# Patient Record
Sex: MALE | Race: WHITE | Employment: OTHER | URBAN - METROPOLITAN AREA
[De-identification: names, ages, dates, MRNs, and addresses within clinical notes are randomized per-mention and may not be internally consistent; named-entity substitution may affect disease eponyms.]

---

## 2017-01-01 ENCOUNTER — HOSPITAL ENCOUNTER (OUTPATIENT)
Dept: OTHER | Age: 37
Discharge: HOME | End: 2017-01-31
Attending: PSYCHIATRY & NEUROLOGY | Admitting: PSYCHIATRY & NEUROLOGY

## 2017-01-11 LAB
BASOPHILS ABSOLUTE: 0.1 K/CU MM
BASOPHILS RELATIVE PERCENT: 1 % (ref 0–1)
DIFFERENTIAL TYPE: ABNORMAL
EOSINOPHILS ABSOLUTE: 0.5 K/CU MM
EOSINOPHILS RELATIVE PERCENT: 10.3 % (ref 0–3)
HCT VFR BLD CALC: 41.7 % (ref 42–52)
HEMOGLOBIN: 13.1 GM/DL (ref 13.5–18)
IMMATURE NEUTROPHIL %: 0.4 % (ref 0–0.43)
LYMPHOCYTES ABSOLUTE: 2.1 K/CU MM
LYMPHOCYTES RELATIVE PERCENT: 41.9 % (ref 24–44)
MCH RBC QN AUTO: 28 PG (ref 27–31)
MCHC RBC AUTO-ENTMCNC: 31.4 % (ref 32–36)
MCV RBC AUTO: 89.1 FL (ref 78–100)
MONOCYTES ABSOLUTE: 0.4 K/CU MM
MONOCYTES RELATIVE PERCENT: 6.9 % (ref 0–4)
NUCLEATED RBC %: 0 %
PDW BLD-RTO: 15.1 % (ref 11.7–14.9)
PLATELET # BLD: 217 K/CU MM (ref 140–440)
PMV BLD AUTO: 11.2 FL (ref 7.5–11.1)
RBC # BLD: 4.68 M/CU MM (ref 4.6–6.2)
SEGMENTED NEUTROPHILS ABSOLUTE COUNT: 2 K/CU MM
SEGMENTED NEUTROPHILS RELATIVE PERCENT: 39.5 % (ref 36–66)
TOTAL IMMATURE NEUTOROPHIL: 0.02 K/CU MM
TOTAL NUCLEATED RBC: 0 K/CU MM
WBC # BLD: 5.1 K/CU MM (ref 4–10.5)

## 2017-02-01 ENCOUNTER — HOSPITAL ENCOUNTER (OUTPATIENT)
Dept: OTHER | Age: 37
Discharge: HOME | End: 2017-02-28
Attending: PSYCHIATRY & NEUROLOGY | Admitting: PSYCHIATRY & NEUROLOGY

## 2017-02-02 ENCOUNTER — HOSPITAL ENCOUNTER (OUTPATIENT)
Dept: PHYSICAL THERAPY | Age: 37
Discharge: HOME | End: 2017-02-28
Attending: INTERNAL MEDICINE | Admitting: INTERNAL MEDICINE

## 2017-02-02 ASSESSMENT — PAIN DESCRIPTION - DESCRIPTORS: DESCRIPTORS: ACHING

## 2017-02-02 ASSESSMENT — PAIN DESCRIPTION - FREQUENCY: FREQUENCY: CONTINUOUS

## 2017-02-02 ASSESSMENT — PAIN SCALES - GENERAL: PAINLEVEL_OUTOF10: 5

## 2017-02-02 ASSESSMENT — PAIN DESCRIPTION - LOCATION: LOCATION: NECK;BACK

## 2017-02-02 ASSESSMENT — PAIN DESCRIPTION - PAIN TYPE: TYPE: CHRONIC PAIN

## 2017-02-07 ENCOUNTER — HOSPITAL ENCOUNTER (OUTPATIENT)
Dept: PHYSICAL THERAPY | Age: 37
Discharge: HOME | End: 2017-02-07
Admitting: INTERNAL MEDICINE

## 2017-02-08 LAB
BASOPHILS ABSOLUTE: 0 K/CU MM
BASOPHILS RELATIVE PERCENT: 0.6 % (ref 0–1)
DIFFERENTIAL TYPE: ABNORMAL
EOSINOPHILS ABSOLUTE: 0.5 K/CU MM
EOSINOPHILS RELATIVE PERCENT: 7.2 % (ref 0–3)
HCT VFR BLD CALC: 40.8 % (ref 42–52)
HEMOGLOBIN: 13 GM/DL (ref 13.5–18)
IMMATURE NEUTROPHIL %: 1 % (ref 0–0.43)
LYMPHOCYTES ABSOLUTE: 1.7 K/CU MM
LYMPHOCYTES RELATIVE PERCENT: 25.2 % (ref 24–44)
MCH RBC QN AUTO: 28.8 PG (ref 27–31)
MCHC RBC AUTO-ENTMCNC: 31.9 % (ref 32–36)
MCV RBC AUTO: 90.5 FL (ref 78–100)
MONOCYTES ABSOLUTE: 0.4 K/CU MM
MONOCYTES RELATIVE PERCENT: 5.6 % (ref 0–4)
NUCLEATED RBC %: 0 %
PDW BLD-RTO: 13.6 % (ref 11.7–14.9)
PLATELET # BLD: 262 K/CU MM (ref 140–440)
PMV BLD AUTO: 10.8 FL (ref 7.5–11.1)
RBC # BLD: 4.51 M/CU MM (ref 4.6–6.2)
SEGMENTED NEUTROPHILS ABSOLUTE COUNT: 4.2 K/CU MM
SEGMENTED NEUTROPHILS RELATIVE PERCENT: 60.4 % (ref 36–66)
TOTAL IMMATURE NEUTOROPHIL: 0.07 K/CU MM
TOTAL NUCLEATED RBC: 0 K/CU MM
WBC # BLD: 6.9 K/CU MM (ref 4–10.5)

## 2017-02-16 ENCOUNTER — HOSPITAL ENCOUNTER (OUTPATIENT)
Dept: PHYSICAL THERAPY | Age: 37
Discharge: HOME | End: 2017-02-16
Admitting: INTERNAL MEDICINE

## 2017-02-28 ENCOUNTER — HOSPITAL ENCOUNTER (OUTPATIENT)
Dept: PHYSICAL THERAPY | Age: 37
Discharge: HOME | End: 2017-02-28
Admitting: INTERNAL MEDICINE

## 2017-03-01 ENCOUNTER — HOSPITAL ENCOUNTER (OUTPATIENT)
Dept: OTHER | Age: 37
Discharge: HOME | End: 2017-03-13
Attending: INTERNAL MEDICINE | Admitting: INTERNAL MEDICINE

## 2017-03-01 ENCOUNTER — HOSPITAL ENCOUNTER (OUTPATIENT)
Dept: OTHER | Age: 37
Discharge: HOME | End: 2017-03-31
Attending: PSYCHIATRY & NEUROLOGY | Admitting: PSYCHIATRY & NEUROLOGY

## 2017-03-08 ENCOUNTER — HOSPITAL ENCOUNTER (OUTPATIENT)
Dept: PHYSICAL THERAPY | Age: 37
Discharge: HOME | End: 2017-03-08
Admitting: INTERNAL MEDICINE

## 2017-03-08 LAB
BASOPHILS ABSOLUTE: 0 K/CU MM
BASOPHILS RELATIVE PERCENT: 0.9 % (ref 0–1)
DIFFERENTIAL TYPE: ABNORMAL
EOSINOPHILS ABSOLUTE: 0.5 K/CU MM
EOSINOPHILS RELATIVE PERCENT: 10.5 % (ref 0–3)
HCT VFR BLD CALC: 39.9 % (ref 42–52)
HEMOGLOBIN: 12.6 GM/DL (ref 13.5–18)
IMMATURE NEUTROPHIL %: 0.9 % (ref 0–0.43)
LYMPHOCYTES ABSOLUTE: 1.8 K/CU MM
LYMPHOCYTES RELATIVE PERCENT: 39.7 % (ref 24–44)
MCH RBC QN AUTO: 28.4 PG (ref 27–31)
MCHC RBC AUTO-ENTMCNC: 31.6 % (ref 32–36)
MCV RBC AUTO: 90.1 FL (ref 78–100)
MONOCYTES ABSOLUTE: 0.3 K/CU MM
MONOCYTES RELATIVE PERCENT: 7.4 % (ref 0–4)
NUCLEATED RBC %: 0 %
PDW BLD-RTO: 13.1 % (ref 11.7–14.9)
PLATELET # BLD: 232 K/CU MM (ref 140–440)
PMV BLD AUTO: 11.3 FL (ref 7.5–11.1)
RBC # BLD: 4.43 M/CU MM (ref 4.6–6.2)
SEGMENTED NEUTROPHILS ABSOLUTE COUNT: 1.8 K/CU MM
SEGMENTED NEUTROPHILS RELATIVE PERCENT: 40.6 % (ref 36–66)
TOTAL IMMATURE NEUTOROPHIL: 0.04 K/CU MM
TOTAL NUCLEATED RBC: 0 K/CU MM
WBC # BLD: 4.5 K/CU MM (ref 4–10.5)

## 2017-04-01 ENCOUNTER — HOSPITAL ENCOUNTER (OUTPATIENT)
Dept: OTHER | Age: 37
Discharge: HOME | End: 2017-04-30
Attending: PSYCHIATRY & NEUROLOGY | Admitting: PSYCHIATRY & NEUROLOGY

## 2017-04-05 LAB
BASOPHILS ABSOLUTE: 0.1 K/CU MM
BASOPHILS RELATIVE PERCENT: 0.9 % (ref 0–1)
DIFFERENTIAL TYPE: ABNORMAL
EOSINOPHILS ABSOLUTE: 0.6 K/CU MM
EOSINOPHILS RELATIVE PERCENT: 9.1 % (ref 0–3)
HCT VFR BLD CALC: 39.3 % (ref 42–52)
HEMOGLOBIN: 12.4 GM/DL (ref 13.5–18)
IMMATURE NEUTROPHIL %: 0.9 % (ref 0–0.43)
LYMPHOCYTES ABSOLUTE: 2.2 K/CU MM
LYMPHOCYTES RELATIVE PERCENT: 32.6 % (ref 24–44)
MCH RBC QN AUTO: 28.4 PG (ref 27–31)
MCHC RBC AUTO-ENTMCNC: 31.6 % (ref 32–36)
MCV RBC AUTO: 89.9 FL (ref 78–100)
MONOCYTES ABSOLUTE: 0.5 K/CU MM
MONOCYTES RELATIVE PERCENT: 6.9 % (ref 0–4)
NUCLEATED RBC %: 0 %
PDW BLD-RTO: 13 % (ref 11.7–14.9)
PLATELET # BLD: 256 K/CU MM (ref 140–440)
PMV BLD AUTO: 11.4 FL (ref 7.5–11.1)
RBC # BLD: 4.37 M/CU MM (ref 4.6–6.2)
SEGMENTED NEUTROPHILS ABSOLUTE COUNT: 3.3 K/CU MM
SEGMENTED NEUTROPHILS RELATIVE PERCENT: 49.6 % (ref 36–66)
TOTAL IMMATURE NEUTOROPHIL: 0.06 K/CU MM
TOTAL NUCLEATED RBC: 0 K/CU MM
WBC # BLD: 6.6 K/CU MM (ref 4–10.5)

## 2017-05-01 ENCOUNTER — HOSPITAL ENCOUNTER (OUTPATIENT)
Dept: OTHER | Age: 37
Discharge: HOME | End: 2017-05-31
Attending: PSYCHIATRY & NEUROLOGY | Admitting: PSYCHIATRY & NEUROLOGY

## 2017-05-03 LAB
BASOPHILS ABSOLUTE: 0.1 K/CU MM
BASOPHILS RELATIVE PERCENT: 1.3 % (ref 0–1)
DIFFERENTIAL TYPE: ABNORMAL
EOSINOPHILS ABSOLUTE: 1 K/CU MM
EOSINOPHILS RELATIVE PERCENT: 16.6 % (ref 0–3)
HCT VFR BLD CALC: 41 % (ref 42–52)
HEMOGLOBIN: 11.8 GM/DL (ref 13.5–18)
IMMATURE NEUTROPHIL %: 1.3 % (ref 0–0.43)
LYMPHOCYTES ABSOLUTE: 2 K/CU MM
LYMPHOCYTES RELATIVE PERCENT: 32.8 % (ref 24–44)
MCH RBC QN AUTO: 28.8 PG (ref 27–31)
MCHC RBC AUTO-ENTMCNC: 28.8 % (ref 32–36)
MCV RBC AUTO: 100 FL (ref 78–100)
MONOCYTES ABSOLUTE: 0.5 K/CU MM
MONOCYTES RELATIVE PERCENT: 8.5 % (ref 0–4)
NUCLEATED RBC %: 0.3 %
PDW BLD-RTO: 13.3 % (ref 11.7–14.9)
PLATELET # BLD: 240 K/CU MM (ref 140–440)
PMV BLD AUTO: 10.6 FL (ref 7.5–11.1)
RBC # BLD: 4.1 M/CU MM (ref 4.6–6.2)
SEGMENTED NEUTROPHILS ABSOLUTE COUNT: 2.4 K/CU MM
SEGMENTED NEUTROPHILS RELATIVE PERCENT: 39.5 % (ref 36–66)
TOTAL IMMATURE NEUTOROPHIL: 0.08 K/CU MM
TOTAL NUCLEATED RBC: 0 K/CU MM
WBC # BLD: 6 K/CU MM (ref 4–10.5)

## 2017-06-01 ENCOUNTER — HOSPITAL ENCOUNTER (OUTPATIENT)
Dept: OTHER | Age: 37
Discharge: HOME | End: 2017-06-30
Attending: PSYCHIATRY & NEUROLOGY | Admitting: PSYCHIATRY & NEUROLOGY

## 2017-06-07 LAB
BASOPHILS ABSOLUTE: 0.1 K/CU MM
BASOPHILS RELATIVE PERCENT: 1 % (ref 0–1)
DIFFERENTIAL TYPE: ABNORMAL
EOSINOPHILS ABSOLUTE: 0.7 K/CU MM
EOSINOPHILS RELATIVE PERCENT: 12.1 % (ref 0–3)
HCT VFR BLD CALC: 39.1 % (ref 42–52)
HEMOGLOBIN: 12.3 GM/DL (ref 13.5–18)
IMMATURE NEUTROPHIL %: 1.4 % (ref 0–0.43)
LYMPHOCYTES ABSOLUTE: 1.6 K/CU MM
LYMPHOCYTES RELATIVE PERCENT: 27.1 % (ref 24–44)
MCH RBC QN AUTO: 28.9 PG (ref 27–31)
MCHC RBC AUTO-ENTMCNC: 31.5 % (ref 32–36)
MCV RBC AUTO: 91.8 FL (ref 78–100)
MONOCYTES ABSOLUTE: 0.3 K/CU MM
MONOCYTES RELATIVE PERCENT: 5 % (ref 0–4)
NUCLEATED RBC %: 0 %
PDW BLD-RTO: 13 % (ref 11.7–14.9)
PLATELET # BLD: 248 K/CU MM (ref 140–440)
PMV BLD AUTO: 11.3 FL (ref 7.5–11.1)
RBC # BLD: 4.26 M/CU MM (ref 4.6–6.2)
SEGMENTED NEUTROPHILS ABSOLUTE COUNT: 3.1 K/CU MM
SEGMENTED NEUTROPHILS RELATIVE PERCENT: 53.4 % (ref 36–66)
TOTAL IMMATURE NEUTOROPHIL: 0.08 K/CU MM
TOTAL NUCLEATED RBC: 0 K/CU MM
WBC # BLD: 5.8 K/CU MM (ref 4–10.5)

## 2017-07-01 ENCOUNTER — HOSPITAL ENCOUNTER (OUTPATIENT)
Dept: OTHER | Age: 37
Discharge: HOME | End: 2017-07-31
Attending: PSYCHIATRY & NEUROLOGY | Admitting: PSYCHIATRY & NEUROLOGY

## 2017-07-12 LAB
BASOPHILS ABSOLUTE: 0.1 K/CU MM
BASOPHILS RELATIVE PERCENT: 1.3 % (ref 0–1)
DIFFERENTIAL TYPE: ABNORMAL
EOSINOPHILS ABSOLUTE: 0.6 K/CU MM
EOSINOPHILS RELATIVE PERCENT: 9 % (ref 0–3)
HCT VFR BLD CALC: 39.8 % (ref 42–52)
HEMOGLOBIN: 12.8 GM/DL (ref 13.5–18)
IMMATURE NEUTROPHIL %: 0.6 % (ref 0–0.43)
LYMPHOCYTES ABSOLUTE: 2.3 K/CU MM
LYMPHOCYTES RELATIVE PERCENT: 36.9 % (ref 24–44)
MCH RBC QN AUTO: 29 PG (ref 27–31)
MCHC RBC AUTO-ENTMCNC: 32.2 % (ref 32–36)
MCV RBC AUTO: 90.2 FL (ref 78–100)
MONOCYTES ABSOLUTE: 0.5 K/CU MM
MONOCYTES RELATIVE PERCENT: 8.2 % (ref 0–4)
NUCLEATED RBC %: 0 %
PDW BLD-RTO: 12.8 % (ref 11.7–14.9)
PLATELET # BLD: 264 K/CU MM (ref 140–440)
PMV BLD AUTO: 11 FL (ref 7.5–11.1)
RBC # BLD: 4.41 M/CU MM (ref 4.6–6.2)
SEGMENTED NEUTROPHILS ABSOLUTE COUNT: 2.7 K/CU MM
SEGMENTED NEUTROPHILS RELATIVE PERCENT: 44 % (ref 36–66)
TOTAL IMMATURE NEUTOROPHIL: 0.04 K/CU MM
TOTAL NUCLEATED RBC: 0 K/CU MM
WBC # BLD: 6.2 K/CU MM (ref 4–10.5)

## 2017-08-01 ENCOUNTER — HOSPITAL ENCOUNTER (OUTPATIENT)
Dept: OTHER | Age: 37
Discharge: HOME | End: 2017-08-31
Attending: PSYCHIATRY & NEUROLOGY | Admitting: PSYCHIATRY & NEUROLOGY

## 2017-09-01 ENCOUNTER — HOSPITAL ENCOUNTER (OUTPATIENT)
Dept: OTHER | Age: 37
Discharge: HOME | End: 2017-09-30
Attending: PSYCHIATRY & NEUROLOGY | Admitting: PSYCHIATRY & NEUROLOGY

## 2017-10-01 ENCOUNTER — HOSPITAL ENCOUNTER (OUTPATIENT)
Dept: OTHER | Age: 37
Discharge: HOME | End: 2017-10-31
Attending: PSYCHIATRY & NEUROLOGY | Admitting: PSYCHIATRY & NEUROLOGY

## 2017-10-04 LAB
BASOPHILS ABSOLUTE: 0.1 K/CU MM
BASOPHILS RELATIVE PERCENT: 0.9 % (ref 0–1)
DIFFERENTIAL TYPE: ABNORMAL
EOSINOPHILS ABSOLUTE: 0.5 K/CU MM
EOSINOPHILS RELATIVE PERCENT: 6.9 % (ref 0–3)
HCT VFR BLD CALC: 37.5 % (ref 42–52)
HEMOGLOBIN: 12.2 GM/DL (ref 13.5–18)
IMMATURE NEUTROPHIL %: 1.4 % (ref 0–0.43)
LYMPHOCYTES ABSOLUTE: 2.7 K/CU MM
LYMPHOCYTES RELATIVE PERCENT: 34.1 % (ref 24–44)
MCH RBC QN AUTO: 28.8 PG (ref 27–31)
MCHC RBC AUTO-ENTMCNC: 32.5 % (ref 32–36)
MCV RBC AUTO: 88.4 FL (ref 78–100)
MONOCYTES ABSOLUTE: 0.5 K/CU MM
MONOCYTES RELATIVE PERCENT: 6.2 % (ref 0–4)
NUCLEATED RBC %: 0 %
PDW BLD-RTO: 13.5 % (ref 11.7–14.9)
PLATELET # BLD: 252 K/CU MM (ref 140–440)
PMV BLD AUTO: 11.1 FL (ref 7.5–11.1)
RBC # BLD: 4.24 M/CU MM (ref 4.6–6.2)
SEGMENTED NEUTROPHILS ABSOLUTE COUNT: 3.9 K/CU MM
SEGMENTED NEUTROPHILS RELATIVE PERCENT: 50.5 % (ref 36–66)
TOTAL IMMATURE NEUTOROPHIL: 0.11 K/CU MM
TOTAL NUCLEATED RBC: 0 K/CU MM
WBC # BLD: 7.8 K/CU MM (ref 4–10.5)

## 2017-11-01 ENCOUNTER — HOSPITAL ENCOUNTER (OUTPATIENT)
Dept: OTHER | Age: 37
Discharge: HOME | End: 2017-11-30
Attending: PSYCHIATRY & NEUROLOGY | Admitting: PSYCHIATRY & NEUROLOGY

## 2017-12-01 ENCOUNTER — HOSPITAL ENCOUNTER (OUTPATIENT)
Dept: OTHER | Age: 37
Discharge: HOME | End: 2017-12-31
Attending: PSYCHIATRY & NEUROLOGY | Admitting: PSYCHIATRY & NEUROLOGY

## 2017-12-06 LAB
BASOPHILS ABSOLUTE: 0.1 K/CU MM
BASOPHILS RELATIVE PERCENT: 1.3 % (ref 0–1)
DIFFERENTIAL TYPE: ABNORMAL
EOSINOPHILS ABSOLUTE: 0.7 K/CU MM
EOSINOPHILS RELATIVE PERCENT: 12.9 % (ref 0–3)
HCT VFR BLD CALC: 39.3 % (ref 42–52)
HEMOGLOBIN: 12.4 GM/DL (ref 13.5–18)
IMMATURE NEUTROPHIL %: 0.9 % (ref 0–0.43)
LYMPHOCYTES ABSOLUTE: 1.9 K/CU MM
LYMPHOCYTES RELATIVE PERCENT: 34.7 % (ref 24–44)
MCH RBC QN AUTO: 28.5 PG (ref 27–31)
MCHC RBC AUTO-ENTMCNC: 31.6 % (ref 32–36)
MCV RBC AUTO: 90.3 FL (ref 78–100)
MONOCYTES ABSOLUTE: 0.5 K/CU MM
MONOCYTES RELATIVE PERCENT: 8.2 % (ref 0–4)
NUCLEATED RBC %: 0 %
PDW BLD-RTO: 14.6 % (ref 11.7–14.9)
PLATELET # BLD: 251 K/CU MM (ref 140–440)
PMV BLD AUTO: 11.5 FL (ref 7.5–11.1)
RBC # BLD: 4.35 M/CU MM (ref 4.6–6.2)
SEGMENTED NEUTROPHILS ABSOLUTE COUNT: 2.4 K/CU MM
SEGMENTED NEUTROPHILS RELATIVE PERCENT: 42 % (ref 36–66)
TOTAL IMMATURE NEUTOROPHIL: 0.05 K/CU MM
TOTAL NUCLEATED RBC: 0 K/CU MM
WBC # BLD: 5.6 K/CU MM (ref 4–10.5)

## 2018-01-01 ENCOUNTER — HOSPITAL ENCOUNTER (OUTPATIENT)
Dept: OTHER | Age: 38
Discharge: HOME | End: 2018-01-31
Attending: PSYCHIATRY & NEUROLOGY | Admitting: PSYCHIATRY & NEUROLOGY

## 2018-01-03 LAB
BASOPHILS ABSOLUTE: 0 K/CU MM
BASOPHILS RELATIVE PERCENT: 0.3 % (ref 0–1)
DIFFERENTIAL TYPE: ABNORMAL
EOSINOPHILS ABSOLUTE: 0 K/CU MM
EOSINOPHILS RELATIVE PERCENT: 0.1 % (ref 0–3)
HCT VFR BLD CALC: 36.9 % (ref 42–52)
HEMOGLOBIN: 12 GM/DL (ref 13.5–18)
IMMATURE NEUTROPHIL %: 0.3 % (ref 0–0.43)
LYMPHOCYTES ABSOLUTE: 1.4 K/CU MM
LYMPHOCYTES RELATIVE PERCENT: 15.7 % (ref 24–44)
MCH RBC QN AUTO: 28.6 PG (ref 27–31)
MCHC RBC AUTO-ENTMCNC: 32.5 % (ref 32–36)
MCV RBC AUTO: 88.1 FL (ref 78–100)
MONOCYTES ABSOLUTE: 0.5 K/CU MM
MONOCYTES RELATIVE PERCENT: 5.6 % (ref 0–4)
NUCLEATED RBC %: 0 %
PDW BLD-RTO: 13.9 % (ref 11.7–14.9)
PLATELET # BLD: 275 K/CU MM (ref 140–440)
PMV BLD AUTO: 11.5 FL (ref 7.5–11.1)
RBC # BLD: 4.19 M/CU MM (ref 4.6–6.2)
SEGMENTED NEUTROPHILS ABSOLUTE COUNT: 7.2 K/CU MM
SEGMENTED NEUTROPHILS RELATIVE PERCENT: 78 % (ref 36–66)
TOTAL IMMATURE NEUTOROPHIL: 0.03 K/CU MM
TOTAL NUCLEATED RBC: 0 K/CU MM
WBC # BLD: 9.2 K/CU MM (ref 4–10.5)

## 2018-02-01 ENCOUNTER — HOSPITAL ENCOUNTER (OUTPATIENT)
Dept: OTHER | Age: 38
Discharge: HOME | End: 2018-02-28
Attending: PSYCHIATRY & NEUROLOGY | Admitting: PSYCHIATRY & NEUROLOGY

## 2018-02-07 LAB
BASOPHILS ABSOLUTE: 0.1 K/CU MM
BASOPHILS RELATIVE PERCENT: 0.7 % (ref 0–1)
DIFFERENTIAL TYPE: ABNORMAL
EOSINOPHILS ABSOLUTE: 0.9 K/CU MM
EOSINOPHILS RELATIVE PERCENT: 13.1 % (ref 0–3)
HCT VFR BLD CALC: 36.1 % (ref 42–52)
HEMOGLOBIN: 11.5 GM/DL (ref 13.5–18)
IMMATURE NEUTROPHIL %: 0.7 % (ref 0–0.43)
LYMPHOCYTES ABSOLUTE: 2.3 K/CU MM
LYMPHOCYTES RELATIVE PERCENT: 33.6 % (ref 24–44)
MCH RBC QN AUTO: 28.8 PG (ref 27–31)
MCHC RBC AUTO-ENTMCNC: 31.9 % (ref 32–36)
MCV RBC AUTO: 90.3 FL (ref 78–100)
MONOCYTES ABSOLUTE: 0.5 K/CU MM
MONOCYTES RELATIVE PERCENT: 7.8 % (ref 0–4)
NUCLEATED RBC %: 0 %
PDW BLD-RTO: 13.6 % (ref 11.7–14.9)
PLATELET # BLD: 237 K/CU MM (ref 140–440)
PMV BLD AUTO: 11.2 FL (ref 7.5–11.1)
RBC # BLD: 4 M/CU MM (ref 4.6–6.2)
SEGMENTED NEUTROPHILS ABSOLUTE COUNT: 3 K/CU MM
SEGMENTED NEUTROPHILS RELATIVE PERCENT: 44.1 % (ref 36–66)
TOTAL IMMATURE NEUTOROPHIL: 0.05 K/CU MM
TOTAL NUCLEATED RBC: 0 K/CU MM
WBC # BLD: 6.7 K/CU MM (ref 4–10.5)

## 2018-03-01 ENCOUNTER — HOSPITAL ENCOUNTER (OUTPATIENT)
Dept: OTHER | Age: 38
Discharge: HOME | End: 2018-03-31
Attending: PSYCHIATRY & NEUROLOGY | Admitting: PSYCHIATRY & NEUROLOGY

## 2018-03-07 LAB
BASOPHILS ABSOLUTE: 0.1 K/CU MM
BASOPHILS RELATIVE PERCENT: 1.4 % (ref 0–1)
DIFFERENTIAL TYPE: ABNORMAL
EOSINOPHILS ABSOLUTE: 0.7 K/CU MM
EOSINOPHILS RELATIVE PERCENT: 11.6 % (ref 0–3)
HCT VFR BLD CALC: 38.4 % (ref 42–52)
HEMOGLOBIN: 11.8 GM/DL (ref 13.5–18)
IMMATURE NEUTROPHIL %: 0.9 % (ref 0–0.43)
LYMPHOCYTES ABSOLUTE: 1.8 K/CU MM
LYMPHOCYTES RELATIVE PERCENT: 31.1 % (ref 24–44)
MCH RBC QN AUTO: 28.2 PG (ref 27–31)
MCHC RBC AUTO-ENTMCNC: 30.7 % (ref 32–36)
MCV RBC AUTO: 91.9 FL (ref 78–100)
MONOCYTES ABSOLUTE: 0.3 K/CU MM
MONOCYTES RELATIVE PERCENT: 5.8 % (ref 0–4)
NUCLEATED RBC %: 0 %
PDW BLD-RTO: 14.2 % (ref 11.7–14.9)
PLATELET # BLD: 263 K/CU MM (ref 140–440)
PMV BLD AUTO: 11 FL (ref 7.5–11.1)
RBC # BLD: 4.18 M/CU MM (ref 4.6–6.2)
SEGMENTED NEUTROPHILS ABSOLUTE COUNT: 2.8 K/CU MM
SEGMENTED NEUTROPHILS RELATIVE PERCENT: 49.2 % (ref 36–66)
TOTAL IMMATURE NEUTOROPHIL: 0.05 K/CU MM
TOTAL NUCLEATED RBC: 0 K/CU MM
WBC # BLD: 5.7 K/CU MM (ref 4–10.5)

## 2018-04-01 ENCOUNTER — HOSPITAL ENCOUNTER (OUTPATIENT)
Dept: OTHER | Age: 38
Discharge: HOME | End: 2018-04-30
Attending: PSYCHIATRY & NEUROLOGY | Admitting: PSYCHIATRY & NEUROLOGY

## 2018-04-04 LAB
BASOPHILS ABSOLUTE: 0.1 K/CU MM
BASOPHILS RELATIVE PERCENT: 0.8 % (ref 0–1)
DIFFERENTIAL TYPE: ABNORMAL
EOSINOPHILS ABSOLUTE: 1.1 K/CU MM
EOSINOPHILS RELATIVE PERCENT: 12.9 % (ref 0–3)
HCT VFR BLD CALC: 37.3 % (ref 42–52)
HEMOGLOBIN: 11.7 GM/DL (ref 13.5–18)
IMMATURE NEUTROPHIL %: 1.4 % (ref 0–0.43)
LYMPHOCYTES ABSOLUTE: 2.2 K/CU MM
LYMPHOCYTES RELATIVE PERCENT: 26.4 % (ref 24–44)
MCH RBC QN AUTO: 28.9 PG (ref 27–31)
MCHC RBC AUTO-ENTMCNC: 31.4 % (ref 32–36)
MCV RBC AUTO: 92.1 FL (ref 78–100)
MONOCYTES ABSOLUTE: 0.5 K/CU MM
MONOCYTES RELATIVE PERCENT: 6.1 % (ref 0–4)
NUCLEATED RBC %: 0 %
PDW BLD-RTO: 13.6 % (ref 11.7–14.9)
PLATELET # BLD: 267 K/CU MM (ref 140–440)
PMV BLD AUTO: 10.7 FL (ref 7.5–11.1)
RBC # BLD: 4.05 M/CU MM (ref 4.6–6.2)
SEGMENTED NEUTROPHILS ABSOLUTE COUNT: 4.4 K/CU MM
SEGMENTED NEUTROPHILS RELATIVE PERCENT: 52.4 % (ref 36–66)
TOTAL IMMATURE NEUTOROPHIL: 0.12 K/CU MM
TOTAL NUCLEATED RBC: 0 K/CU MM
WBC # BLD: 8.5 K/CU MM (ref 4–10.5)

## 2018-05-01 ENCOUNTER — HOSPITAL ENCOUNTER (OUTPATIENT)
Dept: OTHER | Age: 38
Discharge: HOME | End: 2018-05-31
Attending: PSYCHIATRY & NEUROLOGY | Admitting: PSYCHIATRY & NEUROLOGY

## 2018-05-02 LAB
BASOPHILS ABSOLUTE: 0.1 K/CU MM
BASOPHILS RELATIVE PERCENT: 1.4 % (ref 0–1)
DIFFERENTIAL TYPE: ABNORMAL
EOSINOPHILS ABSOLUTE: 0.8 K/CU MM
EOSINOPHILS RELATIVE PERCENT: 14.9 % (ref 0–3)
HCT VFR BLD CALC: 38.3 % (ref 42–52)
HEMOGLOBIN: 12 GM/DL (ref 13.5–18)
IMMATURE NEUTROPHIL %: 0.2 % (ref 0–0.43)
LYMPHOCYTES ABSOLUTE: 2.3 K/CU MM
LYMPHOCYTES RELATIVE PERCENT: 45 % (ref 24–44)
MCH RBC QN AUTO: 28.5 PG (ref 27–31)
MCHC RBC AUTO-ENTMCNC: 31.3 % (ref 32–36)
MCV RBC AUTO: 91 FL (ref 78–100)
MONOCYTES ABSOLUTE: 0.4 K/CU MM
MONOCYTES RELATIVE PERCENT: 8.5 % (ref 0–4)
NUCLEATED RBC %: 0 %
PDW BLD-RTO: 13.2 % (ref 11.7–14.9)
PLATELET # BLD: 242 K/CU MM (ref 140–440)
PMV BLD AUTO: 11 FL (ref 7.5–11.1)
RBC # BLD: 4.21 M/CU MM (ref 4.6–6.2)
SEGMENTED NEUTROPHILS ABSOLUTE COUNT: 1.5 K/CU MM
SEGMENTED NEUTROPHILS RELATIVE PERCENT: 30 % (ref 36–66)
TOTAL IMMATURE NEUTOROPHIL: 0.01 K/CU MM
TOTAL NUCLEATED RBC: 0 K/CU MM
WBC # BLD: 5.1 K/CU MM (ref 4–10.5)

## 2018-06-01 ENCOUNTER — HOSPITAL ENCOUNTER (OUTPATIENT)
Dept: OTHER | Age: 38
Discharge: HOME | End: 2018-06-30
Attending: PSYCHIATRY & NEUROLOGY | Admitting: PSYCHIATRY & NEUROLOGY

## 2018-06-06 LAB
BASOPHILS ABSOLUTE: 0.1 K/CU MM
BASOPHILS RELATIVE PERCENT: 0.6 % (ref 0–1)
DIFFERENTIAL TYPE: ABNORMAL
EOSINOPHILS ABSOLUTE: 0.9 K/CU MM
EOSINOPHILS RELATIVE PERCENT: 11.8 % (ref 0–3)
HCT VFR BLD CALC: 38.7 % (ref 42–52)
HEMOGLOBIN: 12.3 GM/DL (ref 13.5–18)
IMMATURE NEUTROPHIL %: 0.3 % (ref 0–0.43)
LYMPHOCYTES ABSOLUTE: 2.9 K/CU MM
LYMPHOCYTES RELATIVE PERCENT: 37.1 % (ref 24–44)
MCH RBC QN AUTO: 28.9 PG (ref 27–31)
MCHC RBC AUTO-ENTMCNC: 31.8 % (ref 32–36)
MCV RBC AUTO: 90.8 FL (ref 78–100)
MONOCYTES ABSOLUTE: 0.6 K/CU MM
MONOCYTES RELATIVE PERCENT: 7.3 % (ref 0–4)
NUCLEATED RBC %: 0 %
PDW BLD-RTO: 13.4 % (ref 11.7–14.9)
PLATELET # BLD: 237 K/CU MM (ref 140–440)
PMV BLD AUTO: 11.4 FL (ref 7.5–11.1)
RBC # BLD: 4.26 M/CU MM (ref 4.6–6.2)
SEGMENTED NEUTROPHILS ABSOLUTE COUNT: 3.3 K/CU MM
SEGMENTED NEUTROPHILS RELATIVE PERCENT: 42.9 % (ref 36–66)
TOTAL IMMATURE NEUTOROPHIL: 0.02 K/CU MM
TOTAL NUCLEATED RBC: 0 K/CU MM
WBC # BLD: 7.8 K/CU MM (ref 4–10.5)

## 2018-07-01 ENCOUNTER — HOSPITAL ENCOUNTER (OUTPATIENT)
Dept: OTHER | Age: 38
Discharge: HOME | End: 2018-07-31
Attending: PSYCHIATRY & NEUROLOGY | Admitting: PSYCHIATRY & NEUROLOGY

## 2018-07-11 LAB
BASOPHILS ABSOLUTE: 0.1 K/CU MM
BASOPHILS RELATIVE PERCENT: 1.3 % (ref 0–1)
DIFFERENTIAL TYPE: ABNORMAL
EOSINOPHILS ABSOLUTE: 0.3 K/CU MM
EOSINOPHILS RELATIVE PERCENT: 6.2 % (ref 0–3)
HCT VFR BLD CALC: 40.9 % (ref 42–52)
HEMOGLOBIN: 12.5 GM/DL (ref 13.5–18)
IMMATURE NEUTROPHIL %: 1.1 % (ref 0–0.43)
LYMPHOCYTES ABSOLUTE: 2 K/CU MM
LYMPHOCYTES RELATIVE PERCENT: 37.7 % (ref 24–44)
MCH RBC QN AUTO: 28.3 PG (ref 27–31)
MCHC RBC AUTO-ENTMCNC: 30.6 % (ref 32–36)
MCV RBC AUTO: 92.5 FL (ref 78–100)
MONOCYTES ABSOLUTE: 0.5 K/CU MM
MONOCYTES RELATIVE PERCENT: 8.5 % (ref 0–4)
NUCLEATED RBC %: 0 %
PDW BLD-RTO: 14.3 % (ref 11.7–14.9)
PLATELET # BLD: 239 K/CU MM (ref 140–440)
PMV BLD AUTO: 11.3 FL (ref 7.5–11.1)
RBC # BLD: 4.42 M/CU MM (ref 4.6–6.2)
SEGMENTED NEUTROPHILS ABSOLUTE COUNT: 2.4 K/CU MM
SEGMENTED NEUTROPHILS RELATIVE PERCENT: 45.2 % (ref 36–66)
TOTAL IMMATURE NEUTOROPHIL: 0.06 K/CU MM
TOTAL NUCLEATED RBC: 0 K/CU MM
WBC # BLD: 5.3 K/CU MM (ref 4–10.5)

## 2018-08-01 ENCOUNTER — HOSPITAL ENCOUNTER (OUTPATIENT)
Dept: OTHER | Age: 38
Discharge: HOME | End: 2018-08-31
Attending: PSYCHIATRY & NEUROLOGY | Admitting: PSYCHIATRY & NEUROLOGY

## 2018-08-10 LAB
BASOPHILS ABSOLUTE: 0.1 K/CU MM
BASOPHILS RELATIVE PERCENT: 1.1 % (ref 0–1)
DIFFERENTIAL TYPE: ABNORMAL
EOSINOPHILS ABSOLUTE: 0.6 K/CU MM
EOSINOPHILS RELATIVE PERCENT: 12.2 % (ref 0–3)
HCT VFR BLD CALC: 35.6 % (ref 42–52)
HEMOGLOBIN: 10.6 GM/DL (ref 13.5–18)
IMMATURE NEUTROPHIL %: 0.6 % (ref 0–0.43)
LYMPHOCYTES ABSOLUTE: 1.6 K/CU MM
LYMPHOCYTES RELATIVE PERCENT: 33.3 % (ref 24–44)
MCH RBC QN AUTO: 28.6 PG (ref 27–31)
MCHC RBC AUTO-ENTMCNC: 29.8 % (ref 32–36)
MCV RBC AUTO: 96 FL (ref 78–100)
MONOCYTES ABSOLUTE: 0.4 K/CU MM
MONOCYTES RELATIVE PERCENT: 9.1 % (ref 0–4)
NUCLEATED RBC %: 0 %
PDW BLD-RTO: 14.2 % (ref 11.7–14.9)
PLATELET # BLD: 255 K/CU MM (ref 140–440)
PMV BLD AUTO: 11.3 FL (ref 7.5–11.1)
RBC # BLD: 3.71 M/CU MM (ref 4.6–6.2)
SEGMENTED NEUTROPHILS ABSOLUTE COUNT: 2.1 K/CU MM
SEGMENTED NEUTROPHILS RELATIVE PERCENT: 43.7 % (ref 36–66)
TOTAL IMMATURE NEUTOROPHIL: 0.03 K/CU MM
TOTAL NUCLEATED RBC: 0 K/CU MM
WBC # BLD: 4.8 K/CU MM (ref 4–10.5)

## 2018-09-01 ENCOUNTER — HOSPITAL ENCOUNTER (OUTPATIENT)
Dept: OTHER | Age: 38
Discharge: HOME | End: 2018-09-01
Attending: PSYCHIATRY & NEUROLOGY | Admitting: PSYCHIATRY & NEUROLOGY

## 2018-09-05 LAB
BASOPHILS ABSOLUTE: 0.1 K/CU MM
BASOPHILS RELATIVE PERCENT: 0.8 % (ref 0–1)
DIFFERENTIAL TYPE: ABNORMAL
EOSINOPHILS ABSOLUTE: 0.8 K/CU MM
EOSINOPHILS RELATIVE PERCENT: 11.7 % (ref 0–3)
HCT VFR BLD CALC: 32.5 % (ref 42–52)
HEMOGLOBIN: 9.9 GM/DL (ref 13.5–18)
IMMATURE NEUTROPHIL %: 0.8 % (ref 0–0.43)
LYMPHOCYTES ABSOLUTE: 2.7 K/CU MM
LYMPHOCYTES RELATIVE PERCENT: 38.5 % (ref 24–44)
MCH RBC QN AUTO: 28.6 PG (ref 27–31)
MCHC RBC AUTO-ENTMCNC: 30.5 % (ref 32–36)
MCV RBC AUTO: 93.9 FL (ref 78–100)
MONOCYTES ABSOLUTE: 0.5 K/CU MM
MONOCYTES RELATIVE PERCENT: 7.3 % (ref 0–4)
NUCLEATED RBC %: 0 %
PDW BLD-RTO: 14.1 % (ref 11.7–14.9)
PLATELET # BLD: 236 K/CU MM (ref 140–440)
PMV BLD AUTO: 11.1 FL (ref 7.5–11.1)
RBC # BLD: 3.46 M/CU MM (ref 4.6–6.2)
SEGMENTED NEUTROPHILS ABSOLUTE COUNT: 2.9 K/CU MM
SEGMENTED NEUTROPHILS RELATIVE PERCENT: 40.9 % (ref 36–66)
TOTAL IMMATURE NEUTOROPHIL: 0.06 K/CU MM
TOTAL NUCLEATED RBC: 0 K/CU MM
WBC # BLD: 7.1 K/CU MM (ref 4–10.5)

## 2018-10-03 ENCOUNTER — HOSPITAL ENCOUNTER (OUTPATIENT)
Age: 38
Setting detail: SPECIMEN
Discharge: HOME | End: 2018-10-03
Payer: COMMERCIAL

## 2018-11-07 ENCOUNTER — HOSPITAL ENCOUNTER (OUTPATIENT)
Age: 38
Setting detail: SPECIMEN
Discharge: HOME | End: 2018-11-07
Payer: COMMERCIAL

## 2018-11-07 LAB
BASOPHILS ABSOLUTE: 0.1 K/CU MM
BASOPHILS RELATIVE PERCENT: 1 % (ref 0–1)
DIFFERENTIAL TYPE: ABNORMAL
EOSINOPHILS ABSOLUTE: 0.6 K/CU MM
EOSINOPHILS RELATIVE PERCENT: 11.3 % (ref 0–3)
HCT VFR BLD CALC: 37.2 % (ref 42–52)
HEMOGLOBIN: 11.6 GM/DL (ref 13.5–18)
IMMATURE NEUTROPHIL %: 0.4 % (ref 0–0.43)
LYMPHOCYTES ABSOLUTE: 2 K/CU MM
LYMPHOCYTES RELATIVE PERCENT: 41.6 % (ref 24–44)
MCH RBC QN AUTO: 28.9 PG (ref 27–31)
MCHC RBC AUTO-ENTMCNC: 31.2 % (ref 32–36)
MCV RBC AUTO: 92.5 FL (ref 78–100)
MONOCYTES ABSOLUTE: 0.4 K/CU MM
MONOCYTES RELATIVE PERCENT: 7.4 % (ref 0–4)
NUCLEATED RBC %: 0 %
PDW BLD-RTO: 14.3 % (ref 11.7–14.9)
PLATELET # BLD: 249 K/CU MM (ref 140–440)
PMV BLD AUTO: 10.6 FL (ref 7.5–11.1)
RBC # BLD: 4.02 M/CU MM (ref 4.6–6.2)
SEGMENTED NEUTROPHILS ABSOLUTE COUNT: 1.9 K/CU MM
SEGMENTED NEUTROPHILS RELATIVE PERCENT: 38.3 % (ref 36–66)
TOTAL IMMATURE NEUTOROPHIL: 0.02 K/CU MM
TOTAL NUCLEATED RBC: 0 K/CU MM
WBC # BLD: 4.9 K/CU MM (ref 4–10.5)

## 2018-11-07 PROCEDURE — 36415 COLL VENOUS BLD VENIPUNCTURE: CPT

## 2018-11-07 PROCEDURE — 85025 COMPLETE CBC W/AUTO DIFF WBC: CPT

## 2018-12-05 ENCOUNTER — HOSPITAL ENCOUNTER (OUTPATIENT)
Age: 38
Setting detail: SPECIMEN
Discharge: HOME | End: 2018-12-05
Payer: COMMERCIAL

## 2018-12-05 LAB
BASOPHILS ABSOLUTE: 0.1 K/CU MM
BASOPHILS RELATIVE PERCENT: 1.1 % (ref 0–1)
DIFFERENTIAL TYPE: ABNORMAL
EOSINOPHILS ABSOLUTE: 1.2 K/CU MM
EOSINOPHILS RELATIVE PERCENT: 14.4 % (ref 0–3)
HCT VFR BLD CALC: 40.4 % (ref 42–52)
HEMOGLOBIN: 12.1 GM/DL (ref 13.5–18)
IMMATURE NEUTROPHIL %: 0.6 % (ref 0–0.43)
LYMPHOCYTES ABSOLUTE: 2.6 K/CU MM
LYMPHOCYTES RELATIVE PERCENT: 31.6 % (ref 24–44)
MCH RBC QN AUTO: 28.1 PG (ref 27–31)
MCHC RBC AUTO-ENTMCNC: 30 % (ref 32–36)
MCV RBC AUTO: 94 FL (ref 78–100)
MONOCYTES ABSOLUTE: 0.7 K/CU MM
MONOCYTES RELATIVE PERCENT: 8 % (ref 0–4)
NUCLEATED RBC %: 0 %
PDW BLD-RTO: 14.3 % (ref 11.7–14.9)
PLATELET # BLD: 308 K/CU MM (ref 140–440)
PMV BLD AUTO: 11 FL (ref 7.5–11.1)
RBC # BLD: 4.3 M/CU MM (ref 4.6–6.2)
SEGMENTED NEUTROPHILS ABSOLUTE COUNT: 3.7 K/CU MM
SEGMENTED NEUTROPHILS RELATIVE PERCENT: 44.3 % (ref 36–66)
TOTAL IMMATURE NEUTOROPHIL: 0.05 K/CU MM
TOTAL NUCLEATED RBC: 0 K/CU MM
WBC # BLD: 8.3 K/CU MM (ref 4–10.5)

## 2018-12-05 PROCEDURE — 36415 COLL VENOUS BLD VENIPUNCTURE: CPT

## 2018-12-05 PROCEDURE — 85025 COMPLETE CBC W/AUTO DIFF WBC: CPT

## 2018-12-10 ENCOUNTER — HOSPITAL ENCOUNTER (EMERGENCY)
Age: 38
Discharge: PSYCHIATRIC OR SUBSTANCE USE FACILITY | End: 2018-12-10
Attending: EMERGENCY MEDICINE
Payer: COMMERCIAL

## 2018-12-10 VITALS
OXYGEN SATURATION: 97 % | TEMPERATURE: 98.3 F | WEIGHT: 215 LBS | SYSTOLIC BLOOD PRESSURE: 106 MMHG | HEIGHT: 69 IN | DIASTOLIC BLOOD PRESSURE: 70 MMHG | HEART RATE: 85 BPM | RESPIRATION RATE: 18 BRPM | BODY MASS INDEX: 31.84 KG/M2

## 2018-12-10 DIAGNOSIS — F20.0 PARANOID SCHIZOPHRENIA (HCC): Primary | ICD-10-CM

## 2018-12-10 LAB
ACETAMINOPHEN LEVEL: <5 UG/ML (ref 15–30)
ALBUMIN SERPL-MCNC: 4.7 GM/DL (ref 3.4–5)
ALCOHOL SCREEN SERUM: <0.01 %WT/VOL
ALP BLD-CCNC: 38 IU/L (ref 40–129)
ALT SERPL-CCNC: 17 U/L (ref 10–40)
AMPHETAMINES: NEGATIVE
ANION GAP SERPL CALCULATED.3IONS-SCNC: 16 MMOL/L (ref 4–16)
AST SERPL-CCNC: 22 IU/L (ref 15–37)
BARBITURATE SCREEN URINE: NEGATIVE
BASOPHILS ABSOLUTE: 0.1 K/CU MM
BASOPHILS RELATIVE PERCENT: 0.9 % (ref 0–1)
BENZODIAZEPINE SCREEN, URINE: NEGATIVE
BILIRUB SERPL-MCNC: 0.3 MG/DL (ref 0–1)
BUN BLDV-MCNC: 9 MG/DL (ref 6–23)
CALCIUM SERPL-MCNC: 9.8 MG/DL (ref 8.3–10.6)
CANNABINOID SCREEN URINE: ABNORMAL
CHLORIDE BLD-SCNC: 103 MMOL/L (ref 99–110)
CO2: 20 MMOL/L (ref 21–32)
COCAINE METABOLITE: NEGATIVE
CREAT SERPL-MCNC: 0.9 MG/DL (ref 0.9–1.3)
DIFFERENTIAL TYPE: ABNORMAL
EOSINOPHILS ABSOLUTE: 0.6 K/CU MM
EOSINOPHILS RELATIVE PERCENT: 9.2 % (ref 0–3)
GFR AFRICAN AMERICAN: >60 ML/MIN/1.73M2
GFR NON-AFRICAN AMERICAN: >60 ML/MIN/1.73M2
GLUCOSE BLD-MCNC: 96 MG/DL (ref 70–99)
HCT VFR BLD CALC: 36.6 % (ref 42–52)
HEMOGLOBIN: 11.7 GM/DL (ref 13.5–18)
IMMATURE NEUTROPHIL %: 0.7 % (ref 0–0.43)
LYMPHOCYTES ABSOLUTE: 1.9 K/CU MM
LYMPHOCYTES RELATIVE PERCENT: 28.1 % (ref 24–44)
MCH RBC QN AUTO: 28.7 PG (ref 27–31)
MCHC RBC AUTO-ENTMCNC: 32 % (ref 32–36)
MCV RBC AUTO: 89.7 FL (ref 78–100)
MONOCYTES ABSOLUTE: 0.4 K/CU MM
MONOCYTES RELATIVE PERCENT: 5.7 % (ref 0–4)
NUCLEATED RBC %: 0 %
OPIATES, URINE: NEGATIVE
OXYCODONE: NEGATIVE
PDW BLD-RTO: 13.8 % (ref 11.7–14.9)
PHENCYCLIDINE, URINE: NEGATIVE
PLATELET # BLD: 266 K/CU MM (ref 140–440)
PMV BLD AUTO: 10.9 FL (ref 7.5–11.1)
POTASSIUM SERPL-SCNC: 4 MMOL/L (ref 3.5–5.1)
RBC # BLD: 4.08 M/CU MM (ref 4.6–6.2)
SALICYLATE LEVEL: <0.3 MG/DL (ref 15–30)
SEGMENTED NEUTROPHILS ABSOLUTE COUNT: 3.7 K/CU MM
SEGMENTED NEUTROPHILS RELATIVE PERCENT: 55.4 % (ref 36–66)
SODIUM BLD-SCNC: 139 MMOL/L (ref 135–145)
TOTAL IMMATURE NEUTOROPHIL: 0.05 K/CU MM
TOTAL NUCLEATED RBC: 0 K/CU MM
TOTAL PROTEIN: 7.6 GM/DL (ref 6.4–8.2)
WBC # BLD: 6.7 K/CU MM (ref 4–10.5)

## 2018-12-10 PROCEDURE — 80053 COMPREHEN METABOLIC PANEL: CPT

## 2018-12-10 PROCEDURE — 99285 EMERGENCY DEPT VISIT HI MDM: CPT

## 2018-12-10 PROCEDURE — 94640 AIRWAY INHALATION TREATMENT: CPT

## 2018-12-10 PROCEDURE — 80307 DRUG TEST PRSMV CHEM ANLYZR: CPT

## 2018-12-10 PROCEDURE — G0480 DRUG TEST DEF 1-7 CLASSES: HCPCS

## 2018-12-10 PROCEDURE — 6370000000 HC RX 637 (ALT 250 FOR IP): Performed by: EMERGENCY MEDICINE

## 2018-12-10 PROCEDURE — 36415 COLL VENOUS BLD VENIPUNCTURE: CPT

## 2018-12-10 PROCEDURE — 85025 COMPLETE CBC W/AUTO DIFF WBC: CPT

## 2018-12-10 RX ORDER — ALBUTEROL SULFATE 90 UG/1
2 AEROSOL, METERED RESPIRATORY (INHALATION) ONCE
Status: COMPLETED | OUTPATIENT
Start: 2018-12-10 | End: 2018-12-10

## 2018-12-10 RX ADMIN — ALBUTEROL SULFATE 2 PUFF: 90 AEROSOL, METERED RESPIRATORY (INHALATION) at 18:48

## 2018-12-10 ASSESSMENT — SLEEP AND FATIGUE QUESTIONNAIRES
RESTFUL SLEEP: NO
DIFFICULTY ARISING: NO
DO YOU USE A SLEEP AID: YES
DIFFICULTY FALLING ASLEEP: YES
SLEEP PATTERN: DIFFICULTY FALLING ASLEEP
DIFFICULTY STAYING ASLEEP: NO
DO YOU HAVE DIFFICULTY SLEEPING: YES
AVERAGE NUMBER OF SLEEP HOURS: 4

## 2018-12-10 ASSESSMENT — LIFESTYLE VARIABLES: HISTORY_ALCOHOL_USE: NO

## 2018-12-10 ASSESSMENT — PATIENT HEALTH QUESTIONNAIRE - PHQ9: SUM OF ALL RESPONSES TO PHQ QUESTIONS 1-9: 8

## 2018-12-10 NOTE — ED NOTES
Patient now being assessed by [facility]. Anabelle on Ipad. Alpa Guillen.  BHAVANI Villegas  12/10/18 3126

## 2018-12-10 NOTE — ED NOTES
Boxed lunch given per patient request. Sitter at bedside. Patient updated on admission to [employer]. Bev Garrett.  BHAVANI Villegas  12/10/18 6067

## 2018-12-10 NOTE — ED PROVIDER NOTES
Patient is endorsed to me by Dr. Kamron Barahona at [facility]. In short, patient presented with acute psychosis. The patient was placed in suicide precautions, patient's clothing and belongings were removed, documented and stored in the emergency department.  Patient was reported to me to be medically cleared. I have examined the patient and noted a normal exam and stable vitals. Mental health have evaluated the patient and have recommended that the patient be transferred to a inpatient psychiatric facility. We are currently awaiting placement for the patient. Patient transferred to inpatient facility.      Mihir Da Silva MD  12/10/18 0537

## 2018-12-10 NOTE — ED NOTES
Patient has signed consent for assessment via telehealth. Mishel Arce.  BHAVANI Villegas  12/10/18 0210

## 2018-12-11 ENCOUNTER — HOSPITAL ENCOUNTER (OUTPATIENT)
Age: 38
Setting detail: SPECIMEN
Discharge: HOME | End: 2018-12-11
Payer: COMMERCIAL

## 2018-12-11 LAB
BASOPHILS ABSOLUTE: 0 K/CU MM
BASOPHILS RELATIVE PERCENT: 0.5 % (ref 0–1)
CHOLESTEROL: 140 MG/DL
DIFFERENTIAL TYPE: ABNORMAL
EOSINOPHILS ABSOLUTE: 0.5 K/CU MM
EOSINOPHILS RELATIVE PERCENT: 6.9 % (ref 0–3)
HCT VFR BLD CALC: 41.4 % (ref 42–52)
HDLC SERPL-MCNC: 41 MG/DL
HEMOGLOBIN: 12.4 GM/DL (ref 13.5–18)
IMMATURE NEUTROPHIL %: 0.5 % (ref 0–0.43)
LDL CHOLESTEROL DIRECT: 79 MG/DL
LYMPHOCYTES ABSOLUTE: 1.6 K/CU MM
LYMPHOCYTES RELATIVE PERCENT: 20.8 % (ref 24–44)
MCH RBC QN AUTO: 28 PG (ref 27–31)
MCHC RBC AUTO-ENTMCNC: 30 % (ref 32–36)
MCV RBC AUTO: 93.5 FL (ref 78–100)
MONOCYTES ABSOLUTE: 0.4 K/CU MM
MONOCYTES RELATIVE PERCENT: 5.9 % (ref 0–4)
NUCLEATED RBC %: 0 %
PDW BLD-RTO: 14.2 % (ref 11.7–14.9)
PLATELET # BLD: 299 K/CU MM (ref 140–440)
PMV BLD AUTO: 11.2 FL (ref 7.5–11.1)
RBC # BLD: 4.43 M/CU MM (ref 4.6–6.2)
SEGMENTED NEUTROPHILS ABSOLUTE COUNT: 4.9 K/CU MM
SEGMENTED NEUTROPHILS RELATIVE PERCENT: 65.4 % (ref 36–66)
TOTAL IMMATURE NEUTOROPHIL: 0.04 K/CU MM
TOTAL NUCLEATED RBC: 0 K/CU MM
TRIGL SERPL-MCNC: 86 MG/DL
WBC # BLD: 7.5 K/CU MM (ref 4–10.5)

## 2018-12-11 PROCEDURE — 80061 LIPID PANEL: CPT

## 2018-12-11 PROCEDURE — 36415 COLL VENOUS BLD VENIPUNCTURE: CPT

## 2018-12-11 PROCEDURE — 85025 COMPLETE CBC W/AUTO DIFF WBC: CPT

## 2018-12-11 PROCEDURE — 83721 ASSAY OF BLOOD LIPOPROTEIN: CPT

## 2018-12-11 NOTE — ED NOTES
Vital signs obtained and charted. Pt resting quietly in bed with eyes open and no distress noted. Sitter at bedside.      Candelaria Laird RN  12/10/18 1383

## 2018-12-28 ENCOUNTER — HOSPITAL ENCOUNTER (EMERGENCY)
Age: 38
Discharge: HOME | End: 2018-12-28
Attending: EMERGENCY MEDICINE
Payer: COMMERCIAL

## 2018-12-28 VITALS
TEMPERATURE: 98.2 F | SYSTOLIC BLOOD PRESSURE: 155 MMHG | OXYGEN SATURATION: 98 % | HEIGHT: 68 IN | RESPIRATION RATE: 16 BRPM | DIASTOLIC BLOOD PRESSURE: 83 MMHG | HEART RATE: 83 BPM | WEIGHT: 225 LBS | BODY MASS INDEX: 34.1 KG/M2

## 2018-12-28 DIAGNOSIS — Z71.1 NO PROBLEM, FEARED COMPLAINT UNFOUNDED: Primary | ICD-10-CM

## 2018-12-28 LAB
ALBUMIN SERPL-MCNC: 4.8 GM/DL (ref 3.4–5)
ALP BLD-CCNC: 48 IU/L (ref 40–129)
ALT SERPL-CCNC: 30 U/L (ref 10–40)
AMMONIA: 35 UMOL/L (ref 16–60)
ANION GAP SERPL CALCULATED.3IONS-SCNC: 13 MMOL/L (ref 4–16)
AST SERPL-CCNC: 27 IU/L (ref 15–37)
BACTERIA: NEGATIVE /HPF
BASOPHILS ABSOLUTE: 0 K/CU MM
BASOPHILS RELATIVE PERCENT: 0.5 % (ref 0–1)
BILIRUB SERPL-MCNC: 0.3 MG/DL (ref 0–1)
BILIRUBIN URINE: NEGATIVE MG/DL
BLOOD, URINE: NEGATIVE
BUN BLDV-MCNC: 12 MG/DL (ref 6–23)
CALCIUM SERPL-MCNC: 10 MG/DL (ref 8.3–10.6)
CHLORIDE BLD-SCNC: 102 MMOL/L (ref 99–110)
CLARITY: CLEAR
CO2: 23 MMOL/L (ref 21–32)
COLOR: ABNORMAL
CREAT SERPL-MCNC: 0.8 MG/DL (ref 0.9–1.3)
DIFFERENTIAL TYPE: ABNORMAL
EOSINOPHILS ABSOLUTE: 0 K/CU MM
EOSINOPHILS RELATIVE PERCENT: 0.4 % (ref 0–3)
GFR AFRICAN AMERICAN: >60 ML/MIN/1.73M2
GFR NON-AFRICAN AMERICAN: >60 ML/MIN/1.73M2
GLUCOSE BLD-MCNC: 89 MG/DL (ref 70–99)
GLUCOSE, URINE: NEGATIVE MG/DL
HCT VFR BLD CALC: 43 % (ref 42–52)
HEMOGLOBIN: 13.2 GM/DL (ref 13.5–18)
IMMATURE NEUTROPHIL %: 0.2 % (ref 0–0.43)
KETONES, URINE: NEGATIVE MG/DL
LEUKOCYTE ESTERASE, URINE: NEGATIVE
LIPASE: 75 IU/L (ref 13–60)
LYMPHOCYTES ABSOLUTE: 1.9 K/CU MM
LYMPHOCYTES RELATIVE PERCENT: 23 % (ref 24–44)
MCH RBC QN AUTO: 28.5 PG (ref 27–31)
MCHC RBC AUTO-ENTMCNC: 30.7 % (ref 32–36)
MCV RBC AUTO: 92.9 FL (ref 78–100)
MONOCYTES ABSOLUTE: 0.5 K/CU MM
MONOCYTES RELATIVE PERCENT: 5.8 % (ref 0–4)
MUCUS: ABNORMAL HPF
NITRITE URINE, QUANTITATIVE: NEGATIVE
NUCLEATED RBC %: 0 %
PDW BLD-RTO: 13.5 % (ref 11.7–14.9)
PH, URINE: 6 (ref 5–8)
PLATELET # BLD: 269 K/CU MM (ref 140–440)
PMV BLD AUTO: 10.5 FL (ref 7.5–11.1)
POTASSIUM SERPL-SCNC: 4.9 MMOL/L (ref 3.5–5.1)
PROTEIN UA: NEGATIVE MG/DL
RBC # BLD: 4.63 M/CU MM (ref 4.6–6.2)
RBC URINE: ABNORMAL /HPF (ref 0–3)
SEGMENTED NEUTROPHILS ABSOLUTE COUNT: 5.8 K/CU MM
SEGMENTED NEUTROPHILS RELATIVE PERCENT: 70.1 % (ref 36–66)
SODIUM BLD-SCNC: 138 MMOL/L (ref 135–145)
SPECIFIC GRAVITY UA: 1 (ref 1–1.03)
TOTAL IMMATURE NEUTOROPHIL: 0.02 K/CU MM
TOTAL NUCLEATED RBC: 0 K/CU MM
TOTAL PROTEIN: 7.6 GM/DL (ref 6.4–8.2)
TRICHOMONAS: ABNORMAL /HPF
UROBILINOGEN, URINE: NORMAL MG/DL (ref 0.2–1)
WBC # BLD: 8.3 K/CU MM (ref 4–10.5)
WBC UA: 1 /HPF (ref 0–2)

## 2018-12-28 PROCEDURE — 80053 COMPREHEN METABOLIC PANEL: CPT

## 2018-12-28 PROCEDURE — 85025 COMPLETE CBC W/AUTO DIFF WBC: CPT

## 2018-12-28 PROCEDURE — 36415 COLL VENOUS BLD VENIPUNCTURE: CPT

## 2018-12-28 PROCEDURE — 99283 EMERGENCY DEPT VISIT LOW MDM: CPT

## 2018-12-28 PROCEDURE — 82140 ASSAY OF AMMONIA: CPT

## 2018-12-28 PROCEDURE — 83690 ASSAY OF LIPASE: CPT

## 2018-12-28 PROCEDURE — 81001 URINALYSIS AUTO W/SCOPE: CPT

## 2018-12-29 ASSESSMENT — ENCOUNTER SYMPTOMS
DIARRHEA: 0
COUGH: 0
COLOR CHANGE: 0
VOMITING: 0
BLOOD IN STOOL: 0
ABDOMINAL PAIN: 0
SORE THROAT: 0
NAUSEA: 0
SHORTNESS OF BREATH: 0

## 2019-01-09 ENCOUNTER — HOSPITAL ENCOUNTER (OUTPATIENT)
Age: 39
Setting detail: SPECIMEN
Discharge: HOME | End: 2019-01-09
Payer: COMMERCIAL

## 2019-01-09 LAB
BASOPHILS ABSOLUTE: 0.1 K/CU MM
BASOPHILS RELATIVE PERCENT: 1.1 % (ref 0–1)
DIFFERENTIAL TYPE: ABNORMAL
EOSINOPHILS ABSOLUTE: 0.6 K/CU MM
EOSINOPHILS RELATIVE PERCENT: 10.3 % (ref 0–3)
HCT VFR BLD CALC: 41.2 % (ref 42–52)
HEMOGLOBIN: 12.5 GM/DL (ref 13.5–18)
IMMATURE NEUTROPHIL %: 0.7 % (ref 0–0.43)
LYMPHOCYTES ABSOLUTE: 2.1 K/CU MM
LYMPHOCYTES RELATIVE PERCENT: 34.3 % (ref 24–44)
MCH RBC QN AUTO: 28.9 PG (ref 27–31)
MCHC RBC AUTO-ENTMCNC: 30.3 % (ref 32–36)
MCV RBC AUTO: 95.2 FL (ref 78–100)
MONOCYTES ABSOLUTE: 0.4 K/CU MM
MONOCYTES RELATIVE PERCENT: 7.2 % (ref 0–4)
NUCLEATED RBC %: 0 %
PDW BLD-RTO: 13.1 % (ref 11.7–14.9)
PLATELET # BLD: 255 K/CU MM (ref 140–440)
PMV BLD AUTO: 10.6 FL (ref 7.5–11.1)
RBC # BLD: 4.33 M/CU MM (ref 4.6–6.2)
SEGMENTED NEUTROPHILS ABSOLUTE COUNT: 2.9 K/CU MM
SEGMENTED NEUTROPHILS RELATIVE PERCENT: 46.4 % (ref 36–66)
TOTAL IMMATURE NEUTOROPHIL: 0.04 K/CU MM
TOTAL NUCLEATED RBC: 0 K/CU MM
WBC # BLD: 6.1 K/CU MM (ref 4–10.5)

## 2019-01-09 PROCEDURE — 85025 COMPLETE CBC W/AUTO DIFF WBC: CPT

## 2019-01-09 PROCEDURE — 36415 COLL VENOUS BLD VENIPUNCTURE: CPT

## 2019-02-06 ENCOUNTER — HOSPITAL ENCOUNTER (OUTPATIENT)
Age: 39
Setting detail: SPECIMEN
Discharge: HOME | End: 2019-02-06
Payer: COMMERCIAL

## 2019-02-06 LAB
BASOPHILS ABSOLUTE: 0.1 K/CU MM
BASOPHILS RELATIVE PERCENT: 0.8 % (ref 0–1)
DIFFERENTIAL TYPE: ABNORMAL
EOSINOPHILS ABSOLUTE: 1.2 K/CU MM
EOSINOPHILS RELATIVE PERCENT: 11.4 % (ref 0–3)
HCT VFR BLD CALC: 40 % (ref 42–52)
HEMOGLOBIN: 12.2 GM/DL (ref 13.5–18)
IMMATURE NEUTROPHIL %: 0.6 % (ref 0–0.43)
LYMPHOCYTES ABSOLUTE: 2.3 K/CU MM
LYMPHOCYTES RELATIVE PERCENT: 22.7 % (ref 24–44)
MCH RBC QN AUTO: 28.7 PG (ref 27–31)
MCHC RBC AUTO-ENTMCNC: 30.5 % (ref 32–36)
MCV RBC AUTO: 94.1 FL (ref 78–100)
MONOCYTES ABSOLUTE: 0.7 K/CU MM
MONOCYTES RELATIVE PERCENT: 6.6 % (ref 0–4)
NUCLEATED RBC %: 0 %
PDW BLD-RTO: 13.1 % (ref 11.7–14.9)
PLATELET # BLD: 229 K/CU MM (ref 140–440)
PMV BLD AUTO: 10.8 FL (ref 7.5–11.1)
RBC # BLD: 4.25 M/CU MM (ref 4.6–6.2)
SEGMENTED NEUTROPHILS ABSOLUTE COUNT: 5.9 K/CU MM
SEGMENTED NEUTROPHILS RELATIVE PERCENT: 57.9 % (ref 36–66)
TOTAL IMMATURE NEUTOROPHIL: 0.06 K/CU MM
TOTAL NUCLEATED RBC: 0 K/CU MM
WBC # BLD: 10.1 K/CU MM (ref 4–10.5)

## 2019-02-06 PROCEDURE — 85025 COMPLETE CBC W/AUTO DIFF WBC: CPT

## 2019-02-06 PROCEDURE — 36415 COLL VENOUS BLD VENIPUNCTURE: CPT

## 2019-03-06 ENCOUNTER — HOSPITAL ENCOUNTER (OUTPATIENT)
Age: 39
Setting detail: SPECIMEN
Discharge: HOME | End: 2019-03-06
Payer: COMMERCIAL

## 2019-03-15 ENCOUNTER — HOSPITAL ENCOUNTER (OUTPATIENT)
Age: 39
Discharge: HOME | End: 2019-03-15
Payer: COMMERCIAL

## 2019-03-15 LAB
BASOPHILS ABSOLUTE: 0.1 K/CU MM
BASOPHILS RELATIVE PERCENT: 0.8 % (ref 0–1)
DIFFERENTIAL TYPE: ABNORMAL
EOSINOPHILS ABSOLUTE: 1.2 K/CU MM
EOSINOPHILS RELATIVE PERCENT: 10.6 % (ref 0–3)
HCT VFR BLD CALC: 44.1 % (ref 42–52)
HEMOGLOBIN: 13.8 GM/DL (ref 13.5–18)
IMMATURE NEUTROPHIL %: 0.6 % (ref 0–0.43)
LYMPHOCYTES ABSOLUTE: 2.3 K/CU MM
LYMPHOCYTES RELATIVE PERCENT: 21.3 % (ref 24–44)
MCH RBC QN AUTO: 28.4 PG (ref 27–31)
MCHC RBC AUTO-ENTMCNC: 31.3 % (ref 32–36)
MCV RBC AUTO: 90.7 FL (ref 78–100)
MONOCYTES ABSOLUTE: 0.6 K/CU MM
MONOCYTES RELATIVE PERCENT: 5.2 % (ref 0–4)
NUCLEATED RBC %: 0 %
PDW BLD-RTO: 12.8 % (ref 11.7–14.9)
PLATELET # BLD: 306 K/CU MM (ref 140–440)
PMV BLD AUTO: 10.9 FL (ref 7.5–11.1)
RBC # BLD: 4.86 M/CU MM (ref 4.6–6.2)
SEGMENTED NEUTROPHILS ABSOLUTE COUNT: 6.7 K/CU MM
SEGMENTED NEUTROPHILS RELATIVE PERCENT: 61.5 % (ref 36–66)
TOTAL IMMATURE NEUTOROPHIL: 0.06 K/CU MM
TOTAL NUCLEATED RBC: 0 K/CU MM
WBC # BLD: 10.8 K/CU MM (ref 4–10.5)

## 2019-03-15 PROCEDURE — 85025 COMPLETE CBC W/AUTO DIFF WBC: CPT

## 2019-03-15 PROCEDURE — 36415 COLL VENOUS BLD VENIPUNCTURE: CPT

## 2019-04-03 ENCOUNTER — HOSPITAL ENCOUNTER (OUTPATIENT)
Age: 39
Setting detail: SPECIMEN
Discharge: HOME | End: 2019-04-03
Payer: COMMERCIAL

## 2019-04-03 LAB
BASOPHILS ABSOLUTE: 0.1 K/CU MM
BASOPHILS RELATIVE PERCENT: 1 % (ref 0–1)
DIFFERENTIAL TYPE: ABNORMAL
EOSINOPHILS ABSOLUTE: 0.6 K/CU MM
EOSINOPHILS RELATIVE PERCENT: 10.4 % (ref 0–3)
HCT VFR BLD CALC: 40.8 % (ref 42–52)
HEMOGLOBIN: 12.6 GM/DL (ref 13.5–18)
IMMATURE NEUTROPHIL %: 1.3 % (ref 0–0.43)
LYMPHOCYTES ABSOLUTE: 1.9 K/CU MM
LYMPHOCYTES RELATIVE PERCENT: 31.3 % (ref 24–44)
MCH RBC QN AUTO: 28.1 PG (ref 27–31)
MCHC RBC AUTO-ENTMCNC: 30.9 % (ref 32–36)
MCV RBC AUTO: 90.9 FL (ref 78–100)
MONOCYTES ABSOLUTE: 0.5 K/CU MM
MONOCYTES RELATIVE PERCENT: 8.2 % (ref 0–4)
NUCLEATED RBC %: 0 %
PDW BLD-RTO: 12.8 % (ref 11.7–14.9)
PLATELET # BLD: 275 K/CU MM (ref 140–440)
PMV BLD AUTO: 11.1 FL (ref 7.5–11.1)
RBC # BLD: 4.49 M/CU MM (ref 4.6–6.2)
SEGMENTED NEUTROPHILS ABSOLUTE COUNT: 2.9 K/CU MM
SEGMENTED NEUTROPHILS RELATIVE PERCENT: 47.8 % (ref 36–66)
TOTAL IMMATURE NEUTOROPHIL: 0.08 K/CU MM
TOTAL NUCLEATED RBC: 0 K/CU MM
WBC # BLD: 6.1 K/CU MM (ref 4–10.5)

## 2019-04-03 PROCEDURE — 85025 COMPLETE CBC W/AUTO DIFF WBC: CPT

## 2019-04-03 PROCEDURE — 36415 COLL VENOUS BLD VENIPUNCTURE: CPT

## 2019-05-01 ENCOUNTER — HOSPITAL ENCOUNTER (OUTPATIENT)
Age: 39
Setting detail: SPECIMEN
Discharge: HOME | End: 2019-05-01
Payer: COMMERCIAL

## 2019-05-01 LAB
BASOPHILS ABSOLUTE: 0.1 K/CU MM
BASOPHILS RELATIVE PERCENT: 0.7 % (ref 0–1)
DIFFERENTIAL TYPE: ABNORMAL
EOSINOPHILS ABSOLUTE: 1 K/CU MM
EOSINOPHILS RELATIVE PERCENT: 7.9 % (ref 0–3)
HCT VFR BLD CALC: 39.1 % (ref 42–52)
HEMOGLOBIN: 12.1 GM/DL (ref 13.5–18)
IMMATURE NEUTROPHIL %: 0.6 % (ref 0–0.43)
LYMPHOCYTES ABSOLUTE: 2.2 K/CU MM
LYMPHOCYTES RELATIVE PERCENT: 18.7 % (ref 24–44)
MCH RBC QN AUTO: 28.1 PG (ref 27–31)
MCHC RBC AUTO-ENTMCNC: 30.9 % (ref 32–36)
MCV RBC AUTO: 90.9 FL (ref 78–100)
MONOCYTES ABSOLUTE: 0.8 K/CU MM
MONOCYTES RELATIVE PERCENT: 6.4 % (ref 0–4)
NUCLEATED RBC %: 0 %
PDW BLD-RTO: 13 % (ref 11.7–14.9)
PLATELET # BLD: 265 K/CU MM (ref 140–440)
PMV BLD AUTO: 10.7 FL (ref 7.5–11.1)
RBC # BLD: 4.3 M/CU MM (ref 4.6–6.2)
SEGMENTED NEUTROPHILS ABSOLUTE COUNT: 7.9 K/CU MM
SEGMENTED NEUTROPHILS RELATIVE PERCENT: 65.7 % (ref 36–66)
TOTAL IMMATURE NEUTOROPHIL: 0.07 K/CU MM
TOTAL NUCLEATED RBC: 0 K/CU MM
WBC # BLD: 12 K/CU MM (ref 4–10.5)

## 2019-05-01 PROCEDURE — 85025 COMPLETE CBC W/AUTO DIFF WBC: CPT

## 2019-05-01 PROCEDURE — 36415 COLL VENOUS BLD VENIPUNCTURE: CPT

## 2019-06-05 ENCOUNTER — HOSPITAL ENCOUNTER (OUTPATIENT)
Age: 39
Setting detail: SPECIMEN
Discharge: HOME | End: 2019-06-05
Payer: COMMERCIAL

## 2019-06-12 ENCOUNTER — APPOINTMENT (OUTPATIENT)
Dept: GENERAL RADIOLOGY | Age: 39
End: 2019-06-12
Payer: COMMERCIAL

## 2019-06-12 ENCOUNTER — HOSPITAL ENCOUNTER (EMERGENCY)
Age: 39
Discharge: HOME | End: 2019-06-12
Attending: EMERGENCY MEDICINE
Payer: COMMERCIAL

## 2019-06-12 VITALS
RESPIRATION RATE: 15 BRPM | BODY MASS INDEX: 34.1 KG/M2 | OXYGEN SATURATION: 95 % | TEMPERATURE: 97.8 F | HEIGHT: 68 IN | SYSTOLIC BLOOD PRESSURE: 116 MMHG | DIASTOLIC BLOOD PRESSURE: 76 MMHG | HEART RATE: 90 BPM | WEIGHT: 225 LBS

## 2019-06-12 DIAGNOSIS — T40.601A OPIATE OVERDOSE, ACCIDENTAL OR UNINTENTIONAL, INITIAL ENCOUNTER (HCC): Primary | ICD-10-CM

## 2019-06-12 LAB
ALBUMIN SERPL-MCNC: 4 GM/DL (ref 3.4–5)
ALP BLD-CCNC: 40 IU/L (ref 40–129)
ALT SERPL-CCNC: 42 U/L (ref 10–40)
ANION GAP SERPL CALCULATED.3IONS-SCNC: 11 MMOL/L (ref 4–16)
AST SERPL-CCNC: 66 IU/L (ref 15–37)
BASOPHILS ABSOLUTE: 0.1 K/CU MM
BASOPHILS RELATIVE PERCENT: 0.4 % (ref 0–1)
BILIRUB SERPL-MCNC: 0.3 MG/DL (ref 0–1)
BUN BLDV-MCNC: 18 MG/DL (ref 6–23)
CALCIUM SERPL-MCNC: 8.9 MG/DL (ref 8.3–10.6)
CHLORIDE BLD-SCNC: 102 MMOL/L (ref 99–110)
CO2: 21 MMOL/L (ref 21–32)
CREAT SERPL-MCNC: 1.5 MG/DL (ref 0.9–1.3)
DIFFERENTIAL TYPE: ABNORMAL
EOSINOPHILS ABSOLUTE: 0.4 K/CU MM
EOSINOPHILS RELATIVE PERCENT: 3.3 % (ref 0–3)
GFR AFRICAN AMERICAN: >60 ML/MIN/1.73M2
GFR NON-AFRICAN AMERICAN: 52 ML/MIN/1.73M2
GLUCOSE BLD-MCNC: 133 MG/DL (ref 70–99)
HCT VFR BLD CALC: 38.2 % (ref 42–52)
HEMOGLOBIN: 12 GM/DL (ref 13.5–18)
IMMATURE NEUTROPHIL %: 1.1 % (ref 0–0.43)
LYMPHOCYTES ABSOLUTE: 1.1 K/CU MM
LYMPHOCYTES RELATIVE PERCENT: 8.9 % (ref 24–44)
MCH RBC QN AUTO: 28.2 PG (ref 27–31)
MCHC RBC AUTO-ENTMCNC: 31.4 % (ref 32–36)
MCV RBC AUTO: 89.7 FL (ref 78–100)
MONOCYTES ABSOLUTE: 0.9 K/CU MM
MONOCYTES RELATIVE PERCENT: 6.7 % (ref 0–4)
NUCLEATED RBC %: 0 %
PDW BLD-RTO: 12.7 % (ref 11.7–14.9)
PLATELET # BLD: 235 K/CU MM (ref 140–440)
PMV BLD AUTO: 10.2 FL (ref 7.5–11.1)
POTASSIUM SERPL-SCNC: 3.8 MMOL/L (ref 3.5–5.1)
PRO-BNP: <5 PG/ML
RBC # BLD: 4.26 M/CU MM (ref 4.6–6.2)
SEGMENTED NEUTROPHILS ABSOLUTE COUNT: 10.2 K/CU MM
SEGMENTED NEUTROPHILS RELATIVE PERCENT: 79.6 % (ref 36–66)
SODIUM BLD-SCNC: 134 MMOL/L (ref 135–145)
TOTAL IMMATURE NEUTOROPHIL: 0.14 K/CU MM
TOTAL NUCLEATED RBC: 0 K/CU MM
TOTAL PROTEIN: 6.5 GM/DL (ref 6.4–8.2)
WBC # BLD: 12.7 K/CU MM (ref 4–10.5)

## 2019-06-12 PROCEDURE — 80053 COMPREHEN METABOLIC PANEL: CPT

## 2019-06-12 PROCEDURE — 83880 ASSAY OF NATRIURETIC PEPTIDE: CPT

## 2019-06-12 PROCEDURE — 85025 COMPLETE CBC W/AUTO DIFF WBC: CPT

## 2019-06-12 PROCEDURE — 2700000000 HC OXYGEN THERAPY PER DAY

## 2019-06-12 PROCEDURE — 71046 X-RAY EXAM CHEST 2 VIEWS: CPT

## 2019-06-12 PROCEDURE — 94640 AIRWAY INHALATION TREATMENT: CPT

## 2019-06-12 PROCEDURE — 2580000003 HC RX 258

## 2019-06-12 PROCEDURE — 6370000000 HC RX 637 (ALT 250 FOR IP): Performed by: EMERGENCY MEDICINE

## 2019-06-12 PROCEDURE — 94761 N-INVAS EAR/PLS OXIMETRY MLT: CPT

## 2019-06-12 PROCEDURE — 99284 EMERGENCY DEPT VISIT MOD MDM: CPT

## 2019-06-12 PROCEDURE — 94664 DEMO&/EVAL PT USE INHALER: CPT

## 2019-06-12 RX ORDER — IPRATROPIUM BROMIDE AND ALBUTEROL SULFATE 2.5; .5 MG/3ML; MG/3ML
1 SOLUTION RESPIRATORY (INHALATION) ONCE
Status: COMPLETED | OUTPATIENT
Start: 2019-06-12 | End: 2019-06-12

## 2019-06-12 RX ORDER — 0.9 % SODIUM CHLORIDE 0.9 %
1000 INTRAVENOUS SOLUTION INTRAVENOUS ONCE
Status: COMPLETED | OUTPATIENT
Start: 2019-06-12 | End: 2019-06-12

## 2019-06-12 RX ORDER — 0.9 % SODIUM CHLORIDE 0.9 %
1000 INTRAVENOUS SOLUTION INTRAVENOUS ONCE
Status: DISCONTINUED | OUTPATIENT
Start: 2019-06-12 | End: 2019-06-12

## 2019-06-12 RX ORDER — SODIUM CHLORIDE 9 MG/ML
INJECTION, SOLUTION INTRAVENOUS
Status: COMPLETED
Start: 2019-06-12 | End: 2019-06-12

## 2019-06-12 RX ORDER — PREDNISONE 20 MG/1
60 TABLET ORAL ONCE
Status: COMPLETED | OUTPATIENT
Start: 2019-06-12 | End: 2019-06-12

## 2019-06-12 RX ADMIN — PREDNISONE 60 MG: 20 TABLET ORAL at 20:47

## 2019-06-12 RX ADMIN — IPRATROPIUM BROMIDE AND ALBUTEROL SULFATE 1 AMPULE: .5; 3 SOLUTION RESPIRATORY (INHALATION) at 18:30

## 2019-06-12 RX ADMIN — Medication 1000 ML: at 18:47

## 2019-06-12 RX ADMIN — IPRATROPIUM BROMIDE AND ALBUTEROL SULFATE 1 AMPULE: .5; 3 SOLUTION RESPIRATORY (INHALATION) at 18:59

## 2019-06-12 RX ADMIN — SODIUM CHLORIDE 1000 ML: 9 INJECTION, SOLUTION INTRAVENOUS at 18:47

## 2019-06-12 NOTE — ED PROVIDER NOTES
mL/min/1.73m2    Anion Gap 11 4 - 16   Brain Natriuretic Peptide   Result Value Ref Range    Pro-BNP <5 <300 PG/ML       MDM:  Patient endorsed to me pending further observation of patient. CBC and CMP with mild acute renal insufficiency and mild leukocytosis which is likely a stress reaction. BNP is not suggestive of significant volume overload. Patient was observed for over 3 hours with hemodynamic stability with no oxygen requirement. Patient reports that he has been coughing and his lungs seem to have cleared. No shortness of breath on reassessment. No further Narcan was given. Overdose was not intentional.  Patient does feel comfortable home-going. Resources provided for substance abuse and patient to follow-up with primary care provider in [facility]. Final Impression:  1. Opiate overdose, accidental or unintentional, initial encounter [facility])          Please note that portions of this note may have been complete with a voice recognition program.  Efforts were made to edit the dictations, but occasional words are mis-transcribed.            Cory Diamond MD  06/12/19 5334

## 2019-06-12 NOTE — ED NOTES
This RN at bedside to medicate pt with Prednisone as ordered. Pt refused and stated \"I ain't no dog you can just medicate with whatever\". This RN explained why medication was order and pt refused. Medication returned. Dr. Arliss Cowden aware.       Daphnie Boo RN  06/12/19 2561

## 2019-06-12 NOTE — ED PROVIDER NOTES
Triage Chief Complaint:   Drug Overdose    [place]:  David Shell is a 43 y.o. male that presents after opiate overdose. He is denying that he used anything at this time, but medics gave him 4 mg of Narcan intranasally followed by 4mg IV and he awoke. He states that he mainly just smokes marijuana. He reports that he had smoked marijuana and he felt like it burned his throat so he tried to inhale water. He states that that caused him to wheeze. He states he has history of asthma since then been wheezy. ROS:  General:  No fevers, no chills  Eyes:  No recent vison changes, no discharge  ENT:  No sore throat, no nasal congestion  Cardiovascular:  No chest pain, no palpitations  Respiratory:  + shortness of breath, + cough  Gastrointestinal:  No pain, no nausea, no vomiting  Musculoskeletal:  No muscle pain, no joint pain  Skin:  No rash, no pruritis   Neurologic:  No speech problems, no headache  Psychiatric:  No anxiety  Extremities:  no edema, no pain    Past Medical History:   Diagnosis Date    Anxiety     Asthma     Chronic back pain     [facility] [address]     H/O 24 hour EKG monitoring 09/19/2013    EVENT MONITOR-normal sinus rhythm    H/O echocardiogram 04/20/2017    EF 55% Normal LV with normal systolic function.  No significant valvulopathy is seen    Hx of echocardiogram 08/16/13 08/13 Mitrall annular calcification with a trace to mild MR    Hyperlipidemia     Hypertension     Schizophrenia ([address]      Past Surgical History:   Procedure Laterality Date    HEMORRHOID SURGERY       Family History   Problem Relation Age of Onset    Emphysema Mother     Heart Disease Father     High Blood Pressure Father      Social History     Socioeconomic History    Marital status: Single     Spouse name: Not on file    Number of children: Not on file    Years of education: Not on file    Highest education level: Not on file   Occupational History    Not on file   Social Needs    Financial resource strain: Not on file    Food insecurity:     Worry: Not on file     Inability: Not on file    Transportation needs:     Medical: Not on file     Non-medical: Not on file   Tobacco Use    Smoking status: Former Smoker     Packs/day: 0.50     Years: 20.00     Pack years: 10.00     Types: Cigarettes    Smokeless tobacco: Current User     Types: Snuff   Substance and Sexual Activity    Alcohol use: No    Drug use: No     Comment: denies any drug use to this nurse    Sexual activity: Not Currently     Comment: single    Lifestyle    Physical activity:     Days per week: Not on file     Minutes per session: Not on file    Stress: Not on file   Relationships    Social connections:     Talks on phone: Not on file     Gets together: Not on file     Attends Hindu service: Not on file     Active member of club or organization: Not on file     Attends meetings of clubs or organizations: Not on file     Relationship status: Not on file    Intimate partner violence:     Fear of current or ex partner: Not on file     Emotionally abused: Not on file     Physically abused: Not on file     Forced sexual activity: Not on file   Other Topics Concern    Not on file   Social History Narrative    Not on file     Current Facility-Administered Medications   Medication Dose Route Frequency Provider Last Rate Last Dose    ipratropium-albuterol (DUONEB) nebulizer solution 1 ampule  1 ampule Inhalation Once Alline Jay Em, DO         Current Outpatient Medications   Medication Sig Dispense Refill    metFORMIN (GLUCOPHAGE) 500 MG tablet Take 500 mg by mouth 2 times daily (with meals)      cloZAPine (CLOZARIL) 100 MG tablet Take 100 mg by mouth nightly      ibuprofen (ADVIL;MOTRIN) 600 MG tablet Take 1 tablet by mouth every 6 hours as needed for Pain 30 tablet 0    clonazePAM (KLONOPIN) 1 MG tablet Take 1 mg by mouth 2 times daily      theophylline (LIOR-24) 200 MG extended release capsule Take 200 mg by mouth daily  montelukast (SINGULAIR) 10 MG tablet Take 10 mg by mouth nightly      fenofibrate 160 MG tablet Take 160 mg by mouth nightly      naproxen (NAPROSYN) 375 MG tablet Take 375 mg by mouth 2 times daily (with meals)      gabapentin (NEURONTIN) 300 MG capsule Take 600 mg by mouth 2 times daily .  acetaminophen (TYLENOL) 325 MG tablet Take 650 mg by mouth every 12 hours      atorvastatin (LIPITOR) 40 MG tablet Take 40 mg by mouth nightly       cyclobenzaprine (FLEXERIL) 10 MG tablet Take 10 mg by mouth 2 times daily.  Loratadine (CLARITIN) 10 MG CAPS Take 1 capsule by mouth daily.  lisinopril (PRINIVIL;ZESTRIL) 20 MG tablet Take 20 mg by mouth daily.  docusate sodium (COLACE) 100 MG capsule Take 100 mg by mouth 2 times daily.  benztropine (COGENTIN) 1 MG tablet Take 1 mg by mouth 2 times daily. No Known Allergies    Nursing Notes Reviewed    Physical Exam:  ED Triage Vitals   Enc Vitals Group      BP 06/12/19 1740 (!) 145/116      Pulse 06/12/19 1740 101      Resp 06/12/19 1740 15      Temp 06/12/19 1745 97.8 °F (36.6 °C)      Temp Source 06/12/19 1745 Oral      SpO2 06/12/19 1740 93 %      Weight 06/12/19 1740 225 lb (102.1 kg)      Height 06/12/19 1740 5' 8\" (1.727 m)      Head Circumference --       Peak Flow --       Pain Score --       Pain Loc --       Pain Edu? --       Excl. in [address]? --        My pulse ox interpretation is - normal    General appearance:  No acute distress. Skin:  Warm. Dry. Eye:  Extraocular movements intact. Ears, nose, mouth and throat:  Oral mucosa moist   Neck:  Trachea midline. Extremity:  Normal ROM     Heart:  Regular  Perfusion:  intact  Respiratory:  Respirations nonlabored. Wheezing noted. Abdominal:   Non distended. Neurological:  Alert and oriented, moving all extremities, speech is normal.      MDM:  Patient here likely for opiate overdose.   He is not forthcoming about what he did but giggles when I talked to him about using opiates. He is wheezing on exam and was hypoxic on arrival, thus breathing treatments were ordered given his history of asthma. Chest x-ray was ordered as well. One dose of prednisone given. Will be given 3 breathing treatments, reassessed at that time. If hypoxia resolves, breathing improved he can be discharged home with outpatient resources. Clinical Impression:  Overdose, asthma      Comment: Please note this report has been produced using speech recognition software and may contain errors related to that system including errors in grammar, punctuation, and spelling, as well as words and phrases that may be inappropriate. If there are any questions or concerns please feel free to contact the dictating provider for clarification.       Idalia Blood, DO  06/12/19 6151

## 2019-06-12 NOTE — ED TRIAGE NOTES
Pt presents to ED via EMS for drug overdose. EMS administered Narcan 4 mg nasally and Narcan 4 mg IV. Pt arrived to ED alert. EMS states pt aspirated on vomit. Pt was found in truck and his sister called EMS and stated he was unresponsive. Pt placed on 2L of O2 via nasal canula to keep oxygen saturation above 90% once he arrived to ED.

## 2019-06-12 NOTE — ED NOTES
Pt states he took a bowl of water and put his face in it and inhaled 2 days ago because he was high and that's why he keeps coughing. Dr. Bruner Dys aware and verbalized chest xray order. Order placed.       Jeffrey Mack RN  06/12/19 0152

## 2019-06-13 NOTE — ED NOTES
Dr. Hannah Park aware of updated vital signs and pt weaned to room air with oxygen saturation now at 97%. Pt alert and oriented.       Dennis Ahn RN  06/12/19 2123

## 2019-07-03 ENCOUNTER — HOSPITAL ENCOUNTER (OUTPATIENT)
Age: 39
Discharge: HOME | End: 2019-07-03
Payer: COMMERCIAL

## 2019-07-03 LAB
ALBUMIN SERPL-MCNC: 5 GM/DL (ref 3.4–5)
ALP BLD-CCNC: 91 IU/L (ref 40–128)
ALT SERPL-CCNC: 23 U/L (ref 10–40)
ANION GAP SERPL CALCULATED.3IONS-SCNC: 19 MMOL/L (ref 4–16)
AST SERPL-CCNC: 22 IU/L (ref 15–37)
BASOPHILS ABSOLUTE: 0.1 K/CU MM
BASOPHILS RELATIVE PERCENT: 0.4 % (ref 0–1)
BILIRUB SERPL-MCNC: 0.6 MG/DL (ref 0–1)
BUN BLDV-MCNC: 23 MG/DL (ref 6–23)
CALCIUM SERPL-MCNC: 10.3 MG/DL (ref 8.3–10.6)
CHLORIDE BLD-SCNC: 96 MMOL/L (ref 99–110)
CHOLESTEROL: 134 MG/DL
CO2: 19 MMOL/L (ref 21–32)
CREAT SERPL-MCNC: 1.3 MG/DL (ref 0.9–1.3)
DIFFERENTIAL TYPE: ABNORMAL
EOSINOPHILS ABSOLUTE: 0.4 K/CU MM
EOSINOPHILS RELATIVE PERCENT: 2 % (ref 0–3)
GFR AFRICAN AMERICAN: >60 ML/MIN/1.73M2
GFR NON-AFRICAN AMERICAN: >60 ML/MIN/1.73M2
GLUCOSE BLD-MCNC: 119 MG/DL (ref 70–99)
HCT VFR BLD CALC: 42 % (ref 42–52)
HDLC SERPL-MCNC: 62 MG/DL
HEMOGLOBIN: 13.3 GM/DL (ref 13.5–18)
IMMATURE NEUTROPHIL %: 0.9 % (ref 0–0.43)
LDL CHOLESTEROL DIRECT: 60 MG/DL
LYMPHOCYTES ABSOLUTE: 2.1 K/CU MM
LYMPHOCYTES RELATIVE PERCENT: 11.5 % (ref 24–44)
MCH RBC QN AUTO: 27.7 PG (ref 27–31)
MCHC RBC AUTO-ENTMCNC: 31.7 % (ref 32–36)
MCV RBC AUTO: 87.3 FL (ref 78–100)
MONOCYTES ABSOLUTE: 1.4 K/CU MM
MONOCYTES RELATIVE PERCENT: 7.9 % (ref 0–4)
NUCLEATED RBC %: 0 %
PDW BLD-RTO: 13.2 % (ref 11.7–14.9)
PLATELET # BLD: 475 K/CU MM (ref 140–440)
PMV BLD AUTO: 10.5 FL (ref 7.5–11.1)
POTASSIUM SERPL-SCNC: 4.5 MMOL/L (ref 3.5–5.1)
RBC # BLD: 4.81 M/CU MM (ref 4.6–6.2)
SEGMENTED NEUTROPHILS ABSOLUTE COUNT: 13.9 K/CU MM
SEGMENTED NEUTROPHILS RELATIVE PERCENT: 77.3 % (ref 36–66)
SODIUM BLD-SCNC: 134 MMOL/L (ref 135–145)
TOTAL IMMATURE NEUTOROPHIL: 0.16 K/CU MM
TOTAL NUCLEATED RBC: 0 K/CU MM
TOTAL PROTEIN: 7.7 GM/DL (ref 6.4–8.2)
TRIGL SERPL-MCNC: 88 MG/DL
TSH HIGH SENSITIVITY: 2.37 UIU/ML (ref 0.27–4.2)
VITAMIN D 25-HYDROXY: 22.5 NG/ML
WBC # BLD: 17.9 K/CU MM (ref 4–10.5)

## 2019-07-03 PROCEDURE — 82306 VITAMIN D 25 HYDROXY: CPT

## 2019-07-03 PROCEDURE — 85025 COMPLETE CBC W/AUTO DIFF WBC: CPT

## 2019-07-03 PROCEDURE — 83721 ASSAY OF BLOOD LIPOPROTEIN: CPT

## 2019-07-03 PROCEDURE — 80061 LIPID PANEL: CPT

## 2019-07-03 PROCEDURE — 80053 COMPREHEN METABOLIC PANEL: CPT

## 2019-07-03 PROCEDURE — 36415 COLL VENOUS BLD VENIPUNCTURE: CPT

## 2019-07-03 PROCEDURE — 84443 ASSAY THYROID STIM HORMONE: CPT

## 2019-08-07 ENCOUNTER — HOSPITAL ENCOUNTER (OUTPATIENT)
Age: 39
Setting detail: SPECIMEN
Discharge: HOME | End: 2019-08-07
Payer: COMMERCIAL

## 2019-08-08 ENCOUNTER — HOSPITAL ENCOUNTER (OUTPATIENT)
Age: 39
Discharge: HOME | End: 2019-08-08
Payer: COMMERCIAL

## 2019-08-08 PROCEDURE — 80307 DRUG TEST PRSMV CHEM ANLYZR: CPT

## 2019-08-12 LAB
AMPHETAMINES: NEGATIVE
BARBITURATE SCREEN URINE: NEGATIVE
BENZODIAZEPINE SCREEN, URINE: NEGATIVE
CANNABINOID SCREEN URINE: NEGATIVE
COCAINE METABOLITE: NEGATIVE
OPIATES, URINE: NEGATIVE
OXYCODONE: NORMAL
PHENCYCLIDINE, URINE: NEGATIVE

## 2019-09-04 ENCOUNTER — HOSPITAL ENCOUNTER (OUTPATIENT)
Age: 39
Discharge: HOME | End: 2019-09-04

## 2019-10-02 ENCOUNTER — HOSPITAL ENCOUNTER (OUTPATIENT)
Age: 39
Discharge: HOME | End: 2019-10-02
Payer: COMMERCIAL

## 2019-10-02 LAB
ALBUMIN SERPL-MCNC: 4.2 GM/DL (ref 3.4–5)
ALP BLD-CCNC: 60 IU/L (ref 40–128)
ALT SERPL-CCNC: 44 U/L (ref 10–40)
ANION GAP SERPL CALCULATED.3IONS-SCNC: 14 MMOL/L (ref 4–16)
AST SERPL-CCNC: 97 IU/L (ref 15–37)
BASOPHILS ABSOLUTE: 0.1 K/CU MM
BASOPHILS RELATIVE PERCENT: 1.3 % (ref 0–1)
BILIRUB SERPL-MCNC: 0.2 MG/DL (ref 0–1)
BUN BLDV-MCNC: 10 MG/DL (ref 6–23)
CALCIUM SERPL-MCNC: 9.7 MG/DL (ref 8.3–10.6)
CHLORIDE BLD-SCNC: 107 MMOL/L (ref 99–110)
CO2: 23 MMOL/L (ref 21–32)
CREAT SERPL-MCNC: 0.9 MG/DL (ref 0.9–1.3)
DIFFERENTIAL TYPE: ABNORMAL
EOSINOPHILS ABSOLUTE: 0.7 K/CU MM
EOSINOPHILS RELATIVE PERCENT: 12.8 % (ref 0–3)
GFR AFRICAN AMERICAN: >60 ML/MIN/1.73M2
GFR NON-AFRICAN AMERICAN: >60 ML/MIN/1.73M2
GLUCOSE BLD-MCNC: 128 MG/DL (ref 70–99)
HCT VFR BLD CALC: 35.9 % (ref 42–52)
HEMOGLOBIN: 11.3 GM/DL (ref 13.5–18)
IMMATURE NEUTROPHIL %: 0.4 % (ref 0–0.43)
LYMPHOCYTES ABSOLUTE: 1.8 K/CU MM
LYMPHOCYTES RELATIVE PERCENT: 33 % (ref 24–44)
MCH RBC QN AUTO: 28.9 PG (ref 27–31)
MCHC RBC AUTO-ENTMCNC: 31.5 % (ref 32–36)
MCV RBC AUTO: 91.8 FL (ref 78–100)
MONOCYTES ABSOLUTE: 0.6 K/CU MM
MONOCYTES RELATIVE PERCENT: 10.6 % (ref 0–4)
NUCLEATED RBC %: 0 %
PDW BLD-RTO: 14.4 % (ref 11.7–14.9)
PLATELET # BLD: 283 K/CU MM (ref 140–440)
PMV BLD AUTO: 10.4 FL (ref 7.5–11.1)
POTASSIUM SERPL-SCNC: 4.7 MMOL/L (ref 3.5–5.1)
RBC # BLD: 3.91 M/CU MM (ref 4.6–6.2)
SEGMENTED NEUTROPHILS ABSOLUTE COUNT: 2.3 K/CU MM
SEGMENTED NEUTROPHILS RELATIVE PERCENT: 41.9 % (ref 36–66)
SODIUM BLD-SCNC: 144 MMOL/L (ref 135–145)
TOTAL IMMATURE NEUTOROPHIL: 0.02 K/CU MM
TOTAL NUCLEATED RBC: 0 K/CU MM
TOTAL PROTEIN: 6.2 GM/DL (ref 6.4–8.2)
TSH HIGH SENSITIVITY: 0.57 UIU/ML (ref 0.27–4.2)
WBC # BLD: 5.5 K/CU MM (ref 4–10.5)

## 2019-10-02 PROCEDURE — 36415 COLL VENOUS BLD VENIPUNCTURE: CPT

## 2019-10-02 PROCEDURE — 85025 COMPLETE CBC W/AUTO DIFF WBC: CPT

## 2019-10-02 PROCEDURE — 84443 ASSAY THYROID STIM HORMONE: CPT

## 2019-10-02 PROCEDURE — 80053 COMPREHEN METABOLIC PANEL: CPT

## 2019-11-06 ENCOUNTER — HOSPITAL ENCOUNTER (OUTPATIENT)
Age: 39
Setting detail: SPECIMEN
Discharge: HOME | End: 2019-11-06
Payer: COMMERCIAL

## 2019-11-06 LAB
ALBUMIN SERPL-MCNC: 4.7 GM/DL (ref 3.4–5)
ALP BLD-CCNC: 53 IU/L (ref 40–129)
ALT SERPL-CCNC: 27 U/L (ref 10–40)
ANION GAP SERPL CALCULATED.3IONS-SCNC: 13 MMOL/L (ref 4–16)
AST SERPL-CCNC: 27 IU/L (ref 15–37)
BASOPHILS ABSOLUTE: 0.1 K/CU MM
BASOPHILS RELATIVE PERCENT: 0.6 % (ref 0–1)
BILIRUB SERPL-MCNC: 0.2 MG/DL (ref 0–1)
BUN BLDV-MCNC: 13 MG/DL (ref 6–23)
CALCIUM SERPL-MCNC: 10.1 MG/DL (ref 8.3–10.6)
CHLORIDE BLD-SCNC: 100 MMOL/L (ref 99–110)
CO2: 23 MMOL/L (ref 21–32)
CREAT SERPL-MCNC: 0.9 MG/DL (ref 0.9–1.3)
DIFFERENTIAL TYPE: ABNORMAL
EOSINOPHILS ABSOLUTE: 0.6 K/CU MM
EOSINOPHILS RELATIVE PERCENT: 6.7 % (ref 0–3)
GFR AFRICAN AMERICAN: >60 ML/MIN/1.73M2
GFR NON-AFRICAN AMERICAN: >60 ML/MIN/1.73M2
GLUCOSE BLD-MCNC: 99 MG/DL (ref 70–99)
HCT VFR BLD CALC: 40.1 % (ref 42–52)
HEMOGLOBIN: 12.4 GM/DL (ref 13.5–18)
IMMATURE NEUTROPHIL %: 0.4 % (ref 0–0.43)
LYMPHOCYTES ABSOLUTE: 2.7 K/CU MM
LYMPHOCYTES RELATIVE PERCENT: 28.5 % (ref 24–44)
MCH RBC QN AUTO: 28.3 PG (ref 27–31)
MCHC RBC AUTO-ENTMCNC: 30.9 % (ref 32–36)
MCV RBC AUTO: 91.6 FL (ref 78–100)
MONOCYTES ABSOLUTE: 0.6 K/CU MM
MONOCYTES RELATIVE PERCENT: 6.3 % (ref 0–4)
NUCLEATED RBC %: 0 %
PDW BLD-RTO: 13.7 % (ref 11.7–14.9)
PLATELET # BLD: 275 K/CU MM (ref 140–440)
PMV BLD AUTO: 11.1 FL (ref 7.5–11.1)
POTASSIUM SERPL-SCNC: 4.8 MMOL/L (ref 3.5–5.1)
RBC # BLD: 4.38 M/CU MM (ref 4.6–6.2)
SEGMENTED NEUTROPHILS ABSOLUTE COUNT: 5.4 K/CU MM
SEGMENTED NEUTROPHILS RELATIVE PERCENT: 57.5 % (ref 36–66)
SODIUM BLD-SCNC: 136 MMOL/L (ref 135–145)
TOTAL IMMATURE NEUTOROPHIL: 0.04 K/CU MM
TOTAL NUCLEATED RBC: 0 K/CU MM
TOTAL PROTEIN: 7 GM/DL (ref 6.4–8.2)
TSH HIGH SENSITIVITY: 1.13 UIU/ML (ref 0.27–4.2)
WBC # BLD: 9.4 K/CU MM (ref 4–10.5)

## 2019-11-06 PROCEDURE — 80053 COMPREHEN METABOLIC PANEL: CPT

## 2019-11-06 PROCEDURE — 84443 ASSAY THYROID STIM HORMONE: CPT

## 2019-11-06 PROCEDURE — 85025 COMPLETE CBC W/AUTO DIFF WBC: CPT

## 2019-11-06 PROCEDURE — 36415 COLL VENOUS BLD VENIPUNCTURE: CPT

## 2019-12-04 ENCOUNTER — HOSPITAL ENCOUNTER (OUTPATIENT)
Age: 39
Setting detail: SPECIMEN
Discharge: HOME | End: 2019-12-04
Payer: COMMERCIAL

## 2019-12-04 LAB
BASOPHILS ABSOLUTE: 0 K/CU MM
BASOPHILS RELATIVE PERCENT: 0.6 % (ref 0–1)
DIFFERENTIAL TYPE: ABNORMAL
EOSINOPHILS ABSOLUTE: 0.5 K/CU MM
EOSINOPHILS RELATIVE PERCENT: 7.9 % (ref 0–3)
HCT VFR BLD CALC: 38.4 % (ref 42–52)
HEMOGLOBIN: 12 GM/DL (ref 13.5–18)
IMMATURE NEUTROPHIL %: 0.8 % (ref 0–0.43)
LYMPHOCYTES ABSOLUTE: 2.1 K/CU MM
LYMPHOCYTES RELATIVE PERCENT: 31.8 % (ref 24–44)
MCH RBC QN AUTO: 28.2 PG (ref 27–31)
MCHC RBC AUTO-ENTMCNC: 31.3 % (ref 32–36)
MCV RBC AUTO: 90.1 FL (ref 78–100)
MONOCYTES ABSOLUTE: 0.5 K/CU MM
MONOCYTES RELATIVE PERCENT: 8.2 % (ref 0–4)
NUCLEATED RBC %: 0 %
PDW BLD-RTO: 13.6 % (ref 11.7–14.9)
PLATELET # BLD: 292 K/CU MM (ref 140–440)
PMV BLD AUTO: 10.1 FL (ref 7.5–11.1)
RBC # BLD: 4.26 M/CU MM (ref 4.6–6.2)
SEGMENTED NEUTROPHILS ABSOLUTE COUNT: 3.3 K/CU MM
SEGMENTED NEUTROPHILS RELATIVE PERCENT: 50.7 % (ref 36–66)
TOTAL IMMATURE NEUTOROPHIL: 0.05 K/CU MM
TOTAL NUCLEATED RBC: 0 K/CU MM
WBC # BLD: 6.5 K/CU MM (ref 4–10.5)

## 2019-12-04 PROCEDURE — 36415 COLL VENOUS BLD VENIPUNCTURE: CPT

## 2019-12-04 PROCEDURE — 85025 COMPLETE CBC W/AUTO DIFF WBC: CPT

## 2020-01-08 ENCOUNTER — HOSPITAL ENCOUNTER (OUTPATIENT)
Age: 40
Setting detail: SPECIMEN
Discharge: HOME | End: 2020-01-08
Payer: COMMERCIAL

## 2020-01-08 LAB
ALBUMIN SERPL-MCNC: 4.7 GM/DL (ref 3.4–5)
ALP BLD-CCNC: 51 IU/L (ref 40–129)
ALT SERPL-CCNC: 43 U/L (ref 10–40)
AST SERPL-CCNC: 48 IU/L (ref 15–37)
BASOPHILS ABSOLUTE: 0.1 K/CU MM
BASOPHILS RELATIVE PERCENT: 0.7 % (ref 0–1)
BILIRUB SERPL-MCNC: 0.3 MG/DL (ref 0–1)
BILIRUBIN DIRECT: 0.2 MG/DL (ref 0–0.3)
BILIRUBIN, INDIRECT: 0.1 MG/DL (ref 0–0.7)
DIFFERENTIAL TYPE: ABNORMAL
EOSINOPHILS ABSOLUTE: 0.7 K/CU MM
EOSINOPHILS RELATIVE PERCENT: 10.2 % (ref 0–3)
GLUCOSE FASTING: 92 MG/DL (ref 70–99)
HCT VFR BLD CALC: 42.7 % (ref 42–52)
HEMOGLOBIN: 13.1 GM/DL (ref 13.5–18)
IMMATURE NEUTROPHIL %: 0.4 % (ref 0–0.43)
LYMPHOCYTES ABSOLUTE: 2.1 K/CU MM
LYMPHOCYTES RELATIVE PERCENT: 30.4 % (ref 24–44)
MCH RBC QN AUTO: 28.5 PG (ref 27–31)
MCHC RBC AUTO-ENTMCNC: 30.7 % (ref 32–36)
MCV RBC AUTO: 92.8 FL (ref 78–100)
MONOCYTES ABSOLUTE: 0.6 K/CU MM
MONOCYTES RELATIVE PERCENT: 8.5 % (ref 0–4)
NUCLEATED RBC %: 0 %
PDW BLD-RTO: 14.7 % (ref 11.7–14.9)
PLATELET # BLD: 278 K/CU MM (ref 140–440)
PMV BLD AUTO: 10.1 FL (ref 7.5–11.1)
RBC # BLD: 4.6 M/CU MM (ref 4.6–6.2)
SEGMENTED NEUTROPHILS ABSOLUTE COUNT: 3.4 K/CU MM
SEGMENTED NEUTROPHILS RELATIVE PERCENT: 49.8 % (ref 36–66)
TOTAL IMMATURE NEUTOROPHIL: 0.03 K/CU MM
TOTAL NUCLEATED RBC: 0 K/CU MM
TOTAL PROTEIN: 7.1 GM/DL (ref 6.4–8.2)
TSH HIGH SENSITIVITY: 0.95 UIU/ML (ref 0.27–4.2)
WBC # BLD: 6.9 K/CU MM (ref 4–10.5)

## 2020-01-08 PROCEDURE — 82947 ASSAY GLUCOSE BLOOD QUANT: CPT

## 2020-01-08 PROCEDURE — 36415 COLL VENOUS BLD VENIPUNCTURE: CPT

## 2020-01-08 PROCEDURE — 84443 ASSAY THYROID STIM HORMONE: CPT

## 2020-01-08 PROCEDURE — 85025 COMPLETE CBC W/AUTO DIFF WBC: CPT

## 2020-01-08 PROCEDURE — 80076 HEPATIC FUNCTION PANEL: CPT

## 2020-02-05 ENCOUNTER — HOSPITAL ENCOUNTER (OUTPATIENT)
Age: 40
Setting detail: SPECIMEN
Discharge: HOME | End: 2020-02-05
Payer: COMMERCIAL

## 2020-02-05 LAB
BASOPHILS ABSOLUTE: 0.1 K/CU MM
BASOPHILS RELATIVE PERCENT: 0.7 % (ref 0–1)
DIFFERENTIAL TYPE: ABNORMAL
EOSINOPHILS ABSOLUTE: 0 K/CU MM
EOSINOPHILS RELATIVE PERCENT: 0.1 % (ref 0–3)
HCT VFR BLD CALC: 43 % (ref 42–52)
HEMOGLOBIN: 13.5 GM/DL (ref 13.5–18)
IMMATURE NEUTROPHIL %: 0.3 % (ref 0–0.43)
LYMPHOCYTES ABSOLUTE: 1.8 K/CU MM
LYMPHOCYTES RELATIVE PERCENT: 19.8 % (ref 24–44)
MCH RBC QN AUTO: 28.8 PG (ref 27–31)
MCHC RBC AUTO-ENTMCNC: 31.4 % (ref 32–36)
MCV RBC AUTO: 91.9 FL (ref 78–100)
MONOCYTES ABSOLUTE: 0.6 K/CU MM
MONOCYTES RELATIVE PERCENT: 6.5 % (ref 0–4)
NUCLEATED RBC %: 0 %
PDW BLD-RTO: 12.8 % (ref 11.7–14.9)
PLATELET # BLD: 287 K/CU MM (ref 140–440)
PMV BLD AUTO: 10.7 FL (ref 7.5–11.1)
RBC # BLD: 4.68 M/CU MM (ref 4.6–6.2)
SEGMENTED NEUTROPHILS ABSOLUTE COUNT: 6.7 K/CU MM
SEGMENTED NEUTROPHILS RELATIVE PERCENT: 72.6 % (ref 36–66)
TOTAL IMMATURE NEUTOROPHIL: 0.03 K/CU MM
TOTAL NUCLEATED RBC: 0 K/CU MM
WBC # BLD: 9.2 K/CU MM (ref 4–10.5)

## 2020-02-05 PROCEDURE — 85025 COMPLETE CBC W/AUTO DIFF WBC: CPT

## 2020-02-05 PROCEDURE — 36415 COLL VENOUS BLD VENIPUNCTURE: CPT

## 2020-03-04 ENCOUNTER — HOSPITAL ENCOUNTER (OUTPATIENT)
Age: 40
Setting detail: SPECIMEN
Discharge: HOME | End: 2020-03-04
Payer: COMMERCIAL

## 2020-05-06 ENCOUNTER — APPOINTMENT (OUTPATIENT)
Dept: GENERAL RADIOLOGY | Age: 40
End: 2020-05-06
Payer: COMMERCIAL

## 2020-05-06 ENCOUNTER — HOSPITAL ENCOUNTER (EMERGENCY)
Age: 40
Discharge: HOME | End: 2020-05-06
Payer: COMMERCIAL

## 2020-05-06 VITALS
OXYGEN SATURATION: 95 % | BODY MASS INDEX: 28.04 KG/M2 | HEIGHT: 68 IN | HEART RATE: 98 BPM | SYSTOLIC BLOOD PRESSURE: 120 MMHG | DIASTOLIC BLOOD PRESSURE: 85 MMHG | WEIGHT: 185 LBS | TEMPERATURE: 97.7 F | RESPIRATION RATE: 17 BRPM

## 2020-05-06 LAB
ALBUMIN SERPL-MCNC: 4.4 GM/DL (ref 3.4–5)
ALP BLD-CCNC: 52 IU/L (ref 40–129)
ALT SERPL-CCNC: 19 U/L (ref 10–40)
ANION GAP SERPL CALCULATED.3IONS-SCNC: 9 MMOL/L (ref 4–16)
AST SERPL-CCNC: 21 IU/L (ref 15–37)
BASOPHILS ABSOLUTE: 0.1 K/CU MM
BASOPHILS RELATIVE PERCENT: 0.7 % (ref 0–1)
BILIRUB SERPL-MCNC: 0.3 MG/DL (ref 0–1)
BUN BLDV-MCNC: 19 MG/DL (ref 6–23)
CALCIUM SERPL-MCNC: 9.8 MG/DL (ref 8.3–10.6)
CHLORIDE BLD-SCNC: 104 MMOL/L (ref 99–110)
CO2: 28 MMOL/L (ref 21–32)
CREAT SERPL-MCNC: 0.9 MG/DL (ref 0.9–1.3)
DIFFERENTIAL TYPE: ABNORMAL
EOSINOPHILS ABSOLUTE: 1.2 K/CU MM
EOSINOPHILS RELATIVE PERCENT: 13 % (ref 0–3)
GFR AFRICAN AMERICAN: >60 ML/MIN/1.73M2
GFR NON-AFRICAN AMERICAN: >60 ML/MIN/1.73M2
GLUCOSE BLD-MCNC: 103 MG/DL (ref 70–99)
HCT VFR BLD CALC: 43.8 % (ref 42–52)
HEMOGLOBIN: 14 GM/DL (ref 13.5–18)
IMMATURE NEUTROPHIL %: 0.6 % (ref 0–0.43)
LYMPHOCYTES ABSOLUTE: 1.7 K/CU MM
LYMPHOCYTES RELATIVE PERCENT: 18.1 % (ref 24–44)
MCH RBC QN AUTO: 28.9 PG (ref 27–31)
MCHC RBC AUTO-ENTMCNC: 32 % (ref 32–36)
MCV RBC AUTO: 90.3 FL (ref 78–100)
MONOCYTES ABSOLUTE: 0.6 K/CU MM
MONOCYTES RELATIVE PERCENT: 5.7 % (ref 0–4)
NUCLEATED RBC %: 0 %
PDW BLD-RTO: 13.1 % (ref 11.7–14.9)
PLATELET # BLD: 245 K/CU MM (ref 140–440)
PMV BLD AUTO: 9.4 FL (ref 7.5–11.1)
POTASSIUM SERPL-SCNC: 4.4 MMOL/L (ref 3.5–5.1)
RBC # BLD: 4.85 M/CU MM (ref 4.6–6.2)
SEGMENTED NEUTROPHILS ABSOLUTE COUNT: 5.9 K/CU MM
SEGMENTED NEUTROPHILS RELATIVE PERCENT: 61.9 % (ref 36–66)
SODIUM BLD-SCNC: 141 MMOL/L (ref 135–145)
TOTAL IMMATURE NEUTOROPHIL: 0.06 K/CU MM
TOTAL NUCLEATED RBC: 0 K/CU MM
TOTAL PROTEIN: 7.3 GM/DL (ref 6.4–8.2)
TROPONIN T: <0.01 NG/ML
WBC # BLD: 9.6 K/CU MM (ref 4–10.5)

## 2020-05-06 PROCEDURE — 93005 ELECTROCARDIOGRAM TRACING: CPT | Performed by: PHYSICIAN ASSISTANT

## 2020-05-06 PROCEDURE — 84484 ASSAY OF TROPONIN QUANT: CPT

## 2020-05-06 PROCEDURE — 80053 COMPREHEN METABOLIC PANEL: CPT

## 2020-05-06 PROCEDURE — 99285 EMERGENCY DEPT VISIT HI MDM: CPT

## 2020-05-06 PROCEDURE — 94761 N-INVAS EAR/PLS OXIMETRY MLT: CPT

## 2020-05-06 PROCEDURE — 85025 COMPLETE CBC W/AUTO DIFF WBC: CPT

## 2020-05-06 PROCEDURE — 94664 DEMO&/EVAL PT USE INHALER: CPT

## 2020-05-06 PROCEDURE — 6370000000 HC RX 637 (ALT 250 FOR IP): Performed by: PHYSICIAN ASSISTANT

## 2020-05-06 PROCEDURE — 94640 AIRWAY INHALATION TREATMENT: CPT

## 2020-05-06 PROCEDURE — 71045 X-RAY EXAM CHEST 1 VIEW: CPT

## 2020-05-06 RX ORDER — ALBUTEROL SULFATE 90 UG/1
2 AEROSOL, METERED RESPIRATORY (INHALATION) EVERY 6 HOURS PRN
Qty: 1 INHALER | Refills: 0 | Status: SHIPPED | OUTPATIENT
Start: 2020-05-06 | End: 2020-05-20 | Stop reason: SDUPTHER

## 2020-05-06 RX ORDER — PREDNISONE 20 MG/1
40 TABLET ORAL DAILY
Qty: 10 TABLET | Refills: 0 | Status: SHIPPED | OUTPATIENT
Start: 2020-05-06 | End: 2020-05-11

## 2020-05-06 RX ORDER — PREDNISONE 20 MG/1
60 TABLET ORAL ONCE
Status: COMPLETED | OUTPATIENT
Start: 2020-05-06 | End: 2020-05-06

## 2020-05-06 RX ORDER — ALBUTEROL SULFATE 2.5 MG/3ML
2.5 SOLUTION RESPIRATORY (INHALATION) EVERY 6 HOURS PRN
Qty: 30 EACH | Refills: 0 | Status: SHIPPED | OUTPATIENT
Start: 2020-05-06 | End: 2020-05-20

## 2020-05-06 RX ORDER — SOFT LENS DISINFECTANT
SOLUTION, NON-ORAL MISCELLANEOUS
Qty: 1 DEVICE | Refills: 0 | Status: SHIPPED | OUTPATIENT
Start: 2020-05-06 | End: 2021-01-27

## 2020-05-06 RX ORDER — ASPIRIN 81 MG/1
324 TABLET, CHEWABLE ORAL ONCE
Status: COMPLETED | OUTPATIENT
Start: 2020-05-06 | End: 2020-05-06

## 2020-05-06 RX ORDER — IPRATROPIUM BROMIDE AND ALBUTEROL SULFATE 2.5; .5 MG/3ML; MG/3ML
1 SOLUTION RESPIRATORY (INHALATION) ONCE
Status: COMPLETED | OUTPATIENT
Start: 2020-05-06 | End: 2020-05-06

## 2020-05-06 RX ADMIN — IPRATROPIUM BROMIDE AND ALBUTEROL SULFATE 1 AMPULE: .5; 3 SOLUTION RESPIRATORY (INHALATION) at 09:37

## 2020-05-06 RX ADMIN — IPRATROPIUM BROMIDE AND ALBUTEROL SULFATE 1 AMPULE: .5; 3 SOLUTION RESPIRATORY (INHALATION) at 09:31

## 2020-05-06 RX ADMIN — PREDNISONE 60 MG: 20 TABLET ORAL at 09:14

## 2020-05-06 RX ADMIN — ASPIRIN 81 MG 324 MG: 81 TABLET ORAL at 09:14

## 2020-05-06 ASSESSMENT — PAIN SCALES - GENERAL: PAINLEVEL_OUTOF10: 5

## 2020-05-06 NOTE — ED NOTES
Bed: ED-26  Expected date:   Expected time:   Means of arrival:   Comments:  EMS     [employer] [facility]  05/06/20 7918

## 2020-05-06 NOTE — ED PROVIDER NOTES
EMERGENCY DEPARTMENT ENCOUNTER      [facility]    Chief Complaint   Patient presents with    Asthma    Shortness of Breath    Cough    Wheezing     tightness in chest    Fatigue           This patient was not evaluated by the attending physician. I have independently evaluated this patient. HPI    Monik Mcclure is a 35 y.o. male who presents with cough, wheezes, chest tightness. Onset 3 weeks, worse for 1 week. Patient states symptoms feel like his previous asthma. No fevers. Cough is nonproductive. Generalized chest tightness without focal chest pain or palpitations. Patient smokes cigarettes daily. He states he occasionally smokes marijuana. No other illicit drug use. REVIEW OF SYSTEMS    Constitutional:  Denies fever, chills, weight loss or weakness   HENT:  Denies sore throat or ear pain   Cardiovascular:  Denies chest pain, palpitations   Respiratory: See HPI. GI:  Denies abdominal pain, nausea, vomiting, or diarrhea  :  Denies any urinary symptoms   Musculoskeletal:  Denies back pain  Skin:  Denies rash  Neurologic:  Denies headache, focal weakness or sensory changes   Endocrine:  Denies polyuria or polydypsia   Lymphatic:  Denies swollen glands     All other review of systems are negative  See HPI and nursing notes for additional information     PAST MEDICAL AND SURGICAL HISTORY    Past Medical History:   Diagnosis Date    Anxiety     Asthma     Chronic back pain     Diabetes mellitus ([facility] [address]     H/O 24 hour EKG monitoring 09/19/2013    EVENT MONITOR-normal sinus rhythm    H/O echocardiogram 04/20/2017    EF 55% Normal LV with normal systolic function.  No significant valvulopathy is seen    Hx of echocardiogram 08/16/13 08/13 Mitrall annular calcification with a trace to mild MR    Hyperlipidemia     Hypertension     Schizophrenia [facility])      Past Surgical History:   Procedure Laterality Date    HEMORRHOID SURGERY CURRENT MEDICATIONS    Current Outpatient Rx   Medication Sig Dispense Refill    Respiratory Therapy Supplies (NEBULIZER) CATIE Use as directed with albuterol and ipratropium bromide every 4-6 hours as needed cough, wheezes. 1 Device 0    ipratropium (ATROVENT) 0.02 % nebulizer solution Take 2.5 mLs by nebulization every 6 hours as needed for Wheezing Please dispense one box. 2.5 mL 0    albuterol (PROVENTIL) (2.5 MG/3ML) 0.083% nebulizer solution Take 3 mLs by nebulization every 6 hours as needed for Wheezing 30 each 0    predniSONE (DELTASONE) 20 MG tablet Take 2 tablets by mouth daily for 5 days Patient to take first dose of this medication on 5/7/2020 10 tablet 0    albuterol sulfate  (90 Base) MCG/ACT inhaler Inhale 2 puffs into the lungs every 6 hours as needed for Wheezing or Shortness of Breath (or cough) Please include spacer with instructions for use. 1 Inhaler 0    metFORMIN (GLUCOPHAGE) 500 MG tablet Take 500 mg by mouth 2 times daily (with meals)      cloZAPine (CLOZARIL) 100 MG tablet Take 100 mg by mouth nightly      ibuprofen (ADVIL;MOTRIN) 600 MG tablet Take 1 tablet by mouth every 6 hours as needed for Pain 30 tablet 0    clonazePAM (KLONOPIN) 1 MG tablet Take 1 mg by mouth 2 times daily      theophylline (LIOR-24) 200 MG extended release capsule Take 200 mg by mouth daily      montelukast (SINGULAIR) 10 MG tablet Take 10 mg by mouth nightly      fenofibrate 160 MG tablet Take 160 mg by mouth nightly      naproxen (NAPROSYN) 375 MG tablet Take 375 mg by mouth 2 times daily (with meals)      gabapentin (NEURONTIN) 300 MG capsule Take 600 mg by mouth 2 times daily .  acetaminophen (TYLENOL) 325 MG tablet Take 650 mg by mouth every 12 hours      atorvastatin (LIPITOR) 40 MG tablet Take 40 mg by mouth nightly       cyclobenzaprine (FLEXERIL) 10 MG tablet Take 10 mg by mouth 2 times daily.  Loratadine (CLARITIN) 10 MG CAPS Take 1 capsule by mouth daily.       Constitutional:  Well developed, Well nourished  HENT:  Normocephalic, Atraumatic, PERRL. EOMI. Sclera clear. Conjunctiva normal, No discharge. Neck/Lymphatics: supple, no JVD, no swollen nodes  Cardiovascular:  Rate normal, regular rhythm,  no murmurs/rubs/gallops. No JVD    Respiratory:  Nonlabored breathing. Patient with coarse central airway sounds and central rhonchi with wheezes throughout. Slightly diminished airflow throughout with prolonged expiratory phase. Abdomen: Bowel sounds normal, Soft, No tenderness, no masses. Musculoskeletal:    There is no edema, asymmetry, or calf / thigh tenderness bilaterally. No cyanosis. No cool or pale-appearing limb. Bilateral upper and lower extremity ROM intact without pain or obvious deficit  Integument:  Warm, Dry  Neurologic: Alert & oriented , No focal deficits noted. Cranial nerves II through XII grossly intact. Normal gross motor coordination & motor strength bilateral upper and lower extremities  Sensation intact.   Psychiatric:  Affect normal, Mood normal.       Labs:  Results for orders placed or performed during the hospital encounter of 05/06/20   CBC Auto Differential   Result Value Ref Range    WBC 9.6 4.0 - 10.5 K/CU MM    RBC 4.85 4.6 - 6.2 M/CU MM    Hemoglobin 14.0 13.5 - 18.0 GM/DL    Hematocrit 43.8 42 - 52 %    MCV 90.3 78 - 100 FL    MCH 28.9 27 - 31 PG    MCHC 32.0 32.0 - 36.0 %    RDW 13.1 11.7 - 14.9 %    Platelets 995 650 - 500 K/CU MM    MPV 9.4 7.5 - 11.1 FL    Differential Type AUTOMATED DIFFERENTIAL     Segs Relative 61.9 36 - 66 %    Lymphocytes % 18.1 (L) 24 - 44 %    Monocytes % 5.7 (H) 0 - 4 %    Eosinophils % 13.0 (H) 0 - 3 %    Basophils % 0.7 0 - 1 %    Segs Absolute 5.9 K/CU MM    Lymphocytes Absolute 1.7 K/CU MM    Monocytes Absolute 0.6 K/CU MM    Eosinophils Absolute 1.2 K/CU MM    Basophils Absolute 0.1 K/CU MM    Nucleated RBC % 0.0 %    Total Nucleated RBC 0.0 K/CU MM    Total Immature Neutrophil 0.06 K/CU MM only occasional wheezes heard. Plan for discharge home with albuterol MDI, refill of albuterol Nebules and ipratropium Nebules to use with nebulizer machine. Oral steroid Rx provided as well. Smoking cessation strongly encouraged. Recommend follow with PCP over the next several days. Patient agrees to return emergency department if symptoms worsen or any new symptoms develop. Clinical  IMPRESSION    1. Cough    2. Wheezes    3. Smoker            Comment: Please note this report has been produced using speech recognition software and may contain errors related to that system including errors in grammar, punctuation, and spelling, as well as words and phrases that may be inappropriate. If there are any questions or concerns please feel free to contact the dictating provider for clarification.          Bee Gerardo, [place]  05/06/20 1042

## 2020-05-12 LAB
EKG ATRIAL RATE: 87 BPM
EKG DIAGNOSIS: NORMAL
EKG P AXIS: 43 DEGREES
EKG P-R INTERVAL: 142 MS
EKG Q-T INTERVAL: 360 MS
EKG QRS DURATION: 80 MS
EKG QTC CALCULATION (BAZETT): 433 MS
EKG R AXIS: 79 DEGREES
EKG T AXIS: 49 DEGREES
EKG VENTRICULAR RATE: 87 BPM

## 2020-05-20 ENCOUNTER — APPOINTMENT (OUTPATIENT)
Dept: GENERAL RADIOLOGY | Age: 40
End: 2020-05-20
Payer: COMMERCIAL

## 2020-05-20 ENCOUNTER — HOSPITAL ENCOUNTER (EMERGENCY)
Age: 40
Discharge: HOME | End: 2020-05-20
Payer: COMMERCIAL

## 2020-05-20 VITALS
WEIGHT: 160 LBS | TEMPERATURE: 97.9 F | BODY MASS INDEX: 24.25 KG/M2 | DIASTOLIC BLOOD PRESSURE: 75 MMHG | HEART RATE: 87 BPM | SYSTOLIC BLOOD PRESSURE: 128 MMHG | OXYGEN SATURATION: 100 % | HEIGHT: 68 IN | RESPIRATION RATE: 12 BRPM

## 2020-05-20 PROCEDURE — 99282 EMERGENCY DEPT VISIT SF MDM: CPT

## 2020-05-20 PROCEDURE — 71045 X-RAY EXAM CHEST 1 VIEW: CPT

## 2020-05-20 PROCEDURE — 94640 AIRWAY INHALATION TREATMENT: CPT

## 2020-05-20 PROCEDURE — 94761 N-INVAS EAR/PLS OXIMETRY MLT: CPT

## 2020-05-20 PROCEDURE — 6370000000 HC RX 637 (ALT 250 FOR IP): Performed by: PHYSICIAN ASSISTANT

## 2020-05-20 RX ORDER — ALBUTEROL SULFATE 90 UG/1
2 AEROSOL, METERED RESPIRATORY (INHALATION) ONCE
Status: COMPLETED | OUTPATIENT
Start: 2020-05-20 | End: 2020-05-20

## 2020-05-20 RX ORDER — ALBUTEROL SULFATE 90 UG/1
2 AEROSOL, METERED RESPIRATORY (INHALATION) EVERY 6 HOURS PRN
Qty: 1 INHALER | Refills: 0 | Status: SHIPPED | OUTPATIENT
Start: 2020-05-20 | End: 2021-01-27

## 2020-05-20 RX ADMIN — ALBUTEROL SULFATE 2 PUFF: 90 AEROSOL, METERED RESPIRATORY (INHALATION) at 18:31

## 2020-05-21 NOTE — CARE COORDINATION
CM - room # 14 or pt with a need for refills to his psyc medications -stated robbin been out for a while,   -which he did not know the doses and barely knew the drugs --Clozapine (Clorazil) and Neurontin --pt said he had missed his appointment with Rosalind at [facility] in [facility]. The pt insurance is [employer] -still valid. [facility] was closed -so could not verify meds / doses    Discovered unstated Facts- Both medications were filled each respectively on May 4 and 5 this month by Nhung Sanford and Rosalind from [facility]. -those were 30 day med refill prescriptions --so they cannot be refilled -pt does have one refill  on file at [facility] . During the interview  At bedside --pt kept falling asleep(Drowsy). Informed pt that he would not be given prescriptions -and he needs to keep his appointment at [facility]. In June of 2019 pt came to ER overdosed --was given Narcan. Pt has also been non compliant in the year prior.

## 2020-05-22 NOTE — ED PROVIDER NOTES
LV with normal systolic function. No significant valvulopathy is seen    Hx of echocardiogram 08/16/13 08/13 Mitrall annular calcification with a trace to mild MR    Hyperlipidemia     Hypertension     Schizophrenia ([address]      Past Surgical History:   Procedure Laterality Date    HEMORRHOID SURGERY         CURRENT MEDICATIONS    Current Outpatient Rx   Medication Sig Dispense Refill    albuterol sulfate  (90 Base) MCG/ACT inhaler Inhale 2 puffs into the lungs every 6 hours as needed for Wheezing or Shortness of Breath (or cough) Please include spacer with instructions for use. 1 Inhaler 0    Spacer/Aero-Holding Chambers CATIE Please dispense 1 device to be used with all as needed for shortness of breath or wheezing. 1 Device 0    Respiratory Therapy Supplies (NEBULIZER) CATIE Use as directed with albuterol and ipratropium bromide every 4-6 hours as needed cough, wheezes. 1 Device 0    ipratropium (ATROVENT) 0.02 % nebulizer solution Take 2.5 mLs by nebulization every 6 hours as needed for Wheezing Please dispense one box. 2.5 mL 0    metFORMIN (GLUCOPHAGE) 500 MG tablet Take 500 mg by mouth 2 times daily (with meals)      cloZAPine (CLOZARIL) 100 MG tablet Take 100 mg by mouth nightly      ibuprofen (ADVIL;MOTRIN) 600 MG tablet Take 1 tablet by mouth every 6 hours as needed for Pain 30 tablet 0    clonazePAM (KLONOPIN) 1 MG tablet Take 1 mg by mouth 2 times daily      theophylline (LIOR-24) 200 MG extended release capsule Take 200 mg by mouth daily      montelukast (SINGULAIR) 10 MG tablet Take 10 mg by mouth nightly      fenofibrate 160 MG tablet Take 160 mg by mouth nightly      naproxen (NAPROSYN) 375 MG tablet Take 375 mg by mouth 2 times daily (with meals)      gabapentin (NEURONTIN) 300 MG capsule Take 600 mg by mouth 2 times daily .       acetaminophen (TYLENOL) 325 MG tablet Take 650 mg by mouth every 12 hours      atorvastatin (LIPITOR) 40 MG tablet Take 40 mg by mouth nightly VITAL SIGNS: /75   Pulse 87   Temp 97.9 °F (36.6 °C) (Oral)   Resp 12   Ht 5' 8\" (1.727 m)   Wt 160 lb (72.6 kg)   SpO2 100%   BMI 24.33 kg/m²   Constitutional:  Well developed, well nourished, no acute distress, non-toxic appearance   Eyes: Conjunctiva normal, sclera non-icteric  HEENT:     - Normocephalic, atraumatic   - PERRL, EOM intact. Conjunctiva normal    -  Frontal/Maxillary sinuses NONtender to percussion.   - External auditory canals clear   - TMs clear without discharge, fluid, or bulging.   - Nasal passages with mildly erythematous and edematous turbinates. No masses. - Oropharynx mildly erythematous without tonsillar hypertrophy or exudate. Uvula is midline and rises evenly with phonation. Neck/Lymphatics:    - supple, no JVD, no swollen nodes     Respiratory: There is mild diffuse wheezing on expiration throughout all lung fields. Good air movement in all lung fields. No rales/rhonchi. No retractions, no accessory muscle use  Cardiovascular:  Normal rate, normal rhythm   GI:  Soft, no abdominal tenderness, no guarding. Musculoskeletal:  No obvious deficits, no edema   Integument:  Skin pink, warm, dry, intact   Psych: Normal mood. Normal affect. Does not appear to be interacting with internal stimuli. Denies SI/HI. RADIOLOGY/PROCEDURES    XR CHEST PORTABLE   Final Result   No active cardiopulmonary disease                   ED COURSE & MEDICAL DECISION MAKING       Vital signs and nursing notes reviewed during ED course. I have independently evaluated this patient. Supervising physician present in the Emergency Department, available for consultation, throughout entirety of  patient care. All pertinent Lab data and radiographic results reviewed with patient at bedside. History and exam are consistent with URI and mild asthma exacerbation. Patient also requesting medication refill but does not know what medications.  I involved case management to help with psych 05/20/20 2019   BP:   136/87 128/75   Pulse:   76 87   Resp:  18 18 12   Temp:   96.6 °F (35.9 °C) 97.9 °F (36.6 °C)   TempSrc:   Oral Oral   SpO2:  97%  100%   Weight: 160 lb (72.6 kg)      Height: 5' 8\" (1.727 m)            Clinical  IMPRESSION    1. Acute upper respiratory infection    2. Mild intermittent asthma with exacerbation           Disposition referral (if applicable):  [address]  [address] [place] [address]  [phone]    Schedule an appointment as soon as possible for a visit   Recheck as soon as possible    Karla Malik MD  [address] [place] [address] [facility] [address] [phone]    Call   [address] in 2-3 days    [facility] Emergency Department  [facility] [phone]  Go to   Return to ED if symptoms worsen or new symptoms      Disposition medications (if applicable):  Discharge Medication List as of 5/20/2020  8:07 PM      START taking these medications    Details   Spacer/Aero-Holding Pearlene Messing CATIE Disp-1 Device, R-0, PrintPlease dispense 1 device to be used with all as needed for shortness of breath or wheezing. Comment: Please note this report has been produced using speech recognition software and may contain errors related to that system including errors in grammar, punctuation, and spelling, as well as words and phrases that may be inappropriate. If there are any questions or concerns please feel free to contact the dictating provider for clarification.         Chanda Orona PA-C  05/22/20 8278

## 2020-07-21 ENCOUNTER — HOSPITAL ENCOUNTER (EMERGENCY)
Age: 40
Discharge: HOME | End: 2020-07-21
Attending: EMERGENCY MEDICINE
Payer: COMMERCIAL

## 2020-07-21 VITALS
TEMPERATURE: 98.4 F | RESPIRATION RATE: 18 BRPM | SYSTOLIC BLOOD PRESSURE: 102 MMHG | HEART RATE: 94 BPM | OXYGEN SATURATION: 96 % | DIASTOLIC BLOOD PRESSURE: 68 MMHG

## 2020-07-21 LAB
CHP ED QC CHECK: YES
GLUCOSE BLD-MCNC: 166 MG/DL
GLUCOSE BLD-MCNC: 166 MG/DL (ref 70–99)

## 2020-07-21 PROCEDURE — 82962 GLUCOSE BLOOD TEST: CPT

## 2020-07-21 PROCEDURE — 99284 EMERGENCY DEPT VISIT MOD MDM: CPT

## 2020-07-21 PROCEDURE — 93005 ELECTROCARDIOGRAM TRACING: CPT | Performed by: EMERGENCY MEDICINE

## 2020-07-22 PROCEDURE — 93010 ELECTROCARDIOGRAM REPORT: CPT | Performed by: INTERNAL MEDICINE

## 2020-07-22 NOTE — CARE COORDINATION
CM -called to [address] [place] [address] Rd4 pt in need of ride home.  Pt discharged --went to phone --called for his own transportation home . ,Tm/ BLANCA

## 2020-07-22 NOTE — ED TRIAGE NOTES
Pt brought in by EMS for complaints of overdose; pts sister had came into the house and found the pt unresponsive on the floor; pt was ashen in color and had oxygen saturation of 40%; pt was given 2 rounds of nasal narcan and pt woke back up; at time of arrival to the ER the pt is alert and oriented and shows no signs of distress at this time;

## 2020-07-22 NOTE — ED NOTES
Bed: H-04  Expected date:   Expected time:   Means of arrival:   Comments:  Overdose  [facility]

## 2020-07-22 NOTE — ED PROVIDER NOTES
EVENT MONITOR-normal sinus rhythm    H/O echocardiogram 04/20/2017    EF 55% Normal LV with normal systolic function.  No significant valvulopathy is seen    Hx of echocardiogram 08/16/13 08/13 Mitrall annular calcification with a trace to mild MR    Hyperlipidemia     Hypertension     Sch[facility]ia ([facility]      Past Surgical History:   Procedure Laterality Date    HEMORRHOID SURGERY       Family History   Problem Relation Age of Onset    Emphysema Mother     Heart Disease Father     High Blood Pressure Father      Social History     Socioeconomic History    Marital status: Single     Spouse name: Not on file    Number of children: Not on file    Years of education: Not on file    Highest education level: Not on file   Occupational History    Not on file   Social Needs    Financial resource strain: Not on file    Food insecurity     Worry: Not on file     Inability: Not on file    Transportation needs     Medical: Not on file     Non-medical: Not on file   Tobacco Use    Smoking status: Current Every Day Smoker     Packs/day: 0.50     Years: 20.00     Pack years: 10.00     Types: Cigarettes    Smokeless tobacco: Current User     Types: Snuff   Substance and Sexual Activity    Alcohol use: No    Drug use: No     Comment: denies any drug use to this nurse    Sexual activity: Not Currently     Comment: single    Lifestyle    Physical activity     Days per week: Not on file     Minutes per session: Not on file    Stress: Not on file   Relationships    Social connections     Talks on phone: Not on file     Gets together: Not on file     Attends [employer] service: Not on file     Active member of club or organization: Not on file     Attends meetings of clubs or organizations: Not on file     Relationship status: Not on file    Intimate partner violence     Fear of current or ex partner: Not on file     Emotionally abused: Not on file     Physically abused: Not on file     Forced sexual activity: Not on file   Other Topics Concern    Not on file   Social History Narrative    Not on file     No current facility-administered medications for this encounter. Current Outpatient Medications   Medication Sig Dispense Refill    albuterol sulfate  (90 Base) MCG/ACT inhaler Inhale 2 puffs into the lungs every 6 hours as needed for Wheezing or Shortness of Breath (or cough) Please include spacer with instructions for use. 1 Inhaler 0    Spacer/Aero-Holding Chambers CATIE Please dispense 1 device to be used with all as needed for shortness of breath or wheezing. 1 Device 0    Respiratory Therapy Supplies (NEBULIZER) CATIE Use as directed with albuterol and ipratropium bromide every 4-6 hours as needed cough, wheezes. 1 Device 0    ipratropium (ATROVENT) 0.02 % nebulizer solution Take 2.5 mLs by nebulization every 6 hours as needed for Wheezing Please dispense one box. 2.5 mL 0    metFORMIN (GLUCOPHAGE) 500 MG tablet Take 500 mg by mouth 2 times daily (with meals)      cloZAPine (CLOZARIL) 100 MG tablet Take 100 mg by mouth nightly      ibuprofen (ADVIL;MOTRIN) 600 MG tablet Take 1 tablet by mouth every 6 hours as needed for Pain 30 tablet 0    clonazePAM (KLONOPIN) 1 MG tablet Take 1 mg by mouth 2 times daily      theophylline (LIOR-24) 200 MG extended release capsule Take 200 mg by mouth daily      montelukast (SINGULAIR) 10 MG tablet Take 10 mg by mouth nightly      fenofibrate 160 MG tablet Take 160 mg by mouth nightly      naproxen (NAPROSYN) 375 MG tablet Take 375 mg by mouth 2 times daily (with meals)      gabapentin (NEURONTIN) 300 MG capsule Take 600 mg by mouth 2 times daily .  acetaminophen (TYLENOL) 325 MG tablet Take 650 mg by mouth every 12 hours      atorvastatin (LIPITOR) 40 MG tablet Take 40 mg by mouth nightly       cyclobenzaprine (FLEXERIL) 10 MG tablet Take 10 mg by mouth 2 times daily.  Loratadine (CLARITIN) 10 MG CAPS Take 1 capsule by mouth daily.  lisinopril (PRINIVIL;ZESTRIL) 20 MG tablet Take 20 mg by mouth daily.  docusate sodium (COLACE) 100 MG capsule Take 100 mg by mouth 2 times daily.  benztropine (COGENTIN) 1 MG tablet Take 1 mg by mouth 2 times daily. No Known Allergies    Nursing Notes Reviewed    Physical Exam:  Triage VS:    ED Triage Vitals [07/21/20 2017]   Enc Vitals Group      /64      Pulse 105      Resp 19      Temp 98.4 °F (36.9 °C)      Temp Source Oral      SpO2 94 %      Weight       Height       Head Circumference       Peak Flow       Pain Score       Pain Loc       Pain Edu? Excl. in [address]? My pulse ox interpretation is - normal    General appearance:  No acute distress. Skin:  Warm. Dry. Intact. No Janeway lesions, Osler nodes, splinter hemorrhages. Eye:  Extraocular movements intact. Ears, nose, mouth and throat:  Oral mucosa moist   Neck:  Trachea midline. No JVD. No carotid there is a bruits. Extremity:  Normal ROM in all 4 extremities. Heart:  Regular rate and rhythm. No murmurs, rubs, gallops. Perfusion: Symmetric peripheral pulses. No peripheral edema. Cap refill is brisk and less than 2 seconds. All compartments are soft and compressible. No clinical signs of compartment syndrome. Respiratory:  Respirations nonlabored. No signs of thoracic trauma. Abdominal:   Soft. Nontender. Nondistended. No chest or abdominal retractions. Neurological:  Alert and oriented times 3. No focal or lateralizing neurologic deficits. Patient is able to ambulate without ataxia or gait instability. Psychiatric: Denies suicidal, homicidal ideation plan or intent. No signs of acute psychosis tiesha or delirium. No auditory, visual, tactile hallucinations. EKG (if obtained): (All EKG's are interpreted by myself in the absence of a cardiologist). EKG demonstrates ventricular rate of 85 beats per an in sinus rhythm. No ST elevations or depressions. No T wave inversions.   No signs of acute ischemia, infarct, high degree heart block. Intervals all appear to within normal limits. MDM:  Patient here for opiate overdose, received Narcan in route was awake on arrival, has no complaints, was monitored for a short period of time and continued to have no symptoms, will be discharged to follow-up as an outpatient, patient referrals given, he understands and agree, return warnings given. Denies suicidal homicidal ideation, plan, intent. No signs of acute psychosis, tiesha, delirium. Patient is able to ambulate. He is able to tolerate p.o. intake. Opiate and tobacco abuse cessation counseling is provided for 6 minutes. Risks of tobacco and opiate use to overall health were discussed and resources for cessation were offered. He is discharged in stable, nontoxic condition. Clinical Impression:  1. Opiate overdose, accidental or unintentional, initial encounter ([facility] [address]    2. Opiate use    3. Tobacco abuse    4. Tobacco abuse counseling      PROCEDURES  Substance abuse cessation counseling x 6 minutes. Disposition referral (if applicable):  [facility]  [address] [place] [address]  [phone]  In 3 days  Please follow-up with primary care physician for reevaluation and to establish care. [address] [place] [address] Emergency Department  [facility] [phone]  Go to   Immediately with any new, worsening, concerning symptoms. ED Provider Disposition Time  DISPOSITION: Discharge      Comment: Please note this report has been produced using speech recognition software and may contain errors related to that system including errors in grammar, punctuation, and spelling, as well as words and phrases that may be inappropriate. Efforts were made to edit the dictations.        [facility],   07/21/20 9943

## 2020-08-03 LAB
EKG ATRIAL RATE: 85 BPM
EKG DIAGNOSIS: NORMAL
EKG P AXIS: 3 DEGREES
EKG P-R INTERVAL: 162 MS
EKG Q-T INTERVAL: 342 MS
EKG QRS DURATION: 84 MS
EKG QTC CALCULATION (BAZETT): 406 MS
EKG R AXIS: 53 DEGREES
EKG T AXIS: 6 DEGREES
EKG VENTRICULAR RATE: 85 BPM

## 2020-11-19 ENCOUNTER — OFFICE VISIT (OUTPATIENT)
Dept: INTERNAL MEDICINE CLINIC | Age: 40
End: 2020-11-19
Payer: COMMERCIAL

## 2020-11-19 VITALS
BODY MASS INDEX: 28.59 KG/M2 | TEMPERATURE: 97.2 F | HEART RATE: 84 BPM | WEIGHT: 188 LBS | SYSTOLIC BLOOD PRESSURE: 128 MMHG | DIASTOLIC BLOOD PRESSURE: 72 MMHG | OXYGEN SATURATION: 98 %

## 2020-11-19 PROBLEM — E11.42 DIABETIC POLYNEUROPATHY ASSOCIATED WITH TYPE 2 DIABETES MELLITUS (HCC): Status: ACTIVE | Noted: 2020-11-19

## 2020-11-19 PROBLEM — E78.2 MIXED HYPERLIPIDEMIA: Status: ACTIVE | Noted: 2020-11-19

## 2020-11-19 LAB
A/G RATIO: 2.4 (ref 1.1–2.2)
ALBUMIN SERPL-MCNC: 4.7 G/DL (ref 3.4–5)
ALP BLD-CCNC: 45 U/L (ref 40–129)
ALT SERPL-CCNC: 11 U/L (ref 10–40)
ANION GAP SERPL CALCULATED.3IONS-SCNC: 12 MMOL/L (ref 3–16)
AST SERPL-CCNC: 18 U/L (ref 15–37)
BASOPHILS ABSOLUTE: 0.1 K/UL (ref 0–0.2)
BASOPHILS RELATIVE PERCENT: 0.9 %
BILIRUB SERPL-MCNC: <0.2 MG/DL (ref 0–1)
BUN BLDV-MCNC: 27 MG/DL (ref 7–20)
CALCIUM SERPL-MCNC: 10.4 MG/DL (ref 8.3–10.6)
CHLORIDE BLD-SCNC: 107 MMOL/L (ref 99–110)
CHOLESTEROL, FASTING: 121 MG/DL (ref 0–199)
CO2: 21 MMOL/L (ref 21–32)
CREAT SERPL-MCNC: 0.8 MG/DL (ref 0.9–1.3)
EOSINOPHILS ABSOLUTE: 1.9 K/UL (ref 0–0.6)
EOSINOPHILS RELATIVE PERCENT: 26.2 %
GFR AFRICAN AMERICAN: >60
GFR NON-AFRICAN AMERICAN: >60
GLOBULIN: 2 G/DL
GLUCOSE BLD-MCNC: 141 MG/DL (ref 70–99)
HCT VFR BLD CALC: 41.6 % (ref 40.5–52.5)
HDLC SERPL-MCNC: 43 MG/DL (ref 40–60)
HEMOGLOBIN: 13.9 G/DL (ref 13.5–17.5)
LDL CHOLESTEROL CALCULATED: 57 MG/DL
LYMPHOCYTES ABSOLUTE: 1.7 K/UL (ref 1–5.1)
LYMPHOCYTES RELATIVE PERCENT: 23 %
MCH RBC QN AUTO: 30 PG (ref 26–34)
MCHC RBC AUTO-ENTMCNC: 33.5 G/DL (ref 31–36)
MCV RBC AUTO: 89.6 FL (ref 80–100)
MONOCYTES ABSOLUTE: 0.5 K/UL (ref 0–1.3)
MONOCYTES RELATIVE PERCENT: 6.3 %
NEUTROPHILS ABSOLUTE: 3.2 K/UL (ref 1.7–7.7)
NEUTROPHILS RELATIVE PERCENT: 43.6 %
PDW BLD-RTO: 13.6 % (ref 12.4–15.4)
PLATELET # BLD: 226 K/UL (ref 135–450)
PMV BLD AUTO: 9.1 FL (ref 5–10.5)
POTASSIUM SERPL-SCNC: 5 MMOL/L (ref 3.5–5.1)
RBC # BLD: 4.64 M/UL (ref 4.2–5.9)
SODIUM BLD-SCNC: 140 MMOL/L (ref 136–145)
TOTAL PROTEIN: 6.7 G/DL (ref 6.4–8.2)
TRIGLYCERIDE, FASTING: 105 MG/DL (ref 0–150)
VLDLC SERPL CALC-MCNC: 21 MG/DL
WBC # BLD: 7.3 K/UL (ref 4–11)

## 2020-11-19 PROCEDURE — 2022F DILAT RTA XM EVC RTNOPTHY: CPT | Performed by: INTERNAL MEDICINE

## 2020-11-19 PROCEDURE — 90471 IMMUNIZATION ADMIN: CPT | Performed by: INTERNAL MEDICINE

## 2020-11-19 PROCEDURE — 90686 IIV4 VACC NO PRSV 0.5 ML IM: CPT | Performed by: INTERNAL MEDICINE

## 2020-11-19 PROCEDURE — 99214 OFFICE O/P EST MOD 30 MIN: CPT | Performed by: INTERNAL MEDICINE

## 2020-11-19 PROCEDURE — 36415 COLL VENOUS BLD VENIPUNCTURE: CPT | Performed by: INTERNAL MEDICINE

## 2020-11-19 PROCEDURE — 3023F SPIROM DOC REV: CPT | Performed by: INTERNAL MEDICINE

## 2020-11-19 PROCEDURE — G8482 FLU IMMUNIZE ORDER/ADMIN: HCPCS | Performed by: INTERNAL MEDICINE

## 2020-11-19 PROCEDURE — G8419 CALC BMI OUT NRM PARAM NOF/U: HCPCS | Performed by: INTERNAL MEDICINE

## 2020-11-19 PROCEDURE — 4004F PT TOBACCO SCREEN RCVD TLK: CPT | Performed by: INTERNAL MEDICINE

## 2020-11-19 PROCEDURE — G8926 SPIRO NO PERF OR DOC: HCPCS | Performed by: INTERNAL MEDICINE

## 2020-11-19 PROCEDURE — G8427 DOCREV CUR MEDS BY ELIG CLIN: HCPCS | Performed by: INTERNAL MEDICINE

## 2020-11-19 PROCEDURE — 3046F HEMOGLOBIN A1C LEVEL >9.0%: CPT | Performed by: INTERNAL MEDICINE

## 2020-11-19 RX ORDER — BUSPIRONE HYDROCHLORIDE 5 MG/1
5 TABLET ORAL 2 TIMES DAILY
COMMUNITY
End: 2021-07-23 | Stop reason: SDUPTHER

## 2020-11-19 RX ORDER — FENOFIBRATE 145 MG/1
TABLET, COATED ORAL
Qty: 30 TABLET | Refills: 2 | Status: SHIPPED | OUTPATIENT
Start: 2020-11-19 | End: 2021-03-04

## 2020-11-19 RX ORDER — OMEPRAZOLE 40 MG/1
40 CAPSULE, DELAYED RELEASE ORAL DAILY
COMMUNITY
End: 2020-12-30

## 2020-11-19 RX ORDER — HYDROXYZINE HYDROCHLORIDE 25 MG/1
25 TABLET, FILM COATED ORAL 2 TIMES DAILY
COMMUNITY
End: 2021-07-23 | Stop reason: SDUPTHER

## 2020-11-19 RX ORDER — FENOFIBRATE 145 MG/1
145 TABLET, COATED ORAL DAILY
COMMUNITY
End: 2020-11-19

## 2020-11-19 RX ORDER — FERROUS SULFATE 325(65) MG
325 TABLET ORAL 2 TIMES DAILY
COMMUNITY
End: 2020-12-02 | Stop reason: SDUPTHER

## 2020-11-19 RX ORDER — FLUPHENAZINE DECANOATE 25 MG/ML
12.5 INJECTION, SOLUTION INTRAMUSCULAR; SUBCUTANEOUS
COMMUNITY

## 2020-11-19 RX ORDER — MECOBALAMIN 5000 MCG
10 TABLET,DISINTEGRATING ORAL NIGHTLY
COMMUNITY

## 2020-11-19 RX ORDER — CLONAZEPAM 0.5 MG/1
0.5 TABLET ORAL NIGHTLY PRN
COMMUNITY
End: 2022-06-01

## 2020-11-19 ASSESSMENT — PATIENT HEALTH QUESTIONNAIRE - PHQ9
1. LITTLE INTEREST OR PLEASURE IN DOING THINGS: 1
SUM OF ALL RESPONSES TO PHQ QUESTIONS 1-9: 2
SUM OF ALL RESPONSES TO PHQ9 QUESTIONS 1 & 2: 2
SUM OF ALL RESPONSES TO PHQ QUESTIONS 1-9: 2
2. FEELING DOWN, DEPRESSED OR HOPELESS: 1
SUM OF ALL RESPONSES TO PHQ QUESTIONS 1-9: 2

## 2020-11-19 NOTE — PROGRESS NOTES
Name: Kem Hakns  [id number]  Age: 38 y.o. YOB: 1980  Sex: male    CHIEF COMPLAINT:    Chief Complaint   Patient presents with    Hyperlipidemia    Other     other chronic conditions       HISTORY OF PRESENT ILLNESS:     This is a pleasant  38 y.o. male  is seen today for management of chronic medical problems and medications refills. Previous records reviewed . Doing OK . Denies CP or SOB. Using his inhalers regularly. No fever , sore throat or cough or congestion. Denies any abdominal pain. Appetite OK. Bowels moving [address] No urinary symptoms. Complains of lower back pain. Ran out of Neurontin  Takes ibuprofen and Neurontin which helps his back pain. He is seeing his psychiatrist and taking psych medications regularly. No other complaints. He wants a flu shot given. Past Medical History:    Patient Active Problem List   Diagnosis    Hypertension    Schizophrenia ([facility] [address]    Anxiety    Schizoaffective disorder ([facility]    Diabetic polyneuropathy associated with type 2 diabetes mellitus ([facility]    Mixed hyperlipidemia        Past Surgical History:        Procedure Laterality Date    HEMORRHOID SURGERY         Social History:   Social History     Tobacco Use    Smoking status: Current Every Day Smoker     Packs/day: 0.50     Years: 20.00     Pack years: 10.00     Types: Cigarettes    Smokeless tobacco: Current User     Types: Snuff   Substance Use Topics    Alcohol use: No       Family History:       Problem Relation Age of Onset    Emphysema Mother     Heart Disease Father     High Blood Pressure Father        Allergies:  Cat hair extract; No known allergies; Pollen extract; and Seasonal    Current Medications :      Prior to Admission medications    Medication Sig Start Date End Date Taking? Authorizing Provider   clonazePAM (KLONOPIN) 0.5 MG tablet Take 0.5 mg by mouth nightly as needed.    Yes Historical Provider, MD   theophylline (LIOR-24) 100 MG extended release capsule Take 100 mg by mouth 2 times daily   Yes Historical Provider, MD   hydrOXYzine (ATARAX) 25 MG tablet Take 25 mg by mouth 2 times daily   Yes Historical Provider, MD   fluPHENAZine decanoate (PROLIXIN) 25 MG/ML injection Inject 12.5 mg into the muscle every 14 days   Yes Historical Provider, MD   ferrous sulfate (IRON 325) 325 (65 Fe) MG tablet Take 325 mg by mouth 2 times daily   Yes Historical Provider, MD   busPIRone (BUSPAR) 5 MG tablet Take 5 mg by mouth 2 times daily   Yes Historical Provider, MD   tiotropium (SPIRIVA) 18 MCG inhalation capsule Inhale 18 mcg into the lungs daily   Yes Historical Provider, MD   omeprazole (PRILOSEC) 40 MG delayed release capsule Take 40 mg by mouth daily   Yes Historical Provider, MD   fenofibrate (TRICOR) 145 MG tablet Take 145 mg by mouth daily   Yes Historical Provider, MD   melatonin 5 MG TBDP disintegrating tablet Take 5 mg by mouth nightly   Yes Historical Provider, MD   albuterol sulfate  (90 Base) MCG/ACT inhaler Inhale 2 puffs into the lungs every 6 hours as needed for Wheezing or Shortness of Breath (or cough) Please include spacer with instructions for use. 5/20/20  Yes Carmen Montano PA-C   Spacer/Aero-Holding [place] Please dispense 1 device to be used with all as needed for shortness of breath or wheezing. 5/20/20  Yes Carmen Montano PA-C   Respiratory Therapy Supplies (NEBULIZER) CATIE Use as directed with albuterol and ipratropium bromide every 4-6 hours as needed cough, wheezes. 5/6/20  Yes FLASH Nash   metFORMIN (GLUCOPHAGE) 500 MG tablet Take 500 mg by mouth daily (with breakfast)    Yes Historical Provider, MD   montelukast (SINGULAIR) 10 MG tablet Take 10 mg by mouth nightly   Yes Historical Provider, MD   gabapentin (NEURONTIN) 300 MG capsule Take 600 mg by mouth 3 times daily.     Yes Historical Provider, MD   atorvastatin (LIPITOR) 40 MG tablet Take 40 mg by mouth nightly    Yes Historical Provider, MD cyclobenzaprine (FLEXERIL) 10 MG tablet Take 10 mg by mouth 2 times daily. Yes Historical Provider, MD   lisinopril (PRINIVIL;ZESTRIL) 20 MG tablet Take 20 mg by mouth daily. Yes Historical Provider, MD   docusate sodium (COLACE) 100 MG capsule Take 100 mg by mouth 2 times daily. Yes Historical Provider, MD   ipratropium (ATROVENT) 0.02 % nebulizer solution Take 2.5 mLs by nebulization every 6 hours as needed for Wheezing Please dispense one box. 5/6/20   FLASH Mcneill   cloZAPine (CLOZARIL) 100 MG tablet Take 100 mg by mouth nightly    Historical Provider, MD   ibuprofen (ADVIL;MOTRIN) 600 MG tablet Take 1 tablet by mouth every 6 hours as needed for Pain 3/12/18   Luiza Stevenson PA-C   naproxen (NAPROSYN) 375 MG tablet Take 375 mg by mouth 2 times daily (with meals)    Historical Provider, MD   Loratadine (CLARITIN) 10 MG CAPS Take 1 capsule by mouth daily. Historical Provider, MD       LAB DATA: Reviewed. REVIEW OF SYSTEMS:   see HPI/ Comprehensive review of systems negative except for the ones mentioned in HPI. PHYSICAL EXAMINATION:   /72 (Site: Left Upper Arm, Position: Sitting, Cuff Size: Medium Adult)   Pulse 84   Temp 97.2 °F (36.2 °C)   Wt 188 lb (85.3 kg)   SpO2 98%   BMI 28.59 kg/m²       GENERAL APPEARANCE:    Alert, oriented x 3, well developed, cooperative, not in any distress, appears stated age. HEAD:   Normocephalic, atraumatic   EYES:   PERRLA, EOMI, lids normal, conjuctivea clear, sclera anicteric. NECK:    Supple, symmetrical,  trachea midline, no thyromegaly, no JVD, no lymphadenopathy. LUNGS:    Clear to auscultation bilaterally, respirations unlabored, accessory muscles are not used. HEART:     Regular rate and rhythm, S1 and S2 normal, no murmur, rub or gallop. PMI in MCL. ABDOMEN:    Soft, non-tender, bowel sounds are normoactive, no masses, no hepatospleenomegaly.   EXTREMITY:   no bipedal edema  NEURO:  Alert, oriented to person, place and time.     Grossly intact. Musculoskeletal:        Mild tenderness lower back      PSYCH:  Mood euthymic, insight and judgement good. ASSESSMENT/PLAN:    Chronic obstructive pulmonary disease, unspecified COPD type ([facility] Continue Spiriva, theophylline and albuterol HFA as needed. Advised to continue to follow with his pulmonologist.    Allergic rhinitis, unspecified seasonality, unspecified trigger. Continue Claritin and Singulair. Essential hypertension. Continue current medications, denies side effect with medicationss. Low salt diet and exercise advised. Continue Prinivil  -     Comprehensive Metabolic Panel  -     CBC Auto Differential    Mixed hyperlipidemia. Patient is taking cholesterol medications regularly. Denies any side effects. Diet and exercise advised. Continue Lipitor and TriCor. -     Lipid, Fasting    Controlled type 2 diabetes mellitus with hyperglycemia, without long-term current use of insulin ([facility] Continue metformin. Advised diet, exercise and weight loss. -     Hemoglobin A1C; Future    Diabetic polyneuropathy associated with type 2 diabetes mellitus ([facility] Patient on Neurontin. Schizophrenia, unspecified type ([facility] Advised to continue to follow with his psychiatrist and takes psychiatric medications regularly. Anxiety. Advised to continue to follow with psychiatrist and takes psych medications regularly. Chronic back pain. Continue naproxen, Neurontin plan Flexeril  Also can take Tylenol as needed. Need for immunization against influenza  -     INFLUENZA, QUADV, 6 MO AND OLDER, IM, PF, PREFILL SYR OR SDV, 0.5ML (FLULAVAL QUADV, PF)    I have recommended that the patient follow CDC guidelines for prevention of COVID-19 infection. Care discussed with patient. Questions answered and patient verbalizes understanding and agrees with plan. Medications reviewed and reconciled. Continue current medications. Appropriate prescriptions are ordered. Risks and benefits of meds are discussed. After visit summary provided. Advised to call for any problems, questions, or concerns. If symptoms worsen or don't improve as expected, to call us or go to ER. Follow up as directed, sooner if needed. No follow-ups on file. This dictation was performed with a verbal recognition program and it was checked for errors. It is possible that there are still dictated errors within this office note. Any errors should be brought immediately to my attention for correction. All efforts were made to ensure that this office note is accurate.      Vidya Hall MD

## 2020-11-19 NOTE — PROGRESS NOTES
Have you ever had a reaction to a flu vaccine? NO  Are you able to eat eggs without adverse effects? YES  Do you have any current illness? NO  Have you ever had Guillian Stanwood Syndrome? NO    Flu vaccine given per order. Please see immunization tab.

## 2020-11-25 RX ORDER — MONTELUKAST SODIUM 10 MG/1
TABLET ORAL
Qty: 30 TABLET | Refills: 2 | Status: SHIPPED | OUTPATIENT
Start: 2020-11-25 | End: 2021-03-10

## 2020-12-02 RX ORDER — FERROUS SULFATE 325(65) MG
325 TABLET ORAL 2 TIMES DAILY
Qty: 30 TABLET | Refills: 3 | Status: SHIPPED | OUTPATIENT
Start: 2020-12-02 | End: 2021-07-23 | Stop reason: SDUPTHER

## 2020-12-02 RX ORDER — GABAPENTIN 300 MG/1
600 CAPSULE ORAL 3 TIMES DAILY
Qty: 90 CAPSULE | Refills: 3 | Status: SHIPPED | OUTPATIENT
Start: 2020-12-02 | End: 2021-02-11

## 2020-12-17 RX ORDER — METFORMIN HYDROCHLORIDE 500 MG/1
TABLET, EXTENDED RELEASE ORAL
Qty: 30 TABLET | Refills: 2 | Status: SHIPPED | OUTPATIENT
Start: 2020-12-17 | End: 2021-07-14

## 2020-12-30 RX ORDER — OMEPRAZOLE 40 MG/1
CAPSULE, DELAYED RELEASE ORAL
Qty: 30 CAPSULE | Refills: 2 | Status: SHIPPED | OUTPATIENT
Start: 2020-12-30 | End: 2021-04-08

## 2021-01-11 RX ORDER — NAPROXEN 500 MG/1
TABLET ORAL
Qty: 60 TABLET | Refills: 1 | Status: SHIPPED | OUTPATIENT
Start: 2021-01-11 | End: 2021-03-18

## 2021-01-27 ENCOUNTER — HOSPITAL ENCOUNTER (EMERGENCY)
Age: 41
Discharge: HOME | End: 2021-01-27
Payer: COMMERCIAL

## 2021-01-27 ENCOUNTER — APPOINTMENT (OUTPATIENT)
Dept: GENERAL RADIOLOGY | Age: 41
End: 2021-01-27
Payer: COMMERCIAL

## 2021-01-27 VITALS
OXYGEN SATURATION: 100 % | SYSTOLIC BLOOD PRESSURE: 132 MMHG | WEIGHT: 200 LBS | RESPIRATION RATE: 16 BRPM | TEMPERATURE: 98 F | BODY MASS INDEX: 29.62 KG/M2 | DIASTOLIC BLOOD PRESSURE: 87 MMHG | HEART RATE: 81 BPM | HEIGHT: 69 IN

## 2021-01-27 DIAGNOSIS — J40 BRONCHITIS: ICD-10-CM

## 2021-01-27 DIAGNOSIS — Z72.0 TOBACCO ABUSE: ICD-10-CM

## 2021-01-27 DIAGNOSIS — J06.9 URI WITH COUGH AND CONGESTION: Primary | ICD-10-CM

## 2021-01-27 LAB
ALBUMIN SERPL-MCNC: 4.5 GM/DL (ref 3.4–5)
ALP BLD-CCNC: 48 IU/L (ref 40–129)
ALT SERPL-CCNC: 12 U/L (ref 10–40)
ANION GAP SERPL CALCULATED.3IONS-SCNC: 14 MMOL/L (ref 4–16)
AST SERPL-CCNC: 18 IU/L (ref 15–37)
BASOPHILS ABSOLUTE: 0.1 K/CU MM
BASOPHILS RELATIVE PERCENT: 0.8 % (ref 0–1)
BILIRUB SERPL-MCNC: 0.5 MG/DL (ref 0–1)
BUN BLDV-MCNC: 17 MG/DL (ref 6–23)
CALCIUM SERPL-MCNC: 10.2 MG/DL (ref 8.3–10.6)
CHLORIDE BLD-SCNC: 100 MMOL/L (ref 99–110)
CO2: 18 MMOL/L (ref 21–32)
CREAT SERPL-MCNC: 0.8 MG/DL (ref 0.9–1.3)
DIFFERENTIAL TYPE: ABNORMAL
EOSINOPHILS ABSOLUTE: 0 K/CU MM
EOSINOPHILS RELATIVE PERCENT: 0.2 % (ref 0–3)
GFR AFRICAN AMERICAN: >60 ML/MIN/1.73M2
GFR NON-AFRICAN AMERICAN: >60 ML/MIN/1.73M2
GLUCOSE BLD-MCNC: 104 MG/DL (ref 70–99)
HCT VFR BLD CALC: 43.3 % (ref 42–52)
HEMOGLOBIN: 14.8 GM/DL (ref 13.5–18)
IMMATURE NEUTROPHIL %: 0.3 % (ref 0–0.43)
LYMPHOCYTES ABSOLUTE: 1.5 K/CU MM
LYMPHOCYTES RELATIVE PERCENT: 23.5 % (ref 24–44)
MCH RBC QN AUTO: 29.7 PG (ref 27–31)
MCHC RBC AUTO-ENTMCNC: 34.2 % (ref 32–36)
MCV RBC AUTO: 86.8 FL (ref 78–100)
MONOCYTES ABSOLUTE: 0.6 K/CU MM
MONOCYTES RELATIVE PERCENT: 9.1 % (ref 0–4)
NUCLEATED RBC %: 0 %
PDW BLD-RTO: 12.4 % (ref 11.7–14.9)
PLATELET # BLD: 290 K/CU MM (ref 140–440)
PMV BLD AUTO: 10 FL (ref 7.5–11.1)
POTASSIUM SERPL-SCNC: 4 MMOL/L (ref 3.5–5.1)
RBC # BLD: 4.99 M/CU MM (ref 4.6–6.2)
SEGMENTED NEUTROPHILS ABSOLUTE COUNT: 4.1 K/CU MM
SEGMENTED NEUTROPHILS RELATIVE PERCENT: 66.1 % (ref 36–66)
SODIUM BLD-SCNC: 132 MMOL/L (ref 135–145)
TOTAL IMMATURE NEUTOROPHIL: 0.02 K/CU MM
TOTAL NUCLEATED RBC: 0 K/CU MM
TOTAL PROTEIN: 6.8 GM/DL (ref 6.4–8.2)
TROPONIN T: <0.01 NG/ML
WBC # BLD: 6.3 K/CU MM (ref 4–10.5)

## 2021-01-27 PROCEDURE — 80053 COMPREHEN METABOLIC PANEL: CPT

## 2021-01-27 PROCEDURE — 93010 ELECTROCARDIOGRAM REPORT: CPT | Performed by: INTERNAL MEDICINE

## 2021-01-27 PROCEDURE — 84484 ASSAY OF TROPONIN QUANT: CPT

## 2021-01-27 PROCEDURE — 85025 COMPLETE CBC W/AUTO DIFF WBC: CPT

## 2021-01-27 PROCEDURE — 6370000000 HC RX 637 (ALT 250 FOR IP): Performed by: PHYSICIAN ASSISTANT

## 2021-01-27 PROCEDURE — 94640 AIRWAY INHALATION TREATMENT: CPT

## 2021-01-27 PROCEDURE — 99284 EMERGENCY DEPT VISIT MOD MDM: CPT

## 2021-01-27 PROCEDURE — 71045 X-RAY EXAM CHEST 1 VIEW: CPT

## 2021-01-27 PROCEDURE — 93005 ELECTROCARDIOGRAM TRACING: CPT | Performed by: PHYSICIAN ASSISTANT

## 2021-01-27 RX ORDER — INHALER, ASSIST DEVICES
1 SPACER (EA) MISCELLANEOUS EVERY 6 HOURS
Qty: 1 EACH | Refills: 0 | Status: SHIPPED | OUTPATIENT
Start: 2021-01-27

## 2021-01-27 RX ORDER — BENZONATATE 100 MG/1
100 CAPSULE ORAL ONCE
Status: COMPLETED | OUTPATIENT
Start: 2021-01-27 | End: 2021-01-27

## 2021-01-27 RX ORDER — ALBUTEROL SULFATE 90 UG/1
2 AEROSOL, METERED RESPIRATORY (INHALATION) EVERY 4 HOURS PRN
Qty: 1 INHALER | Refills: 1 | Status: SHIPPED | OUTPATIENT
Start: 2021-01-27 | End: 2021-02-18 | Stop reason: SDUPTHER

## 2021-01-27 RX ORDER — PREDNISONE 20 MG/1
60 TABLET ORAL ONCE
Status: COMPLETED | OUTPATIENT
Start: 2021-01-27 | End: 2021-01-27

## 2021-01-27 RX ORDER — PREDNISONE 10 MG/1
50 TABLET ORAL DAILY
Qty: 25 TABLET | Refills: 0 | Status: SHIPPED | OUTPATIENT
Start: 2021-01-27 | End: 2021-02-01

## 2021-01-27 RX ORDER — ALBUTEROL SULFATE 90 UG/1
2 AEROSOL, METERED RESPIRATORY (INHALATION) ONCE
Status: COMPLETED | OUTPATIENT
Start: 2021-01-27 | End: 2021-01-27

## 2021-01-27 RX ADMIN — PREDNISONE 60 MG: 20 TABLET ORAL at 08:41

## 2021-01-27 RX ADMIN — ALBUTEROL SULFATE 2 PUFF: 90 AEROSOL, METERED RESPIRATORY (INHALATION) at 08:31

## 2021-01-27 RX ADMIN — BENZONATATE 100 MG: 100 CAPSULE ORAL at 08:41

## 2021-01-27 NOTE — ED NOTES
Pt states he has been feeling short of breath for the last 2 weeks, pt states this has happened in the past, pt is a smoker and has asthma.       Aren Yoder RN  01/27/21 7715

## 2021-01-27 NOTE — ED PROVIDER NOTES
EMERGENCY DEPARTMENT ENCOUNTER    [facility] EMERGENCY DEPARTMENT        TRIAGE CHIEF COMPLAINT:   Shortness of Breath (for 2 weeks, increased tobacco use, hx of asthma, chills )      [place]:  Mireille Paez is a 38 y.o. male that presents for shortness of breath. Onset was prior to arrival, x2 weeks. Context is patient states that he has had progressively worsening shortness of breath and came to the ED for \"a breathing treatment. \"  Does have a history of asthma and admits to increased tobacco use over the last several weeks with an intermittently productive cough with clear sputum. No hemoptysis. Is feeling \"burning\" in the chest with inspiration, similar to when his asthma flares up. Has had some nasal congestion and reported chills but no fever. Denying any pleuritic or exertional chest pain, lower leg swelling or any abdominal urinary complaints. No known COVID-19 postiive  contacts. Does state that his symptoms usually resolve utilizing his rescue inhaler but he has run out of it. Denying any loss of taste or smell. Denies any known history of coronary artery disease but does have history of hypertension, type 2 diabetes and hyperlipidemia.   No recent long travel, hospitalizations, surgeries, calf pain or swelling or previous history of DVT/PE.    ROS:  General:  No fevers, +chills   Cardiovascular:  No chest pain, no palpitations  Respiratory:  See HPI    Gastrointestinal:  No pain, no nausea, no vomiting, no diarrhea  Musculoskeletal:  No muscle pain, no joint pain  Skin:  No rash, no pruritis, no easy bruising  Neurologic:  No speech problems, no headache, no extremity numbness, no extremity tingling, no extremity weakness  Psychiatric:  No anxiety  Genitourinary:  No dysuria, no hematuria  Extremities:  No edema    Past Medical History:   Diagnosis Date    Anxiety     Asthma     Chronic back pain     Diabetes mellitus ([address]  H/O 24 hour EKG monitoring 09/19/2013    EVENT MONITOR-normal sinus rhythm    H/O echocardiogram 04/20/2017    EF 55% Normal LV with normal systolic function.  No significant valvulopathy is seen    Hx of echocardiogram 08/16/13 08/13 Mitrall annular calcification with a trace to mild MR    Hyperlipidemia     Hypertension     Sch[address]ia [address] [address]      Past Surgical History:   Procedure Laterality Date    HEMORRHOID SURGERY       Family History   Problem Relation Age of Onset    Emphysema Mother     Heart Disease Father     High Blood Pressure Father      Social History     Socioeconomic History    Marital status: Single     Spouse name: Not on file    Number of children: Not on file    Years of education: Not on file    Highest education level: Not on file   Occupational History    Not on file   Social Needs    Financial resource strain: Not on file    Food insecurity     Worry: Not on file     Inability: Not on file    Transportation needs     Medical: Not on file     Non-medical: Not on file   Tobacco Use    Smoking status: Current Every Day Smoker     Packs/day: 0.50     Years: 20.00     Pack years: 10.00     Types: Cigarettes    Smokeless tobacco: Current User     Types: Snuff   Substance and Sexual Activity    Alcohol use: No    Drug use: No     Comment: denies any drug use to this nurse    Sexual activity: Not Currently     Comment: single    Lifestyle    Physical activity     Days per week: Not on file     Minutes per session: Not on file    Stress: Not on file   Relationships    Social connections     Talks on phone: Not on file     Gets together: Not on file     Attends Confucianism service: Not on file     Active member of club or organization: Not on file     Attends meetings of clubs or organizations: Not on file     Relationship status: Not on file    Intimate partner violence     Fear of current or ex partner: Not on file     Emotionally abused: Not on file Physically abused: Not on file     Forced sexual activity: Not on file   Other Topics Concern    Not on file   Social History Narrative    Not on file     No current facility-administered medications for this encounter. Current Outpatient Medications   Medication Sig Dispense Refill    albuterol sulfate HFA (PROVENTIL HFA) 108 (90 Base) MCG/ACT inhaler Inhale 2 puffs into the lungs every 4 hours as needed for Wheezing or Shortness of Breath With spacer (and mask if indicated). Thanks. 1 Inhaler 1    Spacer/Aero-Holding Chambers (AEROCHAMBER) MISC Inhale 1 Device into the lungs every 6 hours 1 each 0    Pseudoephedrine-DM-GG 60- MG TABS Take 1 tablet by mouth every 6 hours 20 tablet 0    predniSONE (DELTASONE) 10 MG tablet Take 5 tablets by mouth daily for 5 days Start day after ED visit 25 tablet 0    naproxen (NAPROSYN) 500 MG tablet TAKE ONE (1) TABLET BY MOUTH TWO TIMES DAILY 60 tablet 1    omeprazole (PRILOSEC) 40 MG delayed release capsule TAKE 1 CAPSULE BY MOUTH ONCE DAILY (Patient taking differently: Take 40 mg by mouth daily ) 30 capsule 2    metFORMIN (GLUCOPHAGE-XR) 500 MG extended release tablet TAKE 1 TABLET BY MOUTH ONCE DAILY WITH EVENING MEAL 30 tablet 2    gabapentin (NEURONTIN) 300 MG capsule Take 2 capsules by mouth 3 times daily for 30 days. 90 capsule 3    ferrous sulfate (IRON 325) 325 (65 Fe) MG tablet Take 1 tablet by mouth 2 times daily 30 tablet 3    montelukast (SINGULAIR) 10 MG tablet TAKE 1 TABLET BY MOUTH ONCE DAILY (Patient taking differently: Take 10 mg by mouth nightly ) 30 tablet 2    clonazePAM (KLONOPIN) 0.5 MG tablet Take 0.5 mg by mouth nightly as needed.       theophylline (LIOR-24) 100 MG extended release capsule Take 100 mg by mouth 2 times daily      hydrOXYzine (ATARAX) 25 MG tablet Take 25 mg by mouth 2 times daily      fluPHENAZine decanoate (PROLIXIN) 25 MG/ML injection Inject 12.5 mg into the muscle every 14 days Abdomen: Bowel sounds normal in all quadrants, Soft, Non tender/Nondistended, No palpable abdominal masses. Musculoskeletal: BUE/BLE symmetrical without atrophy or deformities  Integument:  Warm, Dry, Intact, Skin turgor and texture normal  Neurologic:  Alert & oriented x3 , No focal deficits noted. Cranial nerves II through XII grossly intact. No slurred speech. No facial droop. Normal gross motor coordination & motor strength bilateral upper and lower extremities.    Psychiatric:  Affect appropriate      I have reviewed and interpreted all of the currently available lab results from this visit (if applicable):  Results for orders placed or performed during the hospital encounter of 01/27/21   CBC auto diff   Result Value Ref Range    WBC 6.3 4.0 - 10.5 K/CU MM    RBC 4.99 4.6 - 6.2 M/CU MM    Hemoglobin 14.8 13.5 - 18.0 GM/DL    Hematocrit 43.3 42 - 52 %    MCV 86.8 78 - 100 FL    MCH 29.7 27 - 31 PG    MCHC 34.2 32.0 - 36.0 %    RDW 12.4 11.7 - 14.9 %    Platelets 799 885 - 615 K/CU MM    MPV 10.0 7.5 - 11.1 FL    Differential Type AUTOMATED DIFFERENTIAL     Segs Relative 66.1 (H) 36 - 66 %    Lymphocytes % 23.5 (L) 24 - 44 %    Monocytes % 9.1 (H) 0 - 4 %    Eosinophils % 0.2 0 - 3 %    Basophils % 0.8 0 - 1 %    Segs Absolute 4.1 K/CU MM    Lymphocytes Absolute 1.5 K/CU MM    Monocytes Absolute 0.6 K/CU MM    Eosinophils Absolute 0.0 K/CU MM    Basophils Absolute 0.1 K/CU MM    Nucleated RBC % 0.0 %    Total Nucleated RBC 0.0 K/CU MM    Total Immature Neutrophil 0.02 K/CU MM    Immature Neutrophil % 0.3 0 - 0.43 %   CMP   Result Value Ref Range    Sodium 132 (L) 135 - 145 MMOL/L    Potassium 4.0 3.5 - 5.1 MMOL/L    Chloride 100 99 - 110 mMol/L    CO2 18 (L) 21 - 32 MMOL/L    BUN 17 6 - 23 MG/DL    CREATININE 0.8 (L) 0.9 - 1.3 MG/DL    Glucose 104 (H) 70 - 99 MG/DL    Calcium 10.2 8.3 - 10.6 MG/DL    Albumin 4.5 3.4 - 5.0 GM/DL    Total Protein 6.8 6.4 - 8.2 GM/DL    Total Bilirubin 0.5 0.0 - 1.0 MG/DL ALT 12 10 - 40 U/L    AST 18 15 - 37 IU/L    Alkaline Phosphatase 48 40 - 129 IU/L    GFR Non-African American >60 >60 mL/min/1.73m2    GFR African American >60 >60 mL/min/1.73m2    Anion Gap 14 4 - 16   Troponin   Result Value Ref Range    Troponin T <0.010 <0.01 NG/ML   EKG 12 Lead   Result Value Ref Range    Ventricular Rate 82 BPM    Atrial Rate 82 BPM    P-R Interval 150 ms    QRS Duration 88 ms    Q-T Interval 372 ms    QTc Calculation (Bazett) 434 ms    P Axis 39 degrees    R Axis 54 degrees    T Axis 32 degrees    Diagnosis       Normal sinus rhythm  Normal ECG  When compared with ECG of 21-JUL-2020 20:56,  No significant change was found          Radiographs (if obtained):  [] The following radiograph was interpreted by myself in the absence of a radiologist:   [] Radiologist's Report Reviewed:  XR CHEST PORTABLE   Final Result   No evidence of acute cardiopulmonary disease. XR CHEST PORTABLE (Final result)  Result time 01/27/21 08:22:12  Final result by Cuba Warner MD (01/27/21 08:22:12)                Impression:    No evidence of acute cardiopulmonary disease. Narrative:    EXAMINATION:   ONE XRAY VIEW OF THE CHEST     1/27/2021 7:59 am     COMPARISON:   Chest x-ray 05/20/2020     HISTORY:   ORDERING SYSTEM PROVIDED HISTORY: SOB   TECHNOLOGIST PROVIDED HISTORY:   Reason for exam:->SOB   Reason for Exam: sob   Acuity: Acute   Type of Exam: Initial     FINDINGS:   The lungs are clear and well expanded.  Costophrenic angles are clear. Cardiac and mediastinal structures are unremarkable.  Osseous structures are   intact.                         EKG Interpretation  Please see ED physician's note - Dr. Tj Duncan - for EKG interpretation        Chart review shows recent radiographs:  No results found.     ED COURSE & MEDICAL DECISION MAKING Vital signs and nursing notes reviewed during ED course. I have independently evaluated this patient . Supervising physician - Dr. Umer Hardin - was present in ED and available for consultation throughout entirety of patient's care. All pertinent Lab data and radiographic results reviewed with patient at bedside. The patient and/or the family were informed of the results of any tests/labs/imaging, the treatment plan, and time was allotted to answer questions. Clinical Impression:  1. URI with cough and congestion    2. Bronchitis    3.  Tobacco abuse swelling, No hemoptysis, No recent surgery or trauma, No prior PE or DVT, No Hormone use) and has a low-risk Well's score, indicating very low risk of PE. The risks of further investigation, including a large dose of radiation and/or unnecessary treatment with anticoagulant therapy, outweigh the risk of a clinically significant PE. Thus, neither a D-dimer nor a CTA of the pulmonary arteries are indicated. Patient is discharged at this time stable condition with steroid burst, antitussives and refill of his usual inhaler with AeroChamber. Educate on the importance of smoking cessation and will follow up with family doctor. Return warnings back to the ED discussed. In light of current events, I did utilize appropriate PPE (including N95 face mask, safety glasses, gloves as recommended by the health facility/national standard best practice, during my bedside interactions with the patient. Patient was masked throughout ED course. Full droplet precaution as well as full PPE was followed throughout patient's ED course and evaluation. Diagnosis and plan discussed in detail with patient who understands and agrees. Patient agrees to return emergency department if symptoms worsen or any new symptoms develop. Disposition referral (if applicable):  MD Luz Kimball  [place] [phone]    Schedule an appointment as soon as possible for a visit       [facility] Emergency Department  [address] [place] [phone]  Go to   As needed, If symptoms worsen      Disposition medications (if applicable):  New Prescriptions    ALBUTEROL SULFATE HFA (PROVENTIL HFA) 108 (90 BASE) MCG/ACT INHALER    Inhale 2 puffs into the lungs every 4 hours as needed for Wheezing or Shortness of Breath With spacer (and mask if indicated). Thanks. PREDNISONE (DELTASONE) 10 MG TABLET    Take 5 tablets by mouth daily for 5 days Start day after ED visit    PSEUDOEPHEDRINE-DM-GG 60- MG TABS    Take 1 tablet by mouth every 6 hours    SPACER/AERO-HOLDING CHAMBERS (AEROCHAMBER) MISC    Inhale 1 Device into the lungs every 6 hours         (Please note that portions of this note may have been completed with a voice recognition program. Efforts were made to edit the dictations but occasionally words are mis-transcribed.)          Meagan Black PA-C  01/27/21 [id number]

## 2021-01-27 NOTE — ED PROVIDER NOTES
EKG is interpreted by me. EKG shows sinus rhythm 82 bpm, normal axis, normal questing elevations or depressions, T waves overall unremarkable, ID interval 150, QRS duration of 88, QTC of 434. Final impression, nonspecific EKG.     Tj Duncan  1/27/2021  8:33 AM        Tj Duncan MD  01/27/21 [id number]

## 2021-01-27 NOTE — PROGRESS NOTES
Medication History  [facility]    Patient Name: Saskia Garay 1980     Medication history has been completed by: Damien Hilliard CPhT    Source(s) of information: Patient, [employer] insurance claims and retail pharmacy     Primary Care Physician: Syed Santoyo MD     Pharmacy: [address] [place] [address] as of 01/27/2021 - Review Complete 01/27/2021   Allergen Reaction Noted    Cat hair extract  11/19/2020    No known allergies  11/19/2020    Pollen extract  11/19/2020    Seasonal  11/19/2020        Prior to Admission medications    Medication Sig Start Date End Date Taking? Authorizing Provider   naproxen (NAPROSYN) 500 MG tablet TAKE ONE (1) TABLET BY MOUTH TWO TIMES DAILY 1/11/21   Robert Bagley MD   omeprazole (PRILOSEC) 40 MG delayed release capsule  Take 40 mg by mouth daily  12/30/20   Robert Bagley MD   metFORMIN (GLUCOPHAGE-XR) 500 MG extended release tablet TAKE 1 TABLET BY MOUTH ONCE DAILY WITH EVENING MEAL 12/17/20   Robert Bagley MD   gabapentin (NEURONTIN) 300 MG capsule Take 2 capsules by mouth 3 times daily for 30 days. 12/2/20 1/27/21  Robert Bagley MD   ferrous sulfate (IRON 325) 325 (65 Fe) MG tablet Take 1 tablet by mouth 2 times daily 12/2/20   Robert Bagley MD   montelukast (SINGULAIR) 10 MG tablet Take 10 mg by mouth nightly  11/25/20   Robert Bagley MD   clonazePAM (KLONOPIN) 0.5 MG tablet Take 0.5 mg by mouth nightly as needed.     Historical Provider, MD   theophylline (LIOR-24) 100 MG extended release capsule Take 100 mg by mouth 2 times daily    Historical Provider, MD   hydrOXYzine (ATARAX) 25 MG tablet Take 25 mg by mouth 2 times daily    Historical Provider, MD   fluPHENAZine decanoate (PROLIXIN) 25 MG/ML injection Inject 12.5 mg into the muscle every 14 days    Historical Provider, MD   busPIRone (BUSPAR) 5 MG tablet Take 5 mg by mouth 2 times daily    Historical Provider, MD tiotropium (SPIRIVA) 18 MCG inhalation capsule Inhale 18 mcg into the lungs daily    Historical Provider, MD   melatonin 5 MG TBDP disintegrating tablet Take 5 mg by mouth nightly    Historical Provider, MD   fenofibrate (TRICOR) 145 MG tablet TAKE 1 TABLET BY MOUTH ONCE DAILY 11/19/20   Cm Quinonez MD   albuterol sulfate  (90 Base) MCG/ACT inhaler Inhale 2 puffs into the lungs every 6 hours as needed for Wheezing or Shortness of Breath (or cough) Please include spacer with instructions for use. 5/20/20   Feliz Hooper PA-C   metFORMIN (GLUCOPHAGE) 500 MG tablet Take 500 mg by mouth daily (with breakfast)     Historical Provider, MD   ibuprofen (ADVIL;MOTRIN) 600 MG tablet Take 1 tablet by mouth every 6 hours as needed for Pain 3/12/18   Amrik Cline PA-C   atorvastatin (LIPITOR) 40 MG tablet Take 40 mg by mouth nightly     Historical Provider, MD   Loratadine (CLARITIN) 10 MG CAPS Take 1 capsule by mouth daily. Historical Provider, MD   lisinopril (PRINIVIL;ZESTRIL) 20 MG tablet Take 20 mg by mouth daily. Historical Provider, MD   docusate sodium (COLACE) 100 MG capsule Take 100 mg by mouth 2 times daily. Historical Provider, MD     Medications removed from list (include reason, ex. noncompliance, medication cost, therapy complete etc.):   Clozapine last known fill date 06/2019 per retail pharmacy  Cyclobenzaprine last fill date 08/2020 per retail pharmacy  Spacer chamber clean up list  Respiratory neb supplies clean up list    Comments:  Patient reports he is out of all his meds. patient could inform interviewer of how long he has been out. Per [place] patient has been taking gabapentin 600 mg TID last fill date 01/13/21 for 15 day supply and clonazepam 0.5 mg nightly as needed. Last fill date 12/30/20 for 15 day supply. Patient states he is out of these meds. Patient states he has been waiting on his  to take him to get his medications. Per list provided by retail pharmacy patient with active scripts on most meds listed. Only medication with no fills in the past 90 days cyclobenzaprine and clozapine.     To my knowledge the above medication history is accurate as of 1/27/2021 9:43 AM.   Damien Hilliard CPhT   1/27/2021 9:43 AM

## 2021-01-27 NOTE — ED NOTES
Vitals completed. This RN was in another room and patient was dropped off without vitals from .       Ang Caballero RN  01/27/21 [id number]

## 2021-01-27 NOTE — CARE COORDINATION
CM received consult on patient. Patient needed transportation home. Patient lives at [facility]. CM called Ray at [employer] @ [phone]. Transport will be here to  patient in 20 minutes. Shared with patient.

## 2021-01-29 LAB
EKG ATRIAL RATE: 82 BPM
EKG DIAGNOSIS: NORMAL
EKG P AXIS: 39 DEGREES
EKG P-R INTERVAL: 150 MS
EKG Q-T INTERVAL: 372 MS
EKG QRS DURATION: 88 MS
EKG QTC CALCULATION (BAZETT): 434 MS
EKG R AXIS: 54 DEGREES
EKG T AXIS: 32 DEGREES
EKG VENTRICULAR RATE: 82 BPM

## 2021-02-11 RX ORDER — GABAPENTIN 300 MG/1
CAPSULE ORAL
Qty: 180 CAPSULE | Refills: 1 | Status: SHIPPED | OUTPATIENT
Start: 2021-02-11 | End: 2021-04-08

## 2021-02-18 ENCOUNTER — OFFICE VISIT (OUTPATIENT)
Dept: INTERNAL MEDICINE CLINIC | Age: 41
End: 2021-02-18
Payer: COMMERCIAL

## 2021-02-18 VITALS
WEIGHT: 197 LBS | DIASTOLIC BLOOD PRESSURE: 74 MMHG | TEMPERATURE: 97.4 F | OXYGEN SATURATION: 99 % | BODY MASS INDEX: 29.86 KG/M2 | HEIGHT: 68 IN | SYSTOLIC BLOOD PRESSURE: 122 MMHG | HEART RATE: 104 BPM

## 2021-02-18 DIAGNOSIS — E11.65 CONTROLLED TYPE 2 DIABETES MELLITUS WITH HYPERGLYCEMIA, WITHOUT LONG-TERM CURRENT USE OF INSULIN (HCC): Primary | ICD-10-CM

## 2021-02-18 DIAGNOSIS — J44.9 CHRONIC OBSTRUCTIVE PULMONARY DISEASE, UNSPECIFIED COPD TYPE (HCC): ICD-10-CM

## 2021-02-18 DIAGNOSIS — I10 ESSENTIAL HYPERTENSION: ICD-10-CM

## 2021-02-18 DIAGNOSIS — E78.2 MIXED HYPERLIPIDEMIA: ICD-10-CM

## 2021-02-18 DIAGNOSIS — Z72.0 TOBACCO ABUSE: ICD-10-CM

## 2021-02-18 DIAGNOSIS — E11.42 DIABETIC POLYNEUROPATHY ASSOCIATED WITH TYPE 2 DIABETES MELLITUS (HCC): ICD-10-CM

## 2021-02-18 DIAGNOSIS — F20.9 SCHIZOPHRENIA, UNSPECIFIED TYPE (HCC): ICD-10-CM

## 2021-02-18 DIAGNOSIS — F41.9 ANXIETY: ICD-10-CM

## 2021-02-18 LAB
CHP ED QC CHECK: NORMAL
GLUCOSE BLD-MCNC: 119 MG/DL
HBA1C MFR BLD: 5.5 %

## 2021-02-18 PROCEDURE — G8482 FLU IMMUNIZE ORDER/ADMIN: HCPCS | Performed by: INTERNAL MEDICINE

## 2021-02-18 PROCEDURE — 82962 GLUCOSE BLOOD TEST: CPT | Performed by: INTERNAL MEDICINE

## 2021-02-18 PROCEDURE — 83036 HEMOGLOBIN GLYCOSYLATED A1C: CPT | Performed by: INTERNAL MEDICINE

## 2021-02-18 PROCEDURE — G8926 SPIRO NO PERF OR DOC: HCPCS | Performed by: INTERNAL MEDICINE

## 2021-02-18 PROCEDURE — 4004F PT TOBACCO SCREEN RCVD TLK: CPT | Performed by: INTERNAL MEDICINE

## 2021-02-18 PROCEDURE — G8427 DOCREV CUR MEDS BY ELIG CLIN: HCPCS | Performed by: INTERNAL MEDICINE

## 2021-02-18 PROCEDURE — 99214 OFFICE O/P EST MOD 30 MIN: CPT | Performed by: INTERNAL MEDICINE

## 2021-02-18 PROCEDURE — 3044F HG A1C LEVEL LT 7.0%: CPT | Performed by: INTERNAL MEDICINE

## 2021-02-18 PROCEDURE — G8419 CALC BMI OUT NRM PARAM NOF/U: HCPCS | Performed by: INTERNAL MEDICINE

## 2021-02-18 PROCEDURE — 3023F SPIROM DOC REV: CPT | Performed by: INTERNAL MEDICINE

## 2021-02-18 PROCEDURE — 2022F DILAT RTA XM EVC RTNOPTHY: CPT | Performed by: INTERNAL MEDICINE

## 2021-02-18 RX ORDER — ALBUTEROL SULFATE 90 UG/1
2 AEROSOL, METERED RESPIRATORY (INHALATION) EVERY 4 HOURS PRN
Qty: 1 INHALER | Refills: 1 | Status: SHIPPED | OUTPATIENT
Start: 2021-02-18 | End: 2021-06-24

## 2021-02-18 RX ORDER — DOCUSATE SODIUM 100 MG/1
100 CAPSULE, LIQUID FILLED ORAL 2 TIMES DAILY
Qty: 60 CAPSULE | Refills: 3 | Status: SHIPPED | OUTPATIENT
Start: 2021-02-18 | End: 2021-07-23 | Stop reason: SDUPTHER

## 2021-02-18 ASSESSMENT — PATIENT HEALTH QUESTIONNAIRE - PHQ9
SUM OF ALL RESPONSES TO PHQ QUESTIONS 1-9: 2
SUM OF ALL RESPONSES TO PHQ QUESTIONS 1-9: 2
2. FEELING DOWN, DEPRESSED OR HOPELESS: 1
SUM OF ALL RESPONSES TO PHQ9 QUESTIONS 1 & 2: 2

## 2021-02-18 NOTE — PROGRESS NOTES
Name: Omega Gregorio  [id number]  Age: 38 y.o. YOB: 1980  Sex: male    CHIEF COMPLAINT:    Chief Complaint   Patient presents with    COPD    Diabetes    Other     other chronic conditions       HISTORY OF PRESENT ILLNESS:     This is a pleasant  38 y.o. male  is seen today for management of chronic medical problems and medications refills. Previous records reviewed . He was recently in the hospital ER for chest tightness. EKG and chest x-ray were essentially negative. Labs including troponin were negative. He was treated as bronchitis. Doing better now. Denies CP, occasional shortness of breath. Continues to smoke about 2 packs/day. Refuses help to quit smoking but will try to cut back on his smoking. No fever , sore throat or cough or congestion. Denies any abdominal pain. Appetite OK. Bowels moving [id number] No urinary symptoms. Denies any significant arthritis. Back pain is better. Hearing is ok. Vision Ok with glasses. Denies  any significant skin lesions. He has significant psychiatric problems and is seeing psychiatrist on a regular basis. He admits that he misses medications frequently. No other complaints.         Past Medical History:    Patient Active Problem List   Diagnosis    Essential hypertension    Schizophrenia ([address]    Anxiety    Schizoaffective disorder ([address]    Diabetic polyneuropathy associated with type 2 diabetes mellitus ([address]    Mixed hyperlipidemia    Tobacco abuse    Chronic obstructive pulmonary disease ([address]    Controlled type 2 diabetes mellitus with hyperglycemia, without long-term current use of insulin ([address]        Past Surgical History:        Procedure Laterality Date    HEMORRHOID SURGERY         Social History:   Social History     Tobacco Use    Smoking status: Current Every Day Smoker     Packs/day: 0.50     Years: 20.00     Pack years: 10.00     Types: Cigarettes    Smokeless tobacco: Current User     Types: Snuff Substance Use Topics    Alcohol use: No       Family History:       Problem Relation Age of Onset    Emphysema Mother     Heart Disease Father     High Blood Pressure Father        Allergies:  Cat hair extract, No known allergies, Pollen extract, and Seasonal    Current Medications :      Prior to Admission medications    Medication Sig Start Date End Date Taking? Authorizing Provider   docusate sodium (COLACE) 100 MG capsule Take 1 capsule by mouth 2 times daily 2/18/21  Yes Gigi Garcia MD   albuterol sulfate HFA (PROVENTIL HFA) 108 (90 Base) MCG/ACT inhaler Inhale 2 puffs into the lungs every 4 hours as needed for Wheezing or Shortness of Breath With spacer (and mask if indicated). Thanks. 2/18/21 3/20/21 Yes Gigi Garcia MD   gabapentin (NEURONTIN) 300 MG capsule TAKE TWO (2) CAPSULES BY MOUTH THREE TIMES DAILY 2/11/21 3/13/21 Yes Gigi Garcia MD   Spacer/Aero-Holding Chambers (AEROCHAMBER) MISC Inhale 1 Device into the lungs every 6 hours 1/27/21  Yes Holland Starkey PA-C   Pseudoephedrine-DM-GG 60- MG TABS Take 1 tablet by mouth every 6 hours 1/27/21  Yes Holland Starkey PA-C   naproxen (NAPROSYN) 500 MG tablet TAKE ONE (1) TABLET BY MOUTH TWO TIMES DAILY 1/11/21  Yes Gigi Garcia MD   omeprazole (PRILOSEC) 40 MG delayed release capsule TAKE 1 CAPSULE BY MOUTH ONCE DAILY  Patient taking differently: Take 40 mg by mouth daily  12/30/20  Yes Gigi Garcia MD   metFORMIN (GLUCOPHAGE-XR) 500 MG extended release tablet TAKE 1 TABLET BY MOUTH ONCE DAILY WITH EVENING MEAL 12/17/20  Yes Gigi Garcia MD   ferrous sulfate (IRON 325) 325 (65 Fe) MG tablet Take 1 tablet by mouth 2 times daily 12/2/20  Yes Gigi Garcia MD   montelukast (SINGULAIR) 10 MG tablet TAKE 1 TABLET BY MOUTH ONCE DAILY  Patient taking differently: Take 10 mg by mouth nightly  11/25/20  Yes Gigi Garcia MD   clonazePAM (KLONOPIN) 0.5 MG tablet Take 0.5 mg by mouth nightly as needed.    Yes Historical Provider, MD theophylline (LIOR-24) 100 MG extended release capsule Take 100 mg by mouth 2 times daily   Yes Historical Provider, MD   hydrOXYzine (ATARAX) 25 MG tablet Take 25 mg by mouth 2 times daily   Yes Historical Provider, MD   fluPHENAZine decanoate (PROLIXIN) 25 MG/ML injection Inject 12.5 mg into the muscle every 14 days   Yes Historical Provider, MD   busPIRone (BUSPAR) 5 MG tablet Take 5 mg by mouth 2 times daily   Yes Historical Provider, MD   tiotropium (SPIRIVA) 18 MCG inhalation capsule Inhale 18 mcg into the lungs daily   Yes Historical Provider, MD   melatonin 5 MG TBDP disintegrating tablet Take 5 mg by mouth nightly   Yes Historical Provider, MD   fenofibrate (TRICOR) 145 MG tablet TAKE 1 TABLET BY MOUTH ONCE DAILY 11/19/20  Yes Christian Reed MD   ipratropium (ATROVENT) 0.02 % nebulizer solution Take 2.5 mLs by nebulization every 6 hours as needed for Wheezing Please dispense one box. 5/6/20  Yes FLASH Bush   metFORMIN (GLUCOPHAGE) 500 MG tablet Take 500 mg by mouth daily (with breakfast)    Yes Historical Provider, MD   ibuprofen (ADVIL;MOTRIN) 600 MG tablet Take 1 tablet by mouth every 6 hours as needed for Pain 3/12/18  Yes Connor Fernandez PA-C   atorvastatin (LIPITOR) 40 MG tablet Take 40 mg by mouth nightly    Yes Historical Provider, MD   Loratadine (CLARITIN) 10 MG CAPS Take 1 capsule by mouth daily. Yes Historical Provider, MD   lisinopril (PRINIVIL;ZESTRIL) 20 MG tablet Take 20 mg by mouth daily. Yes Historical Provider, MD       LAB DATA: Reviewed. REVIEW OF SYSTEMS:   see HPI/ Comprehensive review of systems negative except for the ones mentioned in HPI.     PHYSICAL EXAMINATION:   /74 (Site: Left Upper Arm, Position: Sitting, Cuff Size: Medium Adult)   Pulse 104   Temp 97.4 °F (36.3 °C)   Ht 5' 8\" (1.727 m)   Wt 197 lb (89.4 kg)   SpO2 99%   BMI 29.95 kg/m² GENERAL APPEARANCE:    Alert, oriented x 3, well developed, cooperative, not in any distress, appears stated age. HEAD:   Normocephalic, atraumatic   EYES:   PERRLA, EOMI, lids normal, conjuctivea clear, sclera anicteric. NECK:    Supple, symmetrical,  trachea midline, no thyromegaly, no JVD, no lymphadenopathy. LUNGS:    Clear to auscultation bilaterally, respirations unlabored, accessory muscles are not used. HEART:     Regular rate and rhythm, S1 and S2 normal, no murmur, rub or gallop. PMI in MCL. ABDOMEN:    Soft, non-tender, bowel sounds are normoactive, no masses, no hepatospleenomegaly. EXTREMITY:   no bipedal edema  NEURO:  Alert, oriented to person, place and time. Grossly intact. Musculoskeletal:         No kyphosis or scoliosis, mild tenderness in his lower back. Skin:                            Warm and dry. No rash or obvious suspicious lesions. PSYCH:  Mood euthymic, insight and judgement good. ASSESSMENT/PLAN:    1. Controlled type 2 diabetes mellitus with hyperglycemia, without long-term current use of insulin ([address]  Extensively advised to diet and exercise and lose weight. Continue Glucophage. A1c is good today around 5.5.  - POCT Glucose  - POCT glycosylated hemoglobin (Hb A1C)    2. Chronic obstructive pulmonary disease, unspecified COPD type ([address]  Extensively advised to quit smoking. Advised to continue to follow with his pulmonologist Dr. Dontrell Merlos. Continue theophylline, Spiriva and albuterol HFA as needed. - albuterol sulfate HFA (PROVENTIL HFA) 108 (90 Base) MCG/ACT inhaler; Inhale 2 puffs into the lungs every 4 hours as needed for Wheezing or Shortness of Breath With spacer (and mask if indicated). Thanks. Dispense: 1 Inhaler; Refill: 1    3. Essential hypertension  Continue current medications, denies side effect with medicationss. Low salt diet and exercise advised. Continue Prinivil    4.  Mixed hyperlipidemia Patient is taking cholesterol medications regularly. Denies any side effects. Diet and exercise advised. Continue Lipitor and TriCor    5. Diabetic polyneuropathy associated with type 2 diabetes mellitus ([facility]  Patient on Neurontin    6. Anxiety  Advised to continue to follow with his psychiatrist and take medications regularly. 7. Schizophrenia, unspecified type ([facility]  Advised to continue to follow with his psychiatrist and take medications regularly. 8. Tobacco abuse  Advised strongly to quit smoking. Patient is not ready to quit smoking. He will try on his own to cut back on smoking. I have recommended that the patient follow CDC guidelines for prevention of COVID-19 infection. I also discussed Coronavirus precaution including wearing face mask, handwashing practice, wiping items touched in public such as gas pumps, door handles, shopping carts, etc. Also Self monitoring for infection - fever, chills, cough, SOB. Should he/she develop symptoms he/she should call office or go to ER for further instructions. Care discussed with patient. Questions answered and patient verbalizes understanding and agrees with plan. Medications reviewed and reconciled. Continue current medications. Appropriate prescriptions are ordered. Risks and benefits of meds are discussed. After visit summary provided. Advised to call for any problems, questions, or concerns. If symptoms worsen or don't improve as expected, to call us or go to ER. Follow up as directed, sooner if needed. No follow-ups on file. This dictation was performed with a verbal recognition program and it was checked for errors. It is possible that there are still dictated errors within this office note. Any errors should be brought immediately to my attention for correction. All efforts were made to ensure that this office note is accurate.      Isaac Holder MD

## 2021-02-26 RX ORDER — LISINOPRIL 20 MG/1
TABLET ORAL
Qty: 30 TABLET | Refills: 2 | Status: SHIPPED | OUTPATIENT
Start: 2021-02-26 | End: 2021-05-19

## 2021-02-26 RX ORDER — ATORVASTATIN CALCIUM 40 MG/1
TABLET, FILM COATED ORAL
Qty: 30 TABLET | Refills: 2 | Status: SHIPPED | OUTPATIENT
Start: 2021-02-26 | End: 2021-07-23 | Stop reason: SDUPTHER

## 2021-03-04 RX ORDER — FENOFIBRATE 145 MG/1
TABLET, COATED ORAL
Qty: 30 TABLET | Refills: 1 | Status: SHIPPED | OUTPATIENT
Start: 2021-03-04 | End: 2021-05-07

## 2021-03-10 RX ORDER — MONTELUKAST SODIUM 10 MG/1
TABLET ORAL
Qty: 30 TABLET | Refills: 3 | Status: SHIPPED | OUTPATIENT
Start: 2021-03-10 | End: 2021-07-23 | Stop reason: SDUPTHER

## 2021-03-18 RX ORDER — NAPROXEN 500 MG/1
TABLET ORAL
Qty: 60 TABLET | Refills: 0 | Status: SHIPPED | OUTPATIENT
Start: 2021-03-18 | End: 2021-04-15

## 2021-03-31 RX ORDER — THEOPHYLLINE ANHYDROUS 100 MG/1
CAPSULE, EXTENDED RELEASE ORAL
Qty: 60 CAPSULE | Refills: 2 | Status: SHIPPED | OUTPATIENT
Start: 2021-03-31 | End: 2021-07-14

## 2021-04-08 RX ORDER — GABAPENTIN 300 MG/1
CAPSULE ORAL
Qty: 180 CAPSULE | Refills: 0 | Status: SHIPPED | OUTPATIENT
Start: 2021-04-08 | End: 2021-05-13

## 2021-04-08 RX ORDER — OMEPRAZOLE 40 MG/1
CAPSULE, DELAYED RELEASE ORAL
Qty: 30 CAPSULE | Refills: 1 | Status: SHIPPED | OUTPATIENT
Start: 2021-04-08 | End: 2021-07-23 | Stop reason: SDUPTHER

## 2021-04-15 RX ORDER — NAPROXEN 500 MG/1
TABLET ORAL
Qty: 60 TABLET | Refills: 0 | Status: SHIPPED | OUTPATIENT
Start: 2021-04-15 | End: 2021-07-23 | Stop reason: SDUPTHER

## 2021-05-07 RX ORDER — FENOFIBRATE 145 MG/1
TABLET, COATED ORAL
Qty: 30 TABLET | Refills: 1 | Status: SHIPPED | OUTPATIENT
Start: 2021-05-07 | End: 2021-07-23 | Stop reason: SDUPTHER

## 2021-05-13 RX ORDER — GABAPENTIN 300 MG/1
600 CAPSULE ORAL 3 TIMES DAILY
Qty: 180 CAPSULE | Refills: 0 | Status: SHIPPED | OUTPATIENT
Start: 2021-05-13 | End: 2021-07-23 | Stop reason: SDUPTHER

## 2021-05-19 RX ORDER — LISINOPRIL 20 MG/1
TABLET ORAL
Qty: 30 TABLET | Refills: 2 | Status: SHIPPED | OUTPATIENT
Start: 2021-05-19 | End: 2021-07-23 | Stop reason: SDUPTHER

## 2021-06-24 ENCOUNTER — APPOINTMENT (OUTPATIENT)
Dept: GENERAL RADIOLOGY | Age: 41
End: 2021-06-24
Payer: COMMERCIAL

## 2021-06-24 ENCOUNTER — HOSPITAL ENCOUNTER (EMERGENCY)
Age: 41
Discharge: HOME | End: 2021-06-24
Payer: COMMERCIAL

## 2021-06-24 VITALS
BODY MASS INDEX: 29.18 KG/M2 | HEART RATE: 72 BPM | HEIGHT: 69 IN | TEMPERATURE: 97.5 F | WEIGHT: 197 LBS | OXYGEN SATURATION: 98 % | DIASTOLIC BLOOD PRESSURE: 78 MMHG | SYSTOLIC BLOOD PRESSURE: 112 MMHG | RESPIRATION RATE: 20 BRPM

## 2021-06-24 DIAGNOSIS — J40 BRONCHITIS: Primary | ICD-10-CM

## 2021-06-24 LAB
CHP ED QC CHECK: YES
GLUCOSE BLD-MCNC: 107 MG/DL
GLUCOSE BLD-MCNC: 107 MG/DL (ref 70–99)
SARS-COV-2, NAAT: NOT DETECTED
SOURCE: NORMAL

## 2021-06-24 PROCEDURE — 87635 SARS-COV-2 COVID-19 AMP PRB: CPT

## 2021-06-24 PROCEDURE — 99285 EMERGENCY DEPT VISIT HI MDM: CPT

## 2021-06-24 PROCEDURE — 82962 GLUCOSE BLOOD TEST: CPT

## 2021-06-24 PROCEDURE — 71045 X-RAY EXAM CHEST 1 VIEW: CPT

## 2021-06-24 RX ORDER — GUAIFENESIN/DEXTROMETHORPHAN 100-10MG/5
5 SYRUP ORAL 3 TIMES DAILY PRN
Qty: 120 ML | Refills: 0 | Status: SHIPPED | OUTPATIENT
Start: 2021-06-24 | End: 2021-07-04

## 2021-06-24 RX ORDER — ALBUTEROL SULFATE 90 UG/1
2 AEROSOL, METERED RESPIRATORY (INHALATION) EVERY 4 HOURS PRN
Qty: 1 INHALER | Refills: 1 | Status: SHIPPED | OUTPATIENT
Start: 2021-06-24 | End: 2022-04-01 | Stop reason: ALTCHOICE

## 2021-06-24 NOTE — ED PROVIDER NOTES
eMERGENCY dEPARTMENT eNCOUnter        [facility]    Chief Complaint   Patient presents with    Chills     x2 hours    Cough       HPI    Monie Murphy is a 38 y.o. male with history of diabetes and asthma and schizophrenia who presents with chills, cough. Onset was 2 weeks ago. Associated with mild SOB, sputum production. States that this afternoon while he was at his father's house he felt worse so he decided to come here. No fevers. No sick contacts. Has not received Covid vaccine. States he is compliant with all his medications. Does not check blood sugar regularly but states that he is normally fairly well controlled. REVIEW OF SYSTEMS    See HPI for further details. Review of systems otherwise negative. Constitutional:  No fever. HENT:  no headache, no earache, no nasal congestion, no sinus pressure, no sore throat  Respiratory:  + cough, + sob. Cardiovascular:  Denies chest pain. GI:  Denies nausea, vomiting, diarrhea. Musculoskeletal:  Denies edema, tenderness. Integument:  Denies rashes. PAST MEDICAL HISTORY    Past Medical History:   Diagnosis Date    Anxiety     Asthma     Chronic back pain     Diabetes mellitus ([address]     H/O 24 hour EKG monitoring 09/19/2013    EVENT MONITOR-normal sinus rhythm    H/O echocardiogram 04/20/2017    EF 55% Normal LV with normal systolic function. No significant valvulopathy is seen    Hx of echocardiogram 08/16/13 08/13 Mitrall annular calcification with a trace to mild MR    Hyperlipidemia     Hypertension     Schizophrenia ([address]        SURGICAL HISTORY    Past Surgical History:   Procedure Laterality Date    HEMORRHOID SURGERY         CURRENT MEDICATIONS    Current Outpatient Rx   Medication Sig Dispense Refill    albuterol sulfate HFA (PROVENTIL HFA) 108 (90 Base) MCG/ACT inhaler Inhale 2 puffs into the lungs every 4 hours as needed for Wheezing or Shortness of Breath With spacer (and mask if indicated). Thanks.  1 Inhaler 1    guaiFENesin-dextromethorphan (ROBITUSSIN DM) 100-10 MG/5ML syrup Take 5 mLs by mouth 3 times daily as needed for Cough 120 mL 0    lisinopril (PRINIVIL;ZESTRIL) 20 MG tablet TAKE 1 TABLET BY MOUTH ONCE DAILY 30 tablet 2    gabapentin (NEURONTIN) 300 MG capsule Take 2 capsules by mouth 3 times daily for 30 days. 180 capsule 0    fenofibrate (TRICOR) 145 MG tablet TAKE 1 TABLET BY MOUTH ONCE DAILY 30 tablet 1    naproxen (NAPROSYN) 500 MG tablet TAKE ONE (1) TABLET BY MOUTH TWO TIMES DAILY 60 tablet 0    omeprazole (PRILOSEC) 40 MG delayed release capsule TAKE 1 CAPSULE BY MOUTH ONCE DAILY 30 capsule 1    LIOR-24 100 MG extended release capsule TAKE 2 CAPSULES BY MOUTH ONCE DAILY 60 capsule 2    montelukast (SINGULAIR) 10 MG tablet TAKE 1 TABLET BY MOUTH ONCE DAILY 30 tablet 3    metFORMIN (GLUCOPHAGE) 500 MG tablet TAKE ONE (1) TABLET BY MOUTH TWO TIMES DAILY WITH MEALS 60 tablet 2    atorvastatin (LIPITOR) 40 MG tablet TAKE 1 TABLET BY MOUTH ONCE DAILY 30 tablet 2    docusate sodium (COLACE) 100 MG capsule Take 1 capsule by mouth 2 times daily 60 capsule 3    Spacer/Aero-Holding Chambers (AEROCHAMBER) MISC Inhale 1 Device into the lungs every 6 hours 1 each 0    Pseudoephedrine-DM-GG 60- MG TABS Take 1 tablet by mouth every 6 hours 20 tablet 0    metFORMIN (GLUCOPHAGE-XR) 500 MG extended release tablet TAKE 1 TABLET BY MOUTH ONCE DAILY WITH EVENING MEAL 30 tablet 2    ferrous sulfate (IRON 325) 325 (65 Fe) MG tablet Take 1 tablet by mouth 2 times daily 30 tablet 3    clonazePAM (KLONOPIN) 0.5 MG tablet Take 0.5 mg by mouth nightly as needed.       hydrOXYzine (ATARAX) 25 MG tablet Take 25 mg by mouth 2 times daily      fluPHENAZine decanoate (PROLIXIN) 25 MG/ML injection Inject 12.5 mg into the muscle every 14 days      busPIRone (BUSPAR) 5 MG tablet Take 5 mg by mouth 2 times daily      tiotropium (SPIRIVA) 18 MCG inhalation capsule Inhale 18 mcg into the lungs daily      melatonin 5 MG Gatherings with Friends and Family:     Attends Jew Services:     Active Member of Clubs or Organizations:     Attends Club or Organization Meetings:     Marital Status:    Intimate Partner Violence:     Fear of Current or Ex-Partner:     Emotionally Abused:     Physically Abused:     Sexually Abused:        PHYSICAL EXAM    VITAL SIGNS: /78   Pulse 72   Temp 97.5 °F (36.4 °C)   Resp 20   Ht 5' 9\" (1.753 m)   Wt 197 lb (89.4 kg)   SpO2 98%   BMI 29.09 kg/m²   GENERAL:  Well-developed, well-nourished, no acute distress  EYES:   PERRL, EOMI. Conjunctiva normal.  HENT:  NC/AT. External ears normal, canals patent and nonerythematous bilaterally, TMs intact and noninjected bilaterally. Nares patent. No sinus tenderness to palpation/percussion. Oropharynx moist and pink. No posterior pharyngeal erythema. no tonsillar edema, no exudates. Uvula midline. LUNGS:  Lungs CTAB, no rales or stridor or wheezing. CARDIAC:   RRR. No murmurs. ABDOMEN:  Abdomen soft, non-tender. Bowel sounds active. EXTREMITIES:   No edema. DP intact and symmetric. SKIN:   Warm and dry. NEURO:  Alert and oriented. No focal deficits. LYMPH:  No cervical lymphadenopathy. RADIOLOGY/PROCEDURES    XR CHEST PORTABLE    Result Date: 6/24/2021  EXAMINATION: ONE XRAY VIEW OF THE CHEST 6/24/2021 2:25 pm COMPARISON: January 27, 2021. HISTORY: ORDERING SYSTEM PROVIDED HISTORY: cough, chills, SOB TECHNOLOGIST PROVIDED HISTORY: Reason for exam:->cough, chills, SOB Reason for Exam: cough, chills, SOB Acuity: Acute Type of Exam: Initial Additional signs and symptoms: na Relevant Medical/Surgical History: diabetes, asthma, hypertension FINDINGS: A portable upright frontal view chest radiograph was obtained. The heart size, mediastinal contour, and pleural spaces are within normal limits. The lungs are clear. There is no focal consolidation or pneumothorax. The pulmonary vascular pattern is within normal limits.   No significant thoracic osseous abnormality. Clear lungs. No acute cardiopulmonary abnormality. ED COURSE & MEDICAL DECISION MAKING    Pertinent Labs & Imaging studies reviewed. (See chart for details)  -  Patient seen and evaluated in the emergency department. -  Triage and nursing notes reviewed and incorporated. -  Old chart records reviewed and incorporated. -  I evaluated this patient independently  -  Differential diagnosis includes: upper respiratory infection, sinusitis, influenza, pneumonia, mononucleosis, and others  -  Work-up included: COVID - neg. Accucheck - 107  -  Results discussed with patient. -  Patient discharged home. Likely viral illness. Recommended watchful waiting. Discussed importance of follow-up with PCP if not improving, expect improvement within 2 to 3 days. Will start on cough syrup and albuterol inhaler. Return to the Emergency Department for new/worsening symptoms as needed. Patient agreeable with plan of care and disposition    FINAL IMPRESSION    1.  Bronchitis             General Hua Ye PA-C  06/24/21 9095

## 2021-07-10 ENCOUNTER — HOSPITAL ENCOUNTER (EMERGENCY)
Age: 41
Discharge: HOME | End: 2021-07-10
Attending: EMERGENCY MEDICINE
Payer: COMMERCIAL

## 2021-07-10 VITALS
WEIGHT: 197 LBS | TEMPERATURE: 98.7 F | SYSTOLIC BLOOD PRESSURE: 138 MMHG | HEIGHT: 70 IN | RESPIRATION RATE: 18 BRPM | BODY MASS INDEX: 28.2 KG/M2 | DIASTOLIC BLOOD PRESSURE: 99 MMHG | OXYGEN SATURATION: 98 % | HEART RATE: 68 BPM

## 2021-07-10 DIAGNOSIS — R25.1 SHAKINESS: Primary | ICD-10-CM

## 2021-07-10 LAB
ALBUMIN SERPL-MCNC: 4.4 GM/DL (ref 3.4–5)
ALP BLD-CCNC: 50 IU/L (ref 40–128)
ALT SERPL-CCNC: 20 U/L (ref 10–40)
ANION GAP SERPL CALCULATED.3IONS-SCNC: 15 MMOL/L (ref 4–16)
AST SERPL-CCNC: 21 IU/L (ref 15–37)
BASOPHILS ABSOLUTE: 0.1 K/CU MM
BASOPHILS RELATIVE PERCENT: 1 % (ref 0–1)
BILIRUB SERPL-MCNC: 0.5 MG/DL (ref 0–1)
BUN BLDV-MCNC: 14 MG/DL (ref 6–23)
CALCIUM SERPL-MCNC: 9.9 MG/DL (ref 8.3–10.6)
CHLORIDE BLD-SCNC: 102 MMOL/L (ref 99–110)
CO2: 20 MMOL/L (ref 21–32)
CREAT SERPL-MCNC: 0.7 MG/DL (ref 0.9–1.3)
DIFFERENTIAL TYPE: ABNORMAL
EOSINOPHILS ABSOLUTE: 1.8 K/CU MM
EOSINOPHILS RELATIVE PERCENT: 22 % (ref 0–3)
GFR AFRICAN AMERICAN: >60 ML/MIN/1.73M2
GFR NON-AFRICAN AMERICAN: >60 ML/MIN/1.73M2
GLUCOSE BLD-MCNC: 91 MG/DL (ref 70–99)
HCT VFR BLD CALC: 45.2 % (ref 42–52)
HEMOGLOBIN: 15.2 GM/DL (ref 13.5–18)
LYMPHOCYTES ABSOLUTE: 2.5 K/CU MM
LYMPHOCYTES RELATIVE PERCENT: 30 % (ref 24–44)
MAGNESIUM: 2 MG/DL (ref 1.8–2.4)
MCH RBC QN AUTO: 29.5 PG (ref 27–31)
MCHC RBC AUTO-ENTMCNC: 33.6 % (ref 32–36)
MCV RBC AUTO: 87.6 FL (ref 78–100)
MONOCYTES ABSOLUTE: 0.1 K/CU MM
MONOCYTES RELATIVE PERCENT: 1 % (ref 0–4)
PDW BLD-RTO: 12.9 % (ref 11.7–14.9)
PLATELET # BLD: 239 K/CU MM (ref 140–440)
PLT MORPHOLOGY: ABNORMAL
PMV BLD AUTO: 10.6 FL (ref 7.5–11.1)
POTASSIUM SERPL-SCNC: 3.8 MMOL/L (ref 3.5–5.1)
RBC # BLD: 5.16 M/CU MM (ref 4.6–6.2)
SEGMENTED NEUTROPHILS ABSOLUTE COUNT: 3.9 K/CU MM
SEGMENTED NEUTROPHILS RELATIVE PERCENT: 46 % (ref 36–66)
SODIUM BLD-SCNC: 137 MMOL/L (ref 135–145)
TOTAL PROTEIN: 7.4 GM/DL (ref 6.4–8.2)
WBC # BLD: 8.4 K/CU MM (ref 4–10.5)
WBC # BLD: ABNORMAL 10*3/UL

## 2021-07-10 PROCEDURE — 83735 ASSAY OF MAGNESIUM: CPT

## 2021-07-10 PROCEDURE — 80053 COMPREHEN METABOLIC PANEL: CPT

## 2021-07-10 PROCEDURE — 85025 COMPLETE CBC W/AUTO DIFF WBC: CPT

## 2021-07-10 PROCEDURE — 99283 EMERGENCY DEPT VISIT LOW MDM: CPT

## 2021-07-10 NOTE — ED NOTES
Bed: H-02  Expected date: 7/10/21  Expected time:   Means of arrival:   Comments:  EMS     Elidia Abebe  07/10/21 [id number]

## 2021-07-10 NOTE — ED PROVIDER NOTES
eMERGENCY dEPARTMENT eNCOUnter      PCP: Leonard Webb MD    CHIEF COMPLAINT    Chief Complaint   Patient presents with   [address] Problem       HPI    Rita Bynum is a 38 y.o. male who presents with shakiness, not feeling well. He tells me this is been going on for months. States he feels generally unwell, shakiness in his hands. Initially tells me has not been taking any of his medications, but then tells me that he was at [facility] on Wednesday to receive his medications for schizophrenia. States has been compliant with these. He denies any auditory or visual hallucinations. Denies suicidal thoughts or homicidal thoughts. He does have history of diabetes, tells me he is compliant with medicines. He states that his uncle called paramedics for him today. He denies chest pain, abdominal pain. Denies headache, weakness, numbness or tingling extremities. Denies vision change. Denies fever or chills. REVIEW OF SYSTEMS    Constitutional:  Denies fever, chills. HENT:  Denies sore throat or ear pain   Cardiovascular:  Denies chest pain, palpitations   Respiratory:  Denies cough or shortness of breath    GI:  Denies abdominal pain, nausea, vomiting, or diarrhea  :  Denies any urinary symptoms, flank pain  Musculoskeletal:  Denies back pain, extremity pain  Skin:  Denies rash, color change  Neurologic:  Denies headache, focal weakness or sensory changes   Lymphatic:  Denies swollen glands, edema    All other review of systems are negative  See HPI and nursing notes for additional information     PAST MEDICAL AND SURGICAL HISTORY    Past Medical History:   Diagnosis Date    Anxiety     Asthma     Chronic back pain     [facility] mellitus ([facility]     H/O 24 hour EKG monitoring 09/19/2013    EVENT MONITOR-normal sinus rhythm    H/O echocardiogram 04/20/2017    EF 55% Normal LV with normal systolic function.  No significant valvulopathy is seen    Hx of echocardiogram 08/16/13 08/13 Mitrall annular calcification with a trace to mild MR    Hyperlipidemia     Hypertension     Schizophrenia ([facility] [address]      Past Surgical History:   Procedure Laterality Date    HEMORRHOID SURGERY         CURRENT MEDICATIONS    Current Outpatient Rx   Medication Sig Dispense Refill    albuterol sulfate HFA (PROVENTIL HFA) 108 (90 Base) MCG/ACT inhaler Inhale 2 puffs into the lungs every 4 hours as needed for Wheezing or Shortness of Breath With spacer (and mask if indicated). Thanks. 1 Inhaler 1    lisinopril (PRINIVIL;ZESTRIL) 20 MG tablet TAKE 1 TABLET BY MOUTH ONCE DAILY 30 tablet 2    gabapentin (NEURONTIN) 300 MG capsule Take 2 capsules by mouth 3 times daily for 30 days.  180 capsule 0    fenofibrate (TRICOR) 145 MG tablet TAKE 1 TABLET BY MOUTH ONCE DAILY 30 tablet 1    naproxen (NAPROSYN) 500 MG tablet TAKE ONE (1) TABLET BY MOUTH TWO TIMES DAILY 60 tablet 0    omeprazole (PRILOSEC) 40 MG delayed release capsule TAKE 1 CAPSULE BY MOUTH ONCE DAILY 30 capsule 1    LIOR-24 100 MG extended release capsule TAKE 2 CAPSULES BY MOUTH ONCE DAILY 60 capsule 2    montelukast (SINGULAIR) 10 MG tablet TAKE 1 TABLET BY MOUTH ONCE DAILY 30 tablet 3    metFORMIN (GLUCOPHAGE) 500 MG tablet TAKE ONE (1) TABLET BY MOUTH TWO TIMES DAILY WITH MEALS 60 tablet 2    atorvastatin (LIPITOR) 40 MG tablet TAKE 1 TABLET BY MOUTH ONCE DAILY 30 tablet 2    docusate sodium (COLACE) 100 MG capsule Take 1 capsule by mouth 2 times daily 60 capsule 3    Spacer/Aero-Holding Chambers (AEROCHAMBER) MISC Inhale 1 Device into the lungs every 6 hours 1 each 0    Pseudoephedrine-DM-GG 60- MG TABS Take 1 tablet by mouth every 6 hours 20 tablet 0    metFORMIN (GLUCOPHAGE-XR) 500 MG extended release tablet TAKE 1 TABLET BY MOUTH ONCE DAILY WITH EVENING MEAL 30 tablet 2    ferrous sulfate (IRON 325) 325 (65 Fe) MG tablet Take 1 tablet by mouth 2 times daily 30 tablet 3    clonazePAM (KLONOPIN) 0.5 MG tablet Take 0.5 mg by mouth nightly as Activity:     Days of Exercise per Week:     Minutes of Exercise per Session:    Stress:     Feeling of Stress :    Social Connections:     Frequency of Communication with Friends and Family:     Frequency of Social Gatherings with Friends and Family:     Attends Mormonism Services:     Active Member of Clubs or Organizations:     Attends Club or Organization Meetings:     Marital Status:    Intimate Partner Violence:     Fear of Current or Ex-Partner:     Emotionally Abused:     Physically Abused:     Sexually Abused:      Family History   Problem Relation Age of Onset    Emphysema Mother     Heart Disease Father     High Blood Pressure Father          PHYSICAL EXAM    VITAL SIGNS: BP (!) 138/99   Pulse 68   Temp 98.7 °F (37.1 °C) (Oral)   Resp 18   Ht 5' 10\" (1.778 m)   Wt 197 lb (89.4 kg)   SpO2 98%   BMI 28.27 kg/m²    Constitutional:  Well developed, No acute distress. HENT:  Normocephalic, Atraumatic, PERRL. EOMI. Sclera clear. Conjunctiva normal.  Neck: supple without ridigity  Cardiovascular:  Regular rate and rhythm, No murmurs  Respiratory:  Nonlabored breathing. Normal breath sounds  Abdomen: Soft, Non tender, bowel sounds present. Musculoskeletal: No edema, No tenderness  Integument:  Warm, Dry, no rash  Neurologic:  Alert & oriented , No focal deficits noted. Speech normal.  Occasional hand tremor which resolves with intention. Psychiatric:  Affect is flat. No obvious hallucinations, no delusional thoughts. Labs:  Labs Reviewed   COMPREHENSIVE METABOLIC PANEL - Abnormal; Notable for the following components:       Result Value    CO2 20 (*)     CREATININE 0.7 (*)     All other components within normal limits   CBC WITH AUTO DIFFERENTIAL   MAGNESIUM               ED COURSE & MEDICAL DECISION MAKING       Vital signs and nursing notes reviewed during ED course. All pertinent Lab data and radiographic results reviewed with patient at bedside.        The patient and/or the family were informed of the results of any tests/labs/imaging, the treatment plan, and time was allotted to answer questions. This is a 48-year-old male with history of schizophrenia who presented due to just not feeling well and shakiness. He did have occasional tremor with his hand, but it seemed to resolve with any intention, once I would talk to him, distract him, he would hold of his arms and it would go away. His labs showed no obvious cause. These were unrevealing. He denies suicidal thoughts, homicidal thoughts, hallucinations. He did not appear to be responding to any internal stimuli. Unclear etiology of his symptoms at this point but he does deny alcohol use, drug use. He does agree with follow-up on Wednesday with [facility] for his normal medications. We will plan for discharge home at this point with outpatient follow-up with [facility] and his primary care provider for further care. If he has any new or worsening signs or symptoms instructed to return for reevaluation.       Clinical  IMPRESSION    Shakiness           (Please note the MDM and HPI sections of this note were completed with a voice recognition program.  Efforts were made to edit the dictations but occasionally words are mis-transcribed.)      Phong Walls,   07/10/21 [id number]

## 2021-07-10 NOTE — CARE COORDINATION
CM received consult from Dr Yaneth Gunter for patient in room #23 to assist with discharge planning. CM met with patient to begin discharge planning. CM introduced self and CM role. Patient states he presents to ER for c/o shakiness. Patient denies suicidal or homicidal ideation at this time. Patient reports he receives his medications from [address] every Wednesday. Patient intends to obtain medications from [address] on 7/14. Patient has a PCP and insurance which assists with medication affordability. Patient reports he does not have transportation home nor does he have any money with him. CM placed telephone call to [employer]. ETA 10-15 minutes. CM updated patient on ETA of transportation. Patient to be transported to [facility].

## 2021-07-14 RX ORDER — THEOPHYLLINE ANHYDROUS 100 MG/1
CAPSULE, EXTENDED RELEASE ORAL
Qty: 60 CAPSULE | Refills: 2 | Status: SHIPPED | OUTPATIENT
Start: 2021-07-14 | End: 2021-07-23 | Stop reason: SDUPTHER

## 2021-07-23 ENCOUNTER — OFFICE VISIT (OUTPATIENT)
Dept: INTERNAL MEDICINE CLINIC | Age: 41
End: 2021-07-23
Payer: COMMERCIAL

## 2021-07-23 VITALS
OXYGEN SATURATION: 97 % | DIASTOLIC BLOOD PRESSURE: 72 MMHG | WEIGHT: 176 LBS | HEART RATE: 92 BPM | SYSTOLIC BLOOD PRESSURE: 130 MMHG | BODY MASS INDEX: 25.25 KG/M2

## 2021-07-23 DIAGNOSIS — F20.9 SCHIZOPHRENIA, UNSPECIFIED TYPE (HCC): ICD-10-CM

## 2021-07-23 DIAGNOSIS — J44.9 CHRONIC OBSTRUCTIVE PULMONARY DISEASE, UNSPECIFIED COPD TYPE (HCC): ICD-10-CM

## 2021-07-23 DIAGNOSIS — J30.9 ALLERGIC RHINITIS, UNSPECIFIED SEASONALITY, UNSPECIFIED TRIGGER: ICD-10-CM

## 2021-07-23 DIAGNOSIS — Z11.4 SCREENING FOR HIV (HUMAN IMMUNODEFICIENCY VIRUS): ICD-10-CM

## 2021-07-23 DIAGNOSIS — Z72.0 TOBACCO ABUSE: ICD-10-CM

## 2021-07-23 DIAGNOSIS — F41.9 ANXIETY: ICD-10-CM

## 2021-07-23 DIAGNOSIS — D50.9 IRON DEFICIENCY ANEMIA, UNSPECIFIED IRON DEFICIENCY ANEMIA TYPE: ICD-10-CM

## 2021-07-23 DIAGNOSIS — K21.9 GASTROESOPHAGEAL REFLUX DISEASE WITHOUT ESOPHAGITIS: ICD-10-CM

## 2021-07-23 DIAGNOSIS — E11.65 CONTROLLED TYPE 2 DIABETES MELLITUS WITH HYPERGLYCEMIA, WITHOUT LONG-TERM CURRENT USE OF INSULIN (HCC): Primary | ICD-10-CM

## 2021-07-23 DIAGNOSIS — I10 ESSENTIAL HYPERTENSION: ICD-10-CM

## 2021-07-23 DIAGNOSIS — E11.42 DIABETIC POLYNEUROPATHY ASSOCIATED WITH TYPE 2 DIABETES MELLITUS (HCC): ICD-10-CM

## 2021-07-23 DIAGNOSIS — E78.2 MIXED HYPERLIPIDEMIA: ICD-10-CM

## 2021-07-23 DIAGNOSIS — M54.9 DORSALGIA: ICD-10-CM

## 2021-07-23 LAB
CHP ED QC CHECK: NORMAL
CREATININE URINE: 72.3 MG/DL (ref 39–259)
GLUCOSE BLD-MCNC: 132 MG/DL
MICROALBUMIN UR-MCNC: <1.2 MG/DL
MICROALBUMIN/CREAT UR-RTO: NORMAL MG/G (ref 0–30)

## 2021-07-23 PROCEDURE — 3044F HG A1C LEVEL LT 7.0%: CPT | Performed by: INTERNAL MEDICINE

## 2021-07-23 PROCEDURE — 82962 GLUCOSE BLOOD TEST: CPT | Performed by: INTERNAL MEDICINE

## 2021-07-23 PROCEDURE — 2022F DILAT RTA XM EVC RTNOPTHY: CPT | Performed by: INTERNAL MEDICINE

## 2021-07-23 PROCEDURE — G8427 DOCREV CUR MEDS BY ELIG CLIN: HCPCS | Performed by: INTERNAL MEDICINE

## 2021-07-23 PROCEDURE — 99214 OFFICE O/P EST MOD 30 MIN: CPT | Performed by: INTERNAL MEDICINE

## 2021-07-23 PROCEDURE — 3023F SPIROM DOC REV: CPT | Performed by: INTERNAL MEDICINE

## 2021-07-23 PROCEDURE — G8419 CALC BMI OUT NRM PARAM NOF/U: HCPCS | Performed by: INTERNAL MEDICINE

## 2021-07-23 PROCEDURE — G8926 SPIRO NO PERF OR DOC: HCPCS | Performed by: INTERNAL MEDICINE

## 2021-07-23 PROCEDURE — 4004F PT TOBACCO SCREEN RCVD TLK: CPT | Performed by: INTERNAL MEDICINE

## 2021-07-23 RX ORDER — FERROUS SULFATE 325(65) MG
325 TABLET ORAL 2 TIMES DAILY
Qty: 30 TABLET | Refills: 3 | Status: SHIPPED | OUTPATIENT
Start: 2021-07-23

## 2021-07-23 RX ORDER — DOCUSATE SODIUM 100 MG/1
100 CAPSULE, LIQUID FILLED ORAL 2 TIMES DAILY
Qty: 60 CAPSULE | Refills: 3 | Status: SHIPPED | OUTPATIENT
Start: 2021-07-23 | End: 2022-04-14

## 2021-07-23 RX ORDER — GABAPENTIN 300 MG/1
600 CAPSULE ORAL 3 TIMES DAILY
Qty: 180 CAPSULE | Refills: 3 | Status: SHIPPED | OUTPATIENT
Start: 2021-07-23 | End: 2021-11-17

## 2021-07-23 RX ORDER — HYDROXYZINE HYDROCHLORIDE 25 MG/1
25 TABLET, FILM COATED ORAL 2 TIMES DAILY
Qty: 60 TABLET | Refills: 3 | Status: SHIPPED | OUTPATIENT
Start: 2021-07-23

## 2021-07-23 RX ORDER — BUSPIRONE HYDROCHLORIDE 5 MG/1
5 TABLET ORAL 2 TIMES DAILY
Qty: 60 TABLET | Refills: 3 | Status: SHIPPED | OUTPATIENT
Start: 2021-07-23

## 2021-07-23 RX ORDER — ATORVASTATIN CALCIUM 40 MG/1
40 TABLET, FILM COATED ORAL DAILY
Qty: 30 TABLET | Refills: 3 | Status: SHIPPED | OUTPATIENT
Start: 2021-07-23 | End: 2021-12-08

## 2021-07-23 RX ORDER — OMEPRAZOLE 40 MG/1
40 CAPSULE, DELAYED RELEASE ORAL DAILY
Qty: 30 CAPSULE | Refills: 3 | Status: SHIPPED | OUTPATIENT
Start: 2021-07-23 | End: 2021-11-23

## 2021-07-23 RX ORDER — NAPROXEN 500 MG/1
500 TABLET ORAL 2 TIMES DAILY WITH MEALS
Qty: 60 TABLET | Refills: 3 | Status: SHIPPED | OUTPATIENT
Start: 2021-07-23 | End: 2021-12-02

## 2021-07-23 RX ORDER — MONTELUKAST SODIUM 10 MG/1
10 TABLET ORAL NIGHTLY
Qty: 30 TABLET | Refills: 3 | Status: SHIPPED | OUTPATIENT
Start: 2021-07-23 | End: 2022-01-06

## 2021-07-23 RX ORDER — LISINOPRIL 20 MG/1
20 TABLET ORAL DAILY
Qty: 30 TABLET | Refills: 3 | Status: SHIPPED | OUTPATIENT
Start: 2021-07-23 | End: 2022-03-10

## 2021-07-23 RX ORDER — FENOFIBRATE 145 MG/1
145 TABLET, COATED ORAL DAILY
Qty: 30 TABLET | Refills: 3 | Status: SHIPPED | OUTPATIENT
Start: 2021-07-23 | End: 2021-11-23

## 2021-07-23 RX ORDER — LORATADINE 10 MG/1
10 CAPSULE, LIQUID FILLED ORAL DAILY
Qty: 30 CAPSULE | Refills: 3 | Status: SHIPPED | OUTPATIENT
Start: 2021-07-23 | End: 2022-06-01

## 2021-07-23 SDOH — ECONOMIC STABILITY: FOOD INSECURITY: WITHIN THE PAST 12 MONTHS, YOU WORRIED THAT YOUR FOOD WOULD RUN OUT BEFORE YOU GOT MONEY TO BUY MORE.: NEVER TRUE

## 2021-07-23 SDOH — ECONOMIC STABILITY: FOOD INSECURITY: WITHIN THE PAST 12 MONTHS, THE FOOD YOU BOUGHT JUST DIDN'T LAST AND YOU DIDN'T HAVE MONEY TO GET MORE.: NEVER TRUE

## 2021-07-23 ASSESSMENT — SOCIAL DETERMINANTS OF HEALTH (SDOH): HOW HARD IS IT FOR YOU TO PAY FOR THE VERY BASICS LIKE FOOD, HOUSING, MEDICAL CARE, AND HEATING?: NOT VERY HARD

## 2021-07-23 NOTE — PROGRESS NOTES
Name: Tatiana Brown  [id number]  Age: 42 y.o. YOB: 1980  Sex: male    CHIEF COMPLAINT:    Chief Complaint   Patient presents with    Diabetes    COPD       HISTORY OF PRESENT ILLNESS:     This is a pleasant  42 y.o. male  is seen today for management of chronic medical problems and medications refills. Previous records reviewed . Doing OK . Denies CP or SOB. No fever , sore throat or cough or congestion. He continues to smoke about 1 pack/day and refuses to quit smoking. Denies any abdominal pain. Appetite OK. Bowels moving [place]. No urinary symptoms. Complains of chronic low back pain. Hearing is ok. Vision Ok with glasses. Denies  any significant skin lesions. He is seeing his psychiatrist regularly and taking psych medications regularly from them. He claims his sugars are usually between 90 and 120. But he checks his sugars rarely. No other complaints. Patient refuses to get Covid vaccination.   Labs reviewed from 7/10/2021 and explained to the patient    Past Medical History:    Patient Active Problem List   Diagnosis    Essential hypertension    [facility]    Anxiety    Diabetic polyneuropathy associated with type 2 diabetes mellitus ([facility]    Mixed hyperlipidemia    Tobacco abuse    Chronic obstructive pulmonary disease ([facility]    Controlled type 2 diabetes mellitus with hyperglycemia, without long-term current use of insulin (HCC)        Past Surgical History:        Procedure Laterality Date    HEMORRHOID SURGERY         Social History:   Social History     Tobacco Use    Smoking status: Current Every Day Smoker     Packs/day: 0.50     Years: 20.00     Pack years: 10.00     Types: Cigarettes    Smokeless tobacco: Current User     Types: Snuff   Substance Use Topics    Alcohol use: No       Family History:       Problem Relation Age of Onset    Emphysema Mother     Heart Disease Father     High Blood Pressure Father        Allergies:  Cat hair extract, No known allergies, Pollen extract, and Seasonal    Current Medications :      Prior to Admission medications    Medication Sig Start Date End Date Taking? Authorizing Provider   theophylline (LIOR-24) 100 MG extended release capsule Take 1 capsule by mouth daily 7/23/21  Yes Juancarlos Kraft MD   omeprazole (PRILOSEC) 40 MG delayed release capsule Take 1 capsule by mouth daily 7/23/21  Yes Juancarlos Kraft MD   naproxen (NAPROSYN) 500 MG tablet Take 1 tablet by mouth 2 times daily (with meals) 7/23/21  Yes Juancarlos Kraft MD   montelukast (SINGULAIR) 10 MG tablet Take 1 tablet by mouth nightly 7/23/21  Yes Juancarlos Kraft MD   metFORMIN (GLUCOPHAGE) 500 MG tablet Take 1 tablet by mouth 2 times daily (with meals) 7/23/21  Yes Juancarlos Kraft MD   lisinopril (PRINIVIL;ZESTRIL) 20 MG tablet Take 1 tablet by mouth daily 7/23/21  Yes Juancarlos Kraft MD   gabapentin (NEURONTIN) 300 MG capsule Take 2 capsules by mouth 3 times daily for 30 days. 7/23/21 8/22/21 Yes Juancarlos Kraft MD   ferrous sulfate (IRON 325) 325 (65 Fe) MG tablet Take 1 tablet by mouth 2 times daily 7/23/21  Yes Juancarlos Kraft MD   fenofibrate (TRICOR) 145 MG tablet Take 1 tablet by mouth daily 7/23/21  Yes Juancarlos Kraft MD   docusate sodium (COLACE) 100 MG capsule Take 1 capsule by mouth 2 times daily 7/23/21  Yes Juancarlos Kraft MD   atorvastatin (LIPITOR) 40 MG tablet Take 1 tablet by mouth daily 7/23/21  Yes Juancarlos Kraft MD   busPIRone (BUSPAR) 5 MG tablet Take 1 tablet by mouth 2 times daily 7/23/21  Yes Juancarlos Kraft MD   hydrOXYzine (ATARAX) 25 MG tablet Take 1 tablet by mouth 2 times daily 7/23/21  Yes Juancarlos Kraft MD   loratadine (CLARITIN) 10 MG capsule Take 1 capsule by mouth daily 7/23/21  Yes Juancarlos Kraft MD   albuterol sulfate HFA (PROVENTIL HFA) 108 (90 Base) MCG/ACT inhaler Inhale 2 puffs into the lungs every 4 hours as needed for Wheezing or Shortness of Breath With spacer (and mask if indicated). Thanks.  6/24/21 7/24/21 Yes Aurora Ye PA-C Spacer/Aero-Holding Chambers (AEROCHAMBER) MISC Inhale 1 Device into the lungs every 6 hours 1/27/21  Yes Korinne Lusterio, PA-C   Pseudoephedrine-DM-GG 60- MG TABS Take 1 tablet by mouth every 6 hours 1/27/21  Yes Korinne Lusterio, PA-C   clonazePAM (KLONOPIN) 0.5 MG tablet Take 0.5 mg by mouth nightly as needed. Yes Historical Provider, MD   fluPHENAZine decanoate (PROLIXIN) 25 MG/ML injection Inject 12.5 mg into the muscle every 14 days   Yes Historical Provider, MD   tiotropium (SPIRIVA) 18 MCG inhalation capsule Inhale 18 mcg into the lungs daily   Yes Historical Provider, MD   melatonin 5 MG TBDP disintegrating tablet Take 5 mg by mouth nightly   Yes Historical Provider, MD   ipratropium (ATROVENT) 0.02 % nebulizer solution Take 2.5 mLs by nebulization every 6 hours as needed for Wheezing Please dispense one box. 5/6/20  Yes FLASH Lambert   ibuprofen (ADVIL;MOTRIN) 600 MG tablet Take 1 tablet by mouth every 6 hours as needed for Pain 3/12/18  Yes Jonel Ramos PA-C       LAB DATA: Reviewed. REVIEW OF SYSTEMS:   see HPI/ Comprehensive review of systems negative except for the ones mentioned in HPI. PHYSICAL EXAMINATION:   /72   Pulse 92   Wt 176 lb (79.8 kg)   SpO2 97%   BMI 25.25 kg/m²      GENERAL APPEARANCE:    Alert, oriented x 3, well developed, cooperative, not in any distress, appears stated age. HEAD:   Normocephalic, atraumatic   EYES:   PERRLA, EOMI, lids normal, conjuctivea clear, sclera anicteric. NECK:    Supple, symmetrical,  trachea midline, no thyromegaly, no JVD, no lymphadenopathy. LUNGS:    Clear to auscultation bilaterally, respirations unlabored, accessory muscles are not used. HEART:     Regular rate and rhythm, S1 and S2 normal, no murmur, rub or gallop. PMI in MCL. ABDOMEN:    Soft, non-tender, bowel sounds are normoactive, no masses, no hepatospleenomegaly.   EXTREMITY:   no bipedal edema  NEURO:  Alert, oriented to person, place and time.     Grossly intact. Musculoskeletal:         No kyphosis or scoliosis, no deformity in any extremity noted, muscle strength and tone are normal.  Skin:                            Warm and dry. No rash or obvious suspicious lesions. PSYCH:  Mood euthymic, insight and judgement good. ASSESSMENT/PLAN:    1. Controlled type 2 diabetes mellitus with hyperglycemia, without long-term current use of insulin ([facility])  Advised low sugar diet and exercise. Continue Glucophage  - POCT Glucose  - metFORMIN (GLUCOPHAGE) 500 MG tablet; Take 1 tablet by mouth 2 times daily (with meals)  Dispense: 60 tablet; Refill: 3  - Amb External Referral To Ophthalmology  - Microalbumin / Creatinine Urine Ratio    2. Diabetic polyneuropathy associated with type 2 diabetes mellitus ([facility])  Continue Neurontin  - gabapentin (NEURONTIN) 300 MG capsule; Take 2 capsules by mouth 3 times daily for 30 days. Dispense: 180 capsule; Refill: 3    3. Essential hypertension  Stable,Continue current medications, denies side effect with medicationss. Low salt diet and exercise advised. - lisinopril (PRINIVIL;ZESTRIL) 20 MG tablet; Take 1 tablet by mouth daily  Dispense: 30 tablet; Refill: 3    4. Mixed hyperlipidemia  Patient is taking cholesterol medications regularly. Denies any side effects. Diet and exercise advised. - fenofibrate (TRICOR) 145 MG tablet; Take 1 tablet by mouth daily  Dispense: 30 tablet; Refill: 3  - atorvastatin (LIPITOR) 40 MG tablet; Take 1 tablet by mouth daily  Dispense: 30 tablet; Refill: 3    5. Chronic obstructive pulmonary disease, unspecified COPD type ([facility])  Continue Spiriva and albuterol HFA as needed  - theophylline (LIOR-24) 100 MG extended release capsule; Take 1 capsule by mouth daily  Dispense: 30 capsule; Refill: 3    6. Tobacco abuse  Advised extensively to quit smoking but patient is not interested in quitting smoking at this time. Martha Carrero    7. Anxiety  Continue BuSpar and hydroxyzine. 10/23/2021). This dictation was performed with a verbal recognition program and it was checked for errors. It is possible that there are still dictated errors within this office note. Any errors should be brought immediately to my attention for correction. All efforts were made to ensure that this office note is accurate.      Kenya Nogueira MD MD

## 2021-10-21 ENCOUNTER — OFFICE VISIT (OUTPATIENT)
Dept: INTERNAL MEDICINE CLINIC | Age: 41
End: 2021-10-21
Payer: COMMERCIAL

## 2021-10-21 VITALS
OXYGEN SATURATION: 96 % | BODY MASS INDEX: 27.26 KG/M2 | WEIGHT: 190 LBS | DIASTOLIC BLOOD PRESSURE: 66 MMHG | SYSTOLIC BLOOD PRESSURE: 129 MMHG | HEART RATE: 86 BPM | TEMPERATURE: 97.1 F

## 2021-10-21 DIAGNOSIS — J44.9 CHRONIC OBSTRUCTIVE PULMONARY DISEASE, UNSPECIFIED COPD TYPE (HCC): ICD-10-CM

## 2021-10-21 DIAGNOSIS — F41.9 ANXIETY: ICD-10-CM

## 2021-10-21 DIAGNOSIS — E78.2 MIXED HYPERLIPIDEMIA: ICD-10-CM

## 2021-10-21 DIAGNOSIS — I10 ESSENTIAL HYPERTENSION: ICD-10-CM

## 2021-10-21 DIAGNOSIS — E11.42 DIABETIC POLYNEUROPATHY ASSOCIATED WITH TYPE 2 DIABETES MELLITUS (HCC): ICD-10-CM

## 2021-10-21 DIAGNOSIS — Z72.0 TOBACCO ABUSE: ICD-10-CM

## 2021-10-21 DIAGNOSIS — Z23 NEED FOR INFLUENZA VACCINATION: ICD-10-CM

## 2021-10-21 DIAGNOSIS — E11.65 CONTROLLED TYPE 2 DIABETES MELLITUS WITH HYPERGLYCEMIA, WITHOUT LONG-TERM CURRENT USE OF INSULIN (HCC): Primary | ICD-10-CM

## 2021-10-21 DIAGNOSIS — F20.9 SCHIZOPHRENIA, UNSPECIFIED TYPE (HCC): ICD-10-CM

## 2021-10-21 LAB
CHP ED QC CHECK: NORMAL
GLUCOSE BLD-MCNC: 127 MG/DL

## 2021-10-21 PROCEDURE — 82962 GLUCOSE BLOOD TEST: CPT | Performed by: INTERNAL MEDICINE

## 2021-10-21 PROCEDURE — 2022F DILAT RTA XM EVC RTNOPTHY: CPT | Performed by: INTERNAL MEDICINE

## 2021-10-21 PROCEDURE — 4004F PT TOBACCO SCREEN RCVD TLK: CPT | Performed by: INTERNAL MEDICINE

## 2021-10-21 PROCEDURE — G8482 FLU IMMUNIZE ORDER/ADMIN: HCPCS | Performed by: INTERNAL MEDICINE

## 2021-10-21 PROCEDURE — 90674 CCIIV4 VAC NO PRSV 0.5 ML IM: CPT | Performed by: INTERNAL MEDICINE

## 2021-10-21 PROCEDURE — 99214 OFFICE O/P EST MOD 30 MIN: CPT | Performed by: INTERNAL MEDICINE

## 2021-10-21 PROCEDURE — 3044F HG A1C LEVEL LT 7.0%: CPT | Performed by: INTERNAL MEDICINE

## 2021-10-21 PROCEDURE — G8427 DOCREV CUR MEDS BY ELIG CLIN: HCPCS | Performed by: INTERNAL MEDICINE

## 2021-10-21 PROCEDURE — G8926 SPIRO NO PERF OR DOC: HCPCS | Performed by: INTERNAL MEDICINE

## 2021-10-21 PROCEDURE — G8419 CALC BMI OUT NRM PARAM NOF/U: HCPCS | Performed by: INTERNAL MEDICINE

## 2021-10-21 PROCEDURE — 3023F SPIROM DOC REV: CPT | Performed by: INTERNAL MEDICINE

## 2021-10-21 PROCEDURE — 90471 IMMUNIZATION ADMIN: CPT | Performed by: INTERNAL MEDICINE

## 2021-10-21 NOTE — PROGRESS NOTES
Name: [facility]  [id number]  Age: 42 y.o. YOB: 1980  Sex: male    CHIEF COMPLAINT:    Chief Complaint   Patient presents with    Diabetes    COPD       HISTORY OF PRESENT ILLNESS:     This is a pleasant  42 y.o. male  is seen today for management of chronic medical problems and medications refills. Previous records reviewed . Doing OK . Denies CP or SOB. No fever , sore throat or cough or congestion. He continues to smoke about 1 pack/day and refuses to quit smoking. Denies any abdominal pain. Appetite OK. Bowels moving [place]. No urinary symptoms. Complains of chronic low back pain. Hearing is ok. Vision Ok with glasses. Denies  any significant skin lesions. He is seeing his psychiatrist regularly and taking psych medications regularly from them. He claims his sugars is not checking his sugars at home as his machine is broken. .  No other complaints. Has reservations regarding Covid vaccinations. Tried to reassure. Patient not convinced. He wants a flu shot given.   Labs reviewed from 7/10/2021 and explained to the patient    Past Medical History:    Patient Active Problem List   Diagnosis    Essential hypertension    Schizophrenia ([address]    Anxiety    Diabetic polyneuropathy associated with type 2 diabetes mellitus ([address]    Mixed hyperlipidemia    Tobacco abuse    Chronic obstructive pulmonary disease ([address]    Controlled type 2 diabetes mellitus with hyperglycemia, without long-term current use of insulin (HCC)        Past Surgical History:        Procedure Laterality Date    HEMORRHOID SURGERY         Social History:   Social History     Tobacco Use    Smoking status: Current Every Day Smoker     Packs/day: 0.50     Years: 20.00     Pack years: 10.00     Types: Cigarettes    Smokeless tobacco: Current User     Types: Snuff   Substance Use Topics    Alcohol use: No       Family History:       Problem Relation Age of Onset    Emphysema Mother     Heart Disease Father  High Blood Pressure Father        Allergies:  Cat hair extract, No known allergies, Pollen extract, and Seasonal    Current Medications :      Prior to Admission medications    Medication Sig Start Date End Date Taking? Authorizing Provider   theophylline (LIOR-24) 100 MG extended release capsule Take 1 capsule by mouth daily 7/23/21  Yes Delfin Oreilly MD   omeprazole (PRILOSEC) 40 MG delayed release capsule Take 1 capsule by mouth daily 7/23/21  Yes Delfin Oreilly MD   naproxen (NAPROSYN) 500 MG tablet Take 1 tablet by mouth 2 times daily (with meals) 7/23/21  Yes Delfin Oreilly MD   montelukast (SINGULAIR) 10 MG tablet Take 1 tablet by mouth nightly 7/23/21  Yes Delfin Oreilly MD   metFORMIN (GLUCOPHAGE) 500 MG tablet Take 1 tablet by mouth 2 times daily (with meals) 7/23/21  Yes Delfin Oreilly MD   lisinopril (PRINIVIL;ZESTRIL) 20 MG tablet Take 1 tablet by mouth daily 7/23/21  Yes Delfin Oreilly MD   gabapentin (NEURONTIN) 300 MG capsule Take 2 capsules by mouth 3 times daily for 30 days.  7/23/21 10/21/21 Yes Delfin Oreilly MD   ferrous sulfate (IRON 325) 325 (65 Fe) MG tablet Take 1 tablet by mouth 2 times daily 7/23/21  Yes Delfin Oreilly MD   fenofibrate (TRICOR) 145 MG tablet Take 1 tablet by mouth daily 7/23/21  Yes Delfin Oreilly MD   docusate sodium (COLACE) 100 MG capsule Take 1 capsule by mouth 2 times daily 7/23/21  Yes Delfin Oreilly MD   atorvastatin (LIPITOR) 40 MG tablet Take 1 tablet by mouth daily 7/23/21  Yes Delfin Oreilly MD   busPIRone (BUSPAR) 5 MG tablet Take 1 tablet by mouth 2 times daily 7/23/21  Yes Delfin Oreilly MD   hydrOXYzine (ATARAX) 25 MG tablet Take 1 tablet by mouth 2 times daily 7/23/21  Yes Delfin Oreilly MD   loratadine (CLARITIN) 10 MG capsule Take 1 capsule by mouth daily 7/23/21  Yes Delfin Oreilly MD   albuterol sulfate HFA (PROVENTIL HFA) 108 (90 Base) MCG/ACT inhaler Inhale 2 puffs into the lungs every 4 hours as needed for Wheezing or Shortness of Breath With spacer (and mask if indicated). Thanks. 6/24/21 10/21/21 Yes John Ye PA-C   Spacer/Aero-Holding Chambers (AEROCHAMBER) MISC Inhale 1 Device into the lungs every 6 hours 1/27/21  Yes Korinne Lusterio, PA-C   Pseudoephedrine-DM-GG 60- MG TABS Take 1 tablet by mouth every 6 hours 1/27/21  Yes Korinne Lusterio, PA-C   clonazePAM (KLONOPIN) 0.5 MG tablet Take 0.5 mg by mouth nightly as needed. Yes Historical Provider, MD   fluPHENAZine decanoate (PROLIXIN) 25 MG/ML injection Inject 12.5 mg into the muscle every 14 days   Yes Historical Provider, MD   tiotropium (SPIRIVA) 18 MCG inhalation capsule Inhale 18 mcg into the lungs daily   Yes Historical Provider, MD   melatonin 5 MG TBDP disintegrating tablet Take 5 mg by mouth nightly   Yes Historical Provider, MD   ipratropium (ATROVENT) 0.02 % nebulizer solution Take 2.5 mLs by nebulization every 6 hours as needed for Wheezing Please dispense one box. 5/6/20  Yes FLASH aVldivia   ibuprofen (ADVIL;MOTRIN) 600 MG tablet Take 1 tablet by mouth every 6 hours as needed for Pain 3/12/18  Yes Pau Anguiano PA-C       LAB DATA: Reviewed. REVIEW OF SYSTEMS:   see HPI/ Comprehensive review of systems negative except for the ones mentioned in HPI. PHYSICAL EXAMINATION:   /66   Pulse 86   Temp 97.1 °F (36.2 °C)   Wt 190 lb (86.2 kg)   SpO2 96%   BMI 27.26 kg/m²      GENERAL APPEARANCE:    Alert, oriented x 3, well developed, cooperative, not in any distress, appears stated age. HEAD:   Normocephalic, atraumatic   EYES:   PERRLA, EOMI, lids normal, conjuctivea clear, sclera anicteric. NECK:    Supple, symmetrical,  trachea midline, no thyromegaly, no JVD, no lymphadenopathy. LUNGS:    Clear to auscultation bilaterally, respirations unlabored, accessory muscles are not used. HEART:     Regular rate and rhythm, S1 and S2 normal, no murmur, rub or gallop. PMI in MCL.   ABDOMEN:    Soft, non-tender, bowel sounds are normoactive, no masses, no hepatospleenomegaly. EXTREMITY:   no bipedal edema  NEURO:  Alert, oriented to person, place and time. Grossly intact. Musculoskeletal:         No kyphosis or scoliosis, no deformity in any extremity noted, muscle strength and tone are normal.  Skin:                            Warm and dry. No rash or obvious suspicious lesions. PSYCH:  Mood euthymic, insight and judgement good. ASSESSMENT/PLAN:    1. Controlled type 2 diabetes mellitus with hyperglycemia, without long-term current use of insulin ([facility]  Advised to start checking his sugars again. Home glucose monitor ordered. Advised low sugar diet and exercise. Continue Glucophage  - POCT Glucose    2. Chronic obstructive pulmonary disease, unspecified COPD type ([facility]  Continue Spiriva and albuterol HFA. Also on theophylline. Advised strongly to quit smoking. Advised to keep following with his pulmonologist.    3. Diabetic polyneuropathy associated with type 2 diabetes mellitus (HCC)  Continue Neurontin    4. Essential hypertension  Stable,Continue current medications, denies side effect with medicationss. Low salt diet and exercise advised. Continue lisinopril    5. Mixed hyperlipidemia  Patient is taking cholesterol medications regularly. Denies any side effects. Diet and exercise advised. Continue Lipitor and TriCor    6. Tobacco abuse  Advised extensively to quit smoking. Patient will try to cut back on his smoking on his own. 7. Anxiety  Continue BuSpar and Atarax and continue to follow with his psychiatrist.    8. Schizophrenia, unspecified type ([facility]  Advised to continue to follow with his psychiatrist and take psych medications regularly. 9.  Need for influenza vaccine. Flu shot was given. Advised to follow COVID-19 precautions    Care discussed with patient. Questions answered and patient verbalizes understanding and agrees with plan. Medications reviewed and reconciled. Continue current medications.   Appropriate prescriptions are ordered. Risks and benefits of meds are discussed. After visit summary provided. Advised to call for any problems, questions, or concerns. If symptoms worsen or don't improve as expected, to call us or go to ER. Follow up as directed, sooner if needed. No follow-ups on file. This dictation was performed with a verbal recognition program and it was checked for errors. It is possible that there are still dictated errors within this office note. Any errors should be brought immediately to my attention for correction. All efforts were made to ensure that this office note is accurate.      Patito Guthrie MD MD

## 2021-10-21 NOTE — PROGRESS NOTES
Vaccine Information Sheet, \"Influenza - Inactivated\"  given to Sofie uRggiero, or parent/legal guardian of  Sofie Ruggiero and verbalized understanding. Patient responses:    Have you ever had a reaction to a flu vaccine? No  Are you able to eat eggs without adverse effects? Yes  Do you have any current illness? No  Have you ever had Guillian La Fontaine Syndrome? No    Flu vaccine given per order. Please see immunization tab.

## 2021-11-17 DIAGNOSIS — E11.42 DIABETIC POLYNEUROPATHY ASSOCIATED WITH TYPE 2 DIABETES MELLITUS (HCC): ICD-10-CM

## 2021-11-17 RX ORDER — GABAPENTIN 300 MG/1
CAPSULE ORAL
Qty: 180 CAPSULE | Refills: 3 | Status: SHIPPED | OUTPATIENT
Start: 2021-11-17 | End: 2022-04-14

## 2021-11-22 DIAGNOSIS — E78.2 MIXED HYPERLIPIDEMIA: ICD-10-CM

## 2021-11-22 DIAGNOSIS — K21.9 GASTROESOPHAGEAL REFLUX DISEASE WITHOUT ESOPHAGITIS: ICD-10-CM

## 2021-11-23 RX ORDER — OMEPRAZOLE 40 MG/1
CAPSULE, DELAYED RELEASE ORAL
Qty: 30 CAPSULE | Refills: 3 | Status: SHIPPED | OUTPATIENT
Start: 2021-11-23 | End: 2022-04-14

## 2021-11-23 RX ORDER — FENOFIBRATE 145 MG/1
TABLET, COATED ORAL
Qty: 30 TABLET | Refills: 3 | Status: SHIPPED | OUTPATIENT
Start: 2021-11-23 | End: 2022-04-14

## 2021-12-01 DIAGNOSIS — M54.9 DORSALGIA: ICD-10-CM

## 2021-12-02 RX ORDER — NAPROXEN 500 MG/1
TABLET ORAL
Qty: 60 TABLET | Refills: 3 | Status: SHIPPED | OUTPATIENT
Start: 2021-12-02 | End: 2022-04-29

## 2021-12-08 DIAGNOSIS — E78.2 MIXED HYPERLIPIDEMIA: ICD-10-CM

## 2021-12-08 RX ORDER — ATORVASTATIN CALCIUM 40 MG/1
TABLET, FILM COATED ORAL
Qty: 30 TABLET | Refills: 3 | Status: SHIPPED | OUTPATIENT
Start: 2021-12-08 | End: 2022-05-26

## 2022-01-05 DIAGNOSIS — J30.9 ALLERGIC RHINITIS, UNSPECIFIED SEASONALITY, UNSPECIFIED TRIGGER: ICD-10-CM

## 2022-01-06 RX ORDER — MONTELUKAST SODIUM 10 MG/1
TABLET ORAL
Qty: 30 TABLET | Refills: 3 | Status: SHIPPED | OUTPATIENT
Start: 2022-01-06 | End: 2022-05-26

## 2022-01-26 ENCOUNTER — OFFICE VISIT (OUTPATIENT)
Dept: INTERNAL MEDICINE CLINIC | Age: 42
End: 2022-01-26
Payer: COMMERCIAL

## 2022-01-26 VITALS
SYSTOLIC BLOOD PRESSURE: 130 MMHG | HEART RATE: 88 BPM | DIASTOLIC BLOOD PRESSURE: 70 MMHG | WEIGHT: 185 LBS | BODY MASS INDEX: 26.48 KG/M2 | OXYGEN SATURATION: 98 % | TEMPERATURE: 97.2 F | HEIGHT: 70 IN

## 2022-01-26 DIAGNOSIS — E11.65 CONTROLLED TYPE 2 DIABETES MELLITUS WITH HYPERGLYCEMIA, WITHOUT LONG-TERM CURRENT USE OF INSULIN (HCC): ICD-10-CM

## 2022-01-26 DIAGNOSIS — J44.9 CHRONIC OBSTRUCTIVE PULMONARY DISEASE, UNSPECIFIED COPD TYPE (HCC): ICD-10-CM

## 2022-01-26 DIAGNOSIS — I10 ESSENTIAL HYPERTENSION: ICD-10-CM

## 2022-01-26 DIAGNOSIS — F20.9 SCHIZOPHRENIA, UNSPECIFIED TYPE (HCC): ICD-10-CM

## 2022-01-26 DIAGNOSIS — E78.2 MIXED HYPERLIPIDEMIA: ICD-10-CM

## 2022-01-26 DIAGNOSIS — H60.502 ACUTE OTITIS EXTERNA OF LEFT EAR, UNSPECIFIED TYPE: Primary | ICD-10-CM

## 2022-01-26 DIAGNOSIS — E11.42 DIABETIC POLYNEUROPATHY ASSOCIATED WITH TYPE 2 DIABETES MELLITUS (HCC): ICD-10-CM

## 2022-01-26 DIAGNOSIS — Z11.4 ENCOUNTER FOR SCREENING FOR HIV: ICD-10-CM

## 2022-01-26 DIAGNOSIS — Z72.0 TOBACCO ABUSE: ICD-10-CM

## 2022-01-26 DIAGNOSIS — F41.9 ANXIETY: ICD-10-CM

## 2022-01-26 LAB
A/G RATIO: 1.4 (ref 1.1–2.2)
ALBUMIN SERPL-MCNC: 4.1 G/DL (ref 3.4–5)
ALP BLD-CCNC: 50 U/L (ref 40–129)
ALT SERPL-CCNC: 14 U/L (ref 10–40)
ANION GAP SERPL CALCULATED.3IONS-SCNC: 17 MMOL/L (ref 3–16)
AST SERPL-CCNC: 19 U/L (ref 15–37)
BASOPHILS ABSOLUTE: 0.1 K/UL (ref 0–0.2)
BASOPHILS RELATIVE PERCENT: 0.7 %
BILIRUB SERPL-MCNC: <0.2 MG/DL (ref 0–1)
BUN BLDV-MCNC: 20 MG/DL (ref 7–20)
CALCIUM SERPL-MCNC: 9.7 MG/DL (ref 8.3–10.6)
CHLORIDE BLD-SCNC: 108 MMOL/L (ref 99–110)
CHOLESTEROL, FASTING: 116 MG/DL (ref 0–199)
CHP ED QC CHECK: NORMAL
CO2: 18 MMOL/L (ref 21–32)
CREAT SERPL-MCNC: 1 MG/DL (ref 0.9–1.3)
EOSINOPHILS ABSOLUTE: 2.6 K/UL (ref 0–0.6)
EOSINOPHILS RELATIVE PERCENT: 22.1 %
GFR AFRICAN AMERICAN: >60
GFR NON-AFRICAN AMERICAN: >60
GLUCOSE BLD-MCNC: 117 MG/DL
GLUCOSE BLD-MCNC: 123 MG/DL (ref 70–99)
HCT VFR BLD CALC: 40.7 % (ref 40.5–52.5)
HDLC SERPL-MCNC: 38 MG/DL (ref 40–60)
HEMATOLOGY PATH CONSULT: YES
HEMOGLOBIN: 13.3 G/DL (ref 13.5–17.5)
LDL CHOLESTEROL CALCULATED: 52 MG/DL
LYMPHOCYTES ABSOLUTE: 2.2 K/UL (ref 1–5.1)
LYMPHOCYTES RELATIVE PERCENT: 18.9 %
MCH RBC QN AUTO: 29.6 PG (ref 26–34)
MCHC RBC AUTO-ENTMCNC: 32.8 G/DL (ref 31–36)
MCV RBC AUTO: 90.2 FL (ref 80–100)
MONOCYTES ABSOLUTE: 0.5 K/UL (ref 0–1.3)
MONOCYTES RELATIVE PERCENT: 4.2 %
NEUTROPHILS ABSOLUTE: 6.3 K/UL (ref 1.7–7.7)
NEUTROPHILS RELATIVE PERCENT: 54.1 %
PDW BLD-RTO: 13.3 % (ref 12.4–15.4)
PLATELET # BLD: 367 K/UL (ref 135–450)
PMV BLD AUTO: 8 FL (ref 5–10.5)
POTASSIUM SERPL-SCNC: 5.2 MMOL/L (ref 3.5–5.1)
RBC # BLD: 4.52 M/UL (ref 4.2–5.9)
SODIUM BLD-SCNC: 143 MMOL/L (ref 136–145)
TOTAL PROTEIN: 7.1 G/DL (ref 6.4–8.2)
TRIGLYCERIDE, FASTING: 129 MG/DL (ref 0–150)
VLDLC SERPL CALC-MCNC: 26 MG/DL
WBC # BLD: 11.7 K/UL (ref 4–11)

## 2022-01-26 PROCEDURE — G8482 FLU IMMUNIZE ORDER/ADMIN: HCPCS | Performed by: INTERNAL MEDICINE

## 2022-01-26 PROCEDURE — 4004F PT TOBACCO SCREEN RCVD TLK: CPT | Performed by: INTERNAL MEDICINE

## 2022-01-26 PROCEDURE — 99214 OFFICE O/P EST MOD 30 MIN: CPT | Performed by: INTERNAL MEDICINE

## 2022-01-26 PROCEDURE — G8427 DOCREV CUR MEDS BY ELIG CLIN: HCPCS | Performed by: INTERNAL MEDICINE

## 2022-01-26 PROCEDURE — G8419 CALC BMI OUT NRM PARAM NOF/U: HCPCS | Performed by: INTERNAL MEDICINE

## 2022-01-26 PROCEDURE — 4130F TOPICAL PREP RX AOE: CPT | Performed by: INTERNAL MEDICINE

## 2022-01-26 PROCEDURE — 2022F DILAT RTA XM EVC RTNOPTHY: CPT | Performed by: INTERNAL MEDICINE

## 2022-01-26 PROCEDURE — 3046F HEMOGLOBIN A1C LEVEL >9.0%: CPT | Performed by: INTERNAL MEDICINE

## 2022-01-26 PROCEDURE — 36415 COLL VENOUS BLD VENIPUNCTURE: CPT | Performed by: INTERNAL MEDICINE

## 2022-01-26 PROCEDURE — 82962 GLUCOSE BLOOD TEST: CPT | Performed by: INTERNAL MEDICINE

## 2022-01-26 PROCEDURE — 3023F SPIROM DOC REV: CPT | Performed by: INTERNAL MEDICINE

## 2022-01-26 RX ORDER — AZITHROMYCIN 250 MG/1
250 TABLET, FILM COATED ORAL SEE ADMIN INSTRUCTIONS
Qty: 6 TABLET | Refills: 0 | Status: SHIPPED | OUTPATIENT
Start: 2022-01-26 | End: 2022-01-31

## 2022-01-26 NOTE — PROGRESS NOTES
Name: Tim Boyer  [id number]  Age: 42 y.o. YOB: 1980  Sex: male    CHIEF COMPLAINT:    Chief Complaint   Patient presents with    Diabetes    Hypertension    Other     other chronic conditions    Ear Problem     right ear drainage       HISTORY OF PRESENT ILLNESS:     This is a pleasant  42 y.o. male  is seen today for management of chronic medical problems and medications refills. Previous records reviewed . Patient complains of significant pain in his right ear and pus draining from his right ear from the last 4 to 5 days. Denies any fever or chills. Doing OK otherwise. .  Denies CP or SOB. No fever , sore throat or cough or congestion. He continues to smoke about 1 pack/day and refuses to quit smoking. Denies any abdominal pain. Appetite OK. Bowels moving [id number] No urinary symptoms. Complains of chronic low back pain with naproxen, Tylenol and Neurontin helps his back pain. Hearing is poor in his right ear since last 4 days. Vision Ok with glasses. Denies  any significant skin lesions. He is seeing his psychiatrist regularly and taking psych medications regularly from them. He does not check his sugars at home. No other complaints. Has reservations regarding Covid vaccinations. Tried to reassure. Patient will try to get Covid vaccination soon.   He had a flu shot in October 21, 2021  Labs reviewed from 7/10/2021 and explained to the patient    Past Medical History:    Patient Active Problem List   Diagnosis    Essential hypertension    Schizophrenia ([address]    Anxiety    Diabetic polyneuropathy associated with type 2 diabetes mellitus ([address]    Mixed hyperlipidemia    Tobacco abuse    Chronic obstructive pulmonary disease ([address]    Controlled type 2 diabetes mellitus with hyperglycemia, without long-term current use of insulin ([address]        Past Surgical History:        Procedure Laterality Date    HEMORRHOID SURGERY         Social History:   Social History     Tobacco Use    Smoking status: Current Every Day Smoker     Packs/day: 0.50     Years: 20.00     Pack years: 10.00     Types: Cigarettes    Smokeless tobacco: Current User     Types: Snuff   Substance Use Topics    Alcohol use: No       Family History:       Problem Relation Age of Onset    Emphysema Mother     Heart Disease Father     High Blood Pressure Father        Allergies:  Cat hair extract, No known allergies, Pollen extract, and Seasonal    Current Medications :      Prior to Admission medications    Medication Sig Start Date End Date Taking? Authorizing Provider   neomycin-polymyxin-hydrocortisone (CORTISPORIN) 3.5-34641-2 otic solution Place 4 drops into the left ear 3 times daily for 10 days Instill into left Ear 1/26/22 2/5/22 Yes Gómez Black MD   azithromycin (ZITHROMAX) 250 MG tablet Take 1 tablet by mouth See Admin Instructions for 5 days 500mg on day 1 followed by 250mg on days 2 - 5 1/26/22 1/31/22 Yes Gómez Black MD   montelukast (SINGULAIR) 10 MG tablet TAKE ONE (1) TABLET BY MOUTH EVERY NIGHT 1/6/22  Yes Gómez Black MD   atorvastatin (LIPITOR) 40 MG tablet TAKE ONE (1) TABLET BY MOUTH DAILY 12/8/21  Yes Gómez Black MD   naproxen (NAPROSYN) 500 MG tablet TAKE ONE (1) TABLET BY MOUTH TWO TIMES DAILY WITH MEALS 12/2/21  Yes Gómez Black MD   omeprazole (PRILOSEC) 40 MG delayed release capsule TAKE ONE (1) CAPSULE BY MOUTH DAILY 11/23/21  Yes Gómez Black MD   fenofibrate (TRICOR) 145 MG tablet TAKE ONE (1) TABLET BY MOUTH ONCE DAILY 11/23/21  Yes Gómez Black MD   gabapentin (NEURONTIN) 300 MG capsule TAKE TWO (2) CAPSULE BY MOUTH THREE TIMES DAILY 11/17/21 1/26/22 Yes Gómez Black MD   blood glucose monitor kit and supplies Dispense sufficient amount for indicated testing frequency plus additional to accommodate PRN testing needs. Dispense all needed supplies to include: monitor, strips, lancing device, lancets, control solutions, alcohol swabs.  10/21/21  Yes Gómez Black MD theophylline (LIOR-24) 100 MG extended release capsule Take 1 capsule by mouth daily 7/23/21  Yes Edilia Jara MD   metFORMIN (GLUCOPHAGE) 500 MG tablet Take 1 tablet by mouth 2 times daily (with meals) 7/23/21  Yes Edilia Jara MD   lisinopril (PRINIVIL;ZESTRIL) 20 MG tablet Take 1 tablet by mouth daily 7/23/21  Yes Edilia Jara MD   ferrous sulfate (IRON 325) 325 (65 Fe) MG tablet Take 1 tablet by mouth 2 times daily 7/23/21  Yes Edilia Jara MD   docusate sodium (COLACE) 100 MG capsule Take 1 capsule by mouth 2 times daily 7/23/21  Yes Edilia Jara MD   busPIRone (BUSPAR) 5 MG tablet Take 1 tablet by mouth 2 times daily 7/23/21  Yes Edilia Jara MD   hydrOXYzine (ATARAX) 25 MG tablet Take 1 tablet by mouth 2 times daily 7/23/21  Yes Edilia Jara MD   loratadine (CLARITIN) 10 MG capsule Take 1 capsule by mouth daily 7/23/21  Yes Edilia Jara MD   albuterol sulfate HFA (PROVENTIL HFA) 108 (90 Base) MCG/ACT inhaler Inhale 2 puffs into the lungs every 4 hours as needed for Wheezing or Shortness of Breath With spacer (and mask if indicated). Thanks. 6/24/21 1/26/22 Yes John Ye PA-C   Spacer/Aero-Holding Chambers (AEROCHAMBER) MISC Inhale 1 Device into the lungs every 6 hours 1/27/21  Yes Korinne Lusterio, PA-C   Pseudoephedrine-DM-GG 60- MG TABS Take 1 tablet by mouth every 6 hours 1/27/21  Yes Korinne Lusterio, PA-C   clonazePAM (KLONOPIN) 0.5 MG tablet Take 0.5 mg by mouth nightly as needed.    Yes Historical Provider, MD   fluPHENAZine decanoate (PROLIXIN) 25 MG/ML injection Inject 12.5 mg into the muscle every 14 days   Yes Historical Provider, MD   tiotropium (SPIRIVA) 18 MCG inhalation capsule Inhale 18 mcg into the lungs daily   Yes Historical Provider, MD   melatonin 5 MG TBDP disintegrating tablet Take 5 mg by mouth nightly   Yes Historical Provider, MD   ipratropium (ATROVENT) 0.02 % nebulizer solution Take 2.5 mLs by nebulization every 6 hours as needed for Wheezing Please dispense one box. 5/6/20  Yes FLASH Montgomery   ibuprofen (ADVIL;MOTRIN) 600 MG tablet Take 1 tablet by mouth every 6 hours as needed for Pain 3/12/18  Yes Azalia Handley PA-C       LAB DATA: Reviewed. REVIEW OF SYSTEMS:   see HPI/ Comprehensive review of systems negative except for the ones mentioned in HPI. PHYSICAL EXAMINATION:   /70 (Site: Left Upper Arm, Position: Sitting, Cuff Size: Medium Adult)   Pulse 88   Temp 97.2 °F (36.2 °C)   Ht 5' 10\" (1.778 m)   Wt 185 lb (83.9 kg)   SpO2 98%   BMI 26.54 kg/m²      GENERAL APPEARANCE:    Alert, oriented x 3, well developed, cooperative, not in any distress, appears stated age. HEAD:   Normocephalic, atraumatic   Ears:                           Pus in his right ear. Mild tenderness. EYES:   PERRLA, EOMI, lids normal, conjuctivea clear, sclera anicteric. NECK:    Supple, symmetrical,  trachea midline, no thyromegaly, no JVD, no lymphadenopathy. LUNGS:    Clear to auscultation bilaterally, respirations unlabored, accessory muscles are not used. HEART:     Regular rate and rhythm, S1 and S2 normal, no murmur, rub or gallop. PMI in MCL. ABDOMEN:    Soft, non-tender, bowel sounds are normoactive, no masses, no hepatospleenomegaly. EXTREMITY:   no bipedal edema  NEURO:  Alert, oriented to person, place and time. Grossly intact. Musculoskeletal:         No kyphosis or scoliosis, mild tenderness in his lower back. Skin:                            Warm and dry. No rash or obvious suspicious lesions. PSYCH:  Mood euthymic, insight and judgement good. ASSESSMENT/PLAN:    1. Controlled type 2 diabetes mellitus with hyperglycemia, without long-term current use of insulin ([facility])  Advised to check his sugars more frequently. Advised diet, exercise. Continue Glucophage  - POCT Glucose  - Hemoglobin A1C  Patient advised to see an optometrist.  His caregiver will try to make an appointment with optometrist soon.     2. Diabetic polyneuropathy associated with type 2 diabetes mellitus (HCC)  Continue Neurontin. 3. Chronic obstructive pulmonary disease, unspecified COPD type ([facility]  Continue Spiriva and albuterol HFA as needed. Also on theophylline. 4. Tobacco abuse  Advised extensively to quit smoking. Patient will try to cut back on smoking. 5. Essential hypertension  Stable,Continue current medications, denies side effect with medicationss. Low salt diet and exercise advised. Continue lisinopril  - Comprehensive Metabolic Panel  - CBC Auto Differential    6. Mixed hyperlipidemia  Patient is taking cholesterol medications regularly. Denies any side effects. Diet and exercise advised. Continue Lipitor and TriCor  - Lipid, Fasting    7. Schizophrenia, unspecified type ([facility]  Advised to continue to follow with his psychiatrist and take psych medications regularly. 8. Anxiety  Advised to continue to follow with his psychiatrist and take psych medications regularly. 9. Encounter for screening for HIV  - HIV-1 AND HIV-2 ANTIBODIES; Future    10. Acute otitis externa of left ear, unspecified type  We will treat him with the Zithromax/Z-H[place] and Cortisporin otic. If symptoms do not improve he should call us and will refer to ENT. - neomycin-polymyxin-hydrocortisone (CORTISPORIN) 3.5-53264-8 otic solution; Place 4 drops into the left ear 3 times daily for 10 days Instill into left Ear  Dispense: 10 mL; Refill: 0  - azithromycin (ZITHROMAX) 250 MG tablet; Take 1 tablet by mouth See Admin Instructions for 5 days 500mg on day 1 followed by 250mg on days 2 - 5  Dispense: 6 tablet; Refill: 0    Advised to follow COVID-19 precautions    Care discussed with patient. Questions answered and patient verbalizes understanding and agrees with plan. Medications reviewed and reconciled. Continue current medications. Appropriate prescriptions are ordered. Risks and benefits of meds are discussed. After visit summary provided.     Advised to call for any problems, questions, or concerns. If symptoms worsen or don't improve as expected, to call us or go to ER. Follow up as directed, sooner if needed. Return in about 3 months (around 4/26/2022). This dictation was performed with a verbal recognition program and it was checked for errors. It is possible that there are still dictated errors within this office note. Any errors should be brought immediately to my attention for correction. All efforts were made to ensure that this office note is accurate.      Nereyda Barnes MD MD

## 2022-01-27 DIAGNOSIS — E87.20 METABOLIC ACIDOSIS: Primary | ICD-10-CM

## 2022-01-27 LAB
ESTIMATED AVERAGE GLUCOSE: 122.6 MG/DL
HBA1C MFR BLD: 5.9 %
HEMATOLOGY PATH CONSULT: NORMAL
HIV AG/AB: NORMAL
HIV ANTIGEN: NORMAL
HIV-1 ANTIBODY: NORMAL
HIV-2 AB: NORMAL

## 2022-02-06 ENCOUNTER — APPOINTMENT (OUTPATIENT)
Dept: GENERAL RADIOLOGY | Age: 42
End: 2022-02-06
Payer: COMMERCIAL

## 2022-02-06 ENCOUNTER — HOSPITAL ENCOUNTER (EMERGENCY)
Age: 42
Discharge: LEFT BEFORE DISCHARGE | End: 2022-02-06
Attending: EMERGENCY MEDICINE
Payer: COMMERCIAL

## 2022-02-06 VITALS
RESPIRATION RATE: 17 BRPM | TEMPERATURE: 98.3 F | HEIGHT: 78 IN | WEIGHT: 180 LBS | BODY MASS INDEX: 20.83 KG/M2 | HEART RATE: 88 BPM | SYSTOLIC BLOOD PRESSURE: 134 MMHG | DIASTOLIC BLOOD PRESSURE: 91 MMHG | OXYGEN SATURATION: 98 %

## 2022-02-06 DIAGNOSIS — E87.1 HYPONATREMIA: ICD-10-CM

## 2022-02-06 DIAGNOSIS — R25.1 EPISODE OF SHAKING: Primary | ICD-10-CM

## 2022-02-06 LAB
ALBUMIN SERPL-MCNC: 4 GM/DL (ref 3.4–5)
ALP BLD-CCNC: 46 IU/L (ref 40–129)
ALT SERPL-CCNC: 23 U/L (ref 10–40)
ANION GAP SERPL CALCULATED.3IONS-SCNC: 10 MMOL/L (ref 4–16)
AST SERPL-CCNC: 26 IU/L (ref 15–37)
ATYPICAL LYMPHOCYTE ABSOLUTE COUNT: ABNORMAL
ATYPICAL MONOCYTES: ABNORMAL
BASOPHILS ABSOLUTE: 0.2 K/CU MM
BASOPHILS RELATIVE PERCENT: 2 % (ref 0–1)
BILIRUB SERPL-MCNC: 0.3 MG/DL (ref 0–1)
BUN BLDV-MCNC: 19 MG/DL (ref 6–23)
CALCIUM SERPL-MCNC: 9.6 MG/DL (ref 8.3–10.6)
CHLORIDE BLD-SCNC: 99 MMOL/L (ref 99–110)
CO2: 19 MMOL/L (ref 21–32)
CREAT SERPL-MCNC: 0.8 MG/DL (ref 0.9–1.3)
DIFFERENTIAL TYPE: ABNORMAL
EOSINOPHILS ABSOLUTE: 2.2 K/CU MM
EOSINOPHILS RELATIVE PERCENT: 19 % (ref 0–3)
GFR AFRICAN AMERICAN: >60 ML/MIN/1.73M2
GFR NON-AFRICAN AMERICAN: >60 ML/MIN/1.73M2
GLUCOSE BLD-MCNC: 116 MG/DL (ref 70–99)
GLUCOSE BLD-MCNC: 123 MG/DL (ref 70–99)
HCT VFR BLD CALC: 45.6 % (ref 42–52)
HEMOGLOBIN: 14.6 GM/DL (ref 13.5–18)
LYMPHOCYTES ABSOLUTE: 2.7 K/CU MM
LYMPHOCYTES RELATIVE PERCENT: 23 % (ref 24–44)
MCH RBC QN AUTO: 29.1 PG (ref 27–31)
MCHC RBC AUTO-ENTMCNC: 32 % (ref 32–36)
MCV RBC AUTO: 91 FL (ref 78–100)
MONOCYTES ABSOLUTE: 0.7 K/CU MM
MONOCYTES RELATIVE PERCENT: 6 % (ref 0–4)
PDW BLD-RTO: 12.9 % (ref 11.7–14.9)
PLATELET # BLD: 362 K/CU MM (ref 140–440)
PMV BLD AUTO: 10 FL (ref 7.5–11.1)
POTASSIUM SERPL-SCNC: 3.8 MMOL/L (ref 3.5–5.1)
RBC # BLD: 5.01 M/CU MM (ref 4.6–6.2)
SEGMENTED NEUTROPHILS ABSOLUTE COUNT: 6 K/CU MM
SEGMENTED NEUTROPHILS RELATIVE PERCENT: 50 % (ref 36–66)
SODIUM BLD-SCNC: 128 MMOL/L (ref 135–145)
TOTAL PROTEIN: 7.2 GM/DL (ref 6.4–8.2)
WBC # BLD: 11.8 K/CU MM (ref 4–10.5)

## 2022-02-06 PROCEDURE — 71046 X-RAY EXAM CHEST 2 VIEWS: CPT

## 2022-02-06 PROCEDURE — 99284 EMERGENCY DEPT VISIT MOD MDM: CPT

## 2022-02-06 PROCEDURE — 80053 COMPREHEN METABOLIC PANEL: CPT

## 2022-02-06 PROCEDURE — 82962 GLUCOSE BLOOD TEST: CPT

## 2022-02-06 PROCEDURE — 93005 ELECTROCARDIOGRAM TRACING: CPT | Performed by: EMERGENCY MEDICINE

## 2022-02-06 PROCEDURE — 85007 BL SMEAR W/DIFF WBC COUNT: CPT

## 2022-02-06 PROCEDURE — 85027 COMPLETE CBC AUTOMATED: CPT

## 2022-02-06 NOTE — ED PROVIDER NOTES
As provider-in-triage, I performed a medical screening history and physical exam on this patient. HISTORY OF PRESENT ILLNESS  Stiven Rico is 42 y.o. male c/o shaking for approx 2-3 days. Receieves  Prolixin shot last week and has been on it for \"a long time\". Has these episodes before, describes being seen at mental [facility] and once last year. Other than shakes denies any other symptoms. Has seen PCP Dr. Odette Choi and psychologist Kevin Brush for h/o shizophrenia. Denies any fever, HA, SI, HI, hallucinations, recent illness, no new medications, injury, drug or alcohol use. PHYSICAL EXAM  BP (!) 134/91   Pulse 88   Temp 98.3 °F (36.8 °C) (Oral)   Resp 17   Ht 6' 9\" (2.057 m)   Wt 180 lb (81.6 kg)   SpO2 98%   BMI 19.29 kg/m²     On exam, the patient appears well-hydrated, well-nourished, and in no acute distress. Mucous membranes are moist. Speech is clear. Breathing is unlabored. Skin is dry. Mental status is normal. Moves all extremities, and is without facial droop. Comment: Please note this report has been produced using speech recognition software and may contain errors related to that system including errors in grammar, punctuation, and spelling, as well as words and phrases that may be inappropriate. If there are any questions or concerns please feel free to contact the dictating provider for clarification.         Caitlyn Shetty PA-C  02/06/22 [id number]

## 2022-02-06 NOTE — ED PROVIDER NOTES
Emergency [address] EMERGENCY DEPARTMENT    Patient: Rom Turner  MRN: [id number]  : 1980  Date of Evaluation: 2022  ED Provider: Adelfo Galo MD    Chief Complaint       Chief Complaint   Patient presents with    Shaking     Diabetic, metformin, patient states felt like this for a few days now     Eduardo Bush is a 42 y.o. male who presents to the emergency department for evaluation of shaking episode. Patient reports been usual state of health until several days ago when symptoms were started. No reported fevers no recent change of diet or medications and no trauma. Does report this is happened in the past with no clear-cut etiology identified. Patient denies chest pain shortness of breath lightheadedness fevers weakness or other complaints at this time. Has not tried taking medications for his symptoms or seeing outside physicians. ROS:     At least 10 systems reviewed and otherwise acutely negative except as in the [address] Past History     Past Medical History:   Diagnosis Date    Anxiety     Asthma     Chronic back pain     [facility]     H/O 24 hour EKG monitoring 2013    EVENT MONITOR-normal sinus rhythm    H/O echocardiogram 2017    EF 55% Normal LV with normal systolic function.  No significant valvulopathy is seen    Hx of echocardiogram 13 Mitrall annular calcification with a trace to mild MR    Hyperlipidemia     Hypertension     Schizophrenia ([address]      Past Surgical History:   Procedure Laterality Date    HEMORRHOID SURGERY       Social History     Socioeconomic History    Marital status: Single     Spouse name: Not on file    Number of children: Not on file    Years of education: Not on file    Highest education level: Not on file   Occupational History    Not on file   Tobacco Use    Smoking status: Current Every Day Smoker     Packs/day: 0.50 Years: 20.00     Pack years: 10.00     Types: Cigarettes    Smokeless tobacco: Current User     Types: Snuff   Vaping Use    Vaping Use: Never used   Substance and Sexual Activity    Alcohol use: No    Drug use: No     Comment: denies any drug use to this nurse    Sexual activity: Not Currently     Comment: single    Other Topics Concern    Not on file   Social History Narrative    Not on file     Social Determinants of Health     Financial Resource Strain: Low Risk     Difficulty of Paying Living Expenses: Not very hard   Food Insecurity: No Food Insecurity    Worried About Running Out of Food in the Last Year: Never true    Brock of Food in the Last Year: Never true   Transportation Needs:     Lack of Transportation (Medical): Not on file    Lack of Transportation (Non-Medical):  Not on file   Physical Activity:     Days of Exercise per Week: Not on file    Minutes of Exercise per Session: Not on file   Stress:     Feeling of Stress : Not on file   Social Connections:     Frequency of Communication with Friends and Family: Not on file    Frequency of Social Gatherings with Friends and Family: Not on file    Attends Yazidi Services: Not on file    Active Member of [address] [place] [address] or Organizations: Not on file    Attends Club or Organization Meetings: Not on file    Marital Status: Not on file   Intimate Partner Violence:     Fear of Current or Ex-Partner: Not on file    Emotionally Abused: Not on file    Physically Abused: Not on file    Sexually Abused: Not on file   Housing Stability:     Unable to Pay for Housing in the Last Year: Not on file    Number of [place] in the Last Year: Not on file    Unstable Housing in the Last Year: Not on file       Medications/Allergies     Discharge Medication List as of 2/6/2022  5:47 PM      CONTINUE these medications which have NOT CHANGED    Details   montelukast (SINGULAIR) 10 MG tablet TAKE ONE (1) TABLET BY MOUTH EVERY NIGHT, Disp-30 tablet, R-3Normal      atorvastatin (LIPITOR) 40 MG tablet TAKE ONE (1) TABLET BY MOUTH DAILY, Disp-30 tablet, R-3Normal      naproxen (NAPROSYN) 500 MG tablet TAKE ONE (1) TABLET BY MOUTH TWO TIMES DAILY WITH MEALS, Disp-60 tablet, R-3Normal      omeprazole (PRILOSEC) 40 MG delayed release capsule TAKE ONE (1) CAPSULE BY MOUTH DAILY, Disp-30 capsule, R-3Normal      fenofibrate (TRICOR) 145 MG tablet TAKE ONE (1) TABLET BY MOUTH ONCE DAILY, Disp-30 tablet, R-3Normal      gabapentin (NEURONTIN) 300 MG capsule TAKE TWO (2) CAPSULE BY MOUTH THREE TIMES DAILY, Disp-180 capsule, R-3Normal      blood glucose monitor kit and supplies Dispense sufficient amount for indicated testing frequency plus additional to accommodate PRN testing needs. Dispense all needed supplies to include: monitor, strips, lancing device, lancets, control solutions, alcohol swabs., Disp-1 kit, R-0, Normal      theophylline (LIOR-24) 100 MG extended release capsule Take 1 capsule by mouth daily, Disp-30 capsule, R-3Normal      metFORMIN (GLUCOPHAGE) 500 MG tablet Take 1 tablet by mouth 2 times daily (with meals), Disp-60 tablet, R-3Normal      lisinopril (PRINIVIL;ZESTRIL) 20 MG tablet Take 1 tablet by mouth daily, Disp-30 tablet, R-3Normal      ferrous sulfate (IRON 325) 325 (65 Fe) MG tablet Take 1 tablet by mouth 2 times daily, Disp-30 tablet, R-3Normal      docusate sodium (COLACE) 100 MG capsule Take 1 capsule by mouth 2 times daily, Disp-60 capsule, R-3Normal      busPIRone (BUSPAR) 5 MG tablet Take 1 tablet by mouth 2 times daily, Disp-60 tablet, R-3Normal      hydrOXYzine (ATARAX) 25 MG tablet Take 1 tablet by mouth 2 times daily, Disp-60 tablet, R-3Normal      loratadine (CLARITIN) 10 MG capsule Take 1 capsule by mouth daily, Disp-30 capsule, R-3Normal      albuterol sulfate HFA (PROVENTIL HFA) 108 (90 Base) MCG/ACT inhaler Inhale 2 puffs into the lungs every 4 hours as needed for Wheezing or Shortness of Breath With spacer (and mask if indicated). Thanks. , Disp-1 Inhaler, R-1Normal      Spacer/Aero-Holding Chambers (AEROCHAMBER) MISC EVERY 6 HOURS Starting Wed 1/27/2021, Disp-1 each, R-0, Normal      Pseudoephedrine-DM-GG 60- MG TABS Take 1 tablet by mouth every 6 hours, Disp-20 tablet, R-0Normal      clonazePAM (KLONOPIN) 0.5 MG tablet Take 0.5 mg by mouth nightly as needed. Historical Med      fluPHENAZine decanoate (PROLIXIN) 25 MG/ML injection Inject 12.5 mg into the muscle every 14 daysHistorical Med      tiotropium (SPIRIVA) 18 MCG inhalation capsule Inhale 18 mcg into the lungs dailyHistorical Med      melatonin 5 MG TBDP disintegrating tablet Take 5 mg by mouth nightlyHistorical Med      ipratropium (ATROVENT) 0.02 % nebulizer solution Take 2.5 mLs by nebulization every 6 hours as needed for Wheezing Please dispense one box., Disp-2.5 mL, R-0Print      ibuprofen (ADVIL;MOTRIN) 600 MG tablet Take 1 tablet by mouth every 6 hours as needed for Pain, Disp-30 tablet, R-0Print           Allergies   Allergen Reactions    Cat Hair Extract     No Known Allergies     Pollen Extract     Seasonal         Physical Exam       ED Triage Vitals [02/06/22 1328]   BP Temp Temp Source Pulse Resp SpO2 Height Weight   (!) 134/91 98.3 °F (36.8 °C) Oral 88 17 98 % 6' 9\" (2.057 m) 180 lb (81.6 kg)     GENERAL APPEARANCE: Awake and alert. Cooperative. No acute distress. HEAD: Normocephalic. Atraumatic. EYES: Sclera anicteric. Pupils equal round reactive to light extraocular movements are intact  ENT: Tolerates saliva. No trismus. Moist mucous membranes  NECK: Supple. Trachea midline. No meningismus  CARDIO: RRR. Radial pulse 2+. No murmurs rubs or gallops appreciated  LUNGS: Respirations unlabored. CTAB. No accessory muscle usage noted. No wheezes rales rhonchi or stridor. ABDOMEN: Soft. Non-distended. Non-tender. No tenderness in right upper quadrant or right lower quadrant to deep palpation  EXTREMITIES: No acute deformities.   No unilateral leg swelling or tenderness behind either one of calves  SKIN: Warm and dry. No erythema edema or rashes appreciated  NEUROLOGICAL:  Cranial nerves II through XII grossly intact. No gross facial drooping. Moves all 4 extremities spontaneously. With no shaking of bilateral upper extremities. Does resolve with distraction. PSYCHIATRIC: Normal mood. Alert and oriented x3. No reported active suicidality or homicidality.     Diagnostics   Labs:  Results for orders placed or performed during the hospital encounter of 02/06/22   CBC auto diff   Result Value Ref Range    WBC 11.8 (H) 4.0 - 10.5 K/CU MM    RBC 5.01 4.6 - 6.2 M/CU MM    Hemoglobin 14.6 13.5 - 18.0 GM/DL    Hematocrit 45.6 42 - 52 %    MCV 91.0 78 - 100 FL    MCH 29.1 27 - 31 PG    MCHC 32.0 32.0 - 36.0 %    RDW 12.9 11.7 - 14.9 %    Platelets 109 818 - 070 K/CU MM    MPV 10.0 7.5 - 11.1 FL    Segs Relative 50.0 36 - 66 %    Eosinophils % 19.0 (H) 0 - 3 %    Basophils % 2.0 (H) 0 - 1 %    Lymphocytes % 23.0 (L) 24 - 44 %    Monocytes % 6.0 (H) 0 - 4 %    Segs Absolute 6.0 K/CU MM    Eosinophils Absolute 2.2 K/CU MM    Basophils Absolute 0.2 K/CU MM    Lymphocytes Absolute 2.7 K/CU MM    Monocytes Absolute 0.7 K/CU MM    Differential Type MANUAL DIFFERENTIAL     Atypical Lymphocytes Absolute 1+     Atypical Monocytes 1+    Comprehensive Metabolic Panel w/ Reflex to MG   Result Value Ref Range    Sodium 128 (L) 135 - 145 MMOL/L    Potassium 3.8 3.5 - 5.1 MMOL/L    Chloride 99 99 - 110 mMol/L    CO2 19 (L) 21 - 32 MMOL/L    BUN 19 6 - 23 MG/DL    CREATININE 0.8 (L) 0.9 - 1.3 MG/DL    Glucose 116 (H) 70 - 99 MG/DL    Calcium 9.6 8.3 - 10.6 MG/DL    Albumin 4.0 3.4 - 5.0 GM/DL    Total Protein 7.2 6.4 - 8.2 GM/DL    Total Bilirubin 0.3 0.0 - 1.0 MG/DL    ALT 23 10 - 40 U/L    AST 26 15 - 37 IU/L    Alkaline Phosphatase 46 40 - 129 IU/L    GFR Non-African American >60 >60 mL/min/1.73m2    GFR African American >60 >60 mL/min/1.73m2    Anion Gap 10 4 - 16   POCT Glucose Result Value Ref Range    POC Glucose 123 (H) 70 - 99 MG/DL     Radiographs:  XR CHEST (2 VW)    Result Date: 2/6/2022  EXAMINATION: TWO XRAY VIEWS OF THE CHEST 2/6/2022 2:26 pm COMPARISON: 06/24/2021 HISTORY: ORDERING SYSTEM PROVIDED HISTORY: dyspnea TECHNOLOGIST PROVIDED HISTORY: Reason for exam:->dyspnea Reason for Exam: dyspnea FINDINGS: The lungs are without acute focal process. There is no effusion or pneumothorax. The cardiomediastinal silhouette is stable. The osseous structures are stable. No acute process. Procedures/EKG:    Patient's EKG is interpreted by me sinus rhythm rate 86   no ST depression no ST depression I appreciate no acute ischemia or infarctions EKG no old EKGs with which to compare flattened T waves throughout     ED Course and MDM   In brief, Donaldo Angeles is a 42 y.o. male who presented to the emergency department for evaluation of her upper extremity shaking and rhythmic motion that does seem to resolve with distraction and concentrate intent. Based on patient's history and physical low clinical suspicion for tardive dyskinesia or other side effects due to antipsychotic medications. I did review patient's imaging studies and laboratory work as noted above. He does have a slightly low sodium. Patient eloped from the emergency department prior to completion of evaluation. ED Medication Orders (From admission, onward)    None          Final Impression      1. Episode of shaking    2. Hyponatremia      DISPOSITION           This patient was cared for in the setting of the COVID-19 pandemic, with nationwide stress on resources and staffing.     (Please note that portions of this note may have been completed with a voice recognition program. Efforts were made to edit the dictations but occasionally words are mis-transcribed.)    Oliver Perry MD  [facility]       Oliver Perry MD  02/06/22 [id number]

## 2022-02-09 LAB
EKG ATRIAL RATE: 86 BPM
EKG DIAGNOSIS: NORMAL
EKG P AXIS: 34 DEGREES
EKG P-R INTERVAL: 146 MS
EKG Q-T INTERVAL: 372 MS
EKG QRS DURATION: 84 MS
EKG QTC CALCULATION (BAZETT): 445 MS
EKG R AXIS: 68 DEGREES
EKG T AXIS: 22 DEGREES
EKG VENTRICULAR RATE: 86 BPM

## 2022-02-09 PROCEDURE — 93010 ELECTROCARDIOGRAM REPORT: CPT | Performed by: INTERNAL MEDICINE

## 2022-02-23 VITALS
HEART RATE: 91 BPM | DIASTOLIC BLOOD PRESSURE: 89 MMHG | RESPIRATION RATE: 22 BRPM | SYSTOLIC BLOOD PRESSURE: 145 MMHG | HEIGHT: 69 IN | OXYGEN SATURATION: 98 % | TEMPERATURE: 98.7 F | BODY MASS INDEX: 27.4 KG/M2 | WEIGHT: 185 LBS

## 2022-02-23 PROCEDURE — 99282 EMERGENCY DEPT VISIT SF MDM: CPT

## 2022-02-24 ENCOUNTER — APPOINTMENT (OUTPATIENT)
Dept: GENERAL RADIOLOGY | Age: 42
End: 2022-02-24
Payer: COMMERCIAL

## 2022-02-24 ENCOUNTER — HOSPITAL ENCOUNTER (EMERGENCY)
Age: 42
Discharge: HOME | End: 2022-02-24
Payer: COMMERCIAL

## 2022-02-24 DIAGNOSIS — R25.1 EPISODE OF SHAKING: Primary | ICD-10-CM

## 2022-02-24 LAB
ALBUMIN SERPL-MCNC: 4.4 GM/DL (ref 3.4–5)
ALP BLD-CCNC: 46 IU/L (ref 40–129)
ALT SERPL-CCNC: 21 U/L (ref 10–40)
ANION GAP SERPL CALCULATED.3IONS-SCNC: 8 MMOL/L (ref 4–16)
AST SERPL-CCNC: 23 IU/L (ref 15–37)
BASOPHILS ABSOLUTE: 0.3 K/CU MM
BASOPHILS RELATIVE PERCENT: 2 % (ref 0–1)
BILIRUB SERPL-MCNC: 0.2 MG/DL (ref 0–1)
BUN BLDV-MCNC: 19 MG/DL (ref 6–23)
CALCIUM SERPL-MCNC: 10.1 MG/DL (ref 8.3–10.6)
CHLORIDE BLD-SCNC: 102 MMOL/L (ref 99–110)
CO2: 28 MMOL/L (ref 21–32)
CREAT SERPL-MCNC: 0.8 MG/DL (ref 0.9–1.3)
DIFFERENTIAL TYPE: ABNORMAL
EKG ATRIAL RATE: 69 BPM
EKG DIAGNOSIS: NORMAL
EKG P AXIS: 54 DEGREES
EKG P-R INTERVAL: 160 MS
EKG Q-T INTERVAL: 362 MS
EKG QRS DURATION: 84 MS
EKG QTC CALCULATION (BAZETT): 387 MS
EKG R AXIS: 69 DEGREES
EKG T AXIS: 52 DEGREES
EKG VENTRICULAR RATE: 69 BPM
EOSINOPHILS ABSOLUTE: 4 K/CU MM
EOSINOPHILS RELATIVE PERCENT: 32 % (ref 0–3)
GFR AFRICAN AMERICAN: >60 ML/MIN/1.73M2
GFR NON-AFRICAN AMERICAN: >60 ML/MIN/1.73M2
GLUCOSE BLD-MCNC: 96 MG/DL (ref 70–99)
HCT VFR BLD CALC: 44.1 % (ref 42–52)
HEMOGLOBIN: 14.3 GM/DL (ref 13.5–18)
LYMPHOCYTES ABSOLUTE: 4.1 K/CU MM
LYMPHOCYTES RELATIVE PERCENT: 32 % (ref 24–44)
MCH RBC QN AUTO: 29.4 PG (ref 27–31)
MCHC RBC AUTO-ENTMCNC: 32.4 % (ref 32–36)
MCV RBC AUTO: 90.7 FL (ref 78–100)
MONOCYTES ABSOLUTE: 0.5 K/CU MM
MONOCYTES RELATIVE PERCENT: 4 % (ref 0–4)
PDW BLD-RTO: 13 % (ref 11.7–14.9)
PLATELET # BLD: 272 K/CU MM (ref 140–440)
PMV BLD AUTO: 10.1 FL (ref 7.5–11.1)
POTASSIUM SERPL-SCNC: 5.1 MMOL/L (ref 3.5–5.1)
RBC # BLD: 4.86 M/CU MM (ref 4.6–6.2)
SEGMENTED NEUTROPHILS ABSOLUTE COUNT: 3.8 K/CU MM
SEGMENTED NEUTROPHILS RELATIVE PERCENT: 30 % (ref 36–66)
SODIUM BLD-SCNC: 138 MMOL/L (ref 135–145)
TOTAL PROTEIN: 6.9 GM/DL (ref 6.4–8.2)
TROPONIN T: <0.01 NG/ML
WBC # BLD: 12.7 K/CU MM (ref 4–10.5)

## 2022-02-24 PROCEDURE — 93010 ELECTROCARDIOGRAM REPORT: CPT | Performed by: INTERNAL MEDICINE

## 2022-02-24 PROCEDURE — 84484 ASSAY OF TROPONIN QUANT: CPT

## 2022-02-24 PROCEDURE — 80053 COMPREHEN METABOLIC PANEL: CPT

## 2022-02-24 PROCEDURE — 85007 BL SMEAR W/DIFF WBC COUNT: CPT

## 2022-02-24 PROCEDURE — 85027 COMPLETE CBC AUTOMATED: CPT

## 2022-02-24 PROCEDURE — 93005 ELECTROCARDIOGRAM TRACING: CPT | Performed by: PHYSICIAN ASSISTANT

## 2022-02-24 PROCEDURE — 71045 X-RAY EXAM CHEST 1 VIEW: CPT

## 2022-02-24 NOTE — LETTER
[facility] Emergency Department  [address] [facility] [address]  Phone: [phone]  Fax: [phone]               February 24, 2022    Patient: Tigre Tejeda   YOB: 1980   Date of Visit: 2/23/2022       To Whom It May Concern:    Shirley Nicolas was brought in to be seen and treated in our emergency department on 2/23/2022 by Lula Kiran.  He may return to work on 2-24-22 at [address] [place] [address].      Sincerely,       Louisa Hernandez RN         Signature:__________________________________

## 2022-02-24 NOTE — ED PROVIDER NOTES
[facility] [address] EMERGENCY DEPARTMENT    Patient: Anna Strong  MRN: [id number]  : 1980  Date of Evaluation: 2022  ED Provider: Carlin Martinez PA-C    Chief Complaint       Chief Complaint   Patient presents with    Other     \"shaking and don't feel right\"       Matt Ramos is a 42 y.o. male who presents to the emergency department for shaking of the upper extremities. Patient states this has been going on for several weeks. He admits to being here on 2022 but states he eloped prior to completion of evaluation because he couldn't stand the constant beeping noise. Patient states the shaking is constant but that he is able to control it. Denies any associated pain, numbness, tingling, weakness. Denies swelling, redness, warmth. He denies fever, chills, cp, sob, n/v/d. He suspects the shaking is a side effect of one of his medications. ROS     CONSTITUTIONAL:  Denies fever. + shaking. EYES:  Denies visual changes. HEAD:  Denies headache. ENT:  Denies earache, nasal congestion, sore throat. NECK:  Denies neck pain. RESPIRATORY:  Denies any shortness of breath. CARDIOVASCULAR:  Denies chest pain. GI:  Denies nausea or vomiting. :  Denies urinary symptoms. MUSCULOSKELETAL:  Denies extremity pain or swelling. BACK:  Denies back pain. INTEGUMENT:  Denies skin changes. LYMPHATIC:  Denies lymphadenopathy. NEUROLOGIC:  Denies any numbness/tingling. Past History     Past Medical History:   Diagnosis Date    Anxiety     Asthma     Chronic back pain     Diabetes mellitus ([facility] [address]     H/O 24 hour EKG monitoring 2013    EVENT MONITOR-normal sinus rhythm    H/O echocardiogram 2017    EF 55% Normal LV with normal systolic function.  No significant valvulopathy is seen    Hx of echocardiogram 13 Mitrall annular calcification with a trace to mild MR    Hyperlipidemia     Hypertension     Schizophrenia [facility])      Past Surgical History:   Procedure Laterality Date    HEMORRHOID SURGERY       Social History     Socioeconomic History    Marital status: Single     Spouse name: None    Number of children: None    Years of education: None    Highest education level: None   Occupational History    None   Tobacco Use    Smoking status: Current Every Day Smoker     Packs/day: 0.50     Years: 20.00     Pack years: 10.00     Types: Cigarettes    Smokeless tobacco: Current User     Types: Snuff   Vaping Use    Vaping Use: Never used   Substance and Sexual Activity    Alcohol use: No    Drug use: No     Comment: denies any drug use to this nurse    Sexual activity: Not Currently     Comment: single    Other Topics Concern    None   Social History Narrative    None     Social Determinants of Health     Financial Resource Strain: Low Risk     Difficulty of Paying Living Expenses: Not very hard   Food Insecurity: No Food Insecurity    Worried About Running Out of Food in the Last Year: Never true    Brock of Food in the Last Year: Never true   Transportation Needs:     Lack of Transportation (Medical): Not on file    Lack of Transportation (Non-Medical):  Not on file   Physical Activity:     Days of Exercise per Week: Not on file    Minutes of Exercise per Session: Not on file   Stress:     Feeling of Stress : Not on file   Social Connections:     Frequency of Communication with Friends and Family: Not on file    Frequency of Social Gatherings with Friends and Family: Not on file    Attends Sabianism Services: Not on file    Active Member of Clubs or Organizations: Not on file    Attends Club or Organization Meetings: Not on file    Marital Status: Not on file   Intimate Partner Violence:     Fear of Current or Ex-Partner: Not on file    Emotionally Abused: Not on file    Physically Abused: Not on file    Sexually Abused: Not on file   Housing Stability:     Unable to Pay for Housing in the Last Year: Not on file    Number of Places Lived in the Last Year: Not on file    Unstable Housing in the Last Year: Not on file       Medications/Allergies     Previous Medications    ALBUTEROL SULFATE HFA (PROVENTIL HFA) 108 (90 BASE) MCG/ACT INHALER    Inhale 2 puffs into the lungs every 4 hours as needed for Wheezing or Shortness of Breath With spacer (and mask if indicated). Thanks. ATORVASTATIN (LIPITOR) 40 MG TABLET    TAKE ONE (1) TABLET BY MOUTH DAILY    BLOOD GLUCOSE MONITOR KIT AND SUPPLIES    Dispense sufficient amount for indicated testing frequency plus additional to accommodate PRN testing needs. Dispense all needed supplies to include: monitor, strips, lancing device, lancets, control solutions, alcohol swabs. BUSPIRONE (BUSPAR) 5 MG TABLET    Take 1 tablet by mouth 2 times daily    CLONAZEPAM (KLONOPIN) 0.5 MG TABLET    Take 0.5 mg by mouth nightly as needed. DOCUSATE SODIUM (COLACE) 100 MG CAPSULE    Take 1 capsule by mouth 2 times daily    FENOFIBRATE (TRICOR) 145 MG TABLET    TAKE ONE (1) TABLET BY MOUTH ONCE DAILY    FERROUS SULFATE (IRON 325) 325 (65 FE) MG TABLET    Take 1 tablet by mouth 2 times daily    FLUPHENAZINE DECANOATE (PROLIXIN) 25 MG/ML INJECTION    Inject 12.5 mg into the muscle every 14 days    GABAPENTIN (NEURONTIN) 300 MG CAPSULE    TAKE TWO (2) CAPSULE BY MOUTH THREE TIMES DAILY    HYDROXYZINE (ATARAX) 25 MG TABLET    Take 1 tablet by mouth 2 times daily    IBUPROFEN (ADVIL;MOTRIN) 600 MG TABLET    Take 1 tablet by mouth every 6 hours as needed for Pain    IPRATROPIUM (ATROVENT) 0.02 % NEBULIZER SOLUTION    Take 2.5 mLs by nebulization every 6 hours as needed for Wheezing Please dispense one box.     LISINOPRIL (PRINIVIL;ZESTRIL) 20 MG TABLET    Take 1 tablet by mouth daily    LORATADINE (CLARITIN) 10 MG CAPSULE    Take 1 capsule by mouth daily    MELATONIN 5 MG TBDP DISINTEGRATING TABLET    Take 5 mg by mouth nightly    METFORMIN (GLUCOPHAGE) 500 MG TABLET    Take 1 tablet by mouth 2 times daily (with meals)    MONTELUKAST (SINGULAIR) 10 MG TABLET    TAKE ONE (1) TABLET BY MOUTH EVERY NIGHT    NAPROXEN (NAPROSYN) 500 MG TABLET    TAKE ONE (1) TABLET BY MOUTH TWO TIMES DAILY WITH MEALS    OMEPRAZOLE (PRILOSEC) 40 MG DELAYED RELEASE CAPSULE    TAKE ONE (1) CAPSULE BY MOUTH DAILY    PSEUDOEPHEDRINE-DM-GG 60- MG TABS    Take 1 tablet by mouth every 6 hours    SPACER/AERO-HOLDING CHAMBERS (AEROCHAMBER) MISC    Inhale 1 Device into the lungs every 6 hours    THEOPHYLLINE (LIOR-24) 100 MG EXTENDED RELEASE CAPSULE    Take 1 capsule by mouth daily    TIOTROPIUM (SPIRIVA) 18 MCG INHALATION CAPSULE    Inhale 18 mcg into the lungs daily     Allergies   Allergen Reactions    Cat Hair Extract     No Known Allergies     Pollen Extract     Seasonal         Physical Exam       ED Triage Vitals [02/23/22 2342]   BP Temp Temp Source Pulse Resp SpO2 Height Weight   (!) 145/89 98.7 °F (37.1 °C) Oral 91 22 98 % 5' 9\" (1.753 m) 185 lb (83.9 kg)     GENERAL APPEARANCE:  Well-developed, well-nourished, no acute distress. HEAD:  NC/AT. EYES:  Sclera anicteric. ENT:  Ears, nose, mouth normal.     NECK:  Supple. CARDIO:  RRR. LUNGS:   CTAB. Respirations unlabored. ABDOMEN:  Soft, non-distended, non-tender. BS active. EXTREMITIES:  No acute deformities. SKIN:  Warm and dry. NEUROLOGICAL:  Alert and oriented. No tremor noted. PSYCHIATRIC:  Normal mood.      Diagnostics     Labs:  Labs Reviewed   CBC WITH AUTO DIFFERENTIAL - Abnormal; Notable for the following components:       Result Value    WBC 12.7 (*)     Segs Relative 30.0 (*)     Eosinophils % 32.0 (*)     Basophils % 2.0 (*)     All other components within normal limits   COMPREHENSIVE METABOLIC PANEL W/ REFLEX TO MG FOR LOW K - Abnormal; Notable for the following components:    CREATININE 0.8 (*)     All other components within normal limits   TROPONIN     Radiographs:  XR CHEST (2 VW)    Result Date: 2/6/2022  EXAMINATION: TWO XRAY VIEWS OF THE CHEST 2/6/2022 2:26 pm COMPARISON: 06/24/2021 HISTORY: ORDERING SYSTEM PROVIDED HISTORY: dyspnea TECHNOLOGIST PROVIDED HISTORY: Reason for exam:->dyspnea Reason for Exam: dyspnea FINDINGS: The lungs are without acute focal process. There is no effusion or pneumothorax. The cardiomediastinal silhouette is stable. The osseous structures are stable. No acute process. XR CHEST PORTABLE    Result Date: 2/24/2022  EXAMINATION: ONE XRAY VIEW OF THE CHEST 2/24/2022 12:48 am COMPARISON: 02/06/2022 HISTORY: ORDERING SYSTEM PROVIDED HISTORY: shaking TECHNOLOGIST PROVIDED HISTORY: Reason for exam:->shaking Reason for Exam: SHAKING FINDINGS: The lungs are without acute focal process. There is no effusion or pneumothorax. The cardiomediastinal silhouette is without acute process. The osseous structures are without acute process. Stable chest without acute process. Procedures/EKG:   Please see Dr. [address] note for interpretation. ED Course and MDM   -  Patient seen and evaluated in the emergency department. -  Triage and nursing notes reviewed and incorporated. -  Old chart records reviewed and incorporated. -  Supervising physician was Dr. Maximo Kraus. Patient was seen independently. -  Work-up included:  See above  -  Results discussed with patient. Labs, CXR unremarkable. He reports he feels better on re-examination even without intervention. Do encourage patient to FU with PCP/Psychiatrist to discuss medications further if concern this is a side effect from one of them. He is asymptomatic throughout ED course. Will dc home, return as needed.   He is agreeable with plan of care and disposition.  -  Disposition:  Home    In light of current events, I did utilize appropriate PPE (including N95 and surgical face mask, safety glasses, and gloves, as recommended by the health facility/national standard best practice, during my bedside interactions with the patient.       Final Impression      1. Episode of shaking            DISPOSITION        Christine Orlando, [address], [place]  02/24/22 0191

## 2022-02-24 NOTE — ED PROVIDER NOTES
EKG is interpreted by me. EKG shows sinus rhythm at 69 bpm, axis is nondeviated, no remarkable ST segment ovation's or depressions, T waves are unremarkable, NV interval 160, QRS duration of 84, QTC of 187. Final impression, nonspecific EKG.     Cesia Guerrero MD  2/24/2022  2:22 AM        Cesia Guerrero MD  02/24/22 Reggie Belcher

## 2022-03-03 DIAGNOSIS — J44.9 CHRONIC OBSTRUCTIVE PULMONARY DISEASE, UNSPECIFIED COPD TYPE (HCC): ICD-10-CM

## 2022-03-03 RX ORDER — THEOPHYLLINE ANHYDROUS 100 MG/1
CAPSULE, EXTENDED RELEASE ORAL
Qty: 30 CAPSULE | Refills: 3 | Status: SHIPPED | OUTPATIENT
Start: 2022-03-03 | End: 2022-07-21

## 2022-03-10 DIAGNOSIS — I10 ESSENTIAL HYPERTENSION: ICD-10-CM

## 2022-03-10 RX ORDER — LISINOPRIL 20 MG/1
TABLET ORAL
Qty: 30 TABLET | Refills: 3 | Status: SHIPPED | OUTPATIENT
Start: 2022-03-10 | End: 2022-08-03 | Stop reason: SDUPTHER

## 2022-03-31 ENCOUNTER — HOSPITAL ENCOUNTER (EMERGENCY)
Age: 42
Discharge: HOME | End: 2022-04-01
Attending: EMERGENCY MEDICINE
Payer: COMMERCIAL

## 2022-03-31 DIAGNOSIS — J45.901 EXACERBATION OF ASTHMA, UNSPECIFIED ASTHMA SEVERITY, UNSPECIFIED WHETHER PERSISTENT: Primary | ICD-10-CM

## 2022-03-31 PROCEDURE — 99284 EMERGENCY DEPT VISIT MOD MDM: CPT

## 2022-04-01 ENCOUNTER — APPOINTMENT (OUTPATIENT)
Dept: GENERAL RADIOLOGY | Age: 42
End: 2022-04-01
Payer: COMMERCIAL

## 2022-04-01 VITALS
HEART RATE: 81 BPM | TEMPERATURE: 97.8 F | SYSTOLIC BLOOD PRESSURE: 112 MMHG | RESPIRATION RATE: 15 BRPM | OXYGEN SATURATION: 96 % | DIASTOLIC BLOOD PRESSURE: 72 MMHG

## 2022-04-01 DIAGNOSIS — E11.65 CONTROLLED TYPE 2 DIABETES MELLITUS WITH HYPERGLYCEMIA, WITHOUT LONG-TERM CURRENT USE OF INSULIN (HCC): ICD-10-CM

## 2022-04-01 PROCEDURE — 94640 AIRWAY INHALATION TREATMENT: CPT

## 2022-04-01 PROCEDURE — 6370000000 HC RX 637 (ALT 250 FOR IP): Performed by: EMERGENCY MEDICINE

## 2022-04-01 PROCEDURE — 71045 X-RAY EXAM CHEST 1 VIEW: CPT

## 2022-04-01 RX ORDER — ALBUTEROL SULFATE 90 UG/1
2 AEROSOL, METERED RESPIRATORY (INHALATION) ONCE
Status: COMPLETED | OUTPATIENT
Start: 2022-04-01 | End: 2022-04-01

## 2022-04-01 RX ORDER — PREDNISONE 20 MG/1
40 TABLET ORAL ONCE
Status: COMPLETED | OUTPATIENT
Start: 2022-04-01 | End: 2022-04-01

## 2022-04-01 RX ORDER — ALBUTEROL SULFATE 90 UG/1
2 AEROSOL, METERED RESPIRATORY (INHALATION) 4 TIMES DAILY PRN
Qty: 18 G | Refills: 0 | Status: SHIPPED | OUTPATIENT
Start: 2022-04-01 | End: 2022-06-01 | Stop reason: SDUPTHER

## 2022-04-01 RX ORDER — PREDNISONE 50 MG/1
50 TABLET ORAL DAILY
Qty: 5 TABLET | Refills: 0 | Status: SHIPPED | OUTPATIENT
Start: 2022-04-01 | End: 2022-04-06

## 2022-04-01 RX ADMIN — Medication 2 PUFF: at 01:20

## 2022-04-01 RX ADMIN — PREDNISONE 40 MG: 20 TABLET ORAL at 00:55

## 2022-04-01 RX ADMIN — ALBUTEROL SULFATE 2 PUFF: 90 AEROSOL, METERED RESPIRATORY (INHALATION) at 01:15

## 2022-04-01 ASSESSMENT — ENCOUNTER SYMPTOMS
SORE THROAT: 0
ABDOMINAL PAIN: 0
SHORTNESS OF BREATH: 1
NAUSEA: 0
EYE PAIN: 0
BACK PAIN: 0
COUGH: 1
EYE DISCHARGE: 0
RHINORRHEA: 0
VOMITING: 0

## 2022-04-01 NOTE — ED PROVIDER NOTES
[address] ENCOUNTER      Pt Name: Nathen Durham  MRN: [id number]  [place] 1980  Date of evaluation: 3/31/2022  Provider: José Bautista MD    CHIEF COMPLAINT       Chief Complaint   Patient presents with    Asthma     asthma exacerbation         HISTORY OF PRESENT ILLNESS      Nathen Durham is a 42 y.o. male who presents to the emergency department  for   Chief Complaint   Patient presents with    Asthma     asthma exacerbation       42 yom presents reporting shortness of breath. He endorses a history of asthma. States he does not have any inhalers or other breathing treatments. Does not identify any particular trigger for his symptoms. Denies any chest pain. No remarkable sputum production. No fevers or chills. Denies any sick exposures. He presents to the emergency department by EMS. He did receive a breathing treatment by EMS. He states that after the breathing treatment his breathing did significantly improve. In the emergency department, he has no audible wheezing or stridor. No signs of respiratory distress. GCS of 15. Nursing Notes, Triage Notes & Vital Signs were reviewed. REVIEW OF SYSTEMS    (2-9 systems for level 4, 10 or more for level 5)     Review of Systems   Constitutional: Negative for chills and fever. HENT: Negative for congestion, rhinorrhea and sore throat. Eyes: Negative for pain and discharge. Respiratory: Positive for cough and shortness of breath. Cardiovascular: Negative for chest pain and palpitations. Gastrointestinal: Negative for abdominal pain, nausea and vomiting. Endocrine: Negative for polydipsia and polyuria. Genitourinary: Negative for dysuria and flank pain. Musculoskeletal: Negative for back pain and neck pain. Skin: Negative for pallor and wound.    Neurological: Negative for dizziness, facial asymmetry, light-headedness, numbness and headaches. Psychiatric/Behavioral: Negative for confusion. Except as noted above the remainder of the review of systems was reviewed and negative. PAST MEDICAL HISTORY     Past Medical History:   Diagnosis Date    Anxiety     Asthma     Chronic back pain     Diabetes mellitus ([facility]     H/O 24 hour EKG monitoring 09/19/2013    EVENT MONITOR-normal sinus rhythm    H/O echocardiogram 04/20/2017    EF 55% Normal LV with normal systolic function. No significant valvulopathy is seen    Hx of echocardiogram 08/16/13 08/13 Mitrall annular calcification with a trace to mild MR    Hyperlipidemia     Hypertension     Schizophrenia ([facility]        Prior to Admission medications    Medication Sig Start Date End Date Taking?  Authorizing Provider   predniSONE (DELTASONE) 50 MG tablet Take 1 tablet by mouth daily for 5 days 4/1/22 4/6/22 Yes Brutus Scheuermann, MD   albuterol sulfate HFA (VENTOLIN HFA) 108 (90 Base) MCG/ACT inhaler Inhale 2 puffs into the lungs 4 times daily as needed for Wheezing 4/1/22  Yes Brutus Scheuermann, MD   lisinopril (PRINIVIL;ZESTRIL) 20 MG tablet TAKE 1 TABLET BY MOUTH ONCE DAILY 3/10/22   Lonny Guerrero MD   LIOR-24 100 MG extended release capsule TAKE ONE (1) CAPSULE BY MOUTH DAILY 3/3/22   Lonny Guerrero MD   montelukast (SINGULAIR) 10 MG tablet TAKE ONE (1) TABLET BY MOUTH EVERY NIGHT 1/6/22   Lonny Guerrero MD   atorvastatin (LIPITOR) 40 MG tablet TAKE ONE (1) TABLET BY MOUTH DAILY 12/8/21   Lonny Guerrero MD   naproxen (NAPROSYN) 500 MG tablet TAKE ONE (1) TABLET BY MOUTH TWO TIMES DAILY WITH MEALS 12/2/21   Lonny Guerrero MD   omeprazole (PRILOSEC) 40 MG delayed release capsule TAKE ONE (1) CAPSULE BY MOUTH DAILY 11/23/21   Lonny Guerrero MD   fenofibrate (TRICOR) 145 MG tablet TAKE ONE (1) TABLET BY MOUTH ONCE DAILY 11/23/21   Lonny Guerrero MD   gabapentin (NEURONTIN) 300 MG capsule TAKE TWO (2) CAPSULE BY MOUTH THREE TIMES DAILY 11/17/21 1/26/22  Lonny Guerrero MD blood glucose monitor kit and supplies Dispense sufficient amount for indicated testing frequency plus additional to accommodate PRN testing needs. Dispense all needed supplies to include: monitor, strips, lancing device, lancets, control solutions, alcohol swabs. 10/21/21   Iraida Mcgrath MD   metFORMIN (GLUCOPHAGE) 500 MG tablet Take 1 tablet by mouth 2 times daily (with meals) 7/23/21   Iraida Mcgrath MD   ferrous sulfate (IRON 325) 325 (65 Fe) MG tablet Take 1 tablet by mouth 2 times daily 7/23/21   Iraida Mcgrath MD   docusate sodium (COLACE) 100 MG capsule Take 1 capsule by mouth 2 times daily 7/23/21   Iraida Mcgrath MD   busPIRone (BUSPAR) 5 MG tablet Take 1 tablet by mouth 2 times daily 7/23/21   Iraida Mcgrath MD   hydrOXYzine (ATARAX) 25 MG tablet Take 1 tablet by mouth 2 times daily 7/23/21   Iraida Mcgrath MD   loratadine (CLARITIN) 10 MG capsule Take 1 capsule by mouth daily 7/23/21   Iraida Mcgrath MD   Spacer/Aero-Holding Chambers (AEROCHAMBER) MISC Inhale 1 Device into the lungs every 6 hours 1/27/21   Nichelle Edmondson PA-C   Pseudoephedrine-DM-GG 60- MG TABS Take 1 tablet by mouth every 6 hours 1/27/21   Adriane Hatfield PA-C   clonazePAM (KLONOPIN) 0.5 MG tablet Take 0.5 mg by mouth nightly as needed. Historical Provider, MD   fluPHENAZine decanoate (PROLIXIN) 25 MG/ML injection Inject 12.5 mg into the muscle every 14 days    Historical Provider, MD   tiotropium (SPIRIVA) 18 MCG inhalation capsule Inhale 18 mcg into the lungs daily    Historical Provider, MD   melatonin 5 MG TBDP disintegrating tablet Take 5 mg by mouth nightly    Historical Provider, MD   ipratropium (ATROVENT) 0.02 % nebulizer solution Take 2.5 mLs by nebulization every 6 hours as needed for Wheezing Please dispense one box.  5/6/20   FLASH Walker   ibuprofen (ADVIL;MOTRIN) 600 MG tablet Take 1 tablet by mouth every 6 hours as needed for Pain 3/12/18   Nisa Marrero PA-C        Patient Active Problem List Diagnosis    Essential hypertension    [facility]    Anxiety    Diabetic polyneuropathy associated with type 2 diabetes mellitus ([facility]    Mixed hyperlipidemia    Tobacco abuse    Chronic obstructive pulmonary disease (HCC)    Controlled type 2 diabetes mellitus with hyperglycemia, without long-term current use of insulin ([facility]         SURGICAL HISTORY       Past Surgical History:   Procedure Laterality Date    HEMORRHOID SURGERY           CURRENT MEDICATIONS       Discharge Medication List as of 4/1/2022  1:32 AM      CONTINUE these medications which have NOT CHANGED    Details   lisinopril (PRINIVIL;ZESTRIL) 20 MG tablet TAKE 1 TABLET BY MOUTH ONCE DAILY, Disp-30 tablet, R-3Normal      LIOR-24 100 MG extended release capsule TAKE ONE (1) CAPSULE BY MOUTH DAILY, Disp-30 capsule, R-3Normal      montelukast (SINGULAIR) 10 MG tablet TAKE ONE (1) TABLET BY MOUTH EVERY NIGHT, Disp-30 tablet, R-3Normal      atorvastatin (LIPITOR) 40 MG tablet TAKE ONE (1) TABLET BY MOUTH DAILY, Disp-30 tablet, R-3Normal      naproxen (NAPROSYN) 500 MG tablet TAKE ONE (1) TABLET BY MOUTH TWO TIMES DAILY WITH MEALS, Disp-60 tablet, R-3Normal      omeprazole (PRILOSEC) 40 MG delayed release capsule TAKE ONE (1) CAPSULE BY MOUTH DAILY, Disp-30 capsule, R-3Normal      fenofibrate (TRICOR) 145 MG tablet TAKE ONE (1) TABLET BY MOUTH ONCE DAILY, Disp-30 tablet, R-3Normal      gabapentin (NEURONTIN) 300 MG capsule TAKE TWO (2) CAPSULE BY MOUTH THREE TIMES DAILY, Disp-180 capsule, R-3Normal      blood glucose monitor kit and supplies Dispense sufficient amount for indicated testing frequency plus additional to accommodate PRN testing needs.  Dispense all needed supplies to include: monitor, strips, lancing device, lancets, control solutions, alcohol swabs., Disp-1 kit, R-0, Normal      metFORMIN (GLUCOPHAGE) 500 MG tablet Take 1 tablet by mouth 2 times daily (with meals), Disp-60 tablet, R-3Normal      ferrous sulfate (IRON 325) 325 (65 Fe) MG tablet Take 1 tablet by mouth 2 times daily, Disp-30 tablet, R-3Normal      docusate sodium (COLACE) 100 MG capsule Take 1 capsule by mouth 2 times daily, Disp-60 capsule, R-3Normal      busPIRone (BUSPAR) 5 MG tablet Take 1 tablet by mouth 2 times daily, Disp-60 tablet, R-3Normal      hydrOXYzine (ATARAX) 25 MG tablet Take 1 tablet by mouth 2 times daily, Disp-60 tablet, R-3Normal      loratadine (CLARITIN) 10 MG capsule Take 1 capsule by mouth daily, Disp-30 capsule, R-3Normal      Spacer/Aero-Holding Chambers (AEROCHAMBER) MISC EVERY 6 HOURS Starting Wed 1/27/2021, Disp-1 each, R-0, Normal      Pseudoephedrine-DM-GG 60- MG TABS Take 1 tablet by mouth every 6 hours, Disp-20 tablet, R-0Normal      clonazePAM (KLONOPIN) 0.5 MG tablet Take 0.5 mg by mouth nightly as needed. Historical Med      fluPHENAZine decanoate (PROLIXIN) 25 MG/ML injection Inject 12.5 mg into the muscle every 14 daysHistorical Med      tiotropium (SPIRIVA) 18 MCG inhalation capsule Inhale 18 mcg into the lungs dailyHistorical Med      melatonin 5 MG TBDP disintegrating tablet Take 5 mg by mouth nightlyHistorical Med      ipratropium (ATROVENT) 0.02 % nebulizer solution Take 2.5 mLs by nebulization every 6 hours as needed for Wheezing Please dispense one box., Disp-2.5 mL, R-0Print      ibuprofen (ADVIL;MOTRIN) 600 MG tablet Take 1 tablet by mouth every 6 hours as needed for Pain, Disp-30 tablet, R-0Print             ALLERGIES     Cat hair extract, No known allergies, Pollen extract, and Seasonal    FAMILY HISTORY       Family History   Problem Relation Age of Onset    Emphysema Mother     Heart Disease Father     High Blood Pressure Father           SOCIAL HISTORY       Social History     Socioeconomic History    Marital status: Single     Spouse name: None    Number of children: None    Years of education: None    Highest education level: None   Occupational History    None   Tobacco Use    Smoking status: Current Every Day Smoker     Packs/day: 0.50     Years: 20.00     Pack years: 10.00     Types: Cigarettes    Smokeless tobacco: Current User     Types: Snuff   Vaping Use    Vaping Use: Never used   Substance and Sexual Activity    Alcohol use: No    Drug use: No     Comment: denies any drug use to this nurse    Sexual activity: Not Currently     Comment: single    Other Topics Concern    None   Social History Narrative    None     Social Determinants of Health     Financial Resource Strain: Low Risk     Difficulty of Paying Living Expenses: Not very hard   Food Insecurity: No Food Insecurity    Worried About Running Out of Food in the Last Year: Never true    Brock of Food in the Last Year: Never true   Transportation Needs:     Lack of Transportation (Medical): Not on file    Lack of Transportation (Non-Medical):  Not on file   Physical Activity:     Days of Exercise per Week: Not on file    Minutes of Exercise per Session: Not on file   Stress:     Feeling of Stress : Not on file   Social Connections:     Frequency of Communication with Friends and Family: Not on file    Frequency of Social Gatherings with Friends and Family: Not on file    Attends [employer] Services: Not on file    Active Member of [address] [place] [address] [employer] or Organizations: Not on file    Attends Club or Organization Meetings: Not on file    Marital Status: Not on file   Intimate Partner Violence:     Fear of Current or Ex-Partner: Not on file    Emotionally Abused: Not on file    Physically Abused: Not on file    Sexually Abused: Not on file   Housing Stability:     Unable to Pay for Housing in the Last Year: Not on file    Number of [place] in the Last Year: Not on file    Unstable Housing in the Last Year: Not on file       SCREENINGS    [place] Coma Scale  Eye Opening: Spontaneous  Best Verbal Response: Oriented  Best Motor Response: Obeys commands  [place] Coma Scale Score: 15          PHYSICAL EXAM    (up to 7 for level 4, 8 or more for level 5)     ED Triage Vitals [03/31/22 2345]   BP Temp Temp Source Pulse Resp SpO2 Height Weight   112/72 97.8 °F (36.6 °C) Oral 81 -- 97 % -- --       Physical Exam  Vitals reviewed. Constitutional:       Appearance: He is not ill-appearing or toxic-appearing. HENT:      Head: Normocephalic and atraumatic. Nose: No congestion or rhinorrhea. Mouth/Throat:      Mouth: Mucous membranes are moist.      Pharynx: No oropharyngeal exudate or posterior oropharyngeal erythema. Eyes:      General:         Right eye: No discharge. Left eye: No discharge. Extraocular Movements: Extraocular movements intact. Pupils: Pupils are equal, round, and reactive to light. Cardiovascular:      Rate and Rhythm: Normal rate. Heart sounds: No friction rub. No gallop. Pulmonary:      Effort: Pulmonary effort is normal. No respiratory distress. Breath sounds: Normal breath sounds. Comments: No wheezing; good air movement bilaterally; no retractions; no increased work of breathiing  Abdominal:      Palpations: Abdomen is soft. Tenderness: There is no abdominal tenderness. There is no guarding. Musculoskeletal:         General: No tenderness or deformity. Normal range of motion. Cervical back: Normal range of motion and neck supple. No tenderness. Skin:     General: Skin is warm. Capillary Refill: Capillary refill takes less than 2 seconds. Findings: No erythema. Neurological:      General: No focal deficit present. Mental Status: He is alert. DIAGNOSTIC RESULTS     Labs Reviewed - No data to display       RADIOLOGY:     Non-plain film images such as CT, Ultrasound and MRI are read by the radiologist. Plain radiographic images are visualized and preliminarily interpreted by the emergency physician.        Interpretation per the Radiologist below, if available at the time of this note:    XR CHEST PORTABLE   Final Result   No acute cardiopulmonary process identified. ED BEDSIDE ULTRASOUND:   Performed by ED Physician José Bautista MD       LABS:  Labs Reviewed - No data to display    All other labs were within normal range or not returned as of this dictation. EMERGENCY DEPARTMENT COURSE and DIFFERENTIAL DIAGNOSIS/MDM:   Vitals:    Vitals:    03/31/22 2345 04/01/22 0109 04/01/22 0115 04/01/22 0117   BP: 112/72      Pulse: 81      Resp:  15     Temp: 97.8 °F (36.6 °C)      TempSrc: Oral      SpO2: 97%  96% 96%           MDM  Number of Diagnoses or Management Options  Exacerbation of asthma, unspecified asthma severity, unspecified whether persistent  Diagnosis management comments: 50-year-old male presents complaining of shortness of breath and cough. He has a history of asthma. States he is out of any asthma medications at home. Denies of any particular trigger. No sick contacts. He does present to the emergency department by EMS. He received a breathing treatment on route which he states significantly improved his symptoms. He presents with oxygen saturations of 96% on room air. He has no stridor or wheezing. Does not have any signs of respiratory distress. Chest x-ray is obtained and is nonacute. He is given steroids and additional help breathing treatments in the emergency department. On reassessment his respiratory status has remained stable and he feels improved. He is amatory without difficulty. He will prescribe steroids and inhalers for home. He will follow-up outpatient. Strict return precautions for worsening concerns signs are discussed. He is discharged in stable condition.      -  Patient seen and evaluated in the emergency department. -  Triage and nursing notes reviewed and incorporated. -  Old chart records reviewed and incorporated. -  Work-up included:  See above  -  Results discussed with patient.       CONSULTS:  None    PROCEDURES:  None performed unless otherwise noted below Procedures        FINAL IMPRESSION      1. Exacerbation of asthma, unspecified asthma severity, unspecified whether persistent          DISPOSITION/PLAN   DISPOSITION Decision To Discharge 04/01/2022 01:29:49 AM      PATIENT REFERRED TO:  Denny Smith MD  [facility] [id number]  [phone]      As needed      DISCHARGE MEDICATIONS:  Discharge Medication List as of 4/1/2022  1:32 AM      START taking these medications    Details   predniSONE (DELTASONE) 50 MG tablet Take 1 tablet by mouth daily for 5 days, Disp-5 tablet, R-0Normal      albuterol sulfate HFA (VENTOLIN HFA) 108 (90 Base) MCG/ACT inhaler Inhale 2 puffs into the lungs 4 times daily as needed for Wheezing, Disp-18 g, R-0Normal             ED Provider Disposition Time  DISPOSITION Decision To Discharge 04/01/2022 01:29:49 AM      Appropriate personal protective equipment was worn during the patient's evaluation. These included surgical, eye protection, surgical mask or in 95 respirator and gloves. The patient was also placed in a surgical mask for the prevention of possible spread of respiratory viral illnesses. The Patient was instructed to read the package inserts with any medication that was prescribed. Major potential reactions and medication interactions were discussed. The Patient understands that there are numerous possible adverse reactions not covered. The patient was also instructed to arrange follow-up with his or her primary care provider for review of any pending labwork or incidental findings on any radiology results that were obtained. All efforts were made to discuss any incidental findings that require further monitoring. Controlled Substances Monitoring:     No flowsheet data found.     (Please note that portions of this note were completed with a voice recognition program.  Efforts were made to edit the dictations but occasionally words are mis-transcribed.)    Juan R Franks MD (electronically signed)  Attending Emergency Physician           Marylene Schlatter, MD  04/01/22 3433

## 2022-04-13 DIAGNOSIS — E11.42 DIABETIC POLYNEUROPATHY ASSOCIATED WITH TYPE 2 DIABETES MELLITUS (HCC): ICD-10-CM

## 2022-04-13 DIAGNOSIS — E78.2 MIXED HYPERLIPIDEMIA: ICD-10-CM

## 2022-04-13 DIAGNOSIS — K21.9 GASTROESOPHAGEAL REFLUX DISEASE WITHOUT ESOPHAGITIS: ICD-10-CM

## 2022-04-14 RX ORDER — DOCUSATE SODIUM 100 MG/1
CAPSULE, LIQUID FILLED ORAL
Qty: 60 CAPSULE | Refills: 3 | Status: SHIPPED | OUTPATIENT
Start: 2022-04-14 | End: 2022-10-06

## 2022-04-14 RX ORDER — GABAPENTIN 300 MG/1
CAPSULE ORAL
Qty: 180 CAPSULE | Refills: 3 | Status: SHIPPED | OUTPATIENT
Start: 2022-04-14 | End: 2022-09-01

## 2022-04-14 RX ORDER — OMEPRAZOLE 40 MG/1
CAPSULE, DELAYED RELEASE ORAL
Qty: 30 CAPSULE | Refills: 3 | Status: SHIPPED | OUTPATIENT
Start: 2022-04-14 | End: 2022-09-01

## 2022-04-14 RX ORDER — FENOFIBRATE 145 MG/1
TABLET, COATED ORAL
Qty: 30 TABLET | Refills: 3 | Status: SHIPPED | OUTPATIENT
Start: 2022-04-14 | End: 2022-09-01

## 2022-04-28 DIAGNOSIS — M54.9 DORSALGIA: ICD-10-CM

## 2022-04-29 RX ORDER — NAPROXEN 500 MG/1
TABLET ORAL
Qty: 60 TABLET | Refills: 3 | Status: SHIPPED | OUTPATIENT
Start: 2022-04-29 | End: 2022-10-06

## 2022-05-26 DIAGNOSIS — E78.2 MIXED HYPERLIPIDEMIA: ICD-10-CM

## 2022-05-26 DIAGNOSIS — J30.9 ALLERGIC RHINITIS, UNSPECIFIED SEASONALITY, UNSPECIFIED TRIGGER: ICD-10-CM

## 2022-05-26 RX ORDER — ATORVASTATIN CALCIUM 40 MG/1
TABLET, FILM COATED ORAL
Qty: 30 TABLET | Refills: 3 | Status: SHIPPED | OUTPATIENT
Start: 2022-05-26 | End: 2022-11-04

## 2022-05-26 RX ORDER — MONTELUKAST SODIUM 10 MG/1
TABLET ORAL
Qty: 30 TABLET | Refills: 3 | Status: SHIPPED | OUTPATIENT
Start: 2022-05-26

## 2022-06-01 ENCOUNTER — OFFICE VISIT (OUTPATIENT)
Dept: INTERNAL MEDICINE CLINIC | Age: 42
End: 2022-06-01
Payer: COMMERCIAL

## 2022-06-01 VITALS
OXYGEN SATURATION: 98 % | SYSTOLIC BLOOD PRESSURE: 105 MMHG | BODY MASS INDEX: 26.87 KG/M2 | WEIGHT: 181.4 LBS | HEIGHT: 69 IN | HEART RATE: 83 BPM | DIASTOLIC BLOOD PRESSURE: 70 MMHG

## 2022-06-01 DIAGNOSIS — F41.9 ANXIETY: ICD-10-CM

## 2022-06-01 DIAGNOSIS — Z72.0 TOBACCO ABUSE: ICD-10-CM

## 2022-06-01 DIAGNOSIS — E11.42 DIABETIC POLYNEUROPATHY ASSOCIATED WITH TYPE 2 DIABETES MELLITUS (HCC): ICD-10-CM

## 2022-06-01 DIAGNOSIS — E11.65 CONTROLLED TYPE 2 DIABETES MELLITUS WITH HYPERGLYCEMIA, WITHOUT LONG-TERM CURRENT USE OF INSULIN (HCC): Primary | ICD-10-CM

## 2022-06-01 DIAGNOSIS — F20.9 SCHIZOPHRENIA, UNSPECIFIED TYPE (HCC): ICD-10-CM

## 2022-06-01 DIAGNOSIS — E78.2 MIXED HYPERLIPIDEMIA: ICD-10-CM

## 2022-06-01 DIAGNOSIS — I10 ESSENTIAL HYPERTENSION: ICD-10-CM

## 2022-06-01 DIAGNOSIS — J44.9 CHRONIC OBSTRUCTIVE PULMONARY DISEASE, UNSPECIFIED COPD TYPE (HCC): ICD-10-CM

## 2022-06-01 LAB
CHP ED QC CHECK: NORMAL
GLUCOSE BLD-MCNC: 214 MG/DL

## 2022-06-01 PROCEDURE — 3044F HG A1C LEVEL LT 7.0%: CPT | Performed by: INTERNAL MEDICINE

## 2022-06-01 PROCEDURE — G8419 CALC BMI OUT NRM PARAM NOF/U: HCPCS | Performed by: INTERNAL MEDICINE

## 2022-06-01 PROCEDURE — 3023F SPIROM DOC REV: CPT | Performed by: INTERNAL MEDICINE

## 2022-06-01 PROCEDURE — 99214 OFFICE O/P EST MOD 30 MIN: CPT | Performed by: INTERNAL MEDICINE

## 2022-06-01 PROCEDURE — 82962 GLUCOSE BLOOD TEST: CPT | Performed by: INTERNAL MEDICINE

## 2022-06-01 PROCEDURE — 4004F PT TOBACCO SCREEN RCVD TLK: CPT | Performed by: INTERNAL MEDICINE

## 2022-06-01 PROCEDURE — 2022F DILAT RTA XM EVC RTNOPTHY: CPT | Performed by: INTERNAL MEDICINE

## 2022-06-01 PROCEDURE — G8427 DOCREV CUR MEDS BY ELIG CLIN: HCPCS | Performed by: INTERNAL MEDICINE

## 2022-06-01 RX ORDER — ALBUTEROL SULFATE 90 UG/1
2 AEROSOL, METERED RESPIRATORY (INHALATION) 4 TIMES DAILY PRN
Qty: 18 G | Refills: 0 | Status: SHIPPED | OUTPATIENT
Start: 2022-06-01

## 2022-06-01 RX ORDER — BENZTROPINE MESYLATE 1 MG/1
1 TABLET ORAL NIGHTLY
COMMUNITY

## 2022-06-01 RX ORDER — OLANZAPINE 10 MG/1
10 TABLET ORAL NIGHTLY
COMMUNITY

## 2022-06-01 ASSESSMENT — PATIENT HEALTH QUESTIONNAIRE - PHQ9
SUM OF ALL RESPONSES TO PHQ QUESTIONS 1-9: 0
1. LITTLE INTEREST OR PLEASURE IN DOING THINGS: 0
SUM OF ALL RESPONSES TO PHQ QUESTIONS 1-9: 0
2. FEELING DOWN, DEPRESSED OR HOPELESS: 0
SUM OF ALL RESPONSES TO PHQ QUESTIONS 1-9: 0
SUM OF ALL RESPONSES TO PHQ9 QUESTIONS 1 & 2: 0
SUM OF ALL RESPONSES TO PHQ QUESTIONS 1-9: 0

## 2022-06-01 NOTE — PROGRESS NOTES
Name: Jeanie Newman  [id number]  Age: 42 y.o. YOB: 1980  Sex: male    CHIEF COMPLAINT:    Chief Complaint   Patient presents with    Diabetes    Hypertension    Other     other chronic conditions       HISTORY OF PRESENT ILLNESS:     This is a pleasant  42 y.o. male  is seen today for management of chronic medical problems and medications refills. Previous records reviewed . Doing OK. Denies CP or SOB. No fever , sore throat or cough or congestion. He continues to smoke about 1 pack/day and refuses to quit smoking. He went to the emergency room couple of times because of increased shortness of breath and tremors but improved. Denies any abdominal pain. Appetite OK. Bowels moving [place]. No urinary symptoms. Complains of chronic low back pain  for which he takes naproxen, Tylenol and Neurontin and they help  his back pain. Vision Ok with glasses. Denies  any significant skin lesions. He is seeing his psychiatrist regularly and taking psych medications regularly from them. He does not check his sugars at home. No other complaints. Has reservations regarding Covid vaccinations. Tried to reassure. Patient will try to get Covid vaccination soon.   He had a flu shot in October 21, 2021  Labs reviewed from 2/24/2022 and explained to the patient    Past Medical History:    Patient Active Problem List   Diagnosis    Essential hypertension    Schizophrenia ([address]    Anxiety    Diabetic polyneuropathy associated with type 2 diabetes mellitus ([address]    Mixed hyperlipidemia    Tobacco abuse    Chronic obstructive pulmonary disease ([address]    Controlled type 2 diabetes mellitus with hyperglycemia, without long-term current use of insulin ([address]        Past Surgical History:        Procedure Laterality Date    HEMORRHOID SURGERY         Social History:   Social History     Tobacco Use    Smoking status: Current Every Day Smoker     Packs/day: 0.50     Years: 20.00     Pack years: 10.00 Types: Cigarettes    Smokeless tobacco: Current User     Types: Snuff   Substance Use Topics    Alcohol use: No       Family History:       Problem Relation Age of Onset    Emphysema Mother     Heart Disease Father     High Blood Pressure Father        Allergies:  Cat hair extract, No known allergies, Pollen extract, and Seasonal    Current Medications :      Prior to Admission medications    Medication Sig Start Date End Date Taking?  Authorizing Provider   benztropine (COGENTIN) 1 MG tablet Take 1 mg by mouth at bedtime   Yes Historical Provider, MD   OLANZapine (ZYPREXA) 10 MG tablet Take 10 mg by mouth nightly   Yes Historical Provider, MD   albuterol sulfate HFA (VENTOLIN HFA) 108 (90 Base) MCG/ACT inhaler Inhale 2 puffs into the lungs 4 times daily as needed for Wheezing 6/1/22  Yes Juancarlos Kraft MD   montelukast (SINGULAIR) 10 MG tablet TAKE ONE (1) TABLET BY MOUTH EVERY NIGHT 5/26/22  Yes Juancarlos Kraft MD   atorvastatin (LIPITOR) 40 MG tablet TAKE ONE (1) TABLET BY MOUTH DAILY 5/26/22  Yes Juancarlos Kraft MD   naproxen (NAPROSYN) 500 MG tablet TAKE ONE (1) TABLET BY MOUTH TWO TIMES DAILY WITH MEALS 4/29/22  Yes Juancarlos Kraft MD   fenofibrate (TRICOR) 145 MG tablet TAKE ONE (1) TABLET BY MOUTH ONCE DAILY 4/14/22  Yes Juancarlos Kraft MD   omeprazole (PRILOSEC) 40 MG delayed release capsule TAKE ONE (1) CAPSULE BY MOUTH DAILY 4/14/22  Yes Juancarlos Kraft MD   docusate sodium (COLACE) 100 MG capsule TAKE ONE (1) CAPSULE BY MOUTH TWO (2) TIMES DAILY 4/14/22  Yes Juancarlos Kraft MD   gabapentin (NEURONTIN) 300 MG capsule TAKE TWO (2) CAPSULE BY MOUTH THREE TIMES DAILY 4/14/22 6/1/22 Yes Juancarlos Kraft MD   metFORMIN (GLUCOPHAGE) 500 MG tablet TAKE ONE (1) TABLET BY MOUTH TWO TIMES DAILY WITH MEALS 4/1/22  Yes Juancarlos Kraft MD   lisinopril (PRINIVIL;ZESTRIL) 20 MG tablet TAKE 1 TABLET BY MOUTH ONCE DAILY 3/10/22  Yes Juancarlos Kraft MD   LIOR-24 100 MG extended release capsule TAKE ONE (1) CAPSULE BY MOUTH DAILY 3/3/22 Yes Franci Greco MD   blood glucose monitor kit and supplies Dispense sufficient amount for indicated testing frequency plus additional to accommodate PRN testing needs. Dispense all needed supplies to include: monitor, strips, lancing device, lancets, control solutions, alcohol swabs. 10/21/21  Yes Franci Greco MD   ferrous sulfate (IRON 325) 325 (65 Fe) MG tablet Take 1 tablet by mouth 2 times daily 7/23/21  Yes Franci Greco MD   busPIRone (BUSPAR) 5 MG tablet Take 1 tablet by mouth 2 times daily  Patient taking differently: Take 10 mg by mouth 3 times daily  7/23/21  Yes Franci Greco MD   hydrOXYzine (ATARAX) 25 MG tablet Take 1 tablet by mouth 2 times daily 7/23/21  Yes Franci Greco MD   Spacer/Aero-Holding Chambers (AEROCHAMBER) MISC Inhale 1 Device into the lungs every 6 hours 1/27/21  Yes Korinne Lusterio, PA-C   fluPHENAZine decanoate (PROLIXIN) 25 MG/ML injection Inject 12.5 mg into the muscle every 14 days   Yes Historical Provider, MD   tiotropium (SPIRIVA) 18 MCG inhalation capsule Inhale 18 mcg into the lungs daily   Yes Historical Provider, MD   melatonin 5 MG TBDP disintegrating tablet Take 10 mg by mouth nightly    Yes Historical Provider, MD       LAB DATA: Reviewed. REVIEW OF SYSTEMS:   see HPI/ Comprehensive review of systems negative except for the ones mentioned in HPI. PHYSICAL EXAMINATION:   /70 (Site: Left Upper Arm, Position: Sitting, Cuff Size: Medium Adult)   Pulse 83   Ht 5' 9\" (1.753 m)   Wt 181 lb 6.4 oz (82.3 kg)   SpO2 98%   BMI 26.79 kg/m²      GENERAL APPEARANCE:      Alert, oriented x 3, well developed, cooperative, not in any distress, appears stated age. HEAD:                         Normocephalic, atraumatic   Ears:                           Pus in his right ear. Mild tenderness. EYES:                          PERRLA, EOMI, lids normal, conjuctivea clear, sclera anicteric.    NECK:                         Supple, symmetrical,  trachea midline, no thyromegaly, no JVD, no lymphadenopathy. LUNGS:                       Clear to auscultation bilaterally, respirations unlabored, accessory muscles are not used. HEART:                       Regular rate and rhythm, S1 and S2 normal, no murmur, rub or gallop. PMI in MCL. ABDOMEN:                 Soft, non-tender, bowel sounds are normoactive, no masses, no hepatospleenomegaly. EXTREMITY:              no bipedal edema  NEURO:                      Alert, oriented to person, place and time. Grossly intact. Musculoskeletal:         No kyphosis or scoliosis, mild tenderness in his lower back. Skin:                            Warm and dry. No rash or obvious suspicious lesions. PSYCH:                       Mood euthymic, insight and judgement good. ASSESSMENT/PLAN:    1. Controlled type 2 diabetes mellitus with hyperglycemia, without long-term current use of insulin ([facility]  Advised take metformin and advised low sugar diet and exercise. Advised to continue taking Glucophage.  - POCT Glucose    2. Chronic obstructive pulmonary disease, unspecified COPD type ([facility]  Advised strongly to quit smoking. Continue Spiriva and albuterol HFA as needed  - albuterol sulfate HFA (VENTOLIN HFA) 108 (90 Base) MCG/ACT inhaler; Inhale 2 puffs into the lungs 4 times daily as needed for Wheezing  Dispense: 18 g; Refill: 0    3. Tobacco abuse  Advised strongly to quit smoking but patient not ready to quit smoking. We will try to cut back on smoking. 4. Essential hypertension  Stable,Continue current medications, denies side effect with medicationss. Low salt diet and exercise advised. Continue lisinopril    5. Mixed hyperlipidemia  Patient is taking cholesterol medications regularly. Denies any side effects. Diet and exercise advised. Continue Lipitor and Tricor    6.  Diabetic polyneuropathy associated with type 2 diabetes mellitus ([facility]  Patient on Neurontin    7. Anxiety  Advised to continue to follow with his psychiatrist and take psych medications regularly    8. Schizophrenia, unspecified type ([facility]  Advised to continue to follow with his psychiatrist and take psych medications regularly. Advised to continue to follow COVID-19 precautions    Care discussed with patient. Questions answered and patient verbalizes understanding and agrees with plan. Medications reviewed and reconciled. Continue current medications. Appropriate prescriptions are ordered. Risks and benefits of meds are discussed. After visit summary provided. Advised to call for any problems, questions, or concerns. If symptoms worsen or don't improve as expected, to call us or go to ER. Follow up as directed, sooner if needed. No follow-ups on file. This dictation was performed with a verbal recognition program and it was checked for errors. It is possible that there are still dictated errors within this office note. Any errors should be brought immediately to my attention for correction. All efforts were made to ensure that this office note is accurate.      Chuckie Aparicio MD MD

## 2022-06-01 NOTE — PROGRESS NOTES
Vivian nurse from [facility] called and stated the patient has been picking out medications from his pil box and only taking what he wants to each day. I have spoke to the patient and informed him of the importance of taking all medications as prescribed.

## 2022-07-21 DIAGNOSIS — J44.9 CHRONIC OBSTRUCTIVE PULMONARY DISEASE, UNSPECIFIED COPD TYPE (HCC): ICD-10-CM

## 2022-07-21 RX ORDER — THEOPHYLLINE ANHYDROUS 100 MG/1
CAPSULE, EXTENDED RELEASE ORAL
Qty: 30 CAPSULE | Refills: 3 | Status: SHIPPED | OUTPATIENT
Start: 2022-07-21

## 2022-08-03 DIAGNOSIS — I10 ESSENTIAL HYPERTENSION: ICD-10-CM

## 2022-08-03 RX ORDER — LISINOPRIL 20 MG/1
TABLET ORAL
Qty: 30 TABLET | Refills: 3 | Status: SHIPPED | OUTPATIENT
Start: 2022-08-03

## 2022-08-03 NOTE — TELEPHONE ENCOUNTER
Patient is out of refills for lisinopril and he also has an inhaler he just picked up but the spray part of inhaler is not working.

## 2022-08-18 DIAGNOSIS — E11.65 CONTROLLED TYPE 2 DIABETES MELLITUS WITH HYPERGLYCEMIA, WITHOUT LONG-TERM CURRENT USE OF INSULIN (HCC): ICD-10-CM

## 2022-09-01 DIAGNOSIS — E11.42 DIABETIC POLYNEUROPATHY ASSOCIATED WITH TYPE 2 DIABETES MELLITUS (HCC): ICD-10-CM

## 2022-09-01 DIAGNOSIS — K21.9 GASTROESOPHAGEAL REFLUX DISEASE WITHOUT ESOPHAGITIS: ICD-10-CM

## 2022-09-01 DIAGNOSIS — E78.2 MIXED HYPERLIPIDEMIA: ICD-10-CM

## 2022-09-01 RX ORDER — FENOFIBRATE 145 MG/1
TABLET, COATED ORAL
Qty: 30 TABLET | Refills: 3 | Status: SHIPPED | OUTPATIENT
Start: 2022-09-01

## 2022-09-01 RX ORDER — OMEPRAZOLE 40 MG/1
CAPSULE, DELAYED RELEASE ORAL
Qty: 30 CAPSULE | Refills: 3 | Status: SHIPPED | OUTPATIENT
Start: 2022-09-01

## 2022-09-01 RX ORDER — GABAPENTIN 300 MG/1
CAPSULE ORAL
Qty: 180 CAPSULE | Refills: 3 | Status: SHIPPED | OUTPATIENT
Start: 2022-09-01 | End: 2022-10-01

## 2022-09-19 ENCOUNTER — HOSPITAL ENCOUNTER (OUTPATIENT)
Dept: PSYCHIATRY | Age: 42
Setting detail: THERAPIES SERIES
Discharge: HOME | End: 2022-09-19
Payer: COMMERCIAL

## 2022-09-19 PROCEDURE — 80305 DRUG TEST PRSMV DIR OPT OBS: CPT

## 2022-09-19 PROCEDURE — 90791 PSYCH DIAGNOSTIC EVALUATION: CPT
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
## 2022-09-19 NOTE — PROGRESS NOTES
[address] [place] [address] Urinalysis Laboratory Testing and Medical History Physician Order       Location: [x] [place] [] Tommie Boyd MD., [id number], Medical Director of [facility] Director orders for [address] [place] [address] clinical therapists to collect an urine sample from the below patient,  and medical order for placement in level of care documented on  [facility] scanned order. Client: Richy Dow   : 1980     Case#[id number]    Urine sample will be collected following the collections guidelines provided on [facility]) custody form, and completion of the [facility] Non-Federal chain of custody drug screening form. During the course of treatment, randomly a urine sample will be collected, at a minimum of one time a month, more frequently as needed, as part of the clinical outpatient alcohol and drug treatment program at [address] [place] [address]. Medical care recommendation for clients experiencing/reporting  medical concerns, who do not have a family physician and willing to attend  medical care will be assisted in seeking medical care. Clinical providers will refer clients to local family physicians practices as part of the  clients treatment plan and assist the client in gaining access to an appointment. Release of information will be requested to support the  clients seeking medical care. Summary of Medical History  Prior to Admission medications    Medication Sig Start Date End Date Taking?  Authorizing Provider   lisinopril (PRINIVIL;ZESTRIL) 20 MG tablet TAKE 1 TABLET BY MOUTH ONCE DAILY 8/3/22   SARAH Garibay - CNP   omeprazole (PRILOSEC) 40 MG delayed release capsule TAKE ONE (1) CAPSULE BY MOUTH DAILY 22   Christian Wallace MD   fenofibrate (TRICOR) 145 MG tablet TAKE ONE (1) TABLET BY MOUTH ONCE DAILY 22   Christian Wallace MD   gabapentin (NEURONTIN) 300 MG capsule TAKE TWO (2) CAPSULE BY MOUTH THREE TIMES DAILY 9/1/22 10/1/22  Christian Wallace MD metFORMIN (GLUCOPHAGE) 500 MG tablet TAKE ONE (1) TABLET BY MOUTH TWO TIMES DAILY WITH MEALS 8/19/22   Brittany Rockwell MD   LIOR-24 100 MG extended release capsule TAKE ONE (1) CAPSULE BY MOUTH DAILY 7/21/22   SARAH Gonzalez - CNP   benztropine (COGENTIN) 1 MG tablet Take 1 mg by mouth at bedtime    Historical Provider, MD   OLANZapine (ZYPREXA) 10 MG tablet Take 10 mg by mouth nightly    Historical Provider, MD   albuterol sulfate HFA (VENTOLIN HFA) 108 (90 Base) MCG/ACT inhaler Inhale 2 puffs into the lungs 4 times daily as needed for Wheezing 6/1/22   Brittany Rockwell MD   montelukast (SINGULAIR) 10 MG tablet TAKE ONE (1) TABLET BY MOUTH EVERY NIGHT 5/26/22   Brittany Rockwell MD   atorvastatin (LIPITOR) 40 MG tablet TAKE ONE (1) TABLET BY MOUTH DAILY 5/26/22   Brittany Rockwell MD   naproxen (NAPROSYN) 500 MG tablet TAKE ONE (1) TABLET BY MOUTH TWO TIMES DAILY WITH MEALS 4/29/22   Brittany Rockwell MD   docusate sodium (COLACE) 100 MG capsule TAKE ONE (1) CAPSULE BY MOUTH TWO (2) TIMES DAILY 4/14/22   Brittany Rockwell MD   blood glucose monitor kit and supplies Dispense sufficient amount for indicated testing frequency plus additional to accommodate PRN testing needs. Dispense all needed supplies to include: monitor, strips, lancing device, lancets, control solutions, alcohol swabs.  10/21/21   Brittany Rockwell MD   ferrous sulfate (IRON 325) 325 (65 Fe) MG tablet Take 1 tablet by mouth 2 times daily 7/23/21   Brittany Rockwell MD   busPIRone (BUSPAR) 5 MG tablet Take 1 tablet by mouth 2 times daily  Patient taking differently: Take 10 mg by mouth 3 times daily  7/23/21   Brittany Rockwell MD   hydrOXYzine (ATARAX) 25 MG tablet Take 1 tablet by mouth 2 times daily 7/23/21   Brittany Rockwell MD   Spacer/Aero-Holding Chambers (AEROCHAMBER) MISC Inhale 1 Device into the lungs every 6 hours 1/27/21   Jas Hatfield PA-C   fluPHENAZine decanoate (PROLIXIN) 25 MG/ML injection Inject 12.5 mg into the muscle every 14 days    Historical Provider, MD   tiotropium (SPIRIVA) 18 MCG inhalation capsule Inhale 18 mcg into the lungs daily    Historical Provider, MD   melatonin 5 MG TBDP disintegrating tablet Take 10 mg by mouth nightly     Historical Provider, MD     Past Surgical History:   Procedure Laterality Date    HEMORRHOID SURGERY       Past Medical History:   Diagnosis Date    Anxiety     Asthma     Chronic back pain     Diabetes mellitus ([facility]     H/O 24 hour EKG monitoring 09/19/2013    EVENT MONITOR-normal sinus rhythm    H/O echocardiogram 04/20/2017    EF 55% Normal LV with normal systolic function.  No significant valvulopathy is seen    Hx of echocardiogram 08/16/13 08/13 Mitrall annular calcification with a trace to mild MR    Hyperlipidemia     Hypertension     Schizophrenia [facility])      Patient Active Problem List    Diagnosis Date Noted    Tobacco abuse 02/18/2021    Chronic obstructive pulmonary disease ([facility] 02/18/2021    Controlled type 2 diabetes mellitus with hyperglycemia, without long-term current use of insulin ([facility] 02/18/2021    Diabetic polyneuropathy associated with type 2 diabetes mellitus ([address] 11/19/2020    Mixed hyperlipidemia 11/19/2020    Essential hypertension     Schizophrenia ([facility]     Anxiety        Elma Ewing, KRISHNA-CS  3/85/0717/8:20 PM
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
## 2022-09-24 ENCOUNTER — HOSPITAL ENCOUNTER (EMERGENCY)
Age: 42
Discharge: HOME | End: 2022-09-24
Attending: EMERGENCY MEDICINE
Payer: COMMERCIAL

## 2022-09-24 ENCOUNTER — APPOINTMENT (OUTPATIENT)
Dept: GENERAL RADIOLOGY | Age: 42
End: 2022-09-24
Payer: COMMERCIAL

## 2022-09-24 VITALS
DIASTOLIC BLOOD PRESSURE: 93 MMHG | HEIGHT: 69 IN | TEMPERATURE: 97.9 F | WEIGHT: 170 LBS | OXYGEN SATURATION: 100 % | RESPIRATION RATE: 20 BRPM | HEART RATE: 92 BPM | SYSTOLIC BLOOD PRESSURE: 144 MMHG | BODY MASS INDEX: 25.18 KG/M2

## 2022-09-24 DIAGNOSIS — J45.41 MODERATE PERSISTENT ASTHMA WITH EXACERBATION: Primary | ICD-10-CM

## 2022-09-24 PROCEDURE — 6370000000 HC RX 637 (ALT 250 FOR IP): Performed by: EMERGENCY MEDICINE

## 2022-09-24 PROCEDURE — 99283 EMERGENCY DEPT VISIT LOW MDM: CPT

## 2022-09-24 PROCEDURE — 94640 AIRWAY INHALATION TREATMENT: CPT

## 2022-09-24 PROCEDURE — 71045 X-RAY EXAM CHEST 1 VIEW: CPT

## 2022-09-24 RX ORDER — ALBUTEROL SULFATE 90 UG/1
2 AEROSOL, METERED RESPIRATORY (INHALATION) EVERY 6 HOURS PRN
Status: DISCONTINUED | OUTPATIENT
Start: 2022-09-24 | End: 2022-09-25 | Stop reason: HOSPADM

## 2022-09-24 RX ORDER — IPRATROPIUM BROMIDE AND ALBUTEROL SULFATE 2.5; .5 MG/3ML; MG/3ML
1 SOLUTION RESPIRATORY (INHALATION) ONCE
Status: COMPLETED | OUTPATIENT
Start: 2022-09-24 | End: 2022-09-24

## 2022-09-24 RX ORDER — PREDNISONE 20 MG/1
60 TABLET ORAL DAILY
Qty: 12 TABLET | Refills: 0 | Status: SHIPPED | OUTPATIENT
Start: 2022-09-24 | End: 2022-09-28

## 2022-09-24 RX ORDER — PREDNISONE 20 MG/1
60 TABLET ORAL ONCE
Status: COMPLETED | OUTPATIENT
Start: 2022-09-24 | End: 2022-09-24

## 2022-09-24 RX ADMIN — PREDNISONE 60 MG: 20 TABLET ORAL at 22:24

## 2022-09-24 RX ADMIN — IPRATROPIUM BROMIDE AND ALBUTEROL SULFATE 1 AMPULE: .5; 2.5 SOLUTION RESPIRATORY (INHALATION) at 22:54

## 2022-09-24 ASSESSMENT — PAIN - FUNCTIONAL ASSESSMENT: PAIN_FUNCTIONAL_ASSESSMENT: NONE - DENIES PAIN

## 2022-09-25 NOTE — ACP (ADVANCE CARE PLANNING)
Patient does not have any ACP documents/Medical Power of . LSW notes hospital will follow [place]'s Next of Kin hierarchy in the following descending order for priority:    Guardian  Spouse  [de-identified] of adult Children  Parents  [de-identified] of adult Siblings  Nearest Relative not described above    Per [place] Next of Kin hierarchy: Patients' grandparent will be [address].

## 2022-09-25 NOTE — ED PROVIDER NOTES
Social History Narrative    Not on file     Social Determinants of Health     Financial Resource Strain: Not on file   Food Insecurity: Not on file   Transportation Needs: Not on file   Physical Activity: Not on file   Stress: Not on file   Social Connections: Not on file   Intimate Partner Violence: Not on file   Housing Stability: Not on file     Current Facility-Administered Medications   Medication Dose Route Frequency Provider Last Rate Last Admin    albuterol sulfate HFA (PROVENTIL;VENTOLIN;PROAIR) 108 (90 Base) MCG/ACT inhaler 2 puff  2 puff Inhalation Q6H PRN Isaias Marino MD         Current Outpatient Medications   Medication Sig Dispense Refill    predniSONE (DELTASONE) 20 MG tablet Take 3 tablets by mouth daily for 4 days 12 tablet 0    lisinopril (PRINIVIL;ZESTRIL) 20 MG tablet TAKE 1 TABLET BY MOUTH ONCE DAILY 30 tablet 3    omeprazole (PRILOSEC) 40 MG delayed release capsule TAKE ONE (1) CAPSULE BY MOUTH DAILY 30 capsule 3    fenofibrate (TRICOR) 145 MG tablet TAKE ONE (1) TABLET BY MOUTH ONCE DAILY 30 tablet 3    gabapentin (NEURONTIN) 300 MG capsule TAKE TWO (2) CAPSULE BY MOUTH THREE TIMES DAILY 180 capsule 3    metFORMIN (GLUCOPHAGE) 500 MG tablet TAKE ONE (1) TABLET BY MOUTH TWO TIMES DAILY WITH MEALS 60 tablet 3    LIOR-24 100 MG extended release capsule TAKE ONE (1) CAPSULE BY MOUTH DAILY 30 capsule 3    benztropine (COGENTIN) 1 MG tablet Take 1 mg by mouth at bedtime      OLANZapine (ZYPREXA) 10 MG tablet Take 10 mg by mouth nightly      albuterol sulfate HFA (VENTOLIN HFA) 108 (90 Base) MCG/ACT inhaler Inhale 2 puffs into the lungs 4 times daily as needed for Wheezing 18 g 0    montelukast (SINGULAIR) 10 MG tablet TAKE ONE (1) TABLET BY MOUTH EVERY NIGHT 30 tablet 3    atorvastatin (LIPITOR) 40 MG tablet TAKE ONE (1) TABLET BY MOUTH DAILY 30 tablet 3    naproxen (NAPROSYN) 500 MG tablet TAKE ONE (1) TABLET BY MOUTH TWO TIMES DAILY WITH MEALS 60 tablet 3    docusate sodium (COLACE) 100 MG capsule TAKE ONE (1) CAPSULE BY MOUTH TWO (2) TIMES DAILY 60 capsule 3    blood glucose monitor kit and supplies Dispense sufficient amount for indicated testing frequency plus additional to accommodate PRN testing needs. Dispense all needed supplies to include: monitor, strips, lancing device, lancets, control solutions, alcohol swabs. 1 kit 0    ferrous sulfate (IRON 325) 325 (65 Fe) MG tablet Take 1 tablet by mouth 2 times daily 30 tablet 3    busPIRone (BUSPAR) 5 MG tablet Take 1 tablet by mouth 2 times daily (Patient taking differently: Take 10 mg by mouth 3 times daily ) 60 tablet 3    hydrOXYzine (ATARAX) 25 MG tablet Take 1 tablet by mouth 2 times daily 60 tablet 3    Spacer/Aero-Holding Chambers (AEROCHAMBER) MISC Inhale 1 Device into the lungs every 6 hours 1 each 0    fluPHENAZine decanoate (PROLIXIN) 25 MG/ML injection Inject 12.5 mg into the muscle every 14 days      tiotropium (SPIRIVA) 18 MCG inhalation capsule Inhale 18 mcg into the lungs daily      melatonin 5 MG TBDP disintegrating tablet Take 10 mg by mouth nightly        Allergies   Allergen Reactions    Cat Hair Extract     No Known Allergies     Pollen Extract     Seasonal        Nursing Notes Reviewed    Physical Exam:  ED Triage Vitals   Enc Vitals Group      BP 09/24/22 2200 (!) 144/86      Heart Rate 09/24/22 2200 92      Resp 09/24/22 2200 20      Temp 09/24/22 2200 97.9 °F (36.6 °C)      Temp Source 09/24/22 2200 Oral      SpO2 09/24/22 2200 97 %      Weight 09/24/22 2201 170 lb (77.1 kg)      Height 09/24/22 2201 5' 9\" (1.753 m)      Head Circumference --       Peak Flow --       Pain Score --       Pain Loc --       Pain Edu? --       Excl. in [address]? --      GENERAL APPEARANCE: Awake and alert. Cooperative. No acute distress. HEAD: Normocephalic. Atraumatic. EYES: EOM's grossly intact. Sclera anicteric. ENT: Mucous membranes are moist. Tolerates saliva. No trismus. NECK: Supple. No meningismus. Trachea midline. HEART: RRR.  Radial

## 2022-09-25 NOTE — CARE COORDINATION
CM was notified that patient will need a ride home upon discharge. After speaking with patient. Patient just needed assistance in calling his uncle and his uncle can pick him up from the ED waiting room. CM called patients' uncle. Uncle to  patient in 15 minutes.

## 2022-09-26 ENCOUNTER — HOSPITAL ENCOUNTER (OUTPATIENT)
Dept: PSYCHIATRY | Age: 42
Setting detail: THERAPIES SERIES
Discharge: HOME | End: 2022-09-26
Payer: COMMERCIAL

## 2022-09-26 PROCEDURE — 90834 PSYTX W PT 45 MINUTES: CPT

## 2022-09-26 PROCEDURE — 80305 DRUG TEST PRSMV DIR OPT OBS: CPT

## 2022-09-26 ASSESSMENT — ANXIETY QUESTIONNAIRES
IF YOU CHECKED OFF ANY PROBLEMS ON THIS QUESTIONNAIRE, HOW DIFFICULT HAVE THESE PROBLEMS MADE IT FOR YOU TO DO YOUR WORK, TAKE CARE OF THINGS AT HOME, OR GET ALONG WITH OTHER PEOPLE: NOT DIFFICULT AT ALL
3. WORRYING TOO MUCH ABOUT DIFFERENT THINGS: 3
GAD7 TOTAL SCORE: 19
6. BECOMING EASILY ANNOYED OR IRRITABLE: 3
7. FEELING AFRAID AS IF SOMETHING AWFUL MIGHT HAPPEN: 3
5. BEING SO RESTLESS THAT IT IS HARD TO SIT STILL: 3
2. NOT BEING ABLE TO STOP OR CONTROL WORRYING: 3
1. FEELING NERVOUS, ANXIOUS, OR ON EDGE: 1
4. TROUBLE RELAXING: 3
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
## 2022-09-26 NOTE — PROGRESS NOTES
[facility] TREATMENT PLAN     Location: [x] [place] [] [place] [place]        Treatment plan: Initial          Strengths: employed, supportive parents, mothering children         Weakness/Limitations:  legal charge, probation, social network use            Service/Frequency/Duration: Individual Session x 1 time weekly x 90 days or as needed, Standard Outpatient Group x 1 times weekly x 90 days, Urinalysis one time monthly x 90 days or as needed and one time weekly x 90 days A.A /  NA meetings             Diagnosis:           F 14.20 Stimulant Use Disorder( crack)   F 12.20 Cannabis Use Disorder                            Level of Care:  Standard  Outpatient and Individual sessions         Problem: History of Substance use                Goal: Enhance personalized knowledge and insight associated with mood altering substances x 90 days     Objectives:         1) Remind 2 to 6  detrimental consequences in major life areas regarding substance  use in 90 days Evaluation Date: 12/19/2022 Code: C Continue TBD               2) Identify 4 to 8 psychological or physiological benefits /  gratitudes due to remaining substance free in 90 days: Evaluation Date: 12/19/2022 Code: C Continue TBD               3) Identify and explain 2 to 4 explanations about relapse triggers. Explain 2 to 4 things about relapse. Identify 2 to 4 differences and similarities between internal and eternal triggers associated with substance use in 90 days and Evaluation Date: 12/19/2022 Code:  C Continue TBD            2.    Problem: Limited knowledge regarding disease concept and substance use. Goal:  Enhance knowledge and understanding regarding disease concept associated with substance use.          Objectives:            1) Complete 2 to 4 assignments regarding biography of substance use, include onset, frequency, tolerance and amounts  and  in 90 days:  Evaluation Date:12/19/2022 Code: Code: C Continue TBD               2) Complete assignment on difference  between substance abuse, substance dependence and disease concept of substance use in 90 days  Evaluation Date:12/19/2022 Code: C Continue TBD                  3)  Utilize, if needed case management services provided by [facility] to enhance abstaining from substance use Evaluation Date:12/19/2022  Code: C Continue TBD               3.    Problem: Limited experience and life regarding Relapse x 90 days            Goal: Identify and address the core dynamics and dilemmas that are perpetuating consequences and exacerbate relapses and triggers and in 90 days         Objectives:            1)  Complete and review with therapist at least 2 or more periods of being sober in 90 days Evaluation Date:12/19/2022 Code: C Continue TBD                2) Enhance 4 to 8 healthy techniques and coping skills to empower a healthy  relapse prevention plan x 90 days  Evaluation Date:12/19/2022 Code: C Continue TB              3) Journal, discuss, monitor  3 to 5 physiological, psychological, biological and mental alterations and coping skills of being sober: x 90 days  Evaluation Date:12/19/2022 : Code: C Continue TBD            Defer: Address legal stipulations and mental health               Discharge Plan/Instructions: Demonstrate constructive motivation to successfully complete outpatient treatment, finalize stipulations for probation and legal system and develop healthy sobriety plan. Richy Dow / 1980 has participated in the treatment plan development outlined above on 9/26/2022.          Russ Blue, [facility]-  6/76/0988/40:95 PM

## 2022-09-28 ENCOUNTER — OFFICE VISIT (OUTPATIENT)
Dept: INTERNAL MEDICINE CLINIC | Age: 42
End: 2022-09-28
Payer: COMMERCIAL

## 2022-09-28 VITALS
SYSTOLIC BLOOD PRESSURE: 136 MMHG | HEART RATE: 76 BPM | WEIGHT: 174 LBS | OXYGEN SATURATION: 97 % | HEIGHT: 69 IN | BODY MASS INDEX: 25.77 KG/M2 | DIASTOLIC BLOOD PRESSURE: 82 MMHG

## 2022-09-28 DIAGNOSIS — J44.9 CHRONIC OBSTRUCTIVE PULMONARY DISEASE, UNSPECIFIED COPD TYPE (HCC): Primary | ICD-10-CM

## 2022-09-28 DIAGNOSIS — F41.9 ANXIETY: ICD-10-CM

## 2022-09-28 DIAGNOSIS — E11.42 DIABETIC POLYNEUROPATHY ASSOCIATED WITH TYPE 2 DIABETES MELLITUS (HCC): ICD-10-CM

## 2022-09-28 DIAGNOSIS — F20.9 SCHIZOPHRENIA, UNSPECIFIED TYPE (HCC): ICD-10-CM

## 2022-09-28 DIAGNOSIS — Z72.0 TOBACCO ABUSE: ICD-10-CM

## 2022-09-28 DIAGNOSIS — E78.2 MIXED HYPERLIPIDEMIA: ICD-10-CM

## 2022-09-28 DIAGNOSIS — I10 ESSENTIAL HYPERTENSION: ICD-10-CM

## 2022-09-28 DIAGNOSIS — E11.65 TYPE 2 DIABETES MELLITUS WITH HYPERGLYCEMIA, WITHOUT LONG-TERM CURRENT USE OF INSULIN (HCC): ICD-10-CM

## 2022-09-28 LAB
CHP ED QC CHECK: NORMAL
GLUCOSE BLD-MCNC: 112 MG/DL

## 2022-09-28 PROCEDURE — G8427 DOCREV CUR MEDS BY ELIG CLIN: HCPCS | Performed by: INTERNAL MEDICINE

## 2022-09-28 PROCEDURE — 99214 OFFICE O/P EST MOD 30 MIN: CPT | Performed by: INTERNAL MEDICINE

## 2022-09-28 PROCEDURE — 82962 GLUCOSE BLOOD TEST: CPT | Performed by: INTERNAL MEDICINE

## 2022-09-28 PROCEDURE — G8419 CALC BMI OUT NRM PARAM NOF/U: HCPCS | Performed by: INTERNAL MEDICINE

## 2022-09-28 PROCEDURE — 3023F SPIROM DOC REV: CPT | Performed by: INTERNAL MEDICINE

## 2022-09-28 PROCEDURE — 4004F PT TOBACCO SCREEN RCVD TLK: CPT | Performed by: INTERNAL MEDICINE

## 2022-09-28 PROCEDURE — 2022F DILAT RTA XM EVC RTNOPTHY: CPT | Performed by: INTERNAL MEDICINE

## 2022-09-28 PROCEDURE — 3044F HG A1C LEVEL LT 7.0%: CPT | Performed by: INTERNAL MEDICINE

## 2022-09-28 SDOH — ECONOMIC STABILITY: FOOD INSECURITY: WITHIN THE PAST 12 MONTHS, YOU WORRIED THAT YOUR FOOD WOULD RUN OUT BEFORE YOU GOT MONEY TO BUY MORE.: NEVER TRUE

## 2022-09-28 SDOH — ECONOMIC STABILITY: FOOD INSECURITY: WITHIN THE PAST 12 MONTHS, THE FOOD YOU BOUGHT JUST DIDN'T LAST AND YOU DIDN'T HAVE MONEY TO GET MORE.: NEVER TRUE

## 2022-09-28 ASSESSMENT — SOCIAL DETERMINANTS OF HEALTH (SDOH): HOW HARD IS IT FOR YOU TO PAY FOR THE VERY BASICS LIKE FOOD, HOUSING, MEDICAL CARE, AND HEATING?: NOT HARD AT ALL

## 2022-09-28 NOTE — PROGRESS NOTES
Name: Edin Bender  [id number]  Age: 36 y.o. YOB: 1980  Sex: male    CHIEF COMPLAINT:    Chief Complaint   Patient presents with    Diabetes    Hypertension    Other     ER follow up and other chronic conditions       HISTORY OF PRESENT ILLNESS:     This is a pleasant  36 y.o. male  is seen today for management of chronic medical problems and medications refills. Previous records reviewed . He went to ER for  exacerbation of COPD. Apparently treated with duoneb and prednisone in  ER . Chest XR was negative. No antibiotics or additional medications were given from the emergency room at discharge. Doing fine now. . in his baseline. Denies CP , SOB is better. No fever , sore throat. Has mild chronic smoker's cough  He continues to smoke about 1 pack/day and refuses to quit smoking. Denies any abdominal pain. Appetite OK. Bowels moving [id number] No urinary symptoms. Complains of chronic low back pain  for which he takes naproxen, Tylenol and Neurontin and they help  his back pain. Vision Ok with glasses. Denies  any significant skin lesions. He is seeing his psychiatrist regularly and taking psych medications regularly from them. He does not check his sugars at home. No other complaints. Has reservations regarding Covid vaccinations. Tried to reassure. Patient will try to get Covid vaccination soon. He had a flu shot in October 21, 2021. Flu shot was offered today but he declined. Labs reviewed from 2/24/2022 and explained to the patient. Patient does not want any blood work done today.     Past Medical History:    Patient Active Problem List   Diagnosis    Essential hypertension    Schizophrenia ([facility]    Anxiety    Diabetic polyneuropathy associated with type 2 diabetes mellitus ([facility]    Mixed hyperlipidemia    Tobacco abuse    Chronic obstructive pulmonary disease ([facility]        Past Surgical History:        Procedure Laterality Date    HEMORRHOID SURGERY         Social History:   Social History     Tobacco Use    Smoking status: Every Day     Packs/day: 0.50     Years: 20.00     Pack years: 10.00     Types: Cigarettes    Smokeless tobacco: Current     Types: Snuff   Substance Use Topics    Alcohol use: No       Family History:       Problem Relation Age of Onset    Emphysema Mother     Heart Disease Father     High Blood Pressure Father        Allergies:  Cat hair extract, No known allergies, Pollen extract, and Seasonal    Current Medications :      Prior to Admission medications    Medication Sig Start Date End Date Taking? Authorizing Provider   blood glucose monitor kit and supplies Dispense sufficient amount for indicated testing frequency plus additional to accommodate PRN testing needs. Dispense all needed supplies to include: monitor, strips, lancing device, lancets, control solutions, alcohol swabs.  9/28/22  Yes Elbert Navarrete MD   predniSONE (DELTASONE) 20 MG tablet Take 3 tablets by mouth daily for 4 days 9/24/22 9/28/22 Yes Tata Cheema MD   omeprazole (PRILOSEC) 40 MG delayed release capsule TAKE ONE (1) CAPSULE BY MOUTH DAILY 9/1/22  Yes Elbert Navarrete MD   fenofibrate (TRICOR) 145 MG tablet TAKE ONE (1) TABLET BY MOUTH ONCE DAILY 9/1/22  Yes Elbert Navarrete MD   gabapentin (NEURONTIN) 300 MG capsule TAKE TWO (2) CAPSULE BY MOUTH THREE TIMES DAILY 9/1/22 10/1/22 Yes Elbert Navarrete MD   metFORMIN (GLUCOPHAGE) 500 MG tablet TAKE ONE (1) TABLET BY MOUTH TWO TIMES DAILY WITH MEALS 8/19/22  Yes Elbert Navarrete MD   lisinopril (PRINIVIL;ZESTRIL) 20 MG tablet TAKE 1 TABLET BY MOUTH ONCE DAILY 8/3/22  Yes SARAH Padgett CNP   LIOR-24 100 MG extended release capsule TAKE ONE (1) CAPSULE BY MOUTH DAILY 7/21/22  Yes SARAH Padgett CNP   benztropine (COGENTIN) 1 MG tablet Take 1 mg by mouth at bedtime   Yes Historical Provider, MD   OLANZapine (ZYPREXA) 10 MG tablet Take 10 mg by mouth nightly   Yes Historical Provider, MD   albuterol sulfate HFA (VENTOLIN HFA) 108 (90 Base) MCG/ACT inhaler Inhale 2 puffs into the lungs 4 times daily as needed for Wheezing 6/1/22  Yes Elbert Navarrete MD   montelukast (SINGULAIR) 10 MG tablet TAKE ONE (1) TABLET BY MOUTH EVERY NIGHT 5/26/22  Yes Elbert Navarrete MD   atorvastatin (LIPITOR) 40 MG tablet TAKE ONE (1) TABLET BY MOUTH DAILY 5/26/22  Yes Elbert Navarrete MD   naproxen (NAPROSYN) 500 MG tablet TAKE ONE (1) TABLET BY MOUTH TWO TIMES DAILY WITH MEALS 4/29/22  Yes Elbert Navarrete MD   docusate sodium (COLACE) 100 MG capsule TAKE ONE (1) CAPSULE BY MOUTH TWO (2) TIMES DAILY 4/14/22  Yes Elbert Navarrete MD   ferrous sulfate (IRON 325) 325 (65 Fe) MG tablet Take 1 tablet by mouth 2 times daily 7/23/21  Yes Elbert Navarrete MD   busPIRone (BUSPAR) 5 MG tablet Take 1 tablet by mouth 2 times daily  Patient taking differently: Take 10 mg by mouth 3 times daily 7/23/21  Yes Elbret Navarrete MD   hydrOXYzine (ATARAX) 25 MG tablet Take 1 tablet by mouth 2 times daily 7/23/21  Yes Elbert Navarrete MD   Spacer/Aero-Holding Chambers (AEROCHAMBER) MISC Inhale 1 Device into the lungs every 6 hours 1/27/21  Yes Korinne Lusterio, PA-C   fluPHENAZine decanoate (PROLIXIN) 25 MG/ML injection Inject 12.5 mg into the muscle every 14 days   Yes Historical Provider, MD   tiotropium (SPIRIVA) 18 MCG inhalation capsule Inhale 18 mcg into the lungs daily   Yes Historical Provider, MD   melatonin 5 MG TBDP disintegrating tablet Take 10 mg by mouth nightly    Yes Historical Provider, MD       LAB DATA: Reviewed. REVIEW OF SYSTEMS:   see HPI/ Comprehensive review of systems negative except for the ones mentioned in HPI. PHYSICAL EXAMINATION:   /82 (Site: Left Upper Arm, Position: Sitting, Cuff Size: Medium Adult)   Pulse 76   Ht 5' 9\" (1.753 m)   Wt 174 lb (78.9 kg)   SpO2 97%   BMI 25.70 kg/m²      GENERAL APPEARANCE:      Alert, oriented x 3, well developed, cooperative, not in any distress, appears stated age.   HEAD:                         Normocephalic, atraumatic   Ears: Pus in his right ear. Mild tenderness. EYES:                          PERRLA, EOMI, lids normal, conjuctivea clear, sclera anicteric. NECK:                         Supple, symmetrical,  trachea midline, no thyromegaly, no JVD, no lymphadenopathy. LUNGS:                       Minimal wheezing, essentially clear to auscultation. HEART:                       Regular rate and rhythm, S1 and S2 normal, no murmur, rub or gallop. PMI in MCL. ABDOMEN:                 Soft, non-tender, bowel sounds are normoactive, no masses, no hepatospleenomegaly. EXTREMITY:              no bipedal edema  NEURO:                      Alert, oriented to person, place and time. Grossly intact. Musculoskeletal:         No kyphosis or scoliosis, mild tenderness in his lower back. Skin:                            Warm and dry. No rash or obvious suspicious lesions. PSYCH:                       Mood euthymic, insight and judgement good. ASSESSMENT/PLAN:    1. Chronic obstructive pulmonary disease, unspecified COPD type ([facility] [address]  Advised extensively to quit smoking. Continue on his Spiriva and albuterol HFA as needed    2. Type 2 diabetes mellitus with hyperglycemia, without long-term current use of insulin (HCC)  Continue metformin. Advised low sugar diet and exercise. Home glucose monitor reordered. He claims he lost his glucometer  - POCT Glucose  - blood glucose monitor kit and supplies; Dispense sufficient amount for indicated testing frequency plus additional to accommodate PRN testing needs. Dispense all needed supplies to include: monitor, strips, lancing device, lancets, control solutions, alcohol swabs. Dispense: 1 kit; Refill: 0    3. Essential hypertension  Continue current medications, denies side effect with medicationss. Low salt diet and exercise advised. Continue lisinopril    4.  Mixed hyperlipidemia  Advised low-cholesterol diet and exercise. Continue Lipitor and Tricor. 5. Tobacco abuse  Advised extensively to quit smoking. Information about smoke cessation programs were given to the patient. Patient does not seem to be interested in quitting smoking at this time    6. Schizophrenia, unspecified type ([facility] [address]  Advised to continue to follow with his psychiatrist and take psych medications regularly. 7. Diabetic polyneuropathy associated with type 2 diabetes mellitus (HCC)  Continue Neurontin    8. Anxiety  Advised to continue to follow with his psychiatrist and take psych medications prescribed by them. Advised to continue to follow COVID-19 precautions    Care discussed with patient. Questions answered and patient verbalizes understanding and agrees with plan. Medications reviewed and reconciled. Continue current medications. Appropriate prescriptions are ordered. Risks and benefits of meds are discussed. After visit summary provided. Advised to call for any problems, questions, or concerns. If symptoms worsen or don't improve as expected, to call us or go to ER. Follow up as directed, sooner if needed. Return in about 3 months (around 12/28/2022). This dictation was performed with a verbal recognition program and it was checked for errors. It is possible that there are still dictated errors within this office note. Any errors should be brought immediately to my attention for correction. All efforts were made to ensure that this office note is accurate.      Kimi Ramirez MD MD

## 2022-10-03 ENCOUNTER — HOSPITAL ENCOUNTER (OUTPATIENT)
Dept: PSYCHIATRY | Age: 42
Setting detail: THERAPIES SERIES
Discharge: HOME | End: 2022-10-03
Payer: COMMERCIAL

## 2022-10-03 PROCEDURE — 80305 DRUG TEST PRSMV DIR OPT OBS: CPT

## 2022-10-03 PROCEDURE — 90834 PSYTX W PT 45 MINUTES: CPT

## 2022-10-04 NOTE — PROGRESS NOTES
[facility]     Location: [x] [place] [] [place] [facility]        Treatment plan: Initial          Strengths: employed, supportive parents, mothering children         Weakness/Limitations:  legal charge, probation, social network use            Service/Frequency/Duration: Individual Session x 1 time weekly x 90 days or as needed, [facility] Outpatient Group x 1 times weekly x 90 days, Urinalysis one time monthly x 90 days or as needed and one time weekly x 90 days A.A /  NA meetings             Diagnosis:           F 14.20 Stimulant Use Disorder( crack)   F 12.20 Cannabis Use Disorder                            Level of Care:  Standard  Outpatient and Individual sessions         Problem: History of Substance use                Goal: Enhance personalized knowledge and insight associated with mood altering substances x 90 days     Objectives:         1) Remind 2 to 6  detrimental consequences in major life areas regarding substance  use in 90 days Evaluation Date: 12/19/2022 Code: On 10-4-22: urine screen: 9-26-22: positive cocaine: level 1127 ng/ml: 9-19-22: positive cocaine: level 530 ng/ ml: identified 4 consequences: expressed daily use: hygiene declined: interacting with using peers: 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 2) Identify 4 to 8 psychological or physiological benefits /  gratitudes due to remaining substance free in 90 days: Evaluation Date: 12/19/2022 Code: C Continue TBD               3) Identify and explain 2 to 4 explanations about relapse triggers. Explain 2 to 4 things about relapse. Identify 2 to 4 differences and similarities between internal and eternal triggers associated with substance use in 90 days and Evaluation Date: 12/19/2022 Code:  C Continue TBD            2.    Problem: Limited knowledge regarding disease concept and substance use.             Goal:  Enhance knowledge and understanding regarding disease concept associated with substance use. Objectives:            1) Complete 2 to 4 assignments regarding biography of substance use, include onset, frequency, tolerance and amounts  and  in 90 days:  Evaluation Date:12/19/2022 Code: Code: C Continue TBD               2) Complete assignment on difference  between substance abuse, substance dependence and disease concept of substance use in 90 days  Evaluation Date:12/19/2022 Code: C Continue TBD                  3)  Utilize, if needed case management services provided by [facility] to enhance abstaining from substance use Evaluation Date:12/19/2022  Code: C Continue TBD               3.    Problem: Limited experience and life regarding Relapse x 90 days            Goal: Identify and address the core dynamics and dilemmas that are perpetuating consequences and exacerbate relapses and triggers and in 90 days         Objectives:            1)  Complete and review with therapist at least 2 or more periods of being sober in 90 days Evaluation Date:12/19/2022 Code: C Continue TBD                2) Enhance 4 to 8 healthy techniques and coping skills to empower a healthy  relapse prevention plan x 90 days  Evaluation Date:12/19/2022 Code: C Continue TB              3) Journal, discuss, monitor  3 to 5 physiological, psychological, biological and mental alterations and coping skills of being sober: x 90 days  Evaluation Date:12/19/2022 : Code: C Continue TBD            Defer: Address legal stipulations and mental health               Discharge Plan/Instructions: Demonstrate constructive motivation to successfully complete outpatient treatment, finalize stipulations for probation and legal system and develop healthy sobriety plan. Ta Giordano / 1980 has participated in the treatment plan development outlined above on 10/4/2022.          GUS Frank  05/9/4598/42:46 PM

## 2022-10-05 ENCOUNTER — HOSPITAL ENCOUNTER (OUTPATIENT)
Age: 42
Setting detail: SPECIMEN
Discharge: HOME | End: 2022-10-05
Payer: COMMERCIAL

## 2022-10-05 LAB
ALBUMIN SERPL-MCNC: 4.8 GM/DL (ref 3.4–5)
ALP BLD-CCNC: 42 IU/L (ref 40–128)
ALT SERPL-CCNC: 25 U/L (ref 10–40)
ANION GAP SERPL CALCULATED.3IONS-SCNC: 11 MMOL/L (ref 4–16)
AST SERPL-CCNC: 22 IU/L (ref 15–37)
BASOPHILS ABSOLUTE: 0.1 K/CU MM
BASOPHILS RELATIVE PERCENT: 0.8 % (ref 0–1)
BILIRUB SERPL-MCNC: 0.2 MG/DL (ref 0–1)
BUN BLDV-MCNC: 16 MG/DL (ref 6–23)
CALCIUM SERPL-MCNC: 9.7 MG/DL (ref 8.3–10.6)
CHLORIDE BLD-SCNC: 99 MMOL/L (ref 99–110)
CHOLESTEROL: 154 MG/DL
CO2: 23 MMOL/L (ref 21–32)
CREAT SERPL-MCNC: 0.8 MG/DL (ref 0.9–1.3)
DIFFERENTIAL TYPE: ABNORMAL
EOSINOPHILS ABSOLUTE: 2.1 K/CU MM
EOSINOPHILS RELATIVE PERCENT: 20.6 % (ref 0–3)
GFR AFRICAN AMERICAN: >60 ML/MIN/1.73M2
GFR NON-AFRICAN AMERICAN: >60 ML/MIN/1.73M2
GLUCOSE BLD-MCNC: 101 MG/DL (ref 70–99)
HCT VFR BLD CALC: 43.5 % (ref 42–52)
HDLC SERPL-MCNC: 73 MG/DL
HEMOGLOBIN: 14.1 GM/DL (ref 13.5–18)
IMMATURE NEUTROPHIL %: 0.3 % (ref 0–0.43)
LDL CHOLESTEROL CALCULATED: 66 MG/DL
LYMPHOCYTES ABSOLUTE: 2.1 K/CU MM
LYMPHOCYTES RELATIVE PERCENT: 20.4 % (ref 24–44)
MCH RBC QN AUTO: 30.1 PG (ref 27–31)
MCHC RBC AUTO-ENTMCNC: 32.4 % (ref 32–36)
MCV RBC AUTO: 92.9 FL (ref 78–100)
MONOCYTES ABSOLUTE: 0.6 K/CU MM
MONOCYTES RELATIVE PERCENT: 6 % (ref 0–4)
PDW BLD-RTO: 13.2 % (ref 11.7–14.9)
PLATELET # BLD: 276 K/CU MM (ref 140–440)
PMV BLD AUTO: 10.4 FL (ref 7.5–11.1)
POTASSIUM SERPL-SCNC: 4.6 MMOL/L (ref 3.5–5.1)
RBC # BLD: 4.68 M/CU MM (ref 4.6–6.2)
SEGMENTED NEUTROPHILS ABSOLUTE COUNT: 5.2 K/CU MM
SEGMENTED NEUTROPHILS RELATIVE PERCENT: 51.9 % (ref 36–66)
SODIUM BLD-SCNC: 133 MMOL/L (ref 135–145)
TOTAL IMMATURE NEUTOROPHIL: 0.03 K/CU MM
TOTAL PROTEIN: 6.9 GM/DL (ref 6.4–8.2)
TRIGL SERPL-MCNC: 76 MG/DL
TSH HIGH SENSITIVITY: 0.63 UIU/ML (ref 0.27–4.2)
VITAMIN D 25-HYDROXY: 35.22 NG/ML
WBC # BLD: 10.1 K/CU MM (ref 4–10.5)
WBC # BLD: ABNORMAL 10*3/UL

## 2022-10-05 PROCEDURE — 82306 VITAMIN D 25 HYDROXY: CPT

## 2022-10-05 PROCEDURE — 85025 COMPLETE CBC W/AUTO DIFF WBC: CPT

## 2022-10-05 PROCEDURE — 84443 ASSAY THYROID STIM HORMONE: CPT

## 2022-10-05 PROCEDURE — 36415 COLL VENOUS BLD VENIPUNCTURE: CPT

## 2022-10-05 PROCEDURE — 80053 COMPREHEN METABOLIC PANEL: CPT

## 2022-10-05 PROCEDURE — 80061 LIPID PANEL: CPT

## 2022-10-06 DIAGNOSIS — M54.9 DORSALGIA: ICD-10-CM

## 2022-10-06 RX ORDER — DOCUSATE SODIUM 100 MG/1
CAPSULE, LIQUID FILLED ORAL
Qty: 60 CAPSULE | Refills: 3 | Status: SHIPPED | OUTPATIENT
Start: 2022-10-06

## 2022-10-06 RX ORDER — NAPROXEN 500 MG/1
TABLET ORAL
Qty: 60 TABLET | Refills: 3 | Status: SHIPPED | OUTPATIENT
Start: 2022-10-06

## 2022-10-10 ENCOUNTER — HOSPITAL ENCOUNTER (OUTPATIENT)
Dept: PSYCHIATRY | Age: 42
Setting detail: THERAPIES SERIES
Discharge: HOME | End: 2022-10-10
Payer: COMMERCIAL

## 2022-10-10 PROCEDURE — 90834 PSYTX W PT 45 MINUTES: CPT

## 2022-10-10 PROCEDURE — 80305 DRUG TEST PRSMV DIR OPT OBS: CPT
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
## 2022-10-17 ENCOUNTER — HOSPITAL ENCOUNTER (OUTPATIENT)
Dept: PSYCHIATRY | Age: 42
Setting detail: THERAPIES SERIES
Discharge: HOME | End: 2022-10-17
Payer: COMMERCIAL

## 2022-10-17 PROCEDURE — 80305 DRUG TEST PRSMV DIR OPT OBS: CPT

## 2022-10-17 PROCEDURE — 90834 PSYTX W PT 45 MINUTES: CPT
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
## 2022-10-18 NOTE — PROGRESS NOTES
[facility] TREATMENT [facility]AN     Location: [x] [place] [] [facility]        Treatment plan: Initial          Strengths: employed, supportive parents, mothering children         Weakness/Limitations:  legal charge, probation, social network use            Service/Frequency/Duration: Individual Session x 1 time weekly x 90 days or as needed, Standard Outpatient Group x 1 times weekly x 90 days, Urinalysis one time monthly x 90 days or as needed and one time weekly x 90 days A.A /  NA meetings             Diagnosis:           F 14.20 Stimulant Use Disorder( crack)   F 12.20 Cannabis Use Disorder                            Level of Care:  Standard  Outpatient and Individual sessions         Problem: History of Substance use                Goal: Enhance personalized knowledge and insight associated with mood altering substances x 90 days     Objectives:         1) Remind 2 to 6  detrimental consequences in major life areas regarding substance  use in 90 days Evaluation Date: 12/19/2022 Code: On 10-4-22: urine screen: 9-26-22: positive cocaine: level 1127 ng/ml: 9-19-22: positive cocaine: level 530 ng/ ml: identified 4 consequences: expressed daily use: hygiene declined: interacting with using peers: 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           On 10-17-22: Moses Valdivia arrived disheveled: externally appears to be digressing: angry when informed therapist will recommend residential treatment due to demonstrating inability to remain sober: pending session with probation: October 27, 2022: weekly therapist notice client deteriorating: unable to recall and recognize consequences: motivation: pre contemplation: unwillingness to remain sober: urine results: October 10 2022: cocaine 07954 ng/ ml and marijuana 24 ng/ ml.         2) Identify 4 to 8 psychological or physiological benefits /  gratitudes due to remaining substance free in 90 days: Evaluation Date: 12/19/2022 Code: C Continue TBD               3) Identify and explain 2 to 4 explanations about relapse triggers. Explain 2 to 4 things about relapse. Identify 2 to 4 differences and similarities between internal and eternal triggers associated with substance use in 90 days and Evaluation Date: 12/19/2022 Code:  C Continue TBD            2.    Problem: Limited knowledge regarding disease concept and substance use. Goal:  Enhance knowledge and understanding regarding disease concept associated with substance use.          Objectives:            1) Complete 2 to 4 assignments regarding biography of substance use, include onset, frequency, tolerance and amounts  and  in 90 days:  Evaluation Date:12/19/2022 Code: Code: C Continue TBD               2) Complete assignment on difference  between substance abuse, substance dependence and disease concept of substance use in 90 days  Evaluation Date:12/19/2022 Code: C Continue TBD                  3)  Utilize, if needed case management services provided by [employer] to enhance abstaining from substance use Evaluation Date:12/19/2022  Code: C Continue TBD               3.    Problem: Limited experience and life regarding Relapse x 90 days            Goal: Identify and address the core dynamics and dilemmas that are perpetuating consequences and exacerbate relapses and triggers and in 90 days         Objectives:            1)  Complete and review with therapist at least 2 or more periods of being sober in 90 days Evaluation Date:12/19/2022 Code: C Continue TBD                2) Enhance 4 to 8 healthy techniques and coping skills to empower a healthy  relapse prevention plan x 90 days Evaluation Date:12/19/2022 Code: C Continue TB              3) Journal, discuss, monitor  3 to 5 physiological, psychological, biological and mental alterations and coping skills of being sober: x 90 days  Evaluation Date:12/19/2022 : Code: C Continue TBD            Defer: Address legal stipulations and mental health               Discharge Plan/Instructions: Demonstrate constructive motivation to successfully complete outpatient treatment, finalize stipulations for probation and legal system and develop healthy sobriety plan. J Carlos Herrera / 1980 has participated in the treatment plan development outlined above on 10/18/2022.          Chino Hatch [facility]-  50/46/3523/1:90 PM

## 2022-10-24 ENCOUNTER — HOSPITAL ENCOUNTER (OUTPATIENT)
Dept: PSYCHIATRY | Age: 42
Setting detail: THERAPIES SERIES
Discharge: HOME | End: 2022-10-24
Payer: COMMERCIAL

## 2022-10-24 PROCEDURE — 80305 DRUG TEST PRSMV DIR OPT OBS: CPT

## 2022-10-24 PROCEDURE — 90834 PSYTX W PT 45 MINUTES: CPT
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
## 2022-10-24 NOTE — PROGRESS NOTES
[facility]     Location: [x] [place] [] [facility]        Treatment plan: Initial          Strengths: employed, supportive parents, mothering children         Weakness/Limitations:  legal charge, probation, social network use            Service/Frequency/Duration: Individual Session x 1 time weekly x 90 days or as needed, Standard Outpatient Group x 1 times weekly x 90 days, Urinalysis one time monthly x 90 days or as needed and one time weekly x 90 days A.A /  NA meetings             Diagnosis:           F 14.20 Stimulant Use Disorder( crack)   F 12.20 Cannabis Use Disorder                              Level of Care:  Standard  Outpatient and Individual sessions           Problem: History of Substance use                Goal: Enhance personalized knowledge and insight associated with mood altering substances x 90 days     Objectives:         1) Remind 2 to 6  detrimental consequences in major life areas regarding substance  use in 90 days Evaluation Date: 12/19/2022 Code: On 10-4-22: urine screen: 9-26-22: positive cocaine: level 1127 ng/ml: 9-19-22: positive cocaine: level 530 ng/ ml: identified 4 consequences: expressed daily use: hygiene declined: interacting with using peers: 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           On 10-17-22: Shawna Vazquez arrived disheveled: externally appears to be digressing: angry when informed therapist will recommend residential treatment due to demonstrating inability to remain sober: pending session with probation: October 27, 2022: weekly therapist notice client deteriorating: unable to recall and recognize consequences: motivation: pre contemplation: unwillingness to remain sober: urine results: October 10 2022: cocaine 49854 ng/ ml and marijuana 24 ng/ ml.        10-24-22: arrived disheveled:  indicated client did utilize shower at facility last Wednesday, however still reflect using crack cocaine throughout week: stealing items to trading to obtain crack: still recommending alf: transition to inpatient treatment: verbalize 2 consequences: loss of money and fear of alf but still unwilling to embrace treatment. 2) Identify 4 to 8 psychological or physiological benefits /  gratitudes due to remaining substance free in 90 days: Evaluation Date: 12/19/2022 Code: C Continue TBD               3) Identify and explain 2 to 4 explanations about relapse triggers. Explain 2 to 4 things about relapse. Identify 2 to 4 differences and similarities between internal and eternal triggers associated with substance use in 90 days and Evaluation Date: 12/19/2022 Code:  C Continue TBD            2.    Problem: Limited knowledge regarding disease concept and substance use. Goal:  Enhance knowledge and understanding regarding disease concept associated with substance use.          Objectives:            1) Complete 2 to 4 assignments regarding biography of substance use, include onset, frequency, tolerance and amounts  and  in 90 days:  Evaluation Date:12/19/2022 Code: Code: C Continue TBD               2) Complete assignment on difference  between substance abuse, substance dependence and disease concept of substance use in 90 days  Evaluation Date:12/19/2022 Code: C Continue TBD                  3)  Utilize, if needed case management services provided by [facility] to enhance abstaining from substance use Evaluation Date:12/19/2022  Code: C Continue TBD               3.    Problem: Limited experience and life regarding Relapse x 90 days            Goal: Identify and address the core dynamics and dilemmas that are perpetuating consequences and exacerbate relapses and triggers and in 90 days         Objectives:            1)  Complete and review with therapist at least 2 or more periods of being sober in 90 days Evaluation Date:12/19/2022 Code: C Continue TBD                2) Enhance 4 to 8 healthy techniques and coping skills to empower a healthy  relapse prevention plan x 90 days  Evaluation Date:12/19/2022 Code: C Continue TB              3) Journal, discuss, monitor  3 to 5 physiological, psychological, biological and mental alterations and coping skills of being sober: x 90 days  Evaluation Date:12/19/2022 : Code: C Continue TBD            Defer: Address legal stipulations and mental health               Discharge Plan/Instructions: Demonstrate constructive motivation to successfully complete outpatient treatment, finalize stipulations for probation and legal system and develop healthy sobriety plan. Noy Encinas / 1980 has participated in the treatment plan development outlined above on 10/24/2022.          Maxime Ramirez [facility]  13/48/2940/4:64 PM

## 2022-10-31 ENCOUNTER — HOSPITAL ENCOUNTER (OUTPATIENT)
Dept: PSYCHIATRY | Age: 42
Setting detail: THERAPIES SERIES
Discharge: HOME | End: 2022-10-31
Payer: COMMERCIAL

## 2022-10-31 PROCEDURE — 90834 PSYTX W PT 45 MINUTES: CPT

## 2022-10-31 PROCEDURE — 80305 DRUG TEST PRSMV DIR OPT OBS: CPT
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
## 2022-10-31 NOTE — PROGRESS NOTES
[facility]        Individual  Progress Note    Location: [x] [place] [] [place]                   Patient Name: Omar Prakash   : 1980     Case # :  [id number]  Therapist: ED HoIII        Objective/Service/Time:   CASE MANAGEMENT    S:  I met with therapist, Chema Abarca, who explained that client will be seen 3 days a week and needs transportation. I made contact with [employer] transportation while client was in my office. Client will be seen at 10 am on Monday's  and . [facility] will  client between 8:30 am and 9:45 am to bring him to his 10 am appointment at [facility] beginning . Rides were scheduled through 2022. They will transport client back home at 11 am (30 minute window after appointment). [employer] number for transportation is [phone] and phone number is [phone]. Therapist also explained that may be going to residential soon. Therapist is interested in client working with Shun Israel while incarcerated and then potentially transitioning into sober living. I left a message for Rosa landis to contact me to discuss. O:  Client was open to discussion. A:  Client was provided dates and times for . Client does not have a phone at this time. [facility] will honk and wait 5 minutes. In also contacted [address] in [place] to inquire about abed availability. There is no bed available today and have a wait list of about 2 weeks. P:  I will continue to follow up with therapist and client about clients transportation. Client will continue to speak with Therapist or  about requests for assistance if needed.       SCHUYLER Ho, [address], CTTS         Electronically signed by Adriana Khan on 10/31/2022 at 11:44 AM

## 2022-10-31 NOTE — PROGRESS NOTES
[facility]     Location: [x] [place] [] [facility]        Treatment plan: Initial          Strengths: employed, supportive parents, mothering children         Weakness/Limitations:  legal charge, probation, social network use            Service/Frequency/Duration: Individual Session x 1 time weekly x 90 days or as needed, Standard Outpatient Group x 1 times weekly x 90 days, Urinalysis one time monthly x 90 days or as needed and one time weekly x 90 days A.A /  NA meetings             Diagnosis:           F 14.20 Stimulant Use Disorder( crack)   F 12.20 Cannabis Use Disorder                              Level of Care:  Standard  Outpatient and Individual sessions           Problem: History of Substance use                Goal: Enhance personalized knowledge and insight associated with mood altering substances x 90 days     Objectives:         1) Remind 2 to 6  detrimental consequences in major life areas regarding substance  use in 90 days Evaluation Date: 12/19/2022 Code: On 10-4-22: urine screen: 9-26-22: positive cocaine: level 1127 ng/ml: 9-19-22: positive cocaine: level 530 ng/ ml: identified 4 consequences: expressed daily use: hygiene declined: interacting with using peers: 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           On 10-17-22: Rachana Keating arrived disheveled: externally appears to be digressing: angry when informed therapist will recommend residential treatment due to demonstrating inability to remain sober: pending session with probation: October 27, 2022: weekly therapist notice client deteriorating: unable to recall and recognize consequences: motivation: pre contemplation: unwillingness to remain sober: urine results: October 10 2022: cocaine 25354 ng/ ml and marijuana 24 ng/ ml.        10-24-22: arrived disheveled:  indicated client did utilize shower at facility last Wednesday, however still reflect using crack cocaine throughout week: stealing items to trading to obtain crack: still recommending nursing home: transition to inpatient treatment: verbalize 2 consequences: loss of money and fear of nursing home but still unwilling to embrace treatment. 10-31-22 arrived disheveled, progressively worse:  indicated client did not utilize shower or wash clothing at outreach facility last week: client verified:  indicated previous court: due to client requesting : he was not sentenced to nursing home: furthermore pending eviction due to substance use and poor care of animals: Millie Mendez admitted using last week following session: tends to get frustrated when discussing amounts and dates of use: therefore still unable to remain sober. 2) Identify 4 to 8 psychological or physiological benefits /  gratitudes due to remaining substance free in 90 days: Evaluation Date: 12/19/2022 Code: C Continue TBD                 3) Identify and explain 2 to 4 explanations about relapse triggers. Explain 2 to 4 things about relapse. Identify 2 to 4 differences and similarities between internal and eternal triggers associated with substance use in 90 days and Evaluation Date: 12/19/2022 Code:  C Continue TBD              2.    Problem: Limited knowledge regarding disease concept and substance use. Goal:  Enhance knowledge and understanding regarding disease concept associated with substance use.          Objectives:            1) Complete 2 to 4 assignments regarding biography of substance use, include onset, frequency, tolerance and amounts  and  in 90 days:  Evaluation Date:12/19/2022 Code: Code: C Continue TBD               2) Complete assignment on difference  between substance abuse, substance dependence and disease concept of substance use in 90 days  Evaluation Date:12/19/2022 Code: C Continue TBD                  3)  Utilize, if needed case management services provided by [facility] to enhance abstaining from substance use Evaluation Date:12/19/2022  Code: C Continue TBD               3.    Problem: Limited experience and life regarding Relapse x 90 days            Goal: Identify and address the core dynamics and dilemmas that are perpetuating consequences and exacerbate relapses and triggers and in 90 days         Objectives:            1)  Complete and review with therapist at least 2 or more periods of being sober in 90 days Evaluation Date:12/19/2022 Code: C Continue TBD                2) Enhance 4 to 8 healthy techniques and coping skills to empower a healthy  relapse prevention plan x 90 days  Evaluation Date:12/19/2022 Code: C Continue TB              3) Journal, discuss, monitor  3 to 5 physiological, psychological, biological and mental alterations and coping skills of being sober: x 90 days  Evaluation Date:12/19/2022 : Code: C Continue TBD            Defer: Address legal stipulations and mental health               Discharge Plan/Instructions: Demonstrate constructive motivation to successfully complete outpatient treatment, finalize stipulations for probation and legal system and develop healthy sobriety plan. Irene Prado / 1980 has participated in the treatment plan development outlined above on 10/31/2022.          KRISHNA Shine-TIARA  29/24/9059/07:84 PM

## 2022-11-03 DIAGNOSIS — E78.2 MIXED HYPERLIPIDEMIA: ICD-10-CM

## 2022-11-04 RX ORDER — ATORVASTATIN CALCIUM 40 MG/1
TABLET, FILM COATED ORAL
Qty: 30 TABLET | Refills: 3 | Status: SHIPPED | OUTPATIENT
Start: 2022-11-04

## 2022-11-07 ENCOUNTER — HOSPITAL ENCOUNTER (OUTPATIENT)
Dept: PSYCHIATRY | Age: 42
Setting detail: THERAPIES SERIES
Discharge: HOME | End: 2022-11-07
Payer: COMMERCIAL

## 2022-11-07 PROCEDURE — 90834 PSYTX W PT 45 MINUTES: CPT

## 2022-11-07 PROCEDURE — 80305 DRUG TEST PRSMV DIR OPT OBS: CPT

## 2022-11-08 NOTE — PROGRESS NOTES
[facility] TREATMENT PLAN     Location: [x] [place] [] [facility]        Treatment plan: Initial          Strengths: case management and external support systems          Weakness/Limitations:  legal charges, inability to remain sober, poor motivation              Service/Frequency/Duration: Individual Session x 1 time weekly x 90 days or as needed, Standard Outpatient Group x 1 times weekly x 90 days, Urinalysis one time monthly x 90 days or as needed and one time weekly x 90 days A.A /  NA meetings             Diagnosis:           F 14.20 Stimulant Use Disorder( crack)   F 12.20 Cannabis Use Disorder                              Level of Care:  Standard  Outpatient and Individual sessions           Problem: History of Substance use                Goal: Enhance personalized knowledge and insight associated with mood altering substances x 90 days     Objectives:         1) Remind 2 to 6  detrimental consequences in major life areas regarding substance  use in 90 days Evaluation Date: 12/19/2022 Code: On 10-4-22: urine screen: 9-26-22: positive cocaine: level 1127 ng/ml: 9-19-22: positive cocaine: level 530 ng/ ml: identified 4 consequences: expressed daily use: hygiene declined: interacting with using peers: 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           On 10-17-22: Chloe Pal arrived disheveled: externally appears to be digressing: angry when informed therapist will recommend residential treatment due to demonstrating inability to remain sober: pending session with probation: October 27, 2022: weekly therapist notice client deteriorating: unable to recall and recognize consequences: motivation: pre contemplation: unwillingness to remain sober: urine results: October 10 2022: cocaine 22223 ng/ ml and marijuana 24 ng/ ml.        10-24-22: arrived disheveled:  indicated client did utilize shower at facility last Wednesday, however still reflect using crack cocaine throughout week: stealing items to trading to obtain crack: still recommending penitentiary: transition to inpatient treatment: verbalize 2 consequences: loss of money and fear of penitentiary but still unwilling to embrace treatment. 10-31-22 arrived disheveled, progressively worse:  indicated client did not utilize shower or wash clothing at outreach facility last week: client verified:  indicated previous court: due to client requesting : he was not sentenced to penitentiary: furthermore pending eviction due to substance use and poor care of animals: Debbie Monday admitted using last week following session: tends to get frustrated when discussing amounts and dates of use: therefore still unable to remain sober. 2) Identify 4 to 8 psychological or physiological benefits /  gratitudes due to remaining substance free in 90 days: Evaluation Date: 12/19/2022 Code: C Continue TBD                 3) Identify and explain 2 to 4 explanations about relapse triggers. Explain 2 to 4 things about relapse. Identify 2 to 4 differences and similarities between internal and eternal triggers associated with substance use in 90 days and Evaluation Date: 12/19/2022 Code:  C Continue TBD              2.    Problem: Limited knowledge regarding disease concept and substance use. Goal:  Enhance knowledge and understanding regarding disease concept associated with substance use.          Objectives:            1) Complete 2 to 4 assignments regarding biography of substance use, include onset, frequency, tolerance and amounts  and  in 90 days:  Evaluation Date:12/19/2022 Code: Code: C Continue TBD 2) Complete assignment on difference  between substance abuse, substance dependence and disease concept of substance use in 90 days  Evaluation Date:12/19/2022 Code: On 11-7-22: identified dependence to crack cocaine                  3)  Utilize, if needed case management services provided by [facility] to enhance abstaining from substance use Evaluation Date:12/19/2022  Code: C Continue TBD               3.    Problem: Limited experience and life regarding Relapse x 90 days            Goal: Identify and address the core dynamics and dilemmas that are perpetuating consequences and exacerbate relapses and triggers and in 90 days         Objectives:            1)  Complete and review with therapist at least 2 or more periods of being sober in 90 days Evaluation Date:12/19/2022 Code: C Continue TBD                2) Enhance 4 to 8 healthy techniques and coping skills to empower a healthy  relapse prevention plan x 90 days  Evaluation Date:12/19/2022 Code: C Continue TB              3) Journal, discuss, monitor  3 to 5 physiological, psychological, biological and mental alterations and coping skills of being sober: x 90 days  Evaluation Date:12/19/2022 : Code: C Continue TBD            Defer: Address legal stipulations and mental health               Discharge Plan/Instructions: Demonstrate constructive motivation to successfully complete outpatient treatment, finalize stipulations for probation and legal system and develop healthy sobriety plan. Aby Flores / 1980 has participated in the treatment plan development outlined above on 11/8/2022.          Bonny Disla [facility] C.A. Dean HospitalSTEFFANIE-TIARA  56/4/7325/55:41 PM
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
## 2022-11-14 ENCOUNTER — HOSPITAL ENCOUNTER (OUTPATIENT)
Dept: PSYCHIATRY | Age: 42
Setting detail: THERAPIES SERIES
Discharge: HOME | End: 2022-11-14
Payer: COMMERCIAL

## 2022-11-14 NOTE — PROGRESS NOTES
[facility]        Individual  Progress Note    Location: [x] [place] [] [facility]                   Patient Name: Melany Johnson   : 1980     Case # :  [id number]  Therapist: GUS Tony      Did not show: plan network with  and probation       Electronically signed by GUS Tony on  at 11:54 AM       Maki Raman, LSW, GUS

## 2022-11-14 NOTE — PROGRESS NOTES
[facility]        Individual  Progress Note    Location: [x] [place] [] [place] [facility]                   Patient Name: Sean Aldrich   : 1980     Case # :  [id number]  Therapist: GUS Stevenson      30 minutes       10:15 AM until 10:45 AM       Case management continuity       Therapist spoke with : [phone]: spoke with : [phone]: delivered  updated progress note: informed external parties client did not show for session: digressing significantly: reviewed urine screens:  indicated she directly meet with client last Wednesday: : medication update: client scheduled for court Tuesday November 15, 2022 @ 9 AM : updated client has not show last week for increased sessions.            Electronically signed by GUS Stevenson on  at 12:10 PM       ESTER Elias LSW, LICDC-CS

## 2022-11-21 ENCOUNTER — APPOINTMENT (OUTPATIENT)
Dept: PSYCHIATRY | Age: 42
End: 2022-11-21
Payer: COMMERCIAL

## 2022-11-28 ENCOUNTER — APPOINTMENT (OUTPATIENT)
Dept: PSYCHIATRY | Age: 42
End: 2022-11-28
Payer: COMMERCIAL

## 2023-02-23 ENCOUNTER — HOSPITAL ENCOUNTER (INPATIENT)
Age: 43
LOS: 5 days | Discharge: PSYCHIATRIC OR SUBSTANCE USE FACILITY | DRG: 426 | End: 2023-03-01
Attending: EMERGENCY MEDICINE | Admitting: STUDENT IN AN ORGANIZED HEALTH CARE EDUCATION/TRAINING PROGRAM
Payer: MEDICAID

## 2023-02-23 DIAGNOSIS — J96.00 ACUTE RESPIRATORY FAILURE, UNSPECIFIED WHETHER WITH HYPOXIA OR HYPERCAPNIA (HCC): ICD-10-CM

## 2023-02-23 DIAGNOSIS — S32.020A COMPRESSION FRACTURE OF L2 VERTEBRA, INITIAL ENCOUNTER (HCC): ICD-10-CM

## 2023-02-23 DIAGNOSIS — D64.9 ANEMIA, UNSPECIFIED TYPE: ICD-10-CM

## 2023-02-23 DIAGNOSIS — S09.90XA CLOSED HEAD INJURY, INITIAL ENCOUNTER: ICD-10-CM

## 2023-02-23 DIAGNOSIS — E87.1 HYPONATREMIA: Primary | ICD-10-CM

## 2023-02-23 DIAGNOSIS — F41.9 ANXIETY: ICD-10-CM

## 2023-02-23 DIAGNOSIS — E87.6 HYPOKALEMIA: ICD-10-CM

## 2023-02-23 DIAGNOSIS — E83.51 HYPOCALCEMIA: ICD-10-CM

## 2023-02-23 PROCEDURE — 96375 TX/PRO/DX INJ NEW DRUG ADDON: CPT

## 2023-02-23 PROCEDURE — 96368 THER/DIAG CONCURRENT INF: CPT

## 2023-02-23 PROCEDURE — 99285 EMERGENCY DEPT VISIT HI MDM: CPT

## 2023-02-23 PROCEDURE — 36556 INSERT NON-TUNNEL CV CATH: CPT

## 2023-02-23 PROCEDURE — 96366 THER/PROPH/DIAG IV INF ADDON: CPT

## 2023-02-23 PROCEDURE — 96365 THER/PROPH/DIAG IV INF INIT: CPT

## 2023-02-24 ENCOUNTER — APPOINTMENT (OUTPATIENT)
Dept: CT IMAGING | Age: 43
DRG: 426 | End: 2023-02-24
Payer: MEDICAID

## 2023-02-24 ENCOUNTER — APPOINTMENT (OUTPATIENT)
Dept: GENERAL RADIOLOGY | Age: 43
DRG: 426 | End: 2023-02-24
Payer: MEDICAID

## 2023-02-24 PROBLEM — G40.89 OTHER SEIZURES (HCC): Status: ACTIVE | Noted: 2023-02-24

## 2023-02-24 PROBLEM — R56.9 CONVULSIONS (HCC): Status: ACTIVE | Noted: 2023-02-24

## 2023-02-24 PROBLEM — J96.00 ACUTE RESPIRATORY FAILURE (HCC): Status: ACTIVE | Noted: 2023-02-24

## 2023-02-24 PROBLEM — S32.020A COMPRESSION FRACTURE OF L2 LUMBAR VERTEBRA (HCC): Status: ACTIVE | Noted: 2023-02-24

## 2023-02-24 PROBLEM — S09.90XA CLOSED HEAD INJURY: Status: ACTIVE | Noted: 2023-02-24

## 2023-02-24 PROBLEM — E87.1 HYPONATREMIA: Status: ACTIVE | Noted: 2023-02-24

## 2023-02-24 PROBLEM — R56.9 SEIZURE (HCC): Status: ACTIVE | Noted: 2023-02-24

## 2023-02-24 LAB
ACETAMINOPHEN LEVEL: <5 UG/ML (ref 15–30)
ACETAMINOPHEN LEVEL: <5 UG/ML (ref 15–30)
ALBUMIN SERPL-MCNC: 3.6 GM/DL (ref 3.4–5)
ALCOHOL SCREEN SERUM: <0.01 %WT/VOL
ALP BLD-CCNC: 35 IU/L (ref 40–129)
ALT SERPL-CCNC: 22 U/L (ref 10–40)
AMPHETAMINES: NEGATIVE
ANION GAP SERPL CALCULATED.3IONS-SCNC: 12 MMOL/L (ref 4–16)
ANION GAP SERPL CALCULATED.3IONS-SCNC: 16 MMOL/L (ref 4–16)
ANION GAP SERPL CALCULATED.3IONS-SCNC: 19 MMOL/L (ref 4–16)
ANION GAP SERPL CALCULATED.3IONS-SCNC: 5 MMOL/L (ref 4–16)
ANION GAP SERPL CALCULATED.3IONS-SCNC: 6 MMOL/L (ref 4–16)
APTT: 33.4 SECONDS (ref 25.1–37.1)
AST SERPL-CCNC: 27 IU/L (ref 15–37)
B-OH-BUTYR SERPL-MCNC: 0.8 MG/DL (ref 0–3)
BARBITURATE SCREEN URINE: NEGATIVE
BASE EXCESS: 1 (ref 0–3.3)
BASOPHILS ABSOLUTE: 0 K/CU MM
BASOPHILS ABSOLUTE: 0 K/CU MM
BASOPHILS ABSOLUTE: 0.1 K/CU MM
BASOPHILS RELATIVE PERCENT: 0.2 % (ref 0–1)
BASOPHILS RELATIVE PERCENT: 0.3 % (ref 0–1)
BASOPHILS RELATIVE PERCENT: 0.3 % (ref 0–1)
BENZODIAZEPINE SCREEN, URINE: NEGATIVE
BILIRUB SERPL-MCNC: 0.6 MG/DL (ref 0–1)
BILIRUBIN URINE: NEGATIVE MG/DL
BLOOD, URINE: NEGATIVE
BUN SERPL-MCNC: 5 MG/DL (ref 6–23)
BUN SERPL-MCNC: 6 MG/DL (ref 6–23)
BUN SERPL-MCNC: 7 MG/DL (ref 6–23)
BUN SERPL-MCNC: 8 MG/DL (ref 6–23)
BUN SERPL-MCNC: 8 MG/DL (ref 6–23)
CALCIUM SERPL-MCNC: 7 MG/DL (ref 8.3–10.6)
CALCIUM SERPL-MCNC: 7.2 MG/DL (ref 8.3–10.6)
CALCIUM SERPL-MCNC: 7.5 MG/DL (ref 8.3–10.6)
CALCIUM SERPL-MCNC: 7.9 MG/DL (ref 8.3–10.6)
CALCIUM SERPL-MCNC: 8.1 MG/DL (ref 8.3–10.6)
CALCIUM SERPL-MCNC: 8.2 MG/DL (ref 8.3–10.6)
CALCIUM SERPL-MCNC: 8.4 MG/DL (ref 8.3–10.6)
CANNABINOID SCREEN URINE: NEGATIVE
CARBON MONOXIDE, BLOOD: 1.6 % (ref 0–5)
CHLORIDE BLD-SCNC: 100 MMOL/L (ref 99–110)
CHLORIDE BLD-SCNC: 102 MMOL/L (ref 99–110)
CHLORIDE BLD-SCNC: 104 MMOL/L (ref 99–110)
CHLORIDE BLD-SCNC: 106 MMOL/L (ref 99–110)
CHLORIDE BLD-SCNC: 87 MMOL/L (ref 99–110)
CHLORIDE BLD-SCNC: 90 MMOL/L (ref 99–110)
CHLORIDE BLD-SCNC: 98 MMOL/L (ref 99–110)
CHLORIDE URINE RANDOM: 10 MMOL/L (ref 43–210)
CLARITY: CLEAR
CO2 CONTENT: 23 MMOL/L (ref 19–24)
CO2: 16 MMOL/L (ref 21–32)
CO2: 19 MMOL/L (ref 21–32)
CO2: 20 MMOL/L (ref 21–32)
CO2: 24 MMOL/L (ref 21–32)
COCAINE METABOLITE: NEGATIVE
COLOR: YELLOW
COMMENT UA: NORMAL
CORTISOL, PLASMA: 14.71
CREAT SERPL-MCNC: 0.6 MG/DL (ref 0.9–1.3)
CREAT SERPL-MCNC: 0.7 MG/DL (ref 0.9–1.3)
CRP SERPL HS-MCNC: 3 MG/L
DIFFERENTIAL TYPE: ABNORMAL
DOSE AMOUNT: ABNORMAL
DOSE AMOUNT: ABNORMAL
DOSE TIME: ABNORMAL
DOSE TIME: ABNORMAL
EKG ATRIAL RATE: 54 BPM
EKG DIAGNOSIS: NORMAL
EKG P AXIS: 34 DEGREES
EKG P-R INTERVAL: 182 MS
EKG Q-T INTERVAL: 440 MS
EKG QRS DURATION: 92 MS
EKG QTC CALCULATION (BAZETT): 417 MS
EKG R AXIS: 69 DEGREES
EKG T AXIS: 61 DEGREES
EKG VENTRICULAR RATE: 54 BPM
EOSINOPHILS ABSOLUTE: 0 K/CU MM
EOSINOPHILS ABSOLUTE: 0.3 K/CU MM
EOSINOPHILS ABSOLUTE: 1.1 K/CU MM
EOSINOPHILS RELATIVE PERCENT: 0.1 % (ref 0–3)
EOSINOPHILS RELATIVE PERCENT: 6.2 % (ref 0–3)
EOSINOPHILS RELATIVE PERCENT: 7.8 % (ref 0–3)
GFR SERPL CREATININE-BSD FRML MDRD: >60 ML/MIN/1.73M2
GLUCOSE SERPL-MCNC: 101 MG/DL (ref 70–99)
GLUCOSE SERPL-MCNC: 102 MG/DL (ref 70–99)
GLUCOSE SERPL-MCNC: 110 MG/DL (ref 70–99)
GLUCOSE SERPL-MCNC: 111 MG/DL (ref 70–99)
GLUCOSE SERPL-MCNC: 134 MG/DL (ref 70–99)
GLUCOSE SERPL-MCNC: 89 MG/DL (ref 70–99)
GLUCOSE SERPL-MCNC: 99 MG/DL (ref 70–99)
GLUCOSE, URINE: NEGATIVE MG/DL
HCO3 ARTERIAL: 22 MMOL/L (ref 18–23)
HCT VFR BLD CALC: 22.2 % (ref 42–52)
HCT VFR BLD CALC: 24.2 % (ref 42–52)
HCT VFR BLD CALC: 25.6 % (ref 42–52)
HEMOGLOBIN: 8 GM/DL (ref 13.5–18)
HEMOGLOBIN: 8.1 GM/DL (ref 13.5–18)
HEMOGLOBIN: 8.8 GM/DL (ref 13.5–18)
IMMATURE NEUTROPHIL %: 0.3 % (ref 0–0.43)
IMMATURE NEUTROPHIL %: 0.4 % (ref 0–0.43)
IMMATURE NEUTROPHIL %: 0.8 % (ref 0–0.43)
INR BLD: 0.91 INDEX
KETONES, URINE: NEGATIVE MG/DL
LACTATE: 0.9 MMOL/L (ref 0.5–1.9)
LACTATE: 2 MMOL/L (ref 0.5–1.9)
LEUKOCYTE ESTERASE, URINE: NEGATIVE
LIPASE: 42 IU/L (ref 13–60)
LYMPHOCYTES ABSOLUTE: 1.2 K/CU MM
LYMPHOCYTES ABSOLUTE: 1.3 K/CU MM
LYMPHOCYTES ABSOLUTE: 2.5 K/CU MM
LYMPHOCYTES RELATIVE PERCENT: 10.7 % (ref 24–44)
LYMPHOCYTES RELATIVE PERCENT: 17.1 % (ref 24–44)
LYMPHOCYTES RELATIVE PERCENT: 26.3 % (ref 24–44)
MAGNESIUM: 1.2 MG/DL (ref 1.8–2.4)
MAGNESIUM: 2.1 MG/DL (ref 1.8–2.4)
MCH RBC QN AUTO: 29.6 PG (ref 27–31)
MCH RBC QN AUTO: 29.9 PG (ref 27–31)
MCH RBC QN AUTO: 31 PG (ref 27–31)
MCHC RBC AUTO-ENTMCNC: 33.5 % (ref 32–36)
MCHC RBC AUTO-ENTMCNC: 34.4 % (ref 32–36)
MCHC RBC AUTO-ENTMCNC: 36 % (ref 32–36)
MCV RBC AUTO: 86 FL (ref 78–100)
MCV RBC AUTO: 87.1 FL (ref 78–100)
MCV RBC AUTO: 88.3 FL (ref 78–100)
METHEMOGLOBIN ARTERIAL: 1.6 %
MONOCYTES ABSOLUTE: 0.3 K/CU MM
MONOCYTES ABSOLUTE: 0.6 K/CU MM
MONOCYTES ABSOLUTE: 0.7 K/CU MM
MONOCYTES RELATIVE PERCENT: 4.4 % (ref 0–4)
MONOCYTES RELATIVE PERCENT: 5.7 % (ref 0–4)
MONOCYTES RELATIVE PERCENT: 6 % (ref 0–4)
NITRITE URINE, QUANTITATIVE: NEGATIVE
NUCLEATED RBC %: 0 %
O2 SATURATION: 96.8 % (ref 96–97)
OPIATES, URINE: NEGATIVE
OSMOLALITY UR: 247 MOS/L (ref 280–300)
OSMOLALITY UR: 59 MOS/L (ref 292–1090)
OXYCODONE: NEGATIVE
PCO2 ARTERIAL: 31 MMHG (ref 32–45)
PDW BLD-RTO: 11.5 % (ref 11.7–14.9)
PDW BLD-RTO: 11.6 % (ref 11.7–14.9)
PDW BLD-RTO: 12 % (ref 11.7–14.9)
PH BLOOD: 7.46 (ref 7.34–7.45)
PH, URINE: 7 (ref 5–8)
PHENCYCLIDINE, URINE: NEGATIVE
PHOSPHORUS: 1.6 MG/DL (ref 2.5–4.9)
PHOSPHORUS: 2.6 MG/DL (ref 2.5–4.9)
PLATELET # BLD: 132 K/CU MM (ref 140–440)
PLATELET # BLD: 145 K/CU MM (ref 140–440)
PLATELET # BLD: 164 K/CU MM (ref 140–440)
PMV BLD AUTO: 10 FL (ref 7.5–11.1)
PMV BLD AUTO: 10.3 FL (ref 7.5–11.1)
PMV BLD AUTO: 10.6 FL (ref 7.5–11.1)
PO2 ARTERIAL: 248 MMHG (ref 75–100)
POTASSIUM SERPL-SCNC: 2.8 MMOL/L (ref 3.5–5.1)
POTASSIUM SERPL-SCNC: 2.9 MMOL/L (ref 3.5–5.1)
POTASSIUM SERPL-SCNC: 3.2 MMOL/L (ref 3.5–5.1)
POTASSIUM SERPL-SCNC: 3.3 MMOL/L (ref 3.5–5.1)
POTASSIUM SERPL-SCNC: 3.9 MMOL/L (ref 3.5–5.1)
POTASSIUM SERPL-SCNC: 4.3 MMOL/L (ref 3.5–5.1)
POTASSIUM SERPL-SCNC: 4.5 MMOL/L (ref 3.5–5.1)
POTASSIUM, UR: 3.5 MMOL/L (ref 22–119)
PRO-BNP: 287 PG/ML
PROCALCITONIN SERPL-MCNC: 0.04 NG/ML
PROTEIN UA: NEGATIVE MG/DL
PROTHROMBIN TIME: 11.7 SECONDS (ref 11.7–14.5)
RBC # BLD: 2.58 M/CU MM (ref 4.6–6.2)
RBC # BLD: 2.74 M/CU MM (ref 4.6–6.2)
RBC # BLD: 2.94 M/CU MM (ref 4.6–6.2)
SALICYLATE LEVEL: <0.3 MG/DL (ref 15–30)
SALICYLATE LEVEL: <0.3 MG/DL (ref 15–30)
SEGMENTED NEUTROPHILS ABSOLUTE COUNT: 10 K/CU MM
SEGMENTED NEUTROPHILS ABSOLUTE COUNT: 3 K/CU MM
SEGMENTED NEUTROPHILS ABSOLUTE COUNT: 9.5 K/CU MM
SEGMENTED NEUTROPHILS RELATIVE PERCENT: 61.2 % (ref 36–66)
SEGMENTED NEUTROPHILS RELATIVE PERCENT: 69.6 % (ref 36–66)
SEGMENTED NEUTROPHILS RELATIVE PERCENT: 82.6 % (ref 36–66)
SODIUM BLD-SCNC: 118 MMOL/L (ref 135–145)
SODIUM BLD-SCNC: 119 MMOL/L (ref 135–145)
SODIUM BLD-SCNC: 121 MMOL/L (ref 135–145)
SODIUM BLD-SCNC: 125 MMOL/L (ref 135–145)
SODIUM BLD-SCNC: 128 MMOL/L (ref 135–145)
SODIUM BLD-SCNC: 129 MMOL/L (ref 135–145)
SODIUM BLD-SCNC: 132 MMOL/L (ref 135–145)
SODIUM BLD-SCNC: 134 MMOL/L (ref 135–145)
SODIUM BLD-SCNC: 136 MMOL/L (ref 135–145)
SODIUM BLD-SCNC: 141 MMOL/L (ref 135–145)
SODIUM URINE: 15 MMOL/L (ref 35–167)
SPECIFIC GRAVITY UA: <1.005 (ref 1–1.03)
TOTAL CK: 283 IU/L (ref 38–174)
TOTAL IMMATURE NEUTOROPHIL: 0.02 K/CU MM
TOTAL IMMATURE NEUTOROPHIL: 0.04 K/CU MM
TOTAL IMMATURE NEUTOROPHIL: 0.11 K/CU MM
TOTAL NUCLEATED RBC: 0 K/CU MM
TOTAL PROTEIN: 5.4 GM/DL (ref 6.4–8.2)
TROPONIN T: <0.01 NG/ML
TSH SERPL DL<=0.005 MIU/L-ACNC: 1.78 UIU/ML (ref 0.27–4.2)
UROBILINOGEN, URINE: 0.2 MG/DL (ref 0.2–1)
WBC # BLD: 11.5 K/CU MM (ref 4–10.5)
WBC # BLD: 14.4 K/CU MM (ref 4–10.5)
WBC # BLD: 4.9 K/CU MM (ref 4–10.5)

## 2023-02-24 PROCEDURE — 95822 EEG COMA OR SLEEP ONLY: CPT | Performed by: STUDENT IN AN ORGANIZED HEALTH CARE EDUCATION/TRAINING PROGRAM

## 2023-02-24 PROCEDURE — 2500000003 HC RX 250 WO HCPCS

## 2023-02-24 PROCEDURE — 85025 COMPLETE CBC W/AUTO DIFF WBC: CPT

## 2023-02-24 PROCEDURE — 2500000003 HC RX 250 WO HCPCS: Performed by: INTERNAL MEDICINE

## 2023-02-24 PROCEDURE — 94002 VENT MGMT INPAT INIT DAY: CPT

## 2023-02-24 PROCEDURE — 71045 X-RAY EXAM CHEST 1 VIEW: CPT

## 2023-02-24 PROCEDURE — 89220 SPUTUM SPECIMEN COLLECTION: CPT

## 2023-02-24 PROCEDURE — 70450 CT HEAD/BRAIN W/O DYE: CPT

## 2023-02-24 PROCEDURE — 6360000002 HC RX W HCPCS: Performed by: INTERNAL MEDICINE

## 2023-02-24 PROCEDURE — 2500000003 HC RX 250 WO HCPCS: Performed by: EMERGENCY MEDICINE

## 2023-02-24 PROCEDURE — 83690 ASSAY OF LIPASE: CPT

## 2023-02-24 PROCEDURE — 93005 ELECTROCARDIOGRAM TRACING: CPT | Performed by: INTERNAL MEDICINE

## 2023-02-24 PROCEDURE — 84145 PROCALCITONIN (PCT): CPT

## 2023-02-24 PROCEDURE — 84100 ASSAY OF PHOSPHORUS: CPT

## 2023-02-24 PROCEDURE — 83935 ASSAY OF URINE OSMOLALITY: CPT

## 2023-02-24 PROCEDURE — 74176 CT ABD & PELVIS W/O CONTRAST: CPT

## 2023-02-24 PROCEDURE — 81003 URINALYSIS AUTO W/O SCOPE: CPT

## 2023-02-24 PROCEDURE — 2700000000 HC OXYGEN THERAPY PER DAY

## 2023-02-24 PROCEDURE — C9113 INJ PANTOPRAZOLE SODIUM, VIA: HCPCS | Performed by: STUDENT IN AN ORGANIZED HEALTH CARE EDUCATION/TRAINING PROGRAM

## 2023-02-24 PROCEDURE — 85730 THROMBOPLASTIN TIME PARTIAL: CPT

## 2023-02-24 PROCEDURE — 2580000003 HC RX 258: Performed by: STUDENT IN AN ORGANIZED HEALTH CARE EDUCATION/TRAINING PROGRAM

## 2023-02-24 PROCEDURE — 85610 PROTHROMBIN TIME: CPT

## 2023-02-24 PROCEDURE — 84443 ASSAY THYROID STIM HORMONE: CPT

## 2023-02-24 PROCEDURE — 94761 N-INVAS EAR/PLS OXIMETRY MLT: CPT

## 2023-02-24 PROCEDURE — 36600 WITHDRAWAL OF ARTERIAL BLOOD: CPT

## 2023-02-24 PROCEDURE — 95819 EEG AWAKE AND ASLEEP: CPT

## 2023-02-24 PROCEDURE — 82803 BLOOD GASES ANY COMBINATION: CPT

## 2023-02-24 PROCEDURE — 72125 CT NECK SPINE W/O DYE: CPT

## 2023-02-24 PROCEDURE — 93010 ELECTROCARDIOGRAM REPORT: CPT | Performed by: INTERNAL MEDICINE

## 2023-02-24 PROCEDURE — 6360000002 HC RX W HCPCS: Performed by: PHYSICIAN ASSISTANT

## 2023-02-24 PROCEDURE — 05HM33Z INSERTION OF INFUSION DEVICE INTO RIGHT INTERNAL JUGULAR VEIN, PERCUTANEOUS APPROACH: ICD-10-PCS | Performed by: STUDENT IN AN ORGANIZED HEALTH CARE EDUCATION/TRAINING PROGRAM

## 2023-02-24 PROCEDURE — 2000000000 HC ICU R&B

## 2023-02-24 PROCEDURE — 2580000003 HC RX 258

## 2023-02-24 PROCEDURE — 82436 ASSAY OF URINE CHLORIDE: CPT

## 2023-02-24 PROCEDURE — 94003 VENT MGMT INPAT SUBQ DAY: CPT

## 2023-02-24 PROCEDURE — 83880 ASSAY OF NATRIURETIC PEPTIDE: CPT

## 2023-02-24 PROCEDURE — 83605 ASSAY OF LACTIC ACID: CPT

## 2023-02-24 PROCEDURE — 86140 C-REACTIVE PROTEIN: CPT

## 2023-02-24 PROCEDURE — 83930 ASSAY OF BLOOD OSMOLALITY: CPT

## 2023-02-24 PROCEDURE — 83735 ASSAY OF MAGNESIUM: CPT

## 2023-02-24 PROCEDURE — 82010 KETONE BODYS QUAN: CPT

## 2023-02-24 PROCEDURE — G0480 DRUG TEST DEF 1-7 CLASSES: HCPCS

## 2023-02-24 PROCEDURE — 31500 INSERT EMERGENCY AIRWAY: CPT

## 2023-02-24 PROCEDURE — 6360000002 HC RX W HCPCS

## 2023-02-24 PROCEDURE — 6360000002 HC RX W HCPCS: Performed by: EMERGENCY MEDICINE

## 2023-02-24 PROCEDURE — 82533 TOTAL CORTISOL: CPT

## 2023-02-24 PROCEDURE — 2580000003 HC RX 258: Performed by: INTERNAL MEDICINE

## 2023-02-24 PROCEDURE — 80307 DRUG TEST PRSMV CHEM ANLYZR: CPT

## 2023-02-24 PROCEDURE — 99255 IP/OBS CONSLTJ NEW/EST HI 80: CPT | Performed by: STUDENT IN AN ORGANIZED HEALTH CARE EDUCATION/TRAINING PROGRAM

## 2023-02-24 PROCEDURE — 84300 ASSAY OF URINE SODIUM: CPT

## 2023-02-24 PROCEDURE — 71250 CT THORAX DX C-: CPT

## 2023-02-24 PROCEDURE — 6360000002 HC RX W HCPCS: Performed by: STUDENT IN AN ORGANIZED HEALTH CARE EDUCATION/TRAINING PROGRAM

## 2023-02-24 PROCEDURE — 80048 BASIC METABOLIC PNL TOTAL CA: CPT

## 2023-02-24 PROCEDURE — P9045 ALBUMIN (HUMAN), 5%, 250 ML: HCPCS | Performed by: INTERNAL MEDICINE

## 2023-02-24 PROCEDURE — 5A1935Z RESPIRATORY VENTILATION, LESS THAN 24 CONSECUTIVE HOURS: ICD-10-PCS | Performed by: STUDENT IN AN ORGANIZED HEALTH CARE EDUCATION/TRAINING PROGRAM

## 2023-02-24 PROCEDURE — 74018 RADEX ABDOMEN 1 VIEW: CPT

## 2023-02-24 PROCEDURE — 84133 ASSAY OF URINE POTASSIUM: CPT

## 2023-02-24 PROCEDURE — 2580000003 HC RX 258: Performed by: EMERGENCY MEDICINE

## 2023-02-24 PROCEDURE — 84484 ASSAY OF TROPONIN QUANT: CPT

## 2023-02-24 PROCEDURE — 80053 COMPREHEN METABOLIC PANEL: CPT

## 2023-02-24 PROCEDURE — 82693 ASSAY OF ETHYLENE GLYCOL: CPT

## 2023-02-24 PROCEDURE — 84295 ASSAY OF SERUM SODIUM: CPT

## 2023-02-24 PROCEDURE — A4216 STERILE WATER/SALINE, 10 ML: HCPCS | Performed by: INTERNAL MEDICINE

## 2023-02-24 PROCEDURE — 2500000003 HC RX 250 WO HCPCS: Performed by: STUDENT IN AN ORGANIZED HEALTH CARE EDUCATION/TRAINING PROGRAM

## 2023-02-24 PROCEDURE — 82550 ASSAY OF CK (CPK): CPT

## 2023-02-24 PROCEDURE — 87040 BLOOD CULTURE FOR BACTERIA: CPT

## 2023-02-24 RX ORDER — THIAMINE HYDROCHLORIDE 100 MG/ML
300 INJECTION, SOLUTION INTRAMUSCULAR; INTRAVENOUS 2 TIMES DAILY
Status: DISCONTINUED | OUTPATIENT
Start: 2023-02-24 | End: 2023-02-24

## 2023-02-24 RX ORDER — LORAZEPAM 2 MG/ML
1 INJECTION INTRAMUSCULAR
Status: DISCONTINUED | OUTPATIENT
Start: 2023-02-24 | End: 2023-03-01 | Stop reason: HOSPADM

## 2023-02-24 RX ORDER — DESMOPRESSIN ACETATE 4 UG/ML
4 INJECTION, SOLUTION INTRAVENOUS; SUBCUTANEOUS 2 TIMES DAILY
Status: DISCONTINUED | OUTPATIENT
Start: 2023-02-24 | End: 2023-02-24

## 2023-02-24 RX ORDER — 0.9 % SODIUM CHLORIDE 0.9 %
1000 INTRAVENOUS SOLUTION INTRAVENOUS ONCE
Status: COMPLETED | OUTPATIENT
Start: 2023-02-24 | End: 2023-02-24

## 2023-02-24 RX ORDER — KETAMINE HYDROCHLORIDE 10 MG/ML
200 INJECTION INTRAMUSCULAR; INTRAVENOUS ONCE
Status: DISCONTINUED | OUTPATIENT
Start: 2023-02-24 | End: 2023-02-26

## 2023-02-24 RX ORDER — DESMOPRESSIN ACETATE 4 UG/ML
4 INJECTION, SOLUTION INTRAVENOUS; SUBCUTANEOUS ONCE
Status: COMPLETED | OUTPATIENT
Start: 2023-02-24 | End: 2023-02-24

## 2023-02-24 RX ORDER — LORAZEPAM 2 MG/ML
2 INJECTION INTRAMUSCULAR ONCE
Status: COMPLETED | OUTPATIENT
Start: 2023-02-24 | End: 2023-02-24

## 2023-02-24 RX ORDER — DEXTROSE MONOHYDRATE 50 MG/ML
INJECTION, SOLUTION INTRAVENOUS CONTINUOUS
Status: DISPENSED | OUTPATIENT
Start: 2023-02-24 | End: 2023-02-24

## 2023-02-24 RX ORDER — FENTANYL CITRATE-0.9 % NACL/PF 10 MCG/ML
25-200 PLASTIC BAG, INJECTION (ML) INTRAVENOUS CONTINUOUS
Status: DISCONTINUED | OUTPATIENT
Start: 2023-02-24 | End: 2023-02-24

## 2023-02-24 RX ORDER — ENOXAPARIN SODIUM 100 MG/ML
40 INJECTION SUBCUTANEOUS DAILY
Status: DISCONTINUED | OUTPATIENT
Start: 2023-02-25 | End: 2023-03-01 | Stop reason: HOSPADM

## 2023-02-24 RX ORDER — POTASSIUM CHLORIDE 7.45 MG/ML
10 INJECTION INTRAVENOUS
Status: DISCONTINUED | OUTPATIENT
Start: 2023-02-24 | End: 2023-02-24

## 2023-02-24 RX ORDER — DEXTROSE MONOHYDRATE 50 MG/ML
INJECTION, SOLUTION INTRAVENOUS CONTINUOUS
Status: ACTIVE | OUTPATIENT
Start: 2023-02-24 | End: 2023-02-24

## 2023-02-24 RX ORDER — KETAMINE HYDROCHLORIDE 50 MG/ML
200 INJECTION, SOLUTION, CONCENTRATE INTRAMUSCULAR; INTRAVENOUS ONCE
Status: COMPLETED | OUTPATIENT
Start: 2023-02-24 | End: 2023-02-24

## 2023-02-24 RX ORDER — PANTOPRAZOLE SODIUM 40 MG/10ML
40 INJECTION, POWDER, LYOPHILIZED, FOR SOLUTION INTRAVENOUS DAILY
Status: DISCONTINUED | OUTPATIENT
Start: 2023-02-24 | End: 2023-02-26

## 2023-02-24 RX ORDER — ALBUMIN, HUMAN INJ 5% 5 %
12.5 SOLUTION INTRAVENOUS ONCE
Status: COMPLETED | OUTPATIENT
Start: 2023-02-24 | End: 2023-02-24

## 2023-02-24 RX ORDER — MIDAZOLAM HYDROCHLORIDE 2 MG/2ML
2 INJECTION, SOLUTION INTRAMUSCULAR; INTRAVENOUS
Status: DISCONTINUED | OUTPATIENT
Start: 2023-02-24 | End: 2023-02-26

## 2023-02-24 RX ORDER — DEXTROSE MONOHYDRATE 50 MG/ML
INJECTION, SOLUTION INTRAVENOUS CONTINUOUS
Status: DISCONTINUED | OUTPATIENT
Start: 2023-02-24 | End: 2023-02-26

## 2023-02-24 RX ORDER — SUCCINYLCHOLINE/SOD CL,ISO/PF 100 MG/5ML
100 SYRINGE (ML) INTRAVENOUS ONCE
Status: COMPLETED | OUTPATIENT
Start: 2023-02-24 | End: 2023-02-24

## 2023-02-24 RX ORDER — KETAMINE HYDROCHLORIDE 10 MG/ML
INJECTION INTRAMUSCULAR; INTRAVENOUS
Status: DISPENSED
Start: 2023-02-24 | End: 2023-02-24

## 2023-02-24 RX ORDER — 3% SODIUM CHLORIDE 3 G/100ML
100 INJECTION, SOLUTION INTRAVENOUS ONCE
Status: COMPLETED | OUTPATIENT
Start: 2023-02-24 | End: 2023-02-24

## 2023-02-24 RX ORDER — FENTANYL CITRATE-0.9 % NACL/PF 10 MCG/ML
25-200 PLASTIC BAG, INJECTION (ML) INTRAVENOUS CONTINUOUS
Status: DISCONTINUED | OUTPATIENT
Start: 2023-02-24 | End: 2023-02-26

## 2023-02-24 RX ORDER — NOREPINEPHRINE BIT/0.9 % NACL 16MG/250ML
1-100 INFUSION BOTTLE (ML) INTRAVENOUS CONTINUOUS
Status: DISCONTINUED | OUTPATIENT
Start: 2023-02-24 | End: 2023-02-26

## 2023-02-24 RX ORDER — FENTANYL CITRATE 50 UG/ML
50 INJECTION, SOLUTION INTRAMUSCULAR; INTRAVENOUS
Status: DISCONTINUED | OUTPATIENT
Start: 2023-02-24 | End: 2023-02-26

## 2023-02-24 RX ORDER — ALBUMIN, HUMAN INJ 5% 5 %
12.5 SOLUTION INTRAVENOUS ONCE
Status: COMPLETED | OUTPATIENT
Start: 2023-02-24 | End: 2023-02-25

## 2023-02-24 RX ORDER — MAGNESIUM SULFATE 4 G/50ML
4000 INJECTION INTRAVENOUS ONCE
Status: COMPLETED | OUTPATIENT
Start: 2023-02-24 | End: 2023-02-24

## 2023-02-24 RX ORDER — DESMOPRESSIN ACETATE 4 UG/ML
4 INJECTION, SOLUTION INTRAVENOUS; SUBCUTANEOUS EVERY 4 HOURS
Status: DISPENSED | OUTPATIENT
Start: 2023-02-24 | End: 2023-02-25

## 2023-02-24 RX ORDER — FENTANYL CITRATE 50 UG/ML
100 INJECTION, SOLUTION INTRAMUSCULAR; INTRAVENOUS ONCE
Status: COMPLETED | OUTPATIENT
Start: 2023-02-24 | End: 2023-02-24

## 2023-02-24 RX ORDER — DEXTROSE MONOHYDRATE 50 MG/ML
INJECTION, SOLUTION INTRAVENOUS CONTINUOUS
Status: DISCONTINUED | OUTPATIENT
Start: 2023-02-24 | End: 2023-02-24

## 2023-02-24 RX ORDER — PROPOFOL 10 MG/ML
5-50 INJECTION, EMULSION INTRAVENOUS CONTINUOUS
Status: DISCONTINUED | OUTPATIENT
Start: 2023-02-24 | End: 2023-02-26

## 2023-02-24 RX ORDER — DOPAMINE HYDROCHLORIDE 160 MG/100ML
5 INJECTION, SOLUTION INTRAVENOUS CONTINUOUS
Status: DISCONTINUED | OUTPATIENT
Start: 2023-02-24 | End: 2023-02-26

## 2023-02-24 RX ORDER — KETAMINE HYDROCHLORIDE 100 MG/ML
INJECTION INTRAMUSCULAR; INTRAVENOUS
Status: COMPLETED
Start: 2023-02-24 | End: 2023-02-24

## 2023-02-24 RX ORDER — POTASSIUM CHLORIDE 29.8 MG/ML
20 INJECTION INTRAVENOUS PRN
Status: DISCONTINUED | OUTPATIENT
Start: 2023-02-24 | End: 2023-03-01 | Stop reason: HOSPADM

## 2023-02-24 RX ORDER — PROPOFOL 10 MG/ML
INJECTION, EMULSION INTRAVENOUS
Status: DISPENSED
Start: 2023-02-24 | End: 2023-02-24

## 2023-02-24 RX ADMIN — SODIUM CHLORIDE 100 ML: 3 INJECTION, SOLUTION INTRAVENOUS at 02:02

## 2023-02-24 RX ADMIN — Medication 50 MCG/HR: at 13:19

## 2023-02-24 RX ADMIN — THIAMINE HYDROCHLORIDE 300 MG: 100 INJECTION, SOLUTION INTRAMUSCULAR; INTRAVENOUS at 22:05

## 2023-02-24 RX ADMIN — DEXTROSE MONOHYDRATE: 50 INJECTION, SOLUTION INTRAVENOUS at 15:18

## 2023-02-24 RX ADMIN — PROPOFOL 30 MCG/KG/MIN: 10 INJECTION, EMULSION INTRAVENOUS at 16:46

## 2023-02-24 RX ADMIN — FAMOTIDINE 20 MG: 10 INJECTION, SOLUTION INTRAVENOUS at 07:56

## 2023-02-24 RX ADMIN — THIAMINE HYDROCHLORIDE 300 MG: 100 INJECTION, SOLUTION INTRAMUSCULAR; INTRAVENOUS at 08:26

## 2023-02-24 RX ADMIN — PIPERACILLIN AND TAZOBACTAM 4500 MG: 4; .5 INJECTION, POWDER, FOR SOLUTION INTRAVENOUS at 06:06

## 2023-02-24 RX ADMIN — DEXTROSE MONOHYDRATE: 50 INJECTION, SOLUTION INTRAVENOUS at 17:22

## 2023-02-24 RX ADMIN — Medication 100 MCG/HR: at 00:28

## 2023-02-24 RX ADMIN — LEVETIRACETAM 500 MG: 100 INJECTION, SOLUTION, CONCENTRATE INTRAVENOUS at 14:38

## 2023-02-24 RX ADMIN — POTASSIUM CHLORIDE 20 MEQ: 29.8 INJECTION, SOLUTION INTRAVENOUS at 19:06

## 2023-02-24 RX ADMIN — PIPERACILLIN AND TAZOBACTAM 3375 MG: 3; .375 INJECTION, POWDER, LYOPHILIZED, FOR SOLUTION INTRAVENOUS at 10:39

## 2023-02-24 RX ADMIN — POTASSIUM PHOSPHATE, MONOBASIC POTASSIUM PHOSPHATE, DIBASIC 15 MMOL: 224; 236 INJECTION, SOLUTION, CONCENTRATE INTRAVENOUS at 08:32

## 2023-02-24 RX ADMIN — DESMOPRESSIN ACETATE 4 MCG: 4 SOLUTION INTRAVENOUS at 22:06

## 2023-02-24 RX ADMIN — ALBUMIN (HUMAN) 12.5 G: 12.5 INJECTION, SOLUTION INTRAVENOUS at 06:14

## 2023-02-24 RX ADMIN — DOPAMINE HYDROCHLORIDE IN DEXTROSE 5 MCG/KG/MIN: 1.6 INJECTION, SOLUTION INTRAVENOUS at 10:41

## 2023-02-24 RX ADMIN — LORAZEPAM 2 MG: 2 INJECTION INTRAMUSCULAR; INTRAVENOUS at 00:01

## 2023-02-24 RX ADMIN — POTASSIUM CHLORIDE 20 MEQ: 29.8 INJECTION, SOLUTION INTRAVENOUS at 06:48

## 2023-02-24 RX ADMIN — POTASSIUM CHLORIDE 20 MEQ: 29.8 INJECTION, SOLUTION INTRAVENOUS at 05:54

## 2023-02-24 RX ADMIN — Medication 100 MG: at 00:09

## 2023-02-24 RX ADMIN — DESMOPRESSIN ACETATE 4 MCG: 4 SOLUTION INTRAVENOUS at 15:54

## 2023-02-24 RX ADMIN — PANTOPRAZOLE SODIUM 40 MG: 40 INJECTION, POWDER, FOR SOLUTION INTRAVENOUS at 14:31

## 2023-02-24 RX ADMIN — FENTANYL CITRATE 100 MCG: 50 INJECTION, SOLUTION INTRAMUSCULAR; INTRAVENOUS at 00:15

## 2023-02-24 RX ADMIN — DEXTROSE MONOHYDRATE: 50 INJECTION, SOLUTION INTRAVENOUS at 12:38

## 2023-02-24 RX ADMIN — Medication 75 MCG/HR: at 15:12

## 2023-02-24 RX ADMIN — PIPERACILLIN AND TAZOBACTAM 3375 MG: 3; .375 INJECTION, POWDER, LYOPHILIZED, FOR SOLUTION INTRAVENOUS at 17:24

## 2023-02-24 RX ADMIN — PROPOFOL 30 MCG/KG/MIN: 10 INJECTION, EMULSION INTRAVENOUS at 22:09

## 2023-02-24 RX ADMIN — ALBUMIN (HUMAN) 12.5 G: 12.5 INJECTION, SOLUTION INTRAVENOUS at 22:11

## 2023-02-24 RX ADMIN — PROPOFOL 15 MCG/KG/MIN: 10 INJECTION, EMULSION INTRAVENOUS at 07:27

## 2023-02-24 RX ADMIN — MAGNESIUM SULFATE HEPTAHYDRATE 4000 MG: 80 INJECTION, SOLUTION INTRAVENOUS at 05:07

## 2023-02-24 RX ADMIN — POTASSIUM CHLORIDE: 2 INJECTION, SOLUTION, CONCENTRATE INTRAVENOUS at 08:33

## 2023-02-24 RX ADMIN — DEXTROSE MONOHYDRATE: 50 INJECTION, SOLUTION INTRAVENOUS at 05:09

## 2023-02-24 RX ADMIN — LEVETIRACETAM 500 MG: 100 INJECTION, SOLUTION, CONCENTRATE INTRAVENOUS at 03:09

## 2023-02-24 RX ADMIN — KETAMINE HYDROCHLORIDE 500 MG: 100 INJECTION INTRAMUSCULAR; INTRAVENOUS at 00:28

## 2023-02-24 RX ADMIN — SODIUM CHLORIDE 1000 ML: 9 INJECTION, SOLUTION INTRAVENOUS at 01:31

## 2023-02-24 RX ADMIN — POTASSIUM CHLORIDE 20 MEQ: 29.8 INJECTION, SOLUTION INTRAVENOUS at 08:08

## 2023-02-24 RX ADMIN — PROPOFOL 20 MCG/KG/MIN: 10 INJECTION, EMULSION INTRAVENOUS at 00:56

## 2023-02-24 RX ADMIN — DEXTROSE MONOHYDRATE: 50 INJECTION, SOLUTION INTRAVENOUS at 10:35

## 2023-02-24 RX ADMIN — DESMOPRESSIN ACETATE 4 MCG: 4 SOLUTION INTRAVENOUS at 10:31

## 2023-02-24 RX ADMIN — Medication 200 MG: at 00:07

## 2023-02-24 ASSESSMENT — PULMONARY FUNCTION TESTS
PIF_VALUE: 40
PIF_VALUE: 18
PIF_VALUE: 19
PIF_VALUE: 28
PIF_VALUE: 19
PIF_VALUE: 19
PIF_VALUE: 24
PIF_VALUE: 20
PIF_VALUE: 23
PIF_VALUE: 20
PIF_VALUE: 24
PIF_VALUE: 19
PIF_VALUE: 20
PIF_VALUE: 18
PIF_VALUE: 23
PIF_VALUE: 20
PIF_VALUE: 28

## 2023-02-24 NOTE — PROCEDURES
ROUTINE ELECTROENCEPHALOGRAM    Identifying Information:  Name: Miriam Cervantes  MRN: [id number]  : 1980  Interpreting Physician: Ricardo Cruz DO  Referring Provider: Rhianna Salas MD  Date of EE23  Procedure Location: Inpatient      Clinical History:  Miriam Cervantes is a 36 y.o. male with concerns for seizure like activity. Current Medications:    ketamine  200 mg IntraVENous Once    piperacillin-tazobactam  3,375 mg IntraVENous q8h    levETIRAcetam  500 mg IntraVENous Q12H    thiamine (VITAMIN B1) IVPB  300 mg IntraVENous BID    pantoprazole  40 mg IntraVENous Daily    [START ON 2023] enoxaparin  40 mg SubCUTAneous Daily    desmopressin PF  4 mcg IntraVENous Q4H        Indication:  Rule out seizure/seizure disorder     Technical Summary:  28 channels of EEG were recorded in a digital format on a patient who is reported to be intubated and unresponsive  during the recording. The patient was not sleep deprived prior to the EEG. The background consists of 6-7 Hz activity in the theta frequency range. It is symmetric, low voltage and discontinuous with intermittent diffuse attenuation lasting 1-2 seconds. Posterior dominant rhythm (PDR) is absent and the background is not reactive to stimulation. There is presence of diffuse overriding fast activity. Photic stimulation and hyperventilation were not performed. During the recording stage II sleep  was not seen. The EKG lead revealed no rhythm abnormalties. EEG Interpretation:   The EEG was abnormal due to the presence of:   Moderate to severe generalized slowing. This is a non-specific finding but is consistent with a generalized disturbance of cerebral function. It may be seen in a variety of conditions, such as toxic, metabolic, post-anoxic, multi-focal or diffuse structural abnormalities. No electrographic seizures or non-convulsive status epilepticus was seen over the entire monitoring period.         Clinical correlation recommended.      Blanca Antonio DO   Epileptologist  2/24/2023 4:33 PM

## 2023-02-24 NOTE — PROGRESS NOTES
Na went up from 118 to 125 in 3 hours. He is not on any fluids now. Got 100 ml of 3% earlier. Will try to slow down the correction - may need small dose of D5W at 30 ml/hr. Check Sodium back at 6AM.   Renal consult.

## 2023-02-24 NOTE — ED NOTES
ED TO INPATIENT SBAR HANDOFF    Patient Name: Anoop Alonso   :  1980  36 y.o. MRN:  [id number]  Preferred Name    ED Room #:  TR04/04TR-04  Family/Caregiver Present yes  @ BS  Restraints yes shackles to ankles,  @ BS  Sitter no   Sepsis Risk Score Sepsis Risk Score: 6.06    Situation  Code Status: Prior Limited Code details: Intubation/Re-intubation No Comment; Defibrillation/Cardioversion No Comment; Chest Compressions No Comment; Resuscitative Medications No Comment; Other No Comment  . Allergies: Cat hair extract, No known allergies, Pollen extract, and Seasonal  Weight: Patient Vitals for the past 96 hrs (Last 3 readings):   Weight   23 0030 200 lb (90.7 kg)     Arrived from: group home  Chief Complaint:   Chief Complaint   Patient presents with   [facility] Problem/Diagnosis:  Active Problems:    * No active hospital problems. *  Resolved Problems:    * No resolved hospital problems. *    Imaging:   CT ABDOMEN PELVIS WO CONTRAST Additional Contrast? None   Final Result   1. Acute compression fracture of the L2 vertebral body with 10% anterior   vertebral height loss and minimal posterior cortex retropulsion. 2. No acute abnormality in the chest.   3. No solid or hollow viscus organ injury within limits of this noncontrast   examination. 4. Distended fluid-filled stomach and mildly dilated fluid-filled esophagus   most consistent with reflux. CT CERVICAL SPINE WO CONTRAST   Preliminary Result   No acute abnormality of the cervical spine. CT CHEST WO CONTRAST   Final Result   1. Acute compression fracture of the L2 vertebral body with 10% anterior   vertebral height loss and minimal posterior cortex retropulsion. 2. No acute abnormality in the chest.   3. No solid or hollow viscus organ injury within limits of this noncontrast   examination.    4. Distended fluid-filled stomach and mildly dilated fluid-filled esophagus   most consistent with reflux. CT HEAD WO CONTRAST   Preliminary Result   No acute intracranial abnormality. Moderate right parietal scalp hematoma. No underlying fracture. Mild left face soft tissue swelling. Abnormal labs:   Abnormal Labs Reviewed   CBC WITH AUTO DIFFERENTIAL - Abnormal; Notable for the following components:       Result Value    WBC 14.4 (*)     RBC 2.94 (*)     Hemoglobin 8.8 (*)     Hematocrit 25.6 (*)     RDW 11.6 (*)     Segs Relative 69.6 (*)     Lymphocytes % 17.1 (*)     Monocytes % 4.4 (*)     Eosinophils % 7.8 (*)     Immature Neutrophil % 0.8 (*)     All other components within normal limits   COMPREHENSIVE METABOLIC PANEL - Abnormal; Notable for the following components:    Sodium 119 (*)     Potassium 2.8 (*)     Chloride 87 (*)     CO2 16 (*)     Creatinine 0.6 (*)     Glucose 134 (*)     Calcium 7.5 (*)     Total Protein 5.4 (*)     Alkaline Phosphatase 35 (*)     All other components within normal limits   SALICYLATE LEVEL - Abnormal; Notable for the following components:    Salicylate Lvl <0.2 (*)     All other components within normal limits   ACETAMINOPHEN LEVEL - Abnormal; Notable for the following components:    Acetaminophen Level <5.0 (*)     All other components within normal limits     Critical values: no     Abnormal Assessment Findings: hematoma to left occiptal area, pt moving ext when first in ER, unable to understand speech, not following commands    Background  History:   Past Medical History:   Diagnosis Date    Anxiety     Asthma     Chronic back pain     Diabetes mellitus ([facility] [address]     H/O 24 hour EKG monitoring 09/19/2013    EVENT MONITOR-normal sinus rhythm    H/O echocardiogram 04/20/2017    EF 55% Normal LV with normal systolic function.  No significant valvulopathy is seen    Hx of echocardiogram 08/16/13 08/13 Mitrall annular calcification with a trace to mild MR    Hyperlipidemia     Hypertension     Schizophrenia ([address] Assessment    Vitals/MEWS: MEWS Score: 7  Level of Consciousness: Responds to pain (2)   Vitals:    02/24/23 0117 02/24/23 0118 02/24/23 0119 02/24/23 0122   BP: (!) 98/55 (!) 97/57 (!) 94/53 (!) 96/50   Pulse: 71 71 71 69   Resp: 14 14 14 14   Temp:       TempSrc:       SpO2: 100% 100% 100% 100%   Weight:         FiO2 (%): intubated  O2 Flow Rate: O2 Device: Ventilator    Cardiac Rhythm:   Pain Assessment:  [] Verbal [x] [place] Scale  Pain Scale:   8/10  Last documented pain score (0-10 scale)    Last documented pain medication administered:0028 ketamine given, fentanyl drip started  Mental Status: disoriented  NIH Score: NIH     C-SSRS:    Bedside swallow:    [place] Coma Scale (GCS): [place] Coma Scale  Eye Opening: To pain  Best Verbal Response: Incomprehensible speech  Best Motor Response: Localizes pain  [place] Coma Scale Score: 9  Active LDA's:   Peripheral IV 02/24/23 Left Hand (Active)       Peripheral IV 73/03/27 Left Basilic (Active)   Site Assessment Clean, dry & intact 02/24/23 0113   Line Status Blood return noted;Normal saline locked 02/24/23 0113   Phlebitis Assessment No symptoms 02/24/23 0113   Infiltration Assessment 0 02/24/23 0113     PO Status: Nothing by Mouth  Pertinent or High Risk Medications/Drips: yes   o If Yes, please provide details: fentanyl drip, propofol drip  Pending Blood Product Administration: no     You may also review the ED PT Care Timeline found under the Summary Nursing Index tab. Recommendation    Pending orders   Plan for Discharge (if known): Additional Comments:  Pt on top bunk in halfway, fell out of bed & hit his head. Unknown if has a hx of seizures, but believed to have had a seizure after hitting his head, pt became confused & agitated. Intubated in ER per  [facility] to protect airway & c-spine.    If any further questions, please call Sending RN at [facility] [address]    Electronically signed by: Electronically signed by Aida Suero RN on 2/24/2023 at 1:25 AM       Amber Roman RN  02/24/23 0131

## 2023-02-24 NOTE — ED NOTES
Lab called regarding 2.0 lactic.  Dr. Alexia Casillas notified     Rafita Quintero, RN  02/24/23 0946

## 2023-02-24 NOTE — ED PROVIDER NOTES
Triage Chief Complaint:   Fall    [place]:  Julia Schwartz is a 36 y.o. male that presents via EMS from longterm after reported fall from the top bunk. There is concern that the patient may have had a seizure but history is somewhat unclear. Patient has not been cooperative in route. He is in a c-collar. Occipital hematoma is noted. Blood glucose in the 100s prior to arrival.    Patient unable to contribute to history. ROS:  Unable to obtain    Past Medical History:   Diagnosis Date    Anxiety     Asthma     Chronic back pain     Diabetes mellitus ([facility] [address]     H/O 24 hour EKG monitoring 09/19/2013    EVENT MONITOR-normal sinus rhythm    H/O echocardiogram 04/20/2017    EF 55% Normal LV with normal systolic function.  No significant valvulopathy is seen    Hx of echocardiogram 08/16/13 08/13 Mitrall annular calcification with a trace to mild MR    Hyperlipidemia     Hypertension     Schizophrenia ([facility] [address]      Past Surgical History:   Procedure Laterality Date    HEMORRHOID SURGERY       Family History   Problem Relation Age of Onset    Emphysema Mother     Heart Disease Father     High Blood Pressure Father      Social History     Socioeconomic History    Marital status: Single     Spouse name: Not on file    Number of children: Not on file    Years of education: Not on file    Highest education level: Not on file   Occupational History    Not on file   Tobacco Use    Smoking status: Every Day     Packs/day: 0.50     Years: 20.00     Pack years: 10.00     Types: Cigarettes    Smokeless tobacco: Current     Types: Snuff   Vaping Use    Vaping Use: Never used   Substance and Sexual Activity    Alcohol use: No    Drug use: Yes     Types: Cocaine, Marijuana (Weed)    Sexual activity: Not Currently     Comment: single    Other Topics Concern    Not on file   Social History Narrative    Not on file     Social Determinants of Health     Financial Resource Strain: Low Risk     Difficulty of Paying Living Expenses: Not hard at all   Food Insecurity: No Food Insecurity    Worried About Running Out of Food in the Last Year: Never true    Ran Out of Food in the Last Year: Never true   Transportation Needs: Not on file   Physical Activity: Not on file   Stress: Not on file   Social Connections: Not on file   Intimate Partner Violence: Not on file   Housing Stability: Not on file     Current Facility-Administered Medications   Medication Dose Route Frequency Provider Last Rate Last Admin    ketamine (KETALAR) 10 MG/ML injection             fentaNYL (SUBLIMAZE) 1,000 mcg in sodium chloride 0.9% 100 mL infusion   mcg/hr IntraVENous Continuous Gautam Gaffney MD 10 mL/hr at 02/24/23 0028 100 mcg/hr at 02/24/23 0028    propofol injection  5-50 mcg/kg/min IntraVENous Continuous Gautam Gaffney MD 10.9 mL/hr at 02/24/23 0100 20 mcg/kg/min at 02/24/23 0100    ketamine (KETALAR) injection 200 mg  200 mg IntraVENous Once Gautam Gaffney MD        potassium chloride 10 mEq/100 mL IVPB (Peripheral Line)  10 mEq IntraVENous Q1H Luan Lackey MD        famotidine (PEPCID) 20 mg in sodium chloride (PF) 0.9 % 10 mL injection  20 mg IntraVENous BID Luan Lackey MD        piperacillin-tazobactam (ZOSYN) 3,375 mg in sodium chloride 0.9 % 50 mL IVPB (mini-bag)  3,375 mg IntraVENous q8h Luan Lackey MD        levETIRAcetam (KEPPRA) 500 mg in sodium chloride 0.9 % 100 mL IVPB  500 mg IntraVENous Q12H Luan Lackey MD        piperacillin-tazobactam (ZOSYN) 4,500 mg in sodium chloride 0.9 % 100 mL IVPB (mini-bag)  4,500 mg IntraVENous Once Luan Lackey MD        LORazepam (ATIVAN) injection 1 mg  1 mg IntraVENous Q1H PRN Luan Lackey MD        thiamine (B-1) injection 300 mg  300 mg IntraVENous BID Luan Lackey MD         Current Outpatient Medications   Medication Sig Dispense Refill    atorvastatin (LIPITOR) 40 MG tablet TAKE ONE (1) TABLET BY MOUTH DAILY 30 tablet 3    docusate sodium (COLACE) 100 MG capsule TAKE ONE (1) CAPSULE BY MOUTH TWO (2) TIMES DAILY 60 capsule 3    naproxen (NAPROSYN) 500 MG tablet TAKE ONE (1) TABLET BY MOUTH TWO TIMES DAILY WITH MEALS 60 tablet 3    blood glucose monitor kit and supplies Dispense sufficient amount for indicated testing frequency plus additional to accommodate PRN testing needs. Dispense all needed supplies to include: monitor, strips, lancing device, lancets, control solutions, alcohol swabs.  1 kit 0    omeprazole (PRILOSEC) 40 MG delayed release capsule TAKE ONE (1) CAPSULE BY MOUTH DAILY 30 capsule 3    fenofibrate (TRICOR) 145 MG tablet TAKE ONE (1) TABLET BY MOUTH ONCE DAILY 30 tablet 3    gabapentin (NEURONTIN) 300 MG capsule TAKE TWO (2) CAPSULE BY MOUTH THREE TIMES DAILY 180 capsule 3    metFORMIN (GLUCOPHAGE) 500 MG tablet TAKE ONE (1) TABLET BY MOUTH TWO TIMES DAILY WITH MEALS 60 tablet 3    lisinopril (PRINIVIL;ZESTRIL) 20 MG tablet TAKE 1 TABLET BY MOUTH ONCE DAILY 30 tablet 3    LIOR-24 100 MG extended release capsule TAKE ONE (1) CAPSULE BY MOUTH DAILY 30 capsule 3    benztropine (COGENTIN) 1 MG tablet Take 1 mg by mouth at bedtime      OLANZapine (ZYPREXA) 10 MG tablet Take 10 mg by mouth nightly      albuterol sulfate HFA (VENTOLIN HFA) 108 (90 Base) MCG/ACT inhaler Inhale 2 puffs into the lungs 4 times daily as needed for Wheezing 18 g 0    montelukast (SINGULAIR) 10 MG tablet TAKE ONE (1) TABLET BY MOUTH EVERY NIGHT 30 tablet 3    ferrous sulfate (IRON 325) 325 (65 Fe) MG tablet Take 1 tablet by mouth 2 times daily 30 tablet 3    busPIRone (BUSPAR) 5 MG tablet Take 1 tablet by mouth 2 times daily (Patient taking differently: Take 10 mg by mouth 3 times daily) 60 tablet 3    hydrOXYzine (ATARAX) 25 MG tablet Take 1 tablet by mouth 2 times daily 60 tablet 3    Spacer/Aero-Holding Chambers (AEROCHAMBER) MISC Inhale 1 Device into the lungs every 6 hours 1 each 0    fluPHENAZine decanoate (PROLIXIN) 25 MG/ML injection Inject 12.5 mg into the muscle every 14 days      tiotropium (SPIRIVA) 18 MCG inhalation capsule Inhale 18 mcg into the lungs daily      melatonin 5 MG TBDP disintegrating tablet Take 10 mg by mouth nightly        Allergies   Allergen Reactions    Cat Hair Extract     No Known Allergies     Pollen Extract     Seasonal        Nursing Notes Reviewed    Physical Exam:  ED Triage Vitals [02/24/23 0004]   Enc Vitals Group      BP (!) 152/90      Heart Rate (!) 133      Resp 29      Temp       Temp src       SpO2 98 %      Weight       Height       Head Circumference       Peak Flow       Pain Score       Pain Loc       Pain Edu? Excl. in [address]? GENERAL APPEARANCE: Unintelligible speech, agitated, c-collar in place  HEENT: Right occipital hematoma, pupils dilated and minimally responsive. Conjunctiva anicteric. Mucous membranes moist. Tolerates saliva. No trismus. Neck supple. Trachea midline. HEART: Tachycardic. Radial pulses 2+. LUNGS: Respirations unlabored. CTAB  ABDOMEN: Soft. Non-distended  EXTREMITIES: No acute deformities. SKIN: Warm and dry. NEUROLOGICAL: No gross facial drooping. Moves all 4 extremities spontaneously. Does not follow commands. Unintelligible speech. Agitated.     I have reviewed and interpreted all of the currently available lab results from this visit (if applicable):  Results for orders placed or performed during the hospital encounter of 02/23/23   CBC with Auto Differential   Result Value Ref Range    WBC 14.4 (H) 4.0 - 10.5 K/CU MM    RBC 2.94 (L) 4.6 - 6.2 M/CU MM    Hemoglobin 8.8 (L) 13.5 - 18.0 GM/DL    Hematocrit 25.6 (L) 42 - 52 %    MCV 87.1 78 - 100 FL    MCH 29.9 27 - 31 PG    MCHC 34.4 32.0 - 36.0 %    RDW 11.6 (L) 11.7 - 14.9 %    Platelets 643 824 - 717 K/CU MM    MPV 10.3 7.5 - 11.1 FL    Differential Type AUTOMATED DIFFERENTIAL     Segs Relative 69.6 (H) 36 - 66 %    Lymphocytes % 17.1 (L) 24 - 44 %    Monocytes % 4.4 (H) 0 - 4 %    Eosinophils % 7.8 (H) 0 - 3 %    Basophils % 0.3 0 - 1 %    Segs Absolute 10.0 K/CU MM    Lymphocytes Absolute 2.5 K/CU MM    Monocytes Absolute 0.6 K/CU MM    Eosinophils Absolute 1.1 K/CU MM    Basophils Absolute 0.1 K/CU MM    Nucleated RBC % 0.0 %    Total Nucleated RBC 0.0 K/CU MM    Total Immature Neutrophil 0.11 K/CU MM    Immature Neutrophil % 0.8 (H) 0 - 0.43 %   Comprehensive Metabolic Panel   Result Value Ref Range    Sodium 119 (LL) 135 - 145 MMOL/L    Potassium 2.8 (LL) 3.5 - 5.1 MMOL/L    Chloride 87 (L) 99 - 110 mMol/L    CO2 16 (L) 21 - 32 MMOL/L    BUN 8 6 - 23 MG/DL    Creatinine 0.6 (L) 0.9 - 1.3 MG/DL    Est, Glom Filt Rate >60 >60 mL/min/1.73m2    Glucose 134 (H) 70 - 99 MG/DL    Calcium 7.5 (L) 8.3 - 10.6 MG/DL    Albumin 3.6 3.4 - 5.0 GM/DL    Total Protein 5.4 (L) 6.4 - 8.2 GM/DL    Total Bilirubin 0.6 0.0 - 1.0 MG/DL    ALT 22 10 - 40 U/L    AST 27 15 - 37 IU/L    Alkaline Phosphatase 35 (L) 40 - 129 IU/L    Anion Gap 16 4 - 16   Ethanol   Result Value Ref Range    Alcohol Scrn <0.01 <3.73 %WT/VOL   Salicylate   Result Value Ref Range    Salicylate Lvl <8.6 (L) 15 - 30 MG/DL    DOSE AMOUNT DOSE AMT. GIVEN - UNKNOWN     DOSE TIME DOSE TIME GIVEN - UNKNOWN    Acetaminophen Level   Result Value Ref Range    Acetaminophen Level <5.0 (L) 15 - 30 ug/ml    DOSE AMOUNT DOSE AMT.  GIVEN - UNKNOWN     DOSE TIME DOSE TIME GIVEN - UNKNOWN    Sodium Lab   Result Value Ref Range    Sodium 118 (LL) 135 - 145 MMOL/L   Beta-Hydroxybutyrate   Result Value Ref Range    Beta-Hydroxybutyrate 0.8 0.0 - 3.0 MG/DL   CBC with Auto Differential   Result Value Ref Range    WBC 11.5 (H) 4.0 - 10.5 K/CU MM    RBC 2.58 (L) 4.6 - 6.2 M/CU MM    Hemoglobin 8.0 (L) 13.5 - 18.0 GM/DL    Hematocrit 22.2 (L) 42 - 52 %    MCV 86.0 78 - 100 FL    MCH 31.0 27 - 31 PG    MCHC 36.0 32.0 - 36.0 %    RDW 11.5 (L) 11.7 - 14.9 %    Platelets 450 (L) 125 - 440 K/CU MM    MPV 10.6 7.5 - 11.1 FL    Differential Type AUTOMATED DIFFERENTIAL     Segs Relative 82.6 (H) 36 - 66 %    Lymphocytes % 10.7 (L) 24 - 44 %    Monocytes % 6.0 (H) 0 - 4 %    Eosinophils % 0.1 0 - 3 %    Basophils % 0.3 0 - 1 %    Segs Absolute 9.5 K/CU MM    Lymphocytes Absolute 1.2 K/CU MM    Monocytes Absolute 0.7 K/CU MM    Eosinophils Absolute 0.0 K/CU MM    Basophils Absolute 0.0 K/CU MM    Nucleated RBC % 0.0 %    Total Nucleated RBC 0.0 K/CU MM    Total Immature Neutrophil 0.04 K/CU MM    Immature Neutrophil % 0.3 0 - 0.43 %   Urinalysis   Result Value Ref Range    Color, UA YELLOW YELLOW    Clarity, UA CLEAR CLEAR    Glucose, Urine NEGATIVE NEGATIVE MG/DL    Bilirubin Urine NEGATIVE NEGATIVE MG/DL    Ketones, Urine NEGATIVE NEGATIVE MG/DL    Specific Gravity, UA <1.005 1.001 - 1.035    Blood, Urine NEGATIVE NEGATIVE    pH, Urine 7.0 5.0 - 8.0    Protein, UA NEGATIVE NEGATIVE MG/DL    Urobilinogen, Urine 0.2 0.2 - 1.0 MG/DL    Nitrite Urine, Quantitative NEGATIVE NEGATIVE    Leukocyte Esterase, Urine NEGATIVE NEGATIVE    Urinalysis Comments       Microscopic exam not performed based on chemical results unless requested in original order. Protime/INR & PTT   Result Value Ref Range    Protime 11.7 11.7 - 14.5 SECONDS    INR 0.91 INDEX    aPTT 33.4 25.1 - 37.1 SECONDS   EKG 12 Lead   Result Value Ref Range    Ventricular Rate 56 BPM    Atrial Rate 56 BPM    P-R Interval 180 ms    QRS Duration 90 ms    Q-T Interval 436 ms    QTc Calculation (Bazett) 420 ms    P Axis 40 degrees    R Axis 69 degrees    T Axis 56 degrees    Diagnosis       ** Poor data quality, interpretation may be adversely affected  Sinus bradycardia with premature atrial complexes with aberrant conduction  Low voltage QRS  Borderline ECG  When compared with ECG of 24-FEB-2022 01:05,  aberrant conduction is now present        Radiographs (if obtained):  [] The following radiograph was interpreted by myself in the absence of a radiologist:  [x] Radiologist's Report Reviewed:    Medical Decision Making and ED Course:    CC/HPI Summary, DDx, ED Course, and Reassessment: Patient presents as above.   On arrival, the patient is agitated, noncooperative with unintelligible speech and does not respond to commands or answer questions. She has a large occipital hematoma and there is concern for acute intracranial hemorrhage. Patient was intubated for airway protection in order to facilitate trauma/medical work-up. There was concern prior to arrival that patient may have had a seizure and fell but patient does not appear to have any history of seizures and is not on antiepileptics per chart review. He has been in nursing home since November at this point. CT is consistent only with L2 compression fracture. No evidence of intracranial hemorrhage or other traumatic injury. Blood work significant for hemoglobin of 8.8 which is down from 14 in October of last year. Patient is hemodynamically stable and there is no evidence of active hemorrhage at this time. Unknown how acute this blood loss is. Patient is hyponatremic and hypokalemic. This is in the setting of normal renal function and low bicarb. Patient was given 3% normal saline which Dr. Magda Mallory of nephrology agreed with. My suspicion is that the patient for what ever reason whether it was a recent illness with diarrhea developed severe hyponatremia leading to seizure, fall. Patient did have a distended stomach but no evidence of GI bleeding after NG tube placement. Patient initially became slightly hypotensive with initiation of propofol in addition to fentanyl for sedation but this improved with IV fluids and he did not require vasopressors. Patient will be admitted to the ICU for further management. I feel that neurosurgery can be consulted later in the patient stay for his L2 compression fracture and this does not need immediate attention. Patient was moving all extremities and did not appear to have any focal neurologic deficits on his exam on arrival.    Procedure Note - Intubation:  Emergent procedure.  Pre-oxygenation was administered using non rebreather and nasal cannula and the appropriate equipment and staff were made available at the bedside. Lili Whitten was sedated/paralyzed; please see the chart for the drugs and dosages administered. A Rodo 4 laryngoscope blade was used for a grade 0 view and a 7.5mm endotracheal tube was viewed to pass through the cords on the first attempt. The tube was secured at 23cm to the maxillary teeth. Tracheal position was confirmed using a colorimetric end-tidal CO2 detector, tube condensation and chest auscultation/visualization. ETT depth confirmed with CT chest. Respiratory therapy is at the bedside and is assisting with ventilatory management. Procedure Note - Central Line:  Emergent procedure. Lili Whitten was prepped and draped in standard bedside fashion in the [address] area. Physical landmarks with ultrasound guidance was used to locate the central vein. Seldinger technique was used for placement of the CVC with confirmation of wire placement in venous structure by ultrasound prior to placement of CVC. All ports sabrina and flushed. The patient tolerated the procedure well and no acute complications occurred. I directly delivered medical care to this critically ill patient. Timely evaluation and treatment was necessary to address the significant organ system dysfunction present in this patient. Due to high probability of clinically significant, life-threatening deterioration, the patient required my highest level of preparedness to intervene emergently and I personally spent this critical care time directly and personally managing the patient. I was involved in the stabilization of this critical patient for 95 minutes.   During this time I was physically present at the bedside during my initial exam and for re-examinations at intervals coordinating this patient's care with other physicians, examining radiographs, interpreting electrocardiograms and rhythm strips, reviewing laboratory results, discussing the patient's condition and management with the patient/family. Time billed does not include time for procedures. History from : EMS    Limitations to history :  Altered Mental Status    Patient was given the following medications:  Medications   ketamine (KETALAR) 10 MG/ML injection (has no administration in time range)   fentaNYL (SUBLIMAZE) 1,000 mcg in sodium chloride 0.9% 100 mL infusion (100 mcg/hr IntraVENous New Bag 2/24/23 0028)   propofol injection (20 mcg/kg/min × 90.7 kg IntraVENous Rate/Dose Verify 2/24/23 0100)   ketamine (KETALAR) injection 200 mg (has no administration in time range)   potassium chloride 10 mEq/100 mL IVPB (Peripheral Line) (has no administration in time range)   famotidine (PEPCID) 20 mg in sodium chloride (PF) 0.9 % 10 mL injection (has no administration in time range)   piperacillin-tazobactam (ZOSYN) 3,375 mg in sodium chloride 0.9 % 50 mL IVPB (mini-bag) (has no administration in time range)   levETIRAcetam (KEPPRA) 500 mg in sodium chloride 0.9 % 100 mL IVPB (has no administration in time range)   piperacillin-tazobactam (ZOSYN) 4,500 mg in sodium chloride 0.9 % 100 mL IVPB (mini-bag) (has no administration in time range)   LORazepam (ATIVAN) injection 1 mg (has no administration in time range)   thiamine (B-1) injection 300 mg (has no administration in time range)   LORazepam (ATIVAN) injection 2 mg (2 mg IntraVENous Given 2/24/23 0001)   ketamine (KETALAR) injection 200 mg (200 mg IntraVENous Given 2/24/23 0007)   succinylcholine chloride (ANECTINE) injection 100 mg (100 mg IntraVENous Given 2/24/23 0009)   fentaNYL (SUBLIMAZE) injection 100 mcg (100 mcg IntraVENous Given 2/24/23 0015)   ketamine (KETALAR) 100 MG/ML injection (500 mg  Given 2/24/23 0028)   0.9 % sodium chloride bolus (1,000 mLs IntraVENous New Bag 2/24/23 0131)   sodium chloride 3 % solution 100 mL (0 mLs IntraVENous Stopped 2/24/23 0223)       Independent Imaging Interpretation by me:     EKG (if obtained): (All EKG's are interpreted by myself in the absence of a cardiologist) 12 lead EKG as interpreted by me reveals normal sinus rhythm. Axis is normal. There are no ischemic ST elevations or other suspicious ST changes;  QRS interval is narrow, QT interval is not prolonged. Final interpretation: Normal sinus rhythm. Chronic conditions affecting care:     Discussion with Other Profesionals : Admitting Team Dr. Nicolás Rangel, Dr. Tammi Crooks    Social Determinants : None      Disposition Considerations (tests considered but not done, Shared Decision Making, Pt Expectation of Test or Tx.):         I am the Primary Clinician of Record. Clinical Impression:  1. Hyponatremia    2. Hypokalemia    3. Hypocalcemia    4. Acute respiratory failure, unspecified whether with hypoxia or hypercapnia (HCC)    5. Closed head injury, initial encounter    6. Anemia, unspecified type    7.  Compression fracture of L2 vertebra, initial encounter [facility])      (Please note that portions of this note may have been completed with a voice recognition program. Efforts were made to edit the dictations but occasionally words are mis-transcribed.)    MD Nima Streeter MD  02/24/23 [address] Naima MD  02/24/23 2017

## 2023-02-24 NOTE — H&P
V2.0  History and Physical      Name:  Lili Whitten /Age/Sex: 1980  (36 y.o. male)   MRN & CSN:  [id number] & [id number] Encounter Date/Time: 2023 4:19 AM EST   Location:  -A PCP: Perez Trotter MD       Hospital Day: 2    Assessment and Plan:   Lili Whitten is a 36 y.o. male with a pmh of DM II, HTN, HLD, COPD, Schizophrenia who presents with Seizure [facility]    Hospital Problems             Last Modified POA    * (Principal) Seizure ([facility] [address]  Yes     Metabolic encephalopathy - 2/2 to hyponatremia. Episode of seizure like activity. Utox negative. CT head - No acute intracranial abnormality. Moderate right parietal scalp hematoma. Neurology consulted. Q4 neurocheck. On keppra. Had to be intubated in ED to protect the airway. Continue Vent management as per ICU    Severe hyponatremia - Started on 3% sodium. Nephrology consulted. BMP q4. Target correction no more than 8 meq in 24 hrs. Neuro check q4 hr    Anemia - Hb 8.8 on admission. Last hb few months back was 14. Currently no sign of overt bleeding. CT abdomen did not reveal any fluid collection. NG and Urine Output is clear. Need further workup with iron panel, Folate, B12, FOBT. Monitor vital and h/h in the mean time. Transfuse as needed    HLD - on statin    HTN - currently BP soft    COPD - does not look in exacerbation. currently on vent.  Duoneb  PRN    Schizophrenia  - meds currently not resumed      Disposition:   Current Living situation: from [place]  Expected Disposition: TBD  Estimated D/C: 3 Days    Diet No diet orders on file   DVT Prophylaxis [] Lovenox, []  Heparin, [x] SCDs, [] Ambulation,  [] Eliquis, [] Xarelto, [] Coumadin   Code Status Prior   Surrogate Decision Maker/ POA      History from:     patient, electronic medical record, ED physician    History of Present Illness:     Chief Complaint: fall  Lili Whitten is a 36 y.o. male with pmh as mentioned above who was brought by EMS from intermediate after reported fall from the top bunk. There is concern that the patient may have had a seizure but history is somewhat unclear. Patient has not been cooperative in route. He came with c-collar. Occipital hematoma is noted. Blood glucose in the 100s prior to arrival. Patient was intubated for airway protection in order to facilitate trauma/medical work-up by ED. Patient is currently on vent and sedation. Unable to get history from patient. Review of Systems: Need 10 Elements   Review of Systems    Unable to obtain     Objective:   No intake or output data in the 24 hours ending 02/24/23 0419   Vitals:   Vitals:    02/24/23 0149 02/24/23 0152 02/24/23 0156 02/24/23 0317   BP: (!) 100/54 (!) 94/52 (!) 95/55    Pulse: 64 63 61 53   Resp: 14 14 14 18   Temp:    98.3 °F (36.8 °C)   TempSrc:       SpO2: 100% 100% 100% 100%   Weight:    174 lb 6.1 oz (79.1 kg)       Medications Prior to Admission     Prior to Admission medications    Medication Sig Start Date End Date Taking? Authorizing Provider   atorvastatin (LIPITOR) 40 MG tablet TAKE ONE (1) TABLET BY MOUTH DAILY 11/4/22   Amarjit Vicente MD   docusate sodium (COLACE) 100 MG capsule TAKE ONE (1) CAPSULE BY MOUTH TWO (2) TIMES DAILY 10/6/22   Amarjit Vicente MD   naproxen (NAPROSYN) 500 MG tablet TAKE ONE (1) TABLET BY MOUTH TWO TIMES DAILY WITH MEALS 10/6/22   Amarjit Vicente MD   blood glucose monitor kit and supplies Dispense sufficient amount for indicated testing frequency plus additional to accommodate PRN testing needs. Dispense all needed supplies to include: monitor, strips, lancing device, lancets, control solutions, alcohol swabs.  9/28/22   Amarjit Vicente MD   omeprazole (PRILOSEC) 40 MG delayed release capsule TAKE ONE (1) CAPSULE BY MOUTH DAILY 9/1/22   Amarjit Vicente MD   fenofibrate (TRICOR) 145 MG tablet TAKE ONE (1) TABLET BY MOUTH ONCE DAILY 9/1/22   Amarjit Vicente MD   gabapentin (NEURONTIN) 300 MG capsule TAKE TWO (2) CAPSULE BY MOUTH THREE TIMES DAILY 9/1/22 10/1/22 Teodora Osman MD   metFORMIN (GLUCOPHAGE) 500 MG tablet TAKE ONE (1) TABLET BY MOUTH TWO TIMES DAILY WITH MEALS 8/19/22   Teodora Osman MD   lisinopril (PRINIVIL;ZESTRIL) 20 MG tablet TAKE 1 TABLET BY MOUTH ONCE DAILY 8/3/22   SARAH Escobedo CNP   LIOR-24 100 MG extended release capsule TAKE ONE (1) CAPSULE BY MOUTH DAILY 7/21/22   SARAH Escobedo CNP   benztropine (COGENTIN) 1 MG tablet Take 1 mg by mouth at bedtime    Historical Provider, MD   OLANZapine (ZYPREXA) 10 MG tablet Take 10 mg by mouth nightly    Historical Provider, MD   albuterol sulfate HFA (VENTOLIN HFA) 108 (90 Base) MCG/ACT inhaler Inhale 2 puffs into the lungs 4 times daily as needed for Wheezing 6/1/22   Teodora Osman MD   montelukast (SINGULAIR) 10 MG tablet TAKE ONE (1) TABLET BY MOUTH EVERY NIGHT 5/26/22   Teodora Osman MD   ferrous sulfate (IRON 325) 325 (65 Fe) MG tablet Take 1 tablet by mouth 2 times daily 7/23/21   Teodora Osman MD   busPIRone (BUSPAR) 5 MG tablet Take 1 tablet by mouth 2 times daily  Patient taking differently: Take 10 mg by mouth 3 times daily 7/23/21   Teodora Osman MD   hydrOXYzine (ATARAX) 25 MG tablet Take 1 tablet by mouth 2 times daily 7/23/21   Teodora Osman MD   Spacer/Aero-Holding Chambers (AEROCHAMBER) MISC Inhale 1 Device into the lungs every 6 hours 1/27/21   Queenie Hatfield PA-C   fluPHENAZine decanoate (PROLIXIN) 25 MG/ML injection Inject 12.5 mg into the muscle every 14 days    Historical Provider, MD   tiotropium (SPIRIVA) 18 MCG inhalation capsule Inhale 18 mcg into the lungs daily    Historical Provider, MD   melatonin 5 MG TBDP disintegrating tablet Take 10 mg by mouth nightly     Historical Provider, MD       Physical Exam: Need 8 Elements   Physical Exam     General: NAD  Eyes: EOMI  ENT: on vent  Cardiovascular: Regular rate. Respiratory: Clear to auscultation  Gastrointestinal: Soft, non tender  Genitourinary: no suprapubic tenderness.  Has flores  Musculoskeletal: No edema  Skin: warm, dry  Neuro: Alert. Psych: Mood appropriate. Past Medical History:   PMHx   Past Medical History:   Diagnosis Date    Anxiety     Asthma     Chronic back pain     Diabetes mellitus ([facility] [address]     H/O 24 hour EKG monitoring 09/19/2013    EVENT MONITOR-normal sinus rhythm    H/O echocardiogram 04/20/2017    EF 55% Normal LV with normal systolic function. No significant valvulopathy is seen    Hx of echocardiogram 08/16/13 08/13 Mitrall annular calcification with a trace to mild MR    Hyperlipidemia     Hypertension     Schizophrenia ([facility] [address]      PSHX:  has a past surgical history that includes Hemorrhoid surgery. Allergies: Allergies   Allergen Reactions    Cat Hair Extract     No Known Allergies     Pollen Extract     Seasonal      Fam HX:  family history includes Emphysema in his mother; Heart Disease in his father; High Blood Pressure in his father.   Soc HX:   Social History     Socioeconomic History    Marital status: Single   Tobacco Use    Smoking status: Every Day     Packs/day: 0.50     Years: 20.00     Pack years: 10.00     Types: Cigarettes    Smokeless tobacco: Current     Types: Snuff   Vaping Use    Vaping Use: Never used   Substance and Sexual Activity    Alcohol use: No    Drug use: Yes     Types: Cocaine, Marijuana (Weed)    Sexual activity: Not Currently     Comment: single      Social Determinants of Health     Financial Resource Strain: Low Risk     Difficulty of Paying Living Expenses: Not hard at all   Food Insecurity: No Food Insecurity    Worried About Running Out of Food in the Last Year: Never true    Brock of Food in the Last Year: Never true       Medications:   Medications:    ketamine        ketamine  200 mg IntraVENous Once    potassium chloride  10 mEq IntraVENous Q1H    famotidine (PEPCID) injection  20 mg IntraVENous BID    piperacillin-tazobactam  3,375 mg IntraVENous q8h    levETIRAcetam  500 mg IntraVENous Q12H    piperacillin-tazobactam 4,500 mg IntraVENous Once    thiamine (VITAMIN B1) IVPB  300 mg IntraVENous BID    magnesium sulfate  4,000 mg IntraVENous Once      Infusions:    fentaNYL 100 mcg/hr (02/24/23 0028)    propofol 25 mcg/kg/min (02/24/23 0405)    dextrose       PRN Meds: LORazepam, 1 mg, Q1H PRN        Labs      CBC:   Recent Labs     02/24/23 0015 02/24/23 0225   WBC 14.4* 11.5*   HGB 8.8* 8.0*    132*     BMP:    Recent Labs     02/24/23 0015 02/24/23 0225 02/24/23 0320   *  119* 121* 125*   K 2.8* 2.9*  --    CL 87* 90*  --    CO2 16* 19*  --    BUN 8 8  --    CREATININE 0.6* 0.6*  --    GLUCOSE 134* 89  --      Hepatic:   Recent Labs     02/24/23 0015   AST 27   ALT 22   BILITOT 0.6   ALKPHOS 35*     Lipids:   Lab Results   Component Value Date/Time    CHOL 154 10/05/2022 10:05 AM    HDL 73 10/05/2022 10:05 AM    TRIG 76 10/05/2022 10:05 AM     Hemoglobin A1C:   Lab Results   Component Value Date/Time    LABA1C 5.9 01/26/2022 01:56 PM     TSH: No results found for: TSH  Troponin:   Lab Results   Component Value Date/Time    TROPONINT <0.010 02/24/2023 02:25 AM    TROPONINT <0.010 02/24/2022 01:09 AM    TROPONINT <0.010 01/27/2021 08:30 AM     Lactic Acid: No results for input(s): LACTA in the last 72 hours.   BNP:   Recent Labs     02/24/23 0225   PROBNP 287.0     UA:  Lab Results   Component Value Date/Time    NITRU NEGATIVE 02/24/2023 01:10 AM    COLORU YELLOW 02/24/2023 01:10 AM    WBCUA 1 12/28/2018 12:31 PM    RBCUA NONE SEEN 12/28/2018 12:31 PM    MUCUS RARE 12/28/2018 12:31 PM    TRICHOMONAS NONE SEEN 12/28/2018 12:31 PM    BACTERIA NEGATIVE 12/28/2018 12:31 PM    CLARITYU CLEAR 02/24/2023 01:10 AM    SPECGRAV <1.005 02/24/2023 01:10 AM    LEUKOCYTESUR NEGATIVE 02/24/2023 01:10 AM    UROBILINOGEN 0.2 02/24/2023 01:10 AM    BILIRUBINUR NEGATIVE 02/24/2023 01:10 AM    BLOODU NEGATIVE 02/24/2023 01:10 AM    KETUA NEGATIVE 02/24/2023 01:10 AM     Urine Cultures: No results found for: Tanja Collazo Cultures: No results found for: BC  No results found for: BLOODCULT2  Organism: No results found for: ORG    Imaging/Diagnostics Last 24 Hours   CT ABDOMEN PELVIS WO CONTRAST Additional Contrast? None    Result Date: 2/24/2023  EXAMINATION: CT OF THE ABDOMEN AND PELVIS WITHOUT CONTRAST; CT OF THE CHEST WITHOUT CONTRAST 2/24/2023 12:39 am TECHNIQUE: CT of the abdomen and pelvis was performed without the administration of intravenous contrast. Multiplanar reformatted images are provided for review. Automated exposure control, iterative reconstruction, and/or weight based adjustment of the mA/kV was utilized to reduce the radiation dose to as low as reasonably achievable.; CT of the chest was performed without the administration of intravenous contrast. Multiplanar reformatted images are provided for review. Automated exposure control, iterative reconstruction, and/or weight based adjustment of the mA/kV was utilized to reduce the radiation dose to as low as reasonably achievable. COMPARISON: 09/12/2012 HISTORY: ORDERING SYSTEM PROVIDED HISTORY: fall TECHNOLOGIST PROVIDED HISTORY: Reason for exam:->fall Additional Contrast?->None Decision Support Exception - unselect if not a suspected or confirmed emergency medical condition->Emergency Medical Condition (MA) Reason for Exam: fall; FINDINGS: Chest: Mediastinum: Normal heart size. No pericardial effusion. Normal thoracic aortic caliber. No abnormal mediastinal or hilar lymph node endotracheal tube in place. Mildly dilated fluid-filled esophagus. Lungs/pleura: Mild dependent atelectasis. Lungs otherwise clear. No pneumothorax or pleural effusion. Airways unremarkable. Soft Tissues/Bones: Acute osseous or soft tissue abnormality. Abdomen/Pelvis: Organs: Limited evaluation due lack of intravenous contrast.  Liver, gallbladder, biliary system, pancreas, spleen, adrenal glands, and kidneys unremarkable. No hydronephrosis or urinary tract calculus.  GI/Bowel: No bowel obstruction. Distended fluid, gas, and fluid-filled stomach. No evidence of appendicitis. Pelvis: Urinary bladder and pelvic organs unremarkable. Peritoneum/Retroperitoneum: No free air or free fluid. Normal abdominal aortic caliber. No retroperitoneal a Ghanshyam Other within limits of this noncontrast examination. No abnormal lymph node. Bones/Soft Tissues: Acute L2 compression fracture with superior endplate concavity and subjacent fracture plane resulting in 10% anterior vertebral body height loss and 2 mm retropulsion of the posterosuperior cortex. Bones otherwise intact. No acute soft tissue abnormality. Tiny uncomplicated fat containing bilateral inguinal hernias. 1. Acute compression fracture of the L2 vertebral body with 10% anterior vertebral height loss and minimal posterior cortex retropulsion. 2. No acute abnormality in the chest. 3. No solid or hollow viscus organ injury within limits of this noncontrast examination. 4. Distended fluid-filled stomach and mildly dilated fluid-filled esophagus most consistent with reflux. XR ABDOMEN (KUB) (SINGLE AP VIEW)    1. Nasogastric tube tip terminates in the distal stomach. 2. Gaseous distention of the stomach. Finding is similar to the CT dated February 24, 2023. 3. L2 compression deformity is seen to better advantage on prior CT. CT HEAD WO CONTRAST    No acute intracranial abnormality. Moderate right parietal scalp hematoma. No underlying fracture. Mild left face soft tissue swelling. CT CHEST WO CONTRAST    Result Date: 2/24/2023  EXAMINATION: CT OF THE ABDOMEN AND PELVIS WITHOUT CONTRAST; CT OF THE CHEST WITHOUT CONTRAST 2/24/2023 12:39 am TECHNIQUE: CT of the abdomen and pelvis was performed without the administration of intravenous contrast. Multiplanar reformatted images are provided for review.  Automated exposure control, iterative reconstruction, and/or weight based adjustment of the mA/kV was utilized to reduce the radiation dose to as low as reasonably achievable.; CT of the chest was performed without the administration of intravenous contrast. Multiplanar reformatted images are provided for review. Automated exposure control, iterative reconstruction, and/or weight based adjustment of the mA/kV was utilized to reduce the radiation dose to as low as reasonably achievable. COMPARISON: 09/12/2012 HISTORY: ORDERING SYSTEM PROVIDED HISTORY: fall TECHNOLOGIST PROVIDED HISTORY: Reason for exam:->fall Additional Contrast?->None Decision Support Exception - unselect if not a suspected or confirmed emergency medical condition->Emergency Medical Condition (MA) Reason for Exam: fall; FINDINGS: Chest: Mediastinum: Normal heart size. No pericardial effusion. Normal thoracic aortic caliber. No abnormal mediastinal or hilar lymph node endotracheal tube in place. Mildly dilated fluid-filled esophagus. Lungs/pleura: Mild dependent atelectasis. Lungs otherwise clear. No pneumothorax or pleural effusion. Airways unremarkable. Soft Tissues/Bones: Acute osseous or soft tissue abnormality. Abdomen/Pelvis: Organs: Limited evaluation due lack of intravenous contrast.  Liver, gallbladder, biliary system, pancreas, spleen, adrenal glands, and kidneys unremarkable. No hydronephrosis or urinary tract calculus. GI/Bowel: No bowel obstruction. Distended fluid, gas, and fluid-filled stomach. No evidence of appendicitis. Pelvis: Urinary bladder and pelvic organs unremarkable. Peritoneum/Retroperitoneum: No free air or free fluid. Normal abdominal aortic caliber. No retroperitoneal a Megan Clan within limits of this noncontrast examination. No abnormal lymph node. Bones/Soft Tissues: Acute L2 compression fracture with superior endplate concavity and subjacent fracture plane resulting in 10% anterior vertebral body height loss and 2 mm retropulsion of the posterosuperior cortex. Bones otherwise intact. No acute soft tissue abnormality.   Tiny uncomplicated fat containing bilateral inguinal hernias. 1. Acute compression fracture of the L2 vertebral body with 10% anterior vertebral height loss and minimal posterior cortex retropulsion. 2. No acute abnormality in the chest. 3. No solid or hollow viscus organ injury within limits of this noncontrast examination. 4. Distended fluid-filled stomach and mildly dilated fluid-filled esophagus most consistent with reflux. CT CERVICAL SPINE WO CONTRAST    No acute abnormality of the cervical spine. XR CHEST PORTABLE    Result Date: 2/24/2023  EXAMINATION: ONE XRAY VIEW OF THE CHEST 2/24/2023 2:41 am COMPARISON: 09/24/2022 HISTORY: ORDERING SYSTEM PROVIDED HISTORY: central line TECHNOLOGIST PROVIDED HISTORY: Reason for exam:->central line Reason for Exam: central line FINDINGS: Endotracheal tube terminates 4.9 cm above the kellen. Enteric tube courses below the diaphragm with the tip not imaged. Right internal jugular center venous catheter tip is in the SVC. Clear lungs. No pneumothorax or pleural effusion. Cardiac and mediastinal contours normal.  No acute osseous abnormality. 1. Endotracheal tube terminates 4.9 cm above the kellen. 2. Right internal jugular center venous catheter tip in the SVC. 3. Clear lungs. No pneumothorax. Personally reviewed Lab Studies, Imaging.      Electronically signed by Peterson Dow MD on 2/24/2023 at 4:19 AM

## 2023-02-24 NOTE — ED NOTES
Pt to CT via stretcher with RT & 2nd RN to help maintain c-spine. c-spine maintained while moving pt onto CT table.      Kimi Gardiner, RN  02/24/23 [id number]

## 2023-02-24 NOTE — PROGRESS NOTES
The  that is at the bedside stated the Shaheen Rodríguez stated Pt is released from half-way and that he does not need to go back to half-way when he is released from the hospital. The  stated Pt has been released from their custody per the Shaheen Rodríguez and he does not need a  to continually remain at the bedside with him. The  removed the handcuff that was on his left ankle and took it with him. The  left Pt's belongings he came here with from the half-way and they are all in a black trash bag on the couch in his room- he has a pair of light colored boots, pants, shirt, some paperwork, a book here with him. The  then left. Pt is still sedated, intubated, and is on vent support. His respirations are easy & unlabored on his current vent settings and his skin is warm and dry. He is responding to verbal and painful stimuli, is moving all 4 extremities, and is following commands. Will continue to monitor him closely.

## 2023-02-24 NOTE — ED NOTES
Report called to [facility], RN. All questions answered.       Merry Knutson RN  02/24/23 [id number]

## 2023-02-24 NOTE — CONSULTS
Patient:   Tejal Beebe    Date:  23  :  1980, 36 y.o. Nephrologist: Clover Harman MD  Provider: Radha Orellana MD    Reason for Consult: Acute on chronic hyponatremia    Consult requested by : Dr Lucia Guidry MD    Chief Complaint:   Possible seizure and fall    HISTORY OF PRESENT ILLNESS/Brief hospital course  AND  brief background history    Mr. Grupo Tyson, is an unfortunate 50-year-old male who is in snf here in town, was brought to the emergency department after a potential seizure and fall . He was brought via EMS with c-collar in. On arrival in our emergency department, he was afebrile and hemodynamic stable although later on his blood pressure went down as he was intubated-to protect airways. He underwent several diagnostic tests which include imaging and biochemical.  Imaging study which include chest x-ray, abdominal x-ray as well as CT of the chest as well as abdomen and pelvis and had as well as CT cervical spine without administration of intravenous contrast showed-L2 compression deformity, gaseous distention of the stomach, but no other outstanding finding    Biochemical testing showed sodium of 119 with potassium of 2.8, his most recent available sodium back in 2022 was 133. In addition his serum bicarbonate is 16, with BUN/creatinine 8 and 0.6 mg/dL. Other pertinent abnormal lab include calcium of 7.5 with albumin of 3.6, leukocytosis with white count of 14.4 thousand, anemia with hemoglobin of 8.8.    UA showed specific gravity less than 0.1005, but no other active sediment and urine sodium was 15, potassium 3.5 and chloride of 10. Additionally urine osmolality was extremely low at 59 mOsm/L he was given 1 L of crystalloid solution namely normal saline, followed by 100 cc of 3% sodium chloride, 12.5 g of 5% albumin, appropriate bodily fluid culture were drawn, empirical antibiotic including piperacillin tazobactam were initiated.   He was subsequently admitted to intensive care unit while intubated. While in the intensive care unit he sodium increased to 125 mEq/L in less than 4 hours, hence normal saline was stopped and D5 water initiated. All other electrolyte have been replacing now    When I saw him, he is intubated and sedated, producing significant amount of diluted urine-which means his sodium will go up-    I am little bit familiar with him, I saw him back in September 2015, when he was found by his mother unresponsive and brought to the emergency room. He sustained an acute kidney injury at that time. His peak creatinine was 2.9 mg/dL on September 3, 2015, but he was quickly able to recover his kidney function and was eventually discharged. I have not had a chance to see him since then    Most of his background history is taken from my prior note, and other healthcare provider, we will need some verification in the near future when I can talk to him or his family members. PAST MEDICAL HISTORY:  1.  1 episode of stage III acute kidney injury back in September 2015  2. He presumably has schiz  oaffective disorder. He was on m. Hypertension. In the past   3. I am assuming he is diabetic now as he was taking metformin at home    4. Gastroesophageal reflux disorder. 5.  Hyperlipidemia. 7.  Probable gastroesophageal reflux disorder          HABITS: Unsure whether he still smokes, but has at least 40 pack year  history. He had at least history of illicit drug abuse in the past      FAMILY HISTORY:  Significant for COPD. Past surgical history  Reportedly only significant for hemorrhoid surgery       SOCIAL HISTORY:     He of course currently in longterm,  he used to livE with mother x-rays back in 2015. He did not  graduate from high school.                    REVIEW OF SYSTEMS:     All pertinent ROS neg except   Presumed seizure and fall  He is with fentanyl and propofol  Also dextrose water infusion  He is mechanically ventilated with assist control mode, FiO2 35% PEEP of 5    Medication :     Reviewed, see in epic    BP (!) 84/46   Pulse 51   Temp 97.2 °F (36.2 °C) (Rectal)   Resp 14   Wt 174 lb 6.1 oz (79.1 kg)   SpO2 100%   BMI 25.75 kg/m²     PHYSICAL EXAM:  General appearance: Intubated, sedated, he is thin- in his room  HEENT: At least 1+ conjunctival pallor no scleral icterus  Neck: Supple, his nasogastric tube-right IJ central venous catheter  Heart: Bradycardia but regular this morning  LUNGS: No crackles on anterior exam  Abdomen: Soft  Extremities: No edema  He has a Ríos catheter    LABS:  Reviewed, see in epic            IMPRESSION:  Acute on chronic hyponatremia-his urinary indicis were drawn before giving any intravenous fluid-which highly suggest volume depletion-extremely poor solute intake-and likely concentrating defect-(with very low urine osmolality-more than likely from prior lithium therapy)-I suspect once he is hypovolemia was corrected with 1 L of fluid bolus-and 3%-  -> his arginine vasopressin level got abruptly suppressed-and he started producing huge amount of diluted urine-which of course accentuated by his concentrating defect-I suspect sodium will shoot up-as WE do not know the duration-dextrose with potassium chloride supplementation will be the treatment of choice-goal remains not to increase sodium more than 8 mEq/L per 24 hours-as the duration of hyponatremia is unknown-to reduce the risk of osmotic demyelination syndrome  Multiple electrolyte imbalance-I suspect mainly from poor total body store-unless he has GI and less likely renal loss  Respiratory failure which mainly for airway protection from presumable seizure  Underlying multiple chronic condition including mental disorder  His initial metabolic acidosis( likely anion gap , if we adjust for low albumin level)-likely from lactic acid-presumably from seizure-as his beta-hydroxybutyrate level was normal-unlikely starvation ketosis-it has corrected by now    PLAN:    I would increase the D5 water rate of 150 mill an hour with at least 40 mEq of potassium(as dextrose to increase potassium entry into the cell causing more hypokalemia ) I will check basic metabolic panel every 6 hours-if sodium continues to go up despite dextrose-we will add DDAVP-although it may or may not work-because of his prior lithium therapy-(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

## 2023-02-24 NOTE — PROGRESS NOTES
Pt intubated by Dr. Chandra Parada WO complication with 7.5 ETT 23 lip. Positive color change, condensation, equal BS and chest rise. Placed on vent AC RR 14  50% o2 PEEP 5. Will monitor.

## 2023-02-24 NOTE — PROGRESS NOTES
Comprehensive Nutrition Assessment    Type and Reason for Visit:  Initial    Nutrition Recommendations/Plan:   Please consult RD for EN order and manage     Malnutrition Assessment:  Malnutrition Status: At risk for malnutrition (Comment) (02/24/23 1824)    Context:  Acute Illness       Nutrition Assessment:    Pt is vented and sedated after falling from a bed in assisted with possible sezuire. RD does not have EN order and manage. Pt is on a medium dose of vassopressors. Please consult RD to help meet the pt estimated needs. Nutrition Related Findings:      Wound Type: None       Current Nutrition Intake & Therapies:    Average Meal Intake: NPO  Average Supplements Intake: NPO  No diet orders on file    Anthropometric Measures:  Height: 5' 9\" (175.3 cm)  Ideal Body Weight (IBW): 160 lbs (73 kg)    Admission Body Weight: 174 lb (78.9 kg)  Current Body Weight: 174 lb (78.9 kg), 108.8 % IBW. Weight Source: Bed Scale  Current BMI (kg/m2): 25.7  Usual Body Weight: 181 lb (82.1 kg)  % Weight Change (Calculated): -3.9                    BMI Categories: Overweight (BMI 25.0-29. 9)    Estimated Daily Nutrient Needs:  Energy Requirements Based On: Formula  Weight Used for Energy Requirements: Current  Energy (kcal/day): 1863  Weight Used for Protein Requirements: Ideal  Protein (g/day): 146  Method Used for Fluid Requirements: 1 ml/kcal  Fluid (ml/day): 1500    Nutrition Diagnosis:   Inadequate oral intake related to impaired respiratory function as evidenced by intubation, NPO or clear liquid status due to medical condition    Nutrition Interventions:   Food and/or Nutrient Delivery: Start Tube Feeding  Nutrition Education/Counseling: No recommendation at this time  Coordination of Nutrition Care: Continue to monitor while inpatient       Goals:     Goals: Initiate nutrition support       Nutrition Monitoring and Evaluation:   Behavioral-Environmental Outcomes: None Identified  Food/Nutrient Intake Outcomes: Enteral Nutrition Intake/Tolerance  Physical Signs/Symptoms Outcomes: Biochemical Data, Skin, Weight    Discharge Planning:    Too soon to determine     Cristina Frankel RD, LD  Contact: [phone]

## 2023-02-24 NOTE — PROGRESS NOTES
Pt seen on tele with SBP of 59-61. Informed Dr. Jose Backer via PS.  Awaiting orders    0530: Ordered to give Albumin 5% 12.5g

## 2023-02-24 NOTE — ED NOTES
c-collar in place per EMS, pt in cuffs from care home, thrashing about bed     Jasmin Duran RN  02/24/23 0000

## 2023-02-24 NOTE — PROGRESS NOTES
Brief ICU progress note    Nathan Heller is a 36 y.o. male with past medical history of HTN/DM/HLD/Asthma/Schizophrenia who presents S/P fall and seizure like activity with AMS and Confusion S/P Intubation and Critical Hyponatremia     - AMS/Confusion: Possibly Metabolic due to Hyponatremia and Seizure R/O Drugs versus Toxin induced  - Critical Hyponatremia   - Seizure Like activity ? ? Hyponatremia related     - Acute Hypoxemic Respiratory Failure on Ventilator due to poor airway protection  - Hypokalemia    - Right Occipital Scalp Hematoma   - S/P Fall. - Anemia: Unclear if acute blood loss versus chronic ? ?  - Metabolic acidosis   - L2 compression fracture   - Hx of DM/HTN/Asthma/HLD and Schizophrenia       Intubated and sedated. On fent and propofol gtt. Na overcorrected from 118 - 136 this AM. On D5w infusion. Unable to get ROS. Plan   Neuro - wean off fentanyl. Wean propofol, try to switch to precedex and do SAT. EEG pending. Neuro consulted. CV - no active issues    Resp - on minimal settings. SBT attempt after SAT    GI - hold TF for now. If not extubatable, will start. GI prophylaxis     - hyponatremia. Over-corrected. D5W bolus of 500ml + ddavp followed by d5w infusion. Goal correction ~124-126 by midnight. Continue q6 bmp. May need repeat ddavp. Monitor UOP strictly as will continue to over-correct if UOP is high    ID - empiric abx. Zosyn. Heme - Hb stable since admission but lower than baseline. Send FOBT.        Time spent 40 minutes

## 2023-02-24 NOTE — CONSULTS
[facility] Tele-Critical Care Consult Note      Name:  Peter Blanco /Age/Sex: 1980  (42 y.o. male)   MRN & CSN:  [id number] & [id number] Encounter Date/Time: 2023 2:19 AM EST   Location:  [address]- PCP: ALDO TRINIDAD MD     Attending:Crispin Borges MD  Consulting Provider: Pete Cardona MD      Hospital Day: 2    This is a telemedicine visit  Physician Location: [facility] working from home   Patient Location: [facility]   Assessment and Recommendations   Peter Blanco is a 42 y.o. male with past medical history of HTN/DM/HLD/Asthma/Schizophrenia who presents S/P fall and seizure like activity with AMS and Confusion S/P Intubation and Critical Hyponatremia    - AMS/Confusion: Possibly Metabolic due to Hyponatremia and Seizure R/O Drugs versus Toxin induced  - Critical Hyponatremia   - Seizure Like activity ?? Hyponatremia related     - Acute Hypoxemic Respiratory Failure on Ventilator due to poor airway protection  - Hypokalemia    - Right Occipital Scalp Hematoma   - S/P Fall.   - Anemia: Unclear if acute blood loss versus chronic ??  - Metabolic acidosis   - L2 compression fracture   - Hx of DM/HTN/Asthma/HLD and Schizophrenia     Recommendations:    Admit to ICU-Critically ill  Vent support with AC 16  FIO2 100% PEEP 5  Check ABG stat  Neuro check   Propofol for sedation and vent synchrony  Ativan PRN for seizure   EEG  May consider MRI later on after Neurology evaluate.  Neurology consult in AM by daytime team.   Keppra for now.  Thiamine  3% saline given in ED per Renal recommendation.   Will hold off on further fluids not to over correct till repeat BMP is back.   BMP Q 4hours  Follow H/H Q 6hours.  Check PT/PTT  Check urine lytes and osm and serum Osm.   Check TSH and Cortisol  Replete Potassium.   Follow I/O.  NGT to suction.  If dropping H/H then will repeat Abdominal CT scan   NGT lavage was negative per ED team.   DVTP with SCDs only due to drop in H/H.(No  AC)  NPO for now. Pepcid  Send cx  Zosyn empirically for now. Please consult neurosurgery in AM for the L2 fracture   Follow BS  Critically ill with high risk for deterioration. Patient seen/evaluated/labs, chart and supporting data reviewed through the [facility] ICU platform and management plan discussed with team in details. Time is 90 minutes of CC time       History Obtained From:    patient, electronic medical record    History of Present Illness:     Chief Complaint: S/P Fall and seizure like activity     Teresa Mcqueen is a 36 y.o. male who is seen today by the intensivist after he was brought in S/P Fall from the top bunk and seizure. He was confused per ED/MD and was intubated to control his airways. Noted to have scalp hematoma, L2 compression fracture, severe hyponatremia, hypokalemia and metabolic acidosis. ICU was called. Objective:   No intake or output data in the 24 hours ending 02/24/23 0219   Vitals:   Vitals:    02/24/23 0146 02/24/23 0149 02/24/23 0152 02/24/23 0156   BP: (!) 87/50 (!) 100/54 (!) 94/52 (!) 95/55   Pulse: 63 64 63 61   Resp: 14 14 14 14   Temp:       TempSrc:       SpO2: 100% 100% 100% 100%   Weight:           Medications Prior to Admission     Prior to Admission medications    Medication Sig Start Date End Date Taking? Authorizing Provider   atorvastatin (LIPITOR) 40 MG tablet TAKE ONE (1) TABLET BY MOUTH DAILY 11/4/22   Donald Muse MD   docusate sodium (COLACE) 100 MG capsule TAKE ONE (1) CAPSULE BY MOUTH TWO (2) TIMES DAILY 10/6/22   Donald Muse MD   naproxen (NAPROSYN) 500 MG tablet TAKE ONE (1) TABLET BY MOUTH TWO TIMES DAILY WITH MEALS 10/6/22   Donald Muse MD   blood glucose monitor kit and supplies Dispense sufficient amount for indicated testing frequency plus additional to accommodate PRN testing needs. Dispense all needed supplies to include: monitor, strips, lancing device, lancets, control solutions, alcohol swabs.  9/28/22   Donald Muse MD   omeprazole (PRILOSEC) 40 MG delayed release capsule TAKE ONE (1) CAPSULE BY MOUTH DAILY 9/1/22   Judge Joaquina MD   fenofibrate (TRICOR) 145 MG tablet TAKE ONE (1) TABLET BY MOUTH ONCE DAILY 9/1/22   Judge Joaquina MD   gabapentin (NEURONTIN) 300 MG capsule TAKE TWO (2) CAPSULE BY MOUTH THREE TIMES DAILY 9/1/22 10/1/22  Judge Joaquina MD   metFORMIN (GLUCOPHAGE) 500 MG tablet TAKE ONE (1) TABLET BY MOUTH TWO TIMES DAILY WITH MEALS 8/19/22   Judge Joaquina MD   lisinopril (PRINIVIL;ZESTRIL) 20 MG tablet TAKE 1 TABLET BY MOUTH ONCE DAILY 8/3/22   SARAH Moyer CNP   LIOR-24 100 MG extended release capsule TAKE ONE (1) CAPSULE BY MOUTH DAILY 7/21/22   SARAH Moyer CNP   benztropine (COGENTIN) 1 MG tablet Take 1 mg by mouth at bedtime    Historical Provider, MD   OLANZapine (ZYPREXA) 10 MG tablet Take 10 mg by mouth nightly    Historical Provider, MD   albuterol sulfate HFA (VENTOLIN HFA) 108 (90 Base) MCG/ACT inhaler Inhale 2 puffs into the lungs 4 times daily as needed for Wheezing 6/1/22   Judge Joaquina MD   montelukast (SINGULAIR) 10 MG tablet TAKE ONE (1) TABLET BY MOUTH EVERY NIGHT 5/26/22   Judge Joaquina MD   ferrous sulfate (IRON 325) 325 (65 Fe) MG tablet Take 1 tablet by mouth 2 times daily 7/23/21   Judge Joaquina MD   busPIRone (BUSPAR) 5 MG tablet Take 1 tablet by mouth 2 times daily  Patient taking differently: Take 10 mg by mouth 3 times daily 7/23/21   Judge Joaquina MD   hydrOXYzine (ATARAX) 25 MG tablet Take 1 tablet by mouth 2 times daily 7/23/21   Judge Joaquina MD   Spacer/Aero-Holding Chambers (AEROCHAMBER) MISC Inhale 1 Device into the lungs every 6 hours 1/27/21   Edgardo Hatfield PA-C   fluPHENAZine decanoate (PROLIXIN) 25 MG/ML injection Inject 12.5 mg into the muscle every 14 days    Historical Provider, MD   tiotropium (SPIRIVA) 18 MCG inhalation capsule Inhale 18 mcg into the lungs daily    Historical Provider, MD   melatonin 5 MG TBDP disintegrating tablet Take 10 mg by mouth nightly Historical Provider, MD       Physical Exam: Need 8 Elements   Exam is performed through direct observation by video conferencing along with assistance from the bedside nurse and a bluetooth stethoscope. General: Intubated and sedated   ENT: ETT in place. Occipital scalp hematoma   Cardiovascular: S1, S2   Respiratory: Clear to auscultation  Gastrointestinal: Soft, non tender  Musculoskeletal: No edema  Skin: warm, dry  Neuro: Intubated and sedated. Reacts to pain. Pupils are sluggish bilaterally. + gag. Past Medical History:   PMHx   Past Medical History:   Diagnosis Date    Anxiety     Asthma     Chronic back pain     Diabetes mellitus ([address]     H/O 24 hour EKG monitoring 09/19/2013    EVENT MONITOR-normal sinus rhythm    H/O echocardiogram 04/20/2017    EF 55% Normal LV with normal systolic function. No significant valvulopathy is seen    Hx of echocardiogram 08/16/13 08/13 Mitrall annular calcification with a trace to mild MR    Hyperlipidemia     Hypertension     Schizophrenia ([facility]      PSHX:  has a past surgical history that includes Hemorrhoid surgery. Allergies: Allergies   Allergen Reactions    Cat Hair Extract     No Known Allergies     Pollen Extract     Seasonal      Fam HX:family history includes Emphysema in his mother; Heart Disease in his father; High Blood Pressure in his father.   Soc HX:   Social History     Socioeconomic History    Marital status: Single   Tobacco Use    Smoking status: Every Day     Packs/day: 0.50     Years: 20.00     Pack years: 10.00     Types: Cigarettes    Smokeless tobacco: Current     Types: Snuff   Vaping Use    Vaping Use: Never used   Substance and Sexual Activity    Alcohol use: No    Drug use: Yes     Types: Cocaine, Marijuana (Weed)    Sexual activity: Not Currently     Comment: single      Social Determinants of Health     Financial Resource Strain: Low Risk     Difficulty of Paying Living Expenses: Not hard at all   Food Insecurity: No Food Insecurity    Worried About Running Out of Food in the Last Year: Never true    Ran Out of Food in the Last Year: Never true       Medications:   Medications:    ketamine        propofol        ketamine  200 mg IntraVENous Once    sodium chloride  1,000 mL IntraVENous Once    potassium chloride  10 mEq IntraVENous Q1H    famotidine (PEPCID) injection  20 mg IntraVENous BID    piperacillin-tazobactam  3,375 mg IntraVENous Q6H    levETIRAcetam  500 mg IntraVENous Q12H      Infusions:    fentaNYL 100 mcg/hr (02/24/23 0028)    propofol 20 mcg/kg/min (02/24/23 0056)    sodium chloride 100 mL (02/24/23 0202)     PRN Meds:      Labs and Imaging   CT ABDOMEN PELVIS WO CONTRAST Additional Contrast? None    Result Date: 2/24/2023  EXAMINATION: CT OF THE ABDOMEN AND PELVIS WITHOUT CONTRAST; CT OF THE CHEST WITHOUT CONTRAST 2/24/2023 12:39 am TECHNIQUE: CT of the abdomen and pelvis was performed without the administration of intravenous contrast. Multiplanar reformatted images are provided for review. Automated exposure control, iterative reconstruction, and/or weight based adjustment of the mA/kV was utilized to reduce the radiation dose to as low as reasonably achievable.; CT of the chest was performed without the administration of intravenous contrast. Multiplanar reformatted images are provided for review. Automated exposure control, iterative reconstruction, and/or weight based adjustment of the mA/kV was utilized to reduce the radiation dose to as low as reasonably achievable. COMPARISON: 09/12/2012 HISTORY: ORDERING SYSTEM PROVIDED HISTORY: fall TECHNOLOGIST PROVIDED HISTORY: Reason for exam:->fall Additional Contrast?->None Decision Support Exception - unselect if not a suspected or confirmed emergency medical condition->Emergency Medical Condition (MA) Reason for Exam: fall; FINDINGS: Chest: Mediastinum: Normal heart size. No pericardial effusion. Normal thoracic aortic caliber.   No abnormal mediastinal or hilar lymph node endotracheal tube in place. Mildly dilated fluid-filled esophagus. Lungs/pleura: Mild dependent atelectasis. Lungs otherwise clear. No pneumothorax or pleural effusion. Airways unremarkable. Soft Tissues/Bones: Acute osseous or soft tissue abnormality. Abdomen/Pelvis: Organs: Limited evaluation due lack of intravenous contrast.  Liver, gallbladder, biliary system, pancreas, spleen, adrenal glands, and kidneys unremarkable. No hydronephrosis or urinary tract calculus. GI/Bowel: No bowel obstruction. Distended fluid, gas, and fluid-filled stomach. No evidence of appendicitis. Pelvis: Urinary bladder and pelvic organs unremarkable. Peritoneum/Retroperitoneum: No free air or free fluid. Normal abdominal aortic caliber. No retroperitoneal a [place] within limits of this noncontrast examination. No abnormal lymph node. Bones/Soft Tissues: Acute L2 compression fracture with superior endplate concavity and subjacent fracture plane resulting in 10% anterior vertebral body height loss and 2 mm retropulsion of the posterosuperior cortex. Bones otherwise intact. No acute soft tissue abnormality. Tiny uncomplicated fat containing bilateral inguinal hernias. 1. Acute compression fracture of the L2 vertebral body with 10% anterior vertebral height loss and minimal posterior cortex retropulsion. 2. No acute abnormality in the chest. 3. No solid or hollow viscus organ injury within limits of this noncontrast examination. 4. Distended fluid-filled stomach and mildly dilated fluid-filled esophagus most consistent with reflux. XR ABDOMEN (KUB) (SINGLE AP VIEW)    1. Nasogastric tube tip terminates in the distal stomach. 2. Gaseous distention of the stomach. Finding is similar to the CT dated February 24, 2023. 3. L2 compression deformity is seen to better advantage on prior CT. CT HEAD WO CONTRAST    No acute intracranial abnormality. Moderate right parietal scalp hematoma. No underlying fracture. Mild left face soft tissue swelling. CT CHEST WO CONTRAST    Result Date: 2/24/2023  EXAMINATION: CT OF THE ABDOMEN AND PELVIS WITHOUT CONTRAST; CT OF THE CHEST WITHOUT CONTRAST 2/24/2023 12:39 am TECHNIQUE: CT of the abdomen and pelvis was performed without the administration of intravenous contrast. Multiplanar reformatted images are provided for review. Automated exposure control, iterative reconstruction, and/or weight based adjustment of the mA/kV was utilized to reduce the radiation dose to as low as reasonably achievable.; CT of the chest was performed without the administration of intravenous contrast. Multiplanar reformatted images are provided for review. Automated exposure control, iterative reconstruction, and/or weight based adjustment of the mA/kV was utilized to reduce the radiation dose to as low as reasonably achievable. COMPARISON: 09/12/2012 HISTORY: ORDERING SYSTEM PROVIDED HISTORY: fall TECHNOLOGIST PROVIDED HISTORY: Reason for exam:->fall Additional Contrast?->None Decision Support Exception - unselect if not a suspected or confirmed emergency medical condition->Emergency Medical Condition (MA) Reason for Exam: fall; FINDINGS: Chest: Mediastinum: Normal heart size. No pericardial effusion. Normal thoracic aortic caliber. No abnormal mediastinal or hilar lymph node endotracheal tube in place. Mildly dilated fluid-filled esophagus. Lungs/pleura: Mild dependent atelectasis. Lungs otherwise clear. No pneumothorax or pleural effusion. Airways unremarkable. Soft Tissues/Bones: Acute osseous or soft tissue abnormality. Abdomen/Pelvis: Organs: Limited evaluation due lack of intravenous contrast.  Liver, gallbladder, biliary system, pancreas, spleen, adrenal glands, and kidneys unremarkable. No hydronephrosis or urinary tract calculus. GI/Bowel: No bowel obstruction. Distended fluid, gas, and fluid-filled stomach. No evidence of appendicitis.  Pelvis: Urinary bladder and pelvic organs unremarkable. Peritoneum/Retroperitoneum: No free air or free fluid.  Normal abdominal aortic caliber.  No retroperitoneal a [place] within limits of this noncontrast examination.  No abnormal lymph node. Bones/Soft Tissues: Acute L2 compression fracture with superior endplate concavity and subjacent fracture plane resulting in 10% anterior vertebral body height loss and 2 mm retropulsion of the posterosuperior cortex.  Bones otherwise intact.  No acute soft tissue abnormality.  Tiny uncomplicated fat containing bilateral inguinal hernias.     1. Acute compression fracture of the L2 vertebral body with 10% anterior vertebral height loss and minimal posterior cortex retropulsion. 2. No acute abnormality in the chest. 3. No solid or hollow viscus organ injury within limits of this noncontrast examination. 4. Distended fluid-filled stomach and mildly dilated fluid-filled esophagus most consistent with reflux.     CT CERVICAL SPINE WO CONTRAST    No acute abnormality of the cervical spine.       CBC:   Recent Labs     02/24/23  0015   WBC 14.4*   HGB 8.8*        BMP:    Recent Labs     02/24/23  0015   *   K 2.8*   CL 87*   CO2 16*   BUN 8   CREATININE 0.6*   GLUCOSE 134*     Hepatic:   Recent Labs     02/24/23  0015   AST 27   ALT 22   BILITOT 0.6   ALKPHOS 35*     Lipids:   Lab Results   Component Value Date/Time    CHOL 154 10/05/2022 10:05 AM    HDL 73 10/05/2022 10:05 AM    TRIG 76 10/05/2022 10:05 AM     Hemoglobin A1C:   Lab Results   Component Value Date/Time    LABA1C 5.9 01/26/2022 01:56 PM     TSH: No results found for: TSH  Troponin:   Lab Results   Component Value Date/Time    TROPONINT <0.010 02/24/2022 01:09 AM    TROPONINT <0.010 01/27/2021 08:30 AM    TROPONINT <0.010 05/06/2020 09:04 AM     Lactic Acid: No results for input(s): LACTA in the last 72 hours.  BNP: No results for input(s): PROBNP in the last 72 hours.  UA:  Lab Results   Component Value Date/Time    NITRU NEGATIVE 12/28/2018  12:31 PM    COLORU STRAW 12/28/2018 12:31 PM    WBCUA 1 12/28/2018 12:31 PM    RBCUA NONE SEEN 12/28/2018 12:31 PM    MUCUS RARE 12/28/2018 12:31 PM    TRICHOMONAS NONE SEEN 12/28/2018 12:31 PM    BACTERIA NEGATIVE 12/28/2018 12:31 PM    CLARITYU CLEAR 12/28/2018 12:31 PM    SPECGRAV 1.005 12/28/2018 12:31 PM    LEUKOCYTESUR NEGATIVE 12/28/2018 12:31 PM    UROBILINOGEN NORMAL 12/28/2018 12:31 PM    BILIRUBINUR NEGATIVE 12/28/2018 12:31 PM    BLOODU NEGATIVE 12/28/2018 12:31 PM    KETUA NEGATIVE 12/28/2018 12:31 PM     Urine Cultures: No results found for: LABURIN  Blood Cultures: No results found for: BC  No results found for: BLOODCULT2  Organism: No results found for: [facility]    Personally reviewed Lab Studies, Imaging, and discussed with ED/MD, Hospitalist and ICU staff.      Electronically signed by Toña Marques MD on 2/24/2023 at 2:19 AM

## 2023-02-24 NOTE — PROGRESS NOTES
Pt arrived from ED with [employer] Model and security. Skin checked and assessed with Khoi BECKHAM and Carin Weller. No skin issues. [id number]: Videocall done with Dr. Shea Sosa with this RN at bedside. All questions answered.   Will monitor pt accordingly

## 2023-02-24 NOTE — PROGRESS NOTES
V2.0  [facility] Hospitalist Progress Note      Name:  Lida Bae /Age/Sex: 1980  (36 y.o. male)   MRN & CSN:  [id number] & [id number] Encounter Date/Time: 2023 11:25 AM EST    Location:  -A PCP: Rut Briggs MD       Hospital Day: 2    Assessment and Plan:   Lida Bae is a 36 y.o. male with pmh of DM II, HTN, HLD, COPD, Schizophrenia who presents with Seizure 2/2 hyponatremia. Plan:    Metabolic encephalopathy - 2/2 to hyponatremia. Episode of seizure like activity. Utox negative. CT head - No acute intracranial abnormality. Moderate right parietal scalp hematoma. Neurology consulted. Q4 neurocheck. On keppra. Had to be intubated in ED to protect the airway. Continue Vent management as per ICU     Severe hyponatremia - Started on 3% sodium. Nephrology consulted. BMP q4. Target correction no more than 8 meq in 24 hrs. Overcorrected and on D5%     Anemia - Hb 8.8 on admission. Last hb few months back was 14. Currently no sign of overt bleeding. CT abdomen did not reveal any fluid collection. NG and Urine Output is clear. Need further workup with iron panel, Folate, B12, FOBT. Monitor vital and h/h in the mean time. Transfuse as needed     HLD - on statin     HTN - currently BP soft     COPD - does not look in exacerbation. currently on vent. Duoneb  PRN     Schizophrenia  - meds currently not resumed    Diet No diet orders on file   DVT Prophylaxis [] Lovenox, []  Heparin, [] SCDs, [] Ambulation,    [] Eliquis, [] Xarelto  [] Coumadin [] other   Code Status Full Code   Disposition From: senior care  Expected Disposition: TBD  Estimated Date of Discharge: TBD  Patient requires continued admission due to respiratory failure and hyponatremia   Surrogate Decision Maker/ POA      Subjective:     Chief Complaint: Fall     Continues to be intubated mechanically ventilated in the ICU. Review of Systems:    Review of Systems   Unable to perform ROS: Intubated       Objective:      Intake/Output Summary (Last 24 hours) at 2/24/2023 1125  Last data filed at 2/24/2023 0810  Gross per 24 hour   Intake --   Output 6575 ml   Net -6575 ml        Vitals:   Vitals:    02/24/23 1000   BP: (!) 80/48   Pulse: 50   Resp: 14   Temp:    SpO2: 100%       Physical Exam:     Physical Exam  Constitutional:       General: He is not in acute distress. Appearance: Normal appearance. HENT:      Head: Normocephalic and atraumatic. Nose: Nose normal.      Mouth/Throat:      Mouth: Mucous membranes are moist.      Pharynx: Oropharynx is clear. Eyes:      Extraocular Movements: Extraocular movements intact. Conjunctiva/sclera: Conjunctivae normal.      Pupils: Pupils are equal, round, and reactive to light. Cardiovascular:      Rate and Rhythm: Normal rate and regular rhythm. Pulses: Normal pulses. Heart sounds: Normal heart sounds. Pulmonary:      Effort: Pulmonary effort is normal.      Comments: intubated  Abdominal:      General: Abdomen is flat. Bowel sounds are normal.      Palpations: Abdomen is soft. Musculoskeletal:         General: Normal range of motion. Cervical back: Normal range of motion and neck supple. Skin:     General: Skin is warm and dry. Neurological:      General: No focal deficit present. Mental Status: He is alert and oriented to person, place, and time. Mental status is at baseline. Psychiatric:         Mood and Affect: Mood normal.         Behavior: Behavior normal.         Thought Content:  Thought content normal.       Medications:   Medications:    ketamine        ketamine  200 mg IntraVENous Once    piperacillin-tazobactam  3,375 mg IntraVENous q8h    levETIRAcetam  500 mg IntraVENous Q12H    thiamine (VITAMIN B1) IVPB  300 mg IntraVENous BID    potassium phosphate IVPB  15 mmol IntraVENous Once    pantoprazole  40 mg IntraVENous Daily    [START ON 2/25/2023] enoxaparin  40 mg SubCUTAneous Daily      Infusions:    fentaNYL 50 mcg/hr (02/24/23 0710) propofol 25 mcg/kg/min (02/24/23 1123)    dextrose 250 mL/hr at 02/24/23 1035    dextrose      DOPamine 5 mcg/kg/min (02/24/23 1041)     PRN Meds: LORazepam, 1 mg, Q1H PRN  potassium chloride, 20 mEq, PRN        Labs      Recent Results (from the past 24 hour(s))   CBC with Auto Differential    Collection Time: 02/24/23 12:15 AM   Result Value Ref Range    WBC 14.4 (H) 4.0 - 10.5 K/CU MM    RBC 2.94 (L) 4.6 - 6.2 M/CU MM    Hemoglobin 8.8 (L) 13.5 - 18.0 GM/DL    Hematocrit 25.6 (L) 42 - 52 %    MCV 87.1 78 - 100 FL    MCH 29.9 27 - 31 PG    MCHC 34.4 32.0 - 36.0 %    RDW 11.6 (L) 11.7 - 14.9 %    Platelets 983 224 - 363 K/CU MM    MPV 10.3 7.5 - 11.1 FL    Differential Type AUTOMATED DIFFERENTIAL     Segs Relative 69.6 (H) 36 - 66 %    Lymphocytes % 17.1 (L) 24 - 44 %    Monocytes % 4.4 (H) 0 - 4 %    Eosinophils % 7.8 (H) 0 - 3 %    Basophils % 0.3 0 - 1 %    Segs Absolute 10.0 K/CU MM    Lymphocytes Absolute 2.5 K/CU MM    Monocytes Absolute 0.6 K/CU MM    Eosinophils Absolute 1.1 K/CU MM    Basophils Absolute 0.1 K/CU MM    Nucleated RBC % 0.0 %    Total Nucleated RBC 0.0 K/CU MM    Total Immature Neutrophil 0.11 K/CU MM    Immature Neutrophil % 0.8 (H) 0 - 0.43 %   Comprehensive Metabolic Panel    Collection Time: 02/24/23 12:15 AM   Result Value Ref Range    Sodium 119 (LL) 135 - 145 MMOL/L    Potassium 2.8 (LL) 3.5 - 5.1 MMOL/L    Chloride 87 (L) 99 - 110 mMol/L    CO2 16 (L) 21 - 32 MMOL/L    BUN 8 6 - 23 MG/DL    Creatinine 0.6 (L) 0.9 - 1.3 MG/DL    Est, Glom Filt Rate >60 >60 mL/min/1.73m2    Glucose 134 (H) 70 - 99 MG/DL    Calcium 7.5 (L) 8.3 - 10.6 MG/DL    Albumin 3.6 3.4 - 5.0 GM/DL    Total Protein 5.4 (L) 6.4 - 8.2 GM/DL    Total Bilirubin 0.6 0.0 - 1.0 MG/DL    ALT 22 10 - 40 U/L    AST 27 15 - 37 IU/L    Alkaline Phosphatase 35 (L) 40 - 129 IU/L    Anion Gap 16 4 - 16   Ethanol    Collection Time: 02/24/23 12:15 AM   Result Value Ref Range    Alcohol Scrn <0.01 <5.38 %WT/VOL   Salicylate Collection Time: 02/24/23 12:15 AM   Result Value Ref Range    Salicylate Lvl <1.0 (L) 15 - 30 MG/DL    DOSE AMOUNT DOSE AMT. GIVEN - UNKNOWN     DOSE TIME DOSE TIME GIVEN - UNKNOWN    Acetaminophen Level    Collection Time: 02/24/23 12:15 AM   Result Value Ref Range    Acetaminophen Level <5.0 (L) 15 - 30 ug/ml    DOSE AMOUNT DOSE AMT. GIVEN - UNKNOWN     DOSE TIME DOSE TIME GIVEN - UNKNOWN    Sodium Lab    Collection Time: 02/24/23 12:15 AM   Result Value Ref Range    Sodium 118 (LL) 135 - 145 MMOL/L   Beta-Hydroxybutyrate    Collection Time: 02/24/23 12:15 AM   Result Value Ref Range    Beta-Hydroxybutyrate 0.8 0.0 - 3.0 MG/DL   Urine Drug Screen    Collection Time: 02/24/23  1:10 AM   Result Value Ref Range    Cannabinoid Scrn, Ur NEGATIVE NEGATIVE    Amphetamines NEGATIVE NEGATIVE    Cocaine Metabolite NEGATIVE NEGATIVE    Benzodiazepine Screen, Urine NEGATIVE NEGATIVE    Barbiturate Screen, Ur NEGATIVE NEGATIVE    Opiates, Urine NEGATIVE NEGATIVE    Phencyclidine, Urine NEGATIVE NEGATIVE    Oxycodone NEGATIVE NEGATIVE   Urinalysis    Collection Time: 02/24/23  1:10 AM   Result Value Ref Range    Color, UA YELLOW YELLOW    Clarity, UA CLEAR CLEAR    Glucose, Urine NEGATIVE NEGATIVE MG/DL    Bilirubin Urine NEGATIVE NEGATIVE MG/DL    Ketones, Urine NEGATIVE NEGATIVE MG/DL    Specific Gravity, UA <1.005 1.001 - 1.035    Blood, Urine NEGATIVE NEGATIVE    pH, Urine 7.0 5.0 - 8.0    Protein, UA NEGATIVE NEGATIVE MG/DL    Urobilinogen, Urine 0.2 0.2 - 1.0 MG/DL    Nitrite Urine, Quantitative NEGATIVE NEGATIVE    Leukocyte Esterase, Urine NEGATIVE NEGATIVE    Urinalysis Comments       Microscopic exam not performed based on chemical results unless requested in original order.    Electrolytes urine random    Collection Time: 02/24/23  1:10 AM   Result Value Ref Range    Sodium, Ur 15 (L) 35 - 167 MMOL/L    Potassium, Ur 3.5 (L) 22 - 119 MMOL/L    Chloride 10 (L) 43 - 210 MMOL/L   Osmolality, Urine    Collection Time: 02/24/23  1:10 AM   Result Value Ref Range    Osmolality, Ur 59 (L) 292 - 1090 MOS/L   Blood Gas, Arterial    Collection Time: 02/24/23  2:15 AM   Result Value Ref Range    pH, Bld 7.46 (H) 7.34 - 7.45    pCO2, Arterial 31.0 (L) 32 - 45 MMHG    pO2, Arterial 248 (H) 75 - 100 MMHG    Base Excess 1 0 - 3.3    HCO3, Arterial 22.0 18 - 23 MMOL/L    CO2 Content 23.0 19 - 24 MMOL/L    O2 Sat 96.8 96 - 97 %    Carbon Monoxide, Blood 1.6 0 - 5 %    Methemoglobin, Arterial 1.6 (H) <1.5 %   CBC with Auto Differential    Collection Time: 02/24/23  2:25 AM   Result Value Ref Range    WBC 11.5 (H) 4.0 - 10.5 K/CU MM    RBC 2.58 (L) 4.6 - 6.2 M/CU MM    Hemoglobin 8.0 (L) 13.5 - 18.0 GM/DL    Hematocrit 22.2 (L) 42 - 52 %    MCV 86.0 78 - 100 FL    MCH 31.0 27 - 31 PG    MCHC 36.0 32.0 - 36.0 %    RDW 11.5 (L) 11.7 - 14.9 %    Platelets 579 (L) 332 - 440 K/CU MM    MPV 10.6 7.5 - 11.1 FL    Differential Type AUTOMATED DIFFERENTIAL     Segs Relative 82.6 (H) 36 - 66 %    Lymphocytes % 10.7 (L) 24 - 44 %    Monocytes % 6.0 (H) 0 - 4 %    Eosinophils % 0.1 0 - 3 %    Basophils % 0.3 0 - 1 %    Segs Absolute 9.5 K/CU MM    Lymphocytes Absolute 1.2 K/CU MM    Monocytes Absolute 0.7 K/CU MM    Eosinophils Absolute 0.0 K/CU MM    Basophils Absolute 0.0 K/CU MM    Nucleated RBC % 0.0 %    Total Nucleated RBC 0.0 K/CU MM    Total Immature Neutrophil 0.04 K/CU MM    Immature Neutrophil % 0.3 0 - 0.43 %   Basic Metabolic Panel    Collection Time: 02/24/23  2:25 AM   Result Value Ref Range    Sodium 121 (L) 135 - 145 MMOL/L    Potassium 2.9 (LL) 3.5 - 5.1 MMOL/L    Chloride 90 (L) 99 - 110 mMol/L    CO2 19 (L) 21 - 32 MMOL/L    Anion Gap 12 4 - 16    BUN 8 6 - 23 MG/DL    Creatinine 0.6 (L) 0.9 - 1.3 MG/DL    Est, Glom Filt Rate >60 >60 mL/min/1.73m2    Glucose 89 70 - 99 MG/DL    Calcium 7.2 (L) 8.3 - 10.6 MG/DL   Osmolality    Collection Time: 02/24/23  2:25 AM   Result Value Ref Range    Osmolality 247 (L) 280 - 300 MOS/L   Lactic Acid Collection Time: 02/24/23  2:25 AM   Result Value Ref Range    Lactate 2.0 (HH) 0.5 - 1.9 mMOL/L   TSH    Collection Time: 02/24/23  2:25 AM   Result Value Ref Range    TSH, High Sensitivity 1.780 0.270 - 4.20 uIu/ml   Troponin    Collection Time: 02/24/23  2:25 AM   Result Value Ref Range    Troponin T <0.010 <0.01 NG/ML   Protime/INR & PTT    Collection Time: 02/24/23  2:25 AM   Result Value Ref Range    Protime 11.7 11.7 - 14.5 SECONDS    INR 0.91 INDEX    aPTT 33.4 25.1 - 37.1 SECONDS   Procalcitonin    Collection Time: 02/24/23  2:25 AM   Result Value Ref Range    Procalcitonin 0.037    Phosphorus    Collection Time: 02/24/23  2:25 AM   Result Value Ref Range    Phosphorus 1.6 (L) 2.5 - 4.9 MG/DL   Magnesium    Collection Time: 02/24/23  2:25 AM   Result Value Ref Range    Magnesium 1.2 (L) 1.8 - 2.4 mg/dl   Lipase    Collection Time: 02/24/23  2:25 AM   Result Value Ref Range    Lipase 42 13 - 60 IU/L   C-Reactive Protein    Collection Time: 02/24/23  2:25 AM   Result Value Ref Range    CRP High Sensitivity 3.0 <5.0 mg/L   Brain Natriuretic Peptide    Collection Time: 02/24/23  2:25 AM   Result Value Ref Range    Pro-.0 <300 PG/ML   Cortisol Total    Collection Time: 02/24/23  2:25 AM   Result Value Ref Range    Cortisol, Plasma 14.71    CK    Collection Time: 02/24/23  2:25 AM   Result Value Ref Range    Total  (H) 38 - 174 IU/L   Toxicology screen, serum    Collection Time: 02/24/23  2:25 AM   Result Value Ref Range    Acetaminophen Level <5.0 (L) 15 - 30 ug/ml    Salicylate Lvl <7.2 (L) 15 - 30 MG/DL   EKG 12 Lead    Collection Time: 02/24/23  3:03 AM   Result Value Ref Range    Ventricular Rate 54 BPM    Atrial Rate 54 BPM    P-R Interval 182 ms    QRS Duration 92 ms    Q-T Interval 440 ms    QTc Calculation (Bazett) 417 ms    P Axis 34 degrees    R Axis 69 degrees    T Axis 61 degrees    Diagnosis       Sinus bradycardia  Low voltage QRS  Borderline ECG  When compared with ECG of 24-FEB-2022 01:05,  No significant change was found     Sodium Lab    Collection Time: 02/24/23  3:20 AM   Result Value Ref Range    Sodium 125 (L) 135 - 145 MMOL/L   Basic Metabolic Panel    Collection Time: 02/24/23  5:15 AM   Result Value Ref Range    Sodium 128 (L) 135 - 145 MMOL/L    Potassium 3.3 (L) 3.5 - 5.1 MMOL/L    Chloride 98 (L) 99 - 110 mMol/L    CO2 24 21 - 32 MMOL/L    Anion Gap 6 4 - 16    BUN 7 6 - 23 MG/DL    Creatinine 0.6 (L) 0.9 - 1.3 MG/DL    Est, Glom Filt Rate >60 >60 mL/min/1.73m2    Glucose 99 70 - 99 MG/DL    Calcium 7.9 (L) 8.3 - 10.6 MG/DL   Sodium Lab    Collection Time: 02/24/23  7:05 AM   Result Value Ref Range    Sodium 132 (L) 135 - 145 MMOL/L   CBC with Auto Differential    Collection Time: 02/24/23  9:15 AM   Result Value Ref Range    WBC 4.9 4.0 - 10.5 K/CU MM    RBC 2.74 (L) 4.6 - 6.2 M/CU MM    Hemoglobin 8.1 (L) 13.5 - 18.0 GM/DL    Hematocrit 24.2 (L) 42 - 52 %    MCV 88.3 78 - 100 FL    MCH 29.6 27 - 31 PG    MCHC 33.5 32.0 - 36.0 %    RDW 12.0 11.7 - 14.9 %    Platelets 299 951 - 742 K/CU MM    MPV 10.0 7.5 - 11.1 FL    Differential Type AUTOMATED DIFFERENTIAL     Segs Relative 61.2 36 - 66 %    Lymphocytes % 26.3 24 - 44 %    Monocytes % 5.7 (H) 0 - 4 %    Eosinophils % 6.2 (H) 0 - 3 %    Basophils % 0.2 0 - 1 %    Segs Absolute 3.0 K/CU MM    Lymphocytes Absolute 1.3 K/CU MM    Monocytes Absolute 0.3 K/CU MM    Eosinophils Absolute 0.3 K/CU MM    Basophils Absolute 0.0 K/CU MM    Nucleated RBC % 0.0 %    Total Nucleated RBC 0.0 K/CU MM    Total Immature Neutrophil 0.02 K/CU MM    Immature Neutrophil % 0.4 0 - 0.43 %   Basic Metabolic Panel    Collection Time: 02/24/23  9:15 AM   Result Value Ref Range    Sodium 136 135 - 145 MMOL/L    Potassium 4.5 3.5 - 5.1 MMOL/L    Chloride 106 99 - 110 mMol/L    CO2 24 21 - 32 MMOL/L    Anion Gap 6 4 - 16    BUN 6 6 - 23 MG/DL    Creatinine 0.7 (L) 0.9 - 1.3 MG/DL    Est, Glom Filt Rate >60 >60 mL/min/1.73m2    Glucose 102 (H) 70 - 99 MG/DL Calcium 8.4 8.3 - 10.6 MG/DL        Imaging/Diagnostics Last 24 Hours   CT ABDOMEN PELVIS WO CONTRAST Additional Contrast? None    Result Date: 2/24/2023  EXAMINATION: CT OF THE ABDOMEN AND PELVIS WITHOUT CONTRAST; CT OF THE CHEST WITHOUT CONTRAST 2/24/2023 12:39 am TECHNIQUE: CT of the abdomen and pelvis was performed without the administration of intravenous contrast. Multiplanar reformatted images are provided for review. Automated exposure control, iterative reconstruction, and/or weight based adjustment of the mA/kV was utilized to reduce the radiation dose to as low as reasonably achievable.; CT of the chest was performed without the administration of intravenous contrast. Multiplanar reformatted images are provided for review. Automated exposure control, iterative reconstruction, and/or weight based adjustment of the mA/kV was utilized to reduce the radiation dose to as low as reasonably achievable. COMPARISON: 09/12/2012 HISTORY: ORDERING SYSTEM PROVIDED HISTORY: fall TECHNOLOGIST PROVIDED HISTORY: Reason for exam:->fall Additional Contrast?->None Decision Support Exception - unselect if not a suspected or confirmed emergency medical condition->Emergency Medical Condition (MA) Reason for Exam: fall; FINDINGS: Chest: Mediastinum: Normal heart size. No pericardial effusion. Normal thoracic aortic caliber. No abnormal mediastinal or hilar lymph node endotracheal tube in place. Mildly dilated fluid-filled esophagus. Lungs/pleura: Mild dependent atelectasis. Lungs otherwise clear. No pneumothorax or pleural effusion. Airways unremarkable. Soft Tissues/Bones: Acute osseous or soft tissue abnormality. Abdomen/Pelvis: Organs: Limited evaluation due lack of intravenous contrast.  Liver, gallbladder, biliary system, pancreas, spleen, adrenal glands, and kidneys unremarkable. No hydronephrosis or urinary tract calculus. GI/Bowel: No bowel obstruction. Distended fluid, gas, and fluid-filled stomach.   No evidence of appendicitis. Pelvis: Urinary bladder and pelvic organs unremarkable. Peritoneum/Retroperitoneum: No free air or free fluid. Normal abdominal aortic caliber. No retroperitoneal a Makenzie Cumins within limits of this noncontrast examination. No abnormal lymph node. Bones/Soft Tissues: Acute L2 compression fracture with superior endplate concavity and subjacent fracture plane resulting in 10% anterior vertebral body height loss and 2 mm retropulsion of the posterosuperior cortex. Bones otherwise intact. No acute soft tissue abnormality. Tiny uncomplicated fat containing bilateral inguinal hernias. 1. Acute compression fracture of the L2 vertebral body with 10% anterior vertebral height loss and minimal posterior cortex retropulsion. 2. No acute abnormality in the chest. 3. No solid or hollow viscus organ injury within limits of this noncontrast examination. 4. Distended fluid-filled stomach and mildly dilated fluid-filled esophagus most consistent with reflux. XR ABDOMEN (KUB) (SINGLE AP VIEW)    Result Date: 2/24/2023  EXAMINATION: ONE SUPINE X-RAY VIEW(S) OF THE ABDOMEN 2/24/2023 2:01 am COMPARISON: February 24, 2023. HISTORY: ORDERING SYSTEM PROVIDED HISTORY:  NGT TECHNOLOGIST PROVIDED HISTORY: Reason for Exam:  NGT FINDINGS: Nasogastric tube tip terminates in the distal stomach. The side-port is beyond the GE junction. No acute or aggressive osseous lesion. Gaseous distention of the stomach is present. The bowel gas pattern is not well evaluated due to supine positioning. No gas distended loops of bowel appreciated. L2 compression deformity is seen to better advantage on prior CT. 1. Nasogastric tube tip terminates in the distal stomach. 2. Gaseous distention of the stomach. Finding is similar to the CT dated February 24, 2023. 3. L2 compression deformity is seen to better advantage on prior CT.      CT HEAD WO CONTRAST    Result Date: 2/24/2023  EXAMINATION: CT OF THE HEAD WITHOUT CONTRAST 2/24/2023 12:39 am TECHNIQUE: CT of the head was performed without the administration of intravenous contrast. Automated exposure control, iterative reconstruction, and/or weight based adjustment of the mA/kV was utilized to reduce the radiation dose to as low as reasonably achievable. COMPARISON: November 15, 2016 HISTORY: ORDERING SYSTEM PROVIDED HISTORY: fall TECHNOLOGIST PROVIDED HISTORY: Has a \"code stroke\" or \"stroke alert\" been called? ->No Reason for exam:->fall Decision Support Exception - unselect if not a suspected or confirmed emergency medical condition->Emergency Medical Condition (MA) Reason for Exam: fall FINDINGS: BRAIN/VENTRICLES: There is no acute intracranial hemorrhage, mass effect or midline shift. No abnormal extra-axial fluid collection. The gray-white differentiation is maintained without evidence of an acute infarct. There is no evidence of hydrocephalus. ORBITS: The visualized portion of the orbits demonstrate no acute abnormality. SINUSES: Moderate mucosal thickening is identified in the bilateral maxillary, ethmoid, and right sphenoid sinus. The mastoid air cells are clear. SOFT TISSUES/SKULL:  Moderate right parietal scalp hematoma is present. Left face soft tissue swelling is also seen. No acute fracture. No acute intracranial abnormality. Moderate right parietal scalp hematoma. No underlying fracture. Mild left face soft tissue swelling. CT CHEST WO CONTRAST    Result Date: 2/24/2023  EXAMINATION: CT OF THE ABDOMEN AND PELVIS WITHOUT CONTRAST; CT OF THE CHEST WITHOUT CONTRAST 2/24/2023 12:39 am TECHNIQUE: CT of the abdomen and pelvis was performed without the administration of intravenous contrast. Multiplanar reformatted images are provided for review.  Automated exposure control, iterative reconstruction, and/or weight based adjustment of the mA/kV was utilized to reduce the radiation dose to as low as reasonably achievable.; CT of the chest was performed without the administration of intravenous contrast. Multiplanar reformatted images are provided for review. Automated exposure control, iterative reconstruction, and/or weight based adjustment of the mA/kV was utilized to reduce the radiation dose to as low as reasonably achievable. COMPARISON: 09/12/2012 HISTORY: ORDERING SYSTEM PROVIDED HISTORY: fall TECHNOLOGIST PROVIDED HISTORY: Reason for exam:->fall Additional Contrast?->None Decision Support Exception - unselect if not a suspected or confirmed emergency medical condition->Emergency Medical Condition (MA) Reason for Exam: fall; FINDINGS: Chest: Mediastinum: Normal heart size. No pericardial effusion. Normal thoracic aortic caliber. No abnormal mediastinal or hilar lymph node endotracheal tube in place. Mildly dilated fluid-filled esophagus. Lungs/pleura: Mild dependent atelectasis. Lungs otherwise clear. No pneumothorax or pleural effusion. Airways unremarkable. Soft Tissues/Bones: Acute osseous or soft tissue abnormality. Abdomen/Pelvis: Organs: Limited evaluation due lack of intravenous contrast.  Liver, gallbladder, biliary system, pancreas, spleen, adrenal glands, and kidneys unremarkable. No hydronephrosis or urinary tract calculus. GI/Bowel: No bowel obstruction. Distended fluid, gas, and fluid-filled stomach. No evidence of appendicitis. Pelvis: Urinary bladder and pelvic organs unremarkable. Peritoneum/Retroperitoneum: No free air or free fluid. Normal abdominal aortic caliber. No retroperitoneal a Governor Pal within limits of this noncontrast examination. No abnormal lymph node. Bones/Soft Tissues: Acute L2 compression fracture with superior endplate concavity and subjacent fracture plane resulting in 10% anterior vertebral body height loss and 2 mm retropulsion of the posterosuperior cortex. Bones otherwise intact. No acute soft tissue abnormality. Tiny uncomplicated fat containing bilateral inguinal hernias.      1. Acute compression fracture of the L2 vertebral body with 10% anterior vertebral height loss and minimal posterior cortex retropulsion. 2. No acute abnormality in the chest. 3. No solid or hollow viscus organ injury within limits of this noncontrast examination. 4. Distended fluid-filled stomach and mildly dilated fluid-filled esophagus most consistent with reflux. CT CERVICAL SPINE WO CONTRAST    Result Date: 2/24/2023  EXAMINATION: CT OF THE CERVICAL SPINE WITHOUT CONTRAST 2/24/2023 12:39 am TECHNIQUE: CT of the cervical spine was performed without the administration of intravenous contrast. Multiplanar reformatted images are provided for review. Automated exposure control, iterative reconstruction, and/or weight based adjustment of the mA/kV was utilized to reduce the radiation dose to as low as reasonably achievable. COMPARISON: None. HISTORY: ORDERING SYSTEM PROVIDED HISTORY: fall TECHNOLOGIST PROVIDED HISTORY: Reason for exam:->fall Decision Support Exception - unselect if not a suspected or confirmed emergency medical condition->Emergency Medical Condition (MA) Reason for Exam: fall FINDINGS: BONES/ALIGNMENT: There is no acute fracture or traumatic malalignment. DEGENERATIVE CHANGES: Mild multilevel degenerative changes are present. SOFT TISSUES: There is no prevertebral soft tissue swelling. OTHER: Trace mastoid effusions are nonspecific. No acute abnormality of the cervical spine. XR CHEST PORTABLE    Result Date: 2/24/2023  EXAMINATION: ONE XRAY VIEW OF THE CHEST 2/24/2023 2:41 am COMPARISON: 09/24/2022 HISTORY: ORDERING SYSTEM PROVIDED HISTORY: central line TECHNOLOGIST PROVIDED HISTORY: Reason for exam:->central line Reason for Exam: central line FINDINGS: Endotracheal tube terminates 4.9 cm above the kellen. Enteric tube courses below the diaphragm with the tip not imaged. Right internal jugular center venous catheter tip is in the SVC. Clear lungs. No pneumothorax or pleural effusion.   Cardiac and mediastinal contours normal.  No acute osseous abnormality. 1. Endotracheal tube terminates 4.9 cm above the kellen. 2. Right internal jugular center venous catheter tip in the SVC. 3. Clear lungs. No pneumothorax. Comment: Please note this report has been produced using speech recognition software and may contain errors related to that system including errors in grammar, punctuation, and spelling, as well as words and phrases that may be inappropriate. If there are any questions or concerns please feel free to contact the dictating provider for clarification.      Electronically signed by Artis Perkins MD on 2/24/2023 at 11:25 AM

## 2023-02-24 NOTE — ED NOTES
Pt returned back from CT via stretcher with RT & 2nd RN. c-spine maintained while moving pt back to stretcher.       Rohan Celis, BHAVANI  02/24/23 0021

## 2023-02-25 LAB
ALBUMIN SERPL-MCNC: 4 GM/DL (ref 3.4–5)
ALP BLD-CCNC: 35 IU/L (ref 40–128)
ALT SERPL-CCNC: 21 U/L (ref 10–40)
ANION GAP SERPL CALCULATED.3IONS-SCNC: 6 MMOL/L (ref 4–16)
ANION GAP SERPL CALCULATED.3IONS-SCNC: 7 MMOL/L (ref 4–16)
ANION GAP SERPL CALCULATED.3IONS-SCNC: 8 MMOL/L (ref 4–16)
ANION GAP SERPL CALCULATED.3IONS-SCNC: 8 MMOL/L (ref 4–16)
AST SERPL-CCNC: 43 IU/L (ref 15–37)
BASOPHILS ABSOLUTE: 0 K/CU MM
BASOPHILS RELATIVE PERCENT: 0.5 % (ref 0–1)
BILIRUB SERPL-MCNC: 0.2 MG/DL (ref 0–1)
BUN SERPL-MCNC: 4 MG/DL (ref 6–23)
BUN SERPL-MCNC: 5 MG/DL (ref 6–23)
BUN SERPL-MCNC: 6 MG/DL (ref 6–23)
CALCIUM SERPL-MCNC: 7.8 MG/DL (ref 8.3–10.6)
CALCIUM SERPL-MCNC: 7.8 MG/DL (ref 8.3–10.6)
CALCIUM SERPL-MCNC: 7.9 MG/DL (ref 8.3–10.6)
CALCIUM SERPL-MCNC: 8 MG/DL (ref 8.3–10.6)
CALCIUM SERPL-MCNC: 8.1 MG/DL (ref 8.3–10.6)
CALCIUM SERPL-MCNC: 8.2 MG/DL (ref 8.3–10.6)
CHLORIDE BLD-SCNC: 94 MMOL/L (ref 99–110)
CHLORIDE BLD-SCNC: 95 MMOL/L (ref 99–110)
CHLORIDE BLD-SCNC: 96 MMOL/L (ref 99–110)
CHLORIDE BLD-SCNC: 98 MMOL/L (ref 99–110)
CO2: 22 MMOL/L (ref 21–32)
CO2: 23 MMOL/L (ref 21–32)
CO2: 24 MMOL/L (ref 21–32)
CREAT SERPL-MCNC: 0.6 MG/DL (ref 0.9–1.3)
CREAT SERPL-MCNC: 0.7 MG/DL (ref 0.9–1.3)
DIFFERENTIAL TYPE: ABNORMAL
EOSINOPHILS ABSOLUTE: 1 K/CU MM
EOSINOPHILS RELATIVE PERCENT: 15 % (ref 0–3)
GFR SERPL CREATININE-BSD FRML MDRD: >60 ML/MIN/1.73M2
GLUCOSE BLD-MCNC: 74 MG/DL (ref 70–99)
GLUCOSE SERPL-MCNC: 107 MG/DL (ref 70–99)
GLUCOSE SERPL-MCNC: 107 MG/DL (ref 70–99)
GLUCOSE SERPL-MCNC: 74 MG/DL (ref 70–99)
GLUCOSE SERPL-MCNC: 75 MG/DL (ref 70–99)
GLUCOSE SERPL-MCNC: 86 MG/DL (ref 70–99)
GLUCOSE SERPL-MCNC: 90 MG/DL (ref 70–99)
HCT VFR BLD CALC: 25.9 % (ref 42–52)
HEMOGLOBIN: 8.6 GM/DL (ref 13.5–18)
IMMATURE NEUTROPHIL %: 0.3 % (ref 0–0.43)
LYMPHOCYTES ABSOLUTE: 1.5 K/CU MM
LYMPHOCYTES RELATIVE PERCENT: 24.2 % (ref 24–44)
MAGNESIUM: 1.7 MG/DL (ref 1.8–2.4)
MCH RBC QN AUTO: 30.1 PG (ref 27–31)
MCHC RBC AUTO-ENTMCNC: 33.2 % (ref 32–36)
MCV RBC AUTO: 90.6 FL (ref 78–100)
MONOCYTES ABSOLUTE: 0.4 K/CU MM
MONOCYTES RELATIVE PERCENT: 5.8 % (ref 0–4)
NUCLEATED RBC %: 0 %
PDW BLD-RTO: 12.3 % (ref 11.7–14.9)
PHOSPHORUS: 2.6 MG/DL (ref 2.5–4.9)
PLATELET # BLD: 147 K/CU MM (ref 140–440)
PMV BLD AUTO: 10.5 FL (ref 7.5–11.1)
POTASSIUM SERPL-SCNC: 3.4 MMOL/L (ref 3.5–5.1)
POTASSIUM SERPL-SCNC: 3.6 MMOL/L (ref 3.5–5.1)
POTASSIUM SERPL-SCNC: 3.7 MMOL/L (ref 3.5–5.1)
POTASSIUM SERPL-SCNC: 3.9 MMOL/L (ref 3.5–5.1)
POTASSIUM SERPL-SCNC: 4 MMOL/L (ref 3.5–5.1)
POTASSIUM SERPL-SCNC: 4 MMOL/L (ref 3.5–5.1)
RBC # BLD: 2.86 M/CU MM (ref 4.6–6.2)
SEGMENTED NEUTROPHILS ABSOLUTE COUNT: 3.4 K/CU MM
SEGMENTED NEUTROPHILS RELATIVE PERCENT: 54.2 % (ref 36–66)
SODIUM BLD-SCNC: 124 MMOL/L (ref 135–145)
SODIUM BLD-SCNC: 125 MMOL/L (ref 135–145)
SODIUM BLD-SCNC: 126 MMOL/L (ref 135–145)
TOTAL IMMATURE NEUTOROPHIL: 0.02 K/CU MM
TOTAL NUCLEATED RBC: 0 K/CU MM
TOTAL PROTEIN: 5.3 GM/DL (ref 6.4–8.2)
WBC # BLD: 6.3 K/CU MM (ref 4–10.5)

## 2023-02-25 PROCEDURE — 2500000003 HC RX 250 WO HCPCS: Performed by: INTERNAL MEDICINE

## 2023-02-25 PROCEDURE — 2580000003 HC RX 258: Performed by: INTERNAL MEDICINE

## 2023-02-25 PROCEDURE — 94003 VENT MGMT INPAT SUBQ DAY: CPT

## 2023-02-25 PROCEDURE — 2500000003 HC RX 250 WO HCPCS: Performed by: STUDENT IN AN ORGANIZED HEALTH CARE EDUCATION/TRAINING PROGRAM

## 2023-02-25 PROCEDURE — 36592 COLLECT BLOOD FROM PICC: CPT

## 2023-02-25 PROCEDURE — 89220 SPUTUM SPECIMEN COLLECTION: CPT

## 2023-02-25 PROCEDURE — 80048 BASIC METABOLIC PNL TOTAL CA: CPT

## 2023-02-25 PROCEDURE — 83735 ASSAY OF MAGNESIUM: CPT

## 2023-02-25 PROCEDURE — 6360000002 HC RX W HCPCS: Performed by: EMERGENCY MEDICINE

## 2023-02-25 PROCEDURE — 2700000000 HC OXYGEN THERAPY PER DAY

## 2023-02-25 PROCEDURE — 2500000003 HC RX 250 WO HCPCS: Performed by: PHYSICIAN ASSISTANT

## 2023-02-25 PROCEDURE — P9047 ALBUMIN (HUMAN), 25%, 50ML: HCPCS | Performed by: INTERNAL MEDICINE

## 2023-02-25 PROCEDURE — 85025 COMPLETE CBC W/AUTO DIFF WBC: CPT

## 2023-02-25 PROCEDURE — 6360000002 HC RX W HCPCS: Performed by: INTERNAL MEDICINE

## 2023-02-25 PROCEDURE — C9113 INJ PANTOPRAZOLE SODIUM, VIA: HCPCS | Performed by: STUDENT IN AN ORGANIZED HEALTH CARE EDUCATION/TRAINING PROGRAM

## 2023-02-25 PROCEDURE — 82962 GLUCOSE BLOOD TEST: CPT

## 2023-02-25 PROCEDURE — 84100 ASSAY OF PHOSPHORUS: CPT

## 2023-02-25 PROCEDURE — 87086 URINE CULTURE/COLONY COUNT: CPT

## 2023-02-25 PROCEDURE — 99233 SBSQ HOSP IP/OBS HIGH 50: CPT | Performed by: NURSE PRACTITIONER

## 2023-02-25 PROCEDURE — 80053 COMPREHEN METABOLIC PANEL: CPT

## 2023-02-25 PROCEDURE — 2000000000 HC ICU R&B

## 2023-02-25 PROCEDURE — 94761 N-INVAS EAR/PLS OXIMETRY MLT: CPT

## 2023-02-25 PROCEDURE — 6360000002 HC RX W HCPCS: Performed by: STUDENT IN AN ORGANIZED HEALTH CARE EDUCATION/TRAINING PROGRAM

## 2023-02-25 RX ORDER — MAGNESIUM SULFATE IN WATER 40 MG/ML
2000 INJECTION, SOLUTION INTRAVENOUS ONCE
Status: COMPLETED | OUTPATIENT
Start: 2023-02-25 | End: 2023-02-25

## 2023-02-25 RX ORDER — ALBUMIN (HUMAN) 12.5 G/50ML
12.5 SOLUTION INTRAVENOUS ONCE
Status: COMPLETED | OUTPATIENT
Start: 2023-02-25 | End: 2023-02-25

## 2023-02-25 RX ORDER — DEXMEDETOMIDINE HYDROCHLORIDE 4 UG/ML
.1-1.5 INJECTION, SOLUTION INTRAVENOUS CONTINUOUS
Status: DISCONTINUED | OUTPATIENT
Start: 2023-02-25 | End: 2023-02-26

## 2023-02-25 RX ORDER — 0.9 % SODIUM CHLORIDE 0.9 %
500 INTRAVENOUS SOLUTION INTRAVENOUS ONCE
Status: COMPLETED | OUTPATIENT
Start: 2023-02-25 | End: 2023-02-25

## 2023-02-25 RX ADMIN — PROPOFOL 25 MCG/KG/MIN: 10 INJECTION, EMULSION INTRAVENOUS at 05:49

## 2023-02-25 RX ADMIN — POTASSIUM CHLORIDE 20 MEQ: 29.8 INJECTION, SOLUTION INTRAVENOUS at 09:48

## 2023-02-25 RX ADMIN — ENOXAPARIN SODIUM 40 MG: 100 INJECTION SUBCUTANEOUS at 08:29

## 2023-02-25 RX ADMIN — SODIUM CHLORIDE 500 ML: 9 INJECTION, SOLUTION INTRAVENOUS at 20:43

## 2023-02-25 RX ADMIN — ALBUMIN (HUMAN) 12.5 G: 0.25 INJECTION, SOLUTION INTRAVENOUS at 03:15

## 2023-02-25 RX ADMIN — MIDAZOLAM HYDROCHLORIDE 2 MG: 1 INJECTION, SOLUTION INTRAMUSCULAR; INTRAVENOUS at 09:18

## 2023-02-25 RX ADMIN — MIDAZOLAM HYDROCHLORIDE 2 MG: 1 INJECTION, SOLUTION INTRAMUSCULAR; INTRAVENOUS at 03:06

## 2023-02-25 RX ADMIN — MAGNESIUM SULFATE HEPTAHYDRATE 2000 MG: 2 INJECTION, SOLUTION INTRAVENOUS at 08:30

## 2023-02-25 RX ADMIN — MIDAZOLAM HYDROCHLORIDE 2 MG: 1 INJECTION, SOLUTION INTRAMUSCULAR; INTRAVENOUS at 01:03

## 2023-02-25 RX ADMIN — Medication 2 MCG/MIN: at 04:03

## 2023-02-25 RX ADMIN — Medication 75 MCG/HR: at 04:14

## 2023-02-25 RX ADMIN — PANTOPRAZOLE SODIUM 40 MG: 40 INJECTION, POWDER, FOR SOLUTION INTRAVENOUS at 08:29

## 2023-02-25 RX ADMIN — PIPERACILLIN AND TAZOBACTAM 3375 MG: 3; .375 INJECTION, POWDER, LYOPHILIZED, FOR SOLUTION INTRAVENOUS at 02:00

## 2023-02-25 RX ADMIN — POTASSIUM CHLORIDE 20 MEQ: 29.8 INJECTION, SOLUTION INTRAVENOUS at 11:22

## 2023-02-25 RX ADMIN — PIPERACILLIN AND TAZOBACTAM 3375 MG: 3; .375 INJECTION, POWDER, LYOPHILIZED, FOR SOLUTION INTRAVENOUS at 17:25

## 2023-02-25 RX ADMIN — THIAMINE HYDROCHLORIDE 300 MG: 100 INJECTION, SOLUTION INTRAMUSCULAR; INTRAVENOUS at 09:23

## 2023-02-25 RX ADMIN — PROPOFOL 30 MCG/KG/MIN: 10 INJECTION, EMULSION INTRAVENOUS at 09:46

## 2023-02-25 RX ADMIN — THIAMINE HYDROCHLORIDE 300 MG: 100 INJECTION, SOLUTION INTRAMUSCULAR; INTRAVENOUS at 20:53

## 2023-02-25 RX ADMIN — DEXMEDETOMIDINE HYDROCHLORIDE 0.2 MCG/KG/HR: 4 INJECTION, SOLUTION INTRAVENOUS at 11:12

## 2023-02-25 RX ADMIN — PIPERACILLIN AND TAZOBACTAM 3375 MG: 3; .375 INJECTION, POWDER, LYOPHILIZED, FOR SOLUTION INTRAVENOUS at 09:28

## 2023-02-25 ASSESSMENT — PULMONARY FUNCTION TESTS
PIF_VALUE: 25
PIF_VALUE: 40
PIF_VALUE: 21
PIF_VALUE: 10
PIF_VALUE: 26
PIF_VALUE: 22
PIF_VALUE: 29
PIF_VALUE: 23
PIF_VALUE: 27
PIF_VALUE: 23
PIF_VALUE: 21
PIF_VALUE: 25
PIF_VALUE: 11
PIF_VALUE: 24
PIF_VALUE: 27
PIF_VALUE: 10
PIF_VALUE: 23
PIF_VALUE: 26
PIF_VALUE: 10
PIF_VALUE: 23
PIF_VALUE: 33
PIF_VALUE: 28
PIF_VALUE: 35
PIF_VALUE: 22
PIF_VALUE: 28
PIF_VALUE: 32

## 2023-02-25 ASSESSMENT — ENCOUNTER SYMPTOMS
GASTROINTESTINAL NEGATIVE: 1
RESPIRATORY NEGATIVE: 1

## 2023-02-25 NOTE — PROGRESS NOTES
Nephrology Progress Note  2/25/2023 11:54 AM  Subjective: Interval History: Annabelle Zaragoza is a 36 y.o. male with sedated on the ventilator        Data:   Scheduled Meds:   ketamine  200 mg IntraVENous Once    piperacillin-tazobactam  3,375 mg IntraVENous q8h    thiamine (VITAMIN B1) IVPB  300 mg IntraVENous BID    pantoprazole  40 mg IntraVENous Daily    enoxaparin  40 mg SubCUTAneous Daily     Continuous Infusions:   dexmedetomidine HCl in NaCl 0.2 mcg/kg/hr (02/25/23 1112)    propofol 5 mcg/kg/min (02/25/23 1132)    dextrose Stopped (02/25/23 0331)    DOPamine 5 mcg/kg/min (02/24/23 1807)    fentaNYL 75 mcg/hr (02/25/23 0414)    norepinephrine Stopped (02/25/23 1103)         CBC   Recent Labs     02/24/23  0225 02/24/23  0915 02/25/23  0507   WBC 11.5* 4.9 6.3   HGB 8.0* 8.1* 8.6*   HCT 22.2* 24.2* 25.9*   * 145 147      BMP   Recent Labs     02/24/23  0225 02/24/23  0320 02/24/23  1306 02/24/23  1730 02/25/23  0133 02/25/23  0507 02/25/23  0838   *   < > 134*   < > 126* 126* 126*   K 2.9*   < > 3.9   < > 3.6 3.7 3.4*   CL 90*   < > 104   < > 98* 96* 96*   CO2 19*   < > 24   < > 22 22 23   PHOS 1.6*  --  2.6  --   --  2.6  --    BUN 8   < > 6   < > 5* 5* 5*   CREATININE 0.6*   < > 0.7*   < > 0.7* 0.7* 0.6*    < > = values in this interval not displayed. Hepatic:   Recent Labs     02/24/23  0015 02/25/23  0507   AST 27 43*   ALT 22 21   BILITOT 0.6 0.2   ALKPHOS 35* 35*     Troponin: No results for input(s): TROPONINI in the last 72 hours. BNP: No results for input(s): BNP in the last 72 hours. Lipids: No results for input(s): CHOL, HDL in the last 72 hours.     Invalid input(s): LDLCALCU  ABGs:   Lab Results   Component Value Date/Time    PO2ART 248 02/24/2023 02:15 AM    SNR7ULB 31.0 02/24/2023 02:15 AM     INR:   Recent Labs     02/24/23  0225   INR 0.91     Renal Labs  Albumin:    Lab Results   Component Value Date/Time    LABALBU 4.0 02/25/2023 05:07 AM     Calcium:    Lab Results Component Value Date/Time    CALCIUM 7.9 02/25/2023 08:38 AM     Phosphorus:    Lab Results   Component Value Date/Time    PHOS 2.6 02/25/2023 05:07 AM     U/A:    Lab Results   Component Value Date/Time    NITRU NEGATIVE 02/24/2023 01:10 AM    COLORU YELLOW 02/24/2023 01:10 AM    WBCUA 1 12/28/2018 12:31 PM    RBCUA NONE SEEN 12/28/2018 12:31 PM    MUCUS RARE 12/28/2018 12:31 PM    TRICHOMONAS NONE SEEN 12/28/2018 12:31 PM    BACTERIA NEGATIVE 12/28/2018 12:31 PM    CLARITYU CLEAR 02/24/2023 01:10 AM    SPECGRAV <1.005 02/24/2023 01:10 AM    UROBILINOGEN 0.2 02/24/2023 01:10 AM    BILIRUBINUR NEGATIVE 02/24/2023 01:10 AM    BLOODU NEGATIVE 02/24/2023 01:10 AM    KETUA NEGATIVE 02/24/2023 01:10 AM     ABG:    Lab Results   Component Value Date/Time    WAX0QFG 31.0 02/24/2023 02:15 AM    PO2ART 248 02/24/2023 02:15 AM    ORX9IOG 22.0 02/24/2023 02:15 AM     HgBA1c:    Lab Results   Component Value Date/Time    LABA1C 5.9 01/26/2022 01:56 PM     Microalbumen/Creatinine ratio:  No components found for: RUCREAT  TSH:  No results found for: TSH  IRON:  No results found for: IRON  Iron Saturation:  No components found for: PERCENTFE  TIBC:  No results found for: TIBC  FERRITIN:  No results found for: FERRITIN  RPR:  No results found for: RPR  TENISHA:    Lab Results   Component Value Date/Time    TENISHA None Detected 04/26/2012 10:05 AM     24 Hour Urine for Creatinine Clearance:  No components found for: CREAT4, UHRS10, UTV10      Objective:   I/O: 02/24 0701 - 02/25 0700  In: 3064.3 [I.V.:1909.2]  Out: 7505 [Urine:7425]  I/O last 3 completed shifts: In: 3064.3 [I.V.:1909.2;  IV Piggyback:1155.1]  Out: 15873 [NIMTT:31384; Emesis/NG output:80]  I/O this shift:  In: -   Out: 475 [Urine:475]  Vitals: BP (!) 107/50   Pulse 60   Temp 98.8 °F (37.1 °C) (Bladder)   Resp 14   Ht 5' 9\" (1.753 m)   Wt 174 lb 6.1 oz (79.1 kg)   SpO2 100%   BMI 25.75 kg/m²  {  General appearance: Sedated ET tube  HEENT: Head: Normal, normocephalic, atraumatic.   Neck: supple, symmetrical, trachea midline  Lungs: diminished breath sounds bilaterally  Heart: S1, S2 normal  Abdomen: abnormal findings:  soft nt  Extremities: edema trace  Neurologic: Mental status: alertness: Sedated        Assessment and Plan:      IMP:  #1 acute on chronic hyponatremia  #2 respiratory failure  #3 recent seizure  #4 hypotension  #5 hypokalemia    Plan     1 sodium overcorrected now decreased to 126 stop D5W stop DDAVP monitor for now supportive care for now slowly to rise  #2 maintain on ventilator wean as able  #3 monitor neuro status  #4 blood pressure low stable  #5 replete potassium  #6 good urine output renal function stable monitor           Ana Maria Carver MD, MD

## 2023-02-25 NOTE — PROGRESS NOTES
Neurology Service Progress Note   [address]   Patient Name: Erica Montoya  : 1980        Subjective:   Patient seen and examined today  He is from correction not sure why he was not hydrated at this time  Weaning sedation dn planned for extubation today  No repeat seizures in the last 24 hours  Non focal exam  Following commands  No family   Discussed plan with Critical Care attending   Hemodynamically stable  No fever   No distress noted     Past Medical History:   Diagnosis Date    Anxiety     Asthma     Chronic back pain     Diabetes mellitus ([facility] [address]     H/O 24 hour EKG monitoring 2013    EVENT MONITOR-normal sinus rhythm    H/O echocardiogram 2017    EF 55% Normal LV with normal systolic function. No significant valvulopathy is seen    Hx of echocardiogram 13 Mitrall annular calcification with a trace to mild MR    Hyperlipidemia     Hypertension     Schizophrenia ([facility] [address]     :   Past Surgical History:   Procedure Laterality Date    HEMORRHOID SURGERY       Medications:  Scheduled Meds:   ketamine  200 mg IntraVENous Once    piperacillin-tazobactam  3,375 mg IntraVENous q8h    thiamine (VITAMIN B1) IVPB  300 mg IntraVENous BID    pantoprazole  40 mg IntraVENous Daily    enoxaparin  40 mg SubCUTAneous Daily     Continuous Infusions:   dexmedetomidine HCl in NaCl 0.2 mcg/kg/hr (23 1112)    propofol 5 mcg/kg/min (23 1132)    dextrose Stopped (23 0331)    DOPamine 5 mcg/kg/min (23 1807)    fentaNYL 75 mcg/hr (23 0414)    norepinephrine Stopped (23 1103)     PRN Meds:. LORazepam, potassium chloride, midazolam, fentanNYL    Allergies   Allergen Reactions    Cat Hair Extract     No Known Allergies     Pollen Extract     Seasonal      Social History     Socioeconomic History    Marital status: Single     Spouse name: Not on file    Number of children: Not on file    Years of education: Not on file    Highest education level: Not on file Occupational History    Not on file   Tobacco Use    Smoking status: Every Day     Packs/day: 0.50     Years: 20.00     Pack years: 10.00     Types: Cigarettes    Smokeless tobacco: Current     Types: Snuff   Vaping Use    Vaping Use: Never used   Substance and Sexual Activity    Alcohol use: No    Drug use: Yes     Types: Cocaine, Marijuana (Weed)    Sexual activity: Not Currently     Comment: single    Other Topics Concern    Not on file   Social History Narrative    Not on file     Social Determinants of Health     Financial Resource Strain: Low Risk     Difficulty of Paying Living Expenses: Not hard at all   Food Insecurity: No Food Insecurity    Worried About Running Out of Food in the Last Year: Never true    Ran Out of Food in the Last Year: Never true   Transportation Needs: Not on file   Physical Activity: Not on file   Stress: Not on file   Social Connections: Not on file   Intimate Partner Violence: Not on file   Housing Stability: Not on file      Family History   Problem Relation Age of Onset    Emphysema Mother     Heart Disease Father     High Blood Pressure Father          ROS (10 systems)  Intubated, sedated and unable to answer questions    Physical Exam:         Wt Readings from Last 3 Encounters:   02/24/23 174 lb 6.1 oz (79.1 kg)   09/28/22 174 lb (78.9 kg)   09/24/22 170 lb (77.1 kg)     Temp Readings from Last 3 Encounters:   02/25/23 98.8 °F (37.1 °C) (Bladder)   09/24/22 97.9 °F (36.6 °C) (Oral)   03/31/22 97.8 °F (36.6 °C) (Oral)     BP Readings from Last 3 Encounters:   02/25/23 (!) 107/50   09/28/22 136/82   09/24/22 (!) 144/93     Pulse Readings from Last 3 Encounters:   02/25/23 60   09/28/22 76   09/24/22 92        Gen: intubated, stable and no distress, following commands   HEENT: NC/AT, EOMI, PERRL with sluggish response, mmm, , neck supple, no meningeal signs  Heart: NSR/SB  Lungs: Intubated, ventilated  Ext: no edema, no calf tenderness b/l  Psych: Noninteractive  Skin: no rashes or lesions    NEUROLOGIC EXAM:    Mental Status: Intubated, NAD, following commands, shaking head yes and no to questions     Cranial Nerve Exam:   CN II-XII: PERRL, VFF, no nystagmus, no gaze paresis, sensation V1-V3 intact b/l, muscles of facial expression symmetric; hearing intact to conversational tone, unable to assess palate due to endotracheal tube,   Motor Exam:       Antigravity x4    Deep Tendon Reflexes: 1 4 biceps, triceps, brachioradialis, patellar, and achilles b/l; flexor plantar responses b/l    Sensation: Intact light touch/pain UE's/LE's b/l    Coordination/Cerebellum:       Tremors--none       Gait and stance:      Gait: deferred      LABS:     Recent Labs     02/24/23  0225 02/24/23  0320 02/24/23  0915 02/24/23  1306 02/25/23  0133 02/25/23  0507 02/25/23  0838   WBC 11.5*  --  4.9  --   --  6.3  --    *   < > 136   < > 126* 126* 126*   K 2.9*   < > 4.5   < > 3.6 3.7 3.4*   CL 90*   < > 106   < > 98* 96* 96*   CO2 19*   < > 24   < > 22 22 23   BUN 8   < > 6   < > 5* 5* 5*   CREATININE 0.6*   < > 0.7*   < > 0.7* 0.7* 0.6*   GLUCOSE 89   < > 102*   < > 107* 86 90   INR 0.91  --   --   --   --   --   --     < > = values in this interval not displayed. IMAGING:    CT head w/o contrast:      Impression   No acute intracranial abnormality. Moderate right parietal scalp hematoma. No underlying fracture. Mild left face soft tissue swelling. CT cervical spine w/o contrast:  Impression   No acute abnormality of the cervical spine. Routine EEG:  EEG Interpretation:   The EEG was abnormal due to the presence of:   Moderate to severe generalized slowing. This is a non-specific finding but is consistent with a generalized disturbance of cerebral function. It may be seen in a variety of conditions, such as toxic, metabolic, post-anoxic, multi-focal or diffuse structural abnormalities.    No electrographic seizures or non-convulsive status epilepticus was seen over the entire monitoring period. All imaging was personally reviewed   ASSESSMENT/PLAN:     Fall and concern for seizure, likely reactive secondary to hyponatremia-resolving, extubation today, no status epilepticus noted   Neuroimaging as above  We will hold on further neuroimaging pending neurologic improvement  Routine EEG as above  No evidence for seizure  Recommend discontinuing Keppra at this time  Recommend weaning sedating medications as patient tolerates  NA managed by critical care, thank you   We will continue to follow pending neurologic improvement        Thank you for allowing us to participate in the care of your patient. If there are any questions regarding evaluation please feel free to contact us.      SARAH Carter - NARA, 2/25/2023

## 2023-02-25 NOTE — PROGRESS NOTES
Patient extubated per protocol per Merlinda Specking RT. Placed on 2L NC. Tolerated well. Patient alert and re-oriented to room.
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
## 2023-02-25 NOTE — PROGRESS NOTES
V2.0  [facility] [place] Note      Name:  Tejal Beebe /Age/Sex: 1980  (36 y.o. male)   MRN & CSN:  [id number] & [id number] Encounter Date/Time: 2023 11:25 AM EST    Location:  -A PCP: Radha Orellana MD       Hospital Day: 3    Assessment and Plan:   Tejal Beebe is a 36 y.o. male with pmh of DM II, HTN, HLD, COPD, Schizophrenia who presents with Seizure 2/2 hyponatremia. Plan:    Metabolic encephalopathy - 2/2 to hyponatremia. Episode of seizure like activity. Utox negative. CT head - No acute intracranial abnormality. Moderate right parietal scalp hematoma. Neurology consulted. Q4 neurocheck. On keppra. Had to be intubated in ED to protect the airway. Continue Vent management as per ICU     Severe hyponatremia -was initially given 3% saline as patient has seizure from hyponatremia. Nephrology consulted. BMP q4. Target correction no more than 8 meq in 24 hrs. Overcorrected and on D5%. Managed by nephrology and critical care     Anemia - Hb 8.8 on admission. Last hb few months back was 14. Currently no sign of overt bleeding. CT abdomen did not reveal any fluid collection. NG and Urine Output is clear. Need further workup with iron panel, Folate, B12, FOBT. Monitor vital and h/h in the mean time. Transfuse as needed     HLD - on statin     HTN - currently BP soft     COPD - does not look in exacerbation. currently on vent. Duoneb  PRN     Schizophrenia  - meds currently not resumed    Diet No diet orders on file   DVT Prophylaxis [] Lovenox, []  Heparin, [] SCDs, [] Ambulation,    [] Eliquis, [] Xarelto  [] Coumadin [] other   Code Status Full Code   Disposition From: residential  Expected Disposition: TBD  Estimated Date of Discharge: TBD  Patient requires continued admission due to respiratory failure and hyponatremia   Surrogate Decision Maker/ POA      Subjective:     Chief Complaint: Fall     Per remains in critical condition with sodium in the 120s in the ICU.   Still intubated and mechanically ventilated. Review of Systems:    Review of Systems   Unable to perform ROS: Intubated       Objective: Intake/Output Summary (Last 24 hours) at 2/25/2023 0944  Last data filed at 2/25/2023 0930  Gross per 24 hour   Intake 3064.29 ml   Output 4055 ml   Net -990.71 ml          Vitals:   Vitals:    02/25/23 0915   BP: 100/60   Pulse: 54   Resp: 14   Temp:    SpO2:        Physical Exam:     Physical Exam  Constitutional:       General: He is not in acute distress. Appearance: Normal appearance. HENT:      Head: Normocephalic and atraumatic. Nose: Nose normal.      Mouth/Throat:      Mouth: Mucous membranes are moist.      Pharynx: Oropharynx is clear. Eyes:      Extraocular Movements: Extraocular movements intact. Conjunctiva/sclera: Conjunctivae normal.      Pupils: Pupils are equal, round, and reactive to light. Cardiovascular:      Rate and Rhythm: Normal rate and regular rhythm. Pulses: Normal pulses. Heart sounds: Normal heart sounds. Pulmonary:      Effort: Pulmonary effort is normal.      Comments: intubated  Abdominal:      General: Abdomen is flat. Bowel sounds are normal.      Palpations: Abdomen is soft. Musculoskeletal:         General: Normal range of motion. Cervical back: Normal range of motion and neck supple. Skin:     General: Skin is warm and dry. Neurological:      General: No focal deficit present. Mental Status: He is alert and oriented to person, place, and time. Mental status is at baseline. Psychiatric:         Mood and Affect: Mood normal.         Behavior: Behavior normal.         Thought Content:  Thought content normal.       Medications:   Medications:    magnesium sulfate  2,000 mg IntraVENous Once    ketamine  200 mg IntraVENous Once    piperacillin-tazobactam  3,375 mg IntraVENous q8h    thiamine (VITAMIN B1) IVPB  300 mg IntraVENous BID    pantoprazole  40 mg IntraVENous Daily    enoxaparin  40 mg SubCUTAneous Daily      Infusions:    propofol 30 mcg/kg/min (02/25/23 0649)    dextrose Stopped (02/25/23 0331)    DOPamine 5 mcg/kg/min (02/24/23 7517)    fentaNYL 75 mcg/hr (02/25/23 0414)    norepinephrine 2 mcg/min (02/25/23 0403)     PRN Meds: LORazepam, 1 mg, Q1H PRN  potassium chloride, 20 mEq, PRN  midazolam, 2 mg, Q2H PRN  fentanNYL, 50 mcg, Q1H PRN      Labs      Recent Results (from the past 24 hour(s))   Magnesium    Collection Time: 02/24/23  1:06 PM   Result Value Ref Range    Magnesium 2.1 1.8 - 2.4 mg/dl   Phosphorus    Collection Time: 02/24/23  1:06 PM   Result Value Ref Range    Phosphorus 2.6 2.5 - 4.9 MG/DL   Basic Metabolic Panel    Collection Time: 02/24/23  1:06 PM   Result Value Ref Range    Sodium 134 (L) 135 - 145 MMOL/L    Potassium 3.9 3.5 - 5.1 MMOL/L    Chloride 104 99 - 110 mMol/L    CO2 24 21 - 32 MMOL/L    Anion Gap 6 4 - 16    BUN 6 6 - 23 MG/DL    Creatinine 0.7 (L) 0.9 - 1.3 MG/DL    Est, Glom Filt Rate >60 >60 mL/min/1.73m2    Glucose 111 (H) 70 - 99 MG/DL    Calcium 8.2 (L) 8.3 - 10.6 MG/DL   Basic Metabolic Panel    Collection Time: 02/24/23  5:30 PM   Result Value Ref Range    Sodium 141 135 - 145 MMOL/L    Potassium 3.2 (L) 3.5 - 5.1 MMOL/L    Chloride 102 99 - 110 mMol/L    CO2 20 (L) 21 - 32 MMOL/L    Anion Gap 19 (H) 4 - 16    BUN 5 (L) 6 - 23 MG/DL    Creatinine 0.6 (L) 0.9 - 1.3 MG/DL    Est, Glom Filt Rate >60 >60 mL/min/1.73m2    Glucose 110 (H) 70 - 99 MG/DL    Calcium 7.0 (L) 8.3 - 10.6 MG/DL   Lactic Acid    Collection Time: 02/24/23  5:30 PM   Result Value Ref Range    Lactate 0.9 0.5 - 1.9 mMOL/L   Basic Metabolic Panel    Collection Time: 02/24/23  7:52 PM   Result Value Ref Range    Sodium 129 (L) 135 - 145 MMOL/L    Potassium 4.3 3.5 - 5.1 MMOL/L    Chloride 100 99 - 110 mMol/L    CO2 24 21 - 32 MMOL/L    Anion Gap 5 4 - 16    BUN 6 6 - 23 MG/DL    Creatinine 0.7 (L) 0.9 - 1.3 MG/DL    Est, Glom Filt Rate >60 >60 mL/min/1.73m2    Glucose 101 (H) 70 - 99 MG/DL Calcium 8.1 (L) 8.3 - 10.6 MG/DL   Basic Metabolic Panel    Collection Time: 02/25/23  1:33 AM   Result Value Ref Range    Sodium 126 (L) 135 - 145 MMOL/L    Potassium 3.6 3.5 - 5.1 MMOL/L    Chloride 98 (L) 99 - 110 mMol/L    CO2 22 21 - 32 MMOL/L    Anion Gap 6 4 - 16    BUN 5 (L) 6 - 23 MG/DL    Creatinine 0.7 (L) 0.9 - 1.3 MG/DL    Est, Glom Filt Rate >60 >60 mL/min/1.73m2    Glucose 107 (H) 70 - 99 MG/DL    Calcium 7.8 (L) 8.3 - 10.6 MG/DL   Comprehensive Metabolic Panel    Collection Time: 02/25/23  5:07 AM   Result Value Ref Range    Sodium 126 (L) 135 - 145 MMOL/L    Potassium 3.7 3.5 - 5.1 MMOL/L    Chloride 96 (L) 99 - 110 mMol/L    CO2 22 21 - 32 MMOL/L    BUN 5 (L) 6 - 23 MG/DL    Creatinine 0.7 (L) 0.9 - 1.3 MG/DL    Est, Glom Filt Rate >60 >60 mL/min/1.73m2    Glucose 86 70 - 99 MG/DL    Calcium 8.1 (L) 8.3 - 10.6 MG/DL    Albumin 4.0 3.4 - 5.0 GM/DL    Total Protein 5.3 (L) 6.4 - 8.2 GM/DL    Total Bilirubin 0.2 0.0 - 1.0 MG/DL    ALT 21 10 - 40 U/L    AST 43 (H) 15 - 37 IU/L    Alkaline Phosphatase 35 (L) 40 - 128 IU/L    Anion Gap 8 4 - 16   Magnesium    Collection Time: 02/25/23  5:07 AM   Result Value Ref Range    Magnesium 1.7 (L) 1.8 - 2.4 mg/dl   Phosphorus    Collection Time: 02/25/23  5:07 AM   Result Value Ref Range    Phosphorus 2.6 2.5 - 4.9 MG/DL   CBC with Auto Differential    Collection Time: 02/25/23  5:07 AM   Result Value Ref Range    WBC 6.3 4.0 - 10.5 K/CU MM    RBC 2.86 (L) 4.6 - 6.2 M/CU MM    Hemoglobin 8.6 (L) 13.5 - 18.0 GM/DL    Hematocrit 25.9 (L) 42 - 52 %    MCV 90.6 78 - 100 FL    MCH 30.1 27 - 31 PG    MCHC 33.2 32.0 - 36.0 %    RDW 12.3 11.7 - 14.9 %    Platelets 080 034 - 459 K/CU MM    MPV 10.5 7.5 - 11.1 FL    Differential Type AUTOMATED DIFFERENTIAL     Segs Relative 54.2 36 - 66 %    Lymphocytes % 24.2 24 - 44 %    Monocytes % 5.8 (H) 0 - 4 %    Eosinophils % 15.0 (H) 0 - 3 %    Basophils % 0.5 0 - 1 %    Segs Absolute 3.4 K/CU MM    Lymphocytes Absolute 1.5 K/CU MM    Monocytes Absolute 0.4 K/CU MM    Eosinophils Absolute 1.0 K/CU MM    Basophils Absolute 0.0 K/CU MM    Nucleated RBC % 0.0 %    Total Nucleated RBC 0.0 K/CU MM    Total Immature Neutrophil 0.02 K/CU MM    Immature Neutrophil % 0.3 0 - 0.43 %   Basic Metabolic Panel    Collection Time: 02/25/23  8:38 AM   Result Value Ref Range    Sodium 126 (L) 135 - 145 MMOL/L    Potassium 3.4 (L) 3.5 - 5.1 MMOL/L    Chloride 96 (L) 99 - 110 mMol/L    CO2 23 21 - 32 MMOL/L    Anion Gap 7 4 - 16    BUN 5 (L) 6 - 23 MG/DL    Creatinine 0.6 (L) 0.9 - 1.3 MG/DL    Est, Glom Filt Rate >60 >60 mL/min/1.73m2    Glucose 90 70 - 99 MG/DL    Calcium 7.9 (L) 8.3 - 10.6 MG/DL        Imaging/Diagnostics Last 24 Hours   CT ABDOMEN PELVIS WO CONTRAST Additional Contrast? None    Result Date: 2/24/2023  EXAMINATION: CT OF THE ABDOMEN AND PELVIS WITHOUT CONTRAST; CT OF THE CHEST WITHOUT CONTRAST 2/24/2023 12:39 am TECHNIQUE: CT of the abdomen and pelvis was performed without the administration of intravenous contrast. Multiplanar reformatted images are provided for review. Automated exposure control, iterative reconstruction, and/or weight based adjustment of the mA/kV was utilized to reduce the radiation dose to as low as reasonably achievable.; CT of the chest was performed without the administration of intravenous contrast. Multiplanar reformatted images are provided for review. Automated exposure control, iterative reconstruction, and/or weight based adjustment of the mA/kV was utilized to reduce the radiation dose to as low as reasonably achievable. COMPARISON: 09/12/2012 HISTORY: ORDERING SYSTEM PROVIDED HISTORY: fall TECHNOLOGIST PROVIDED HISTORY: Reason for exam:->fall Additional Contrast?->None Decision Support Exception - unselect if not a suspected or confirmed emergency medical condition->Emergency Medical Condition (MA) Reason for Exam: fall; FINDINGS: Chest: Mediastinum: Normal heart size. No pericardial effusion.   Normal thoracic aortic caliber. No abnormal mediastinal or hilar lymph node endotracheal tube in place. Mildly dilated fluid-filled esophagus. Lungs/pleura: Mild dependent atelectasis. Lungs otherwise clear. No pneumothorax or pleural effusion. Airways unremarkable. Soft Tissues/Bones: Acute osseous or soft tissue abnormality. Abdomen/Pelvis: Organs: Limited evaluation due lack of intravenous contrast.  Liver, gallbladder, biliary system, pancreas, spleen, adrenal glands, and kidneys unremarkable. No hydronephrosis or urinary tract calculus. GI/Bowel: No bowel obstruction. Distended fluid, gas, and fluid-filled stomach. No evidence of appendicitis. Pelvis: Urinary bladder and pelvic organs unremarkable. Peritoneum/Retroperitoneum: No free air or free fluid. Normal abdominal aortic caliber. No retroperitoneal a Jasmin Marjan within limits of this noncontrast examination. No abnormal lymph node. Bones/Soft Tissues: Acute L2 compression fracture with superior endplate concavity and subjacent fracture plane resulting in 10% anterior vertebral body height loss and 2 mm retropulsion of the posterosuperior cortex. Bones otherwise intact. No acute soft tissue abnormality. Tiny uncomplicated fat containing bilateral inguinal hernias. 1. Acute compression fracture of the L2 vertebral body with 10% anterior vertebral height loss and minimal posterior cortex retropulsion. 2. No acute abnormality in the chest. 3. No solid or hollow viscus organ injury within limits of this noncontrast examination. 4. Distended fluid-filled stomach and mildly dilated fluid-filled esophagus most consistent with reflux. XR ABDOMEN (KUB) (SINGLE AP VIEW)    Result Date: 2/24/2023  EXAMINATION: ONE SUPINE X-RAY VIEW(S) OF THE ABDOMEN 2/24/2023 2:01 am COMPARISON: February 24, 2023.  HISTORY: ORDERING SYSTEM PROVIDED HISTORY:  NGT TECHNOLOGIST PROVIDED HISTORY: Reason for Exam:  NGT FINDINGS: Nasogastric tube tip terminates in the distal stomach. The side-port is beyond the GE junction. No acute or aggressive osseous lesion. Gaseous distention of the stomach is present. The bowel gas pattern is not well evaluated due to supine positioning. No gas distended loops of bowel appreciated. L2 compression deformity is seen to better advantage on prior CT. 1. Nasogastric tube tip terminates in the distal stomach. 2. Gaseous distention of the stomach. Finding is similar to the CT dated February 24, 2023. 3. L2 compression deformity is seen to better advantage on prior CT. CT HEAD WO CONTRAST    Result Date: 2/24/2023  EXAMINATION: CT OF THE HEAD WITHOUT CONTRAST  2/24/2023 12:39 am TECHNIQUE: CT of the head was performed without the administration of intravenous contrast. Automated exposure control, iterative reconstruction, and/or weight based adjustment of the mA/kV was utilized to reduce the radiation dose to as low as reasonably achievable. COMPARISON: November 15, 2016 HISTORY: ORDERING SYSTEM PROVIDED HISTORY: fall TECHNOLOGIST PROVIDED HISTORY: Has a \"code stroke\" or \"stroke alert\" been called? ->No Reason for exam:->fall Decision Support Exception - unselect if not a suspected or confirmed emergency medical condition->Emergency Medical Condition (MA) Reason for Exam: fall FINDINGS: BRAIN/VENTRICLES: There is no acute intracranial hemorrhage, mass effect or midline shift. No abnormal extra-axial fluid collection. The gray-white differentiation is maintained without evidence of an acute infarct. There is no evidence of hydrocephalus. ORBITS: The visualized portion of the orbits demonstrate no acute abnormality. SINUSES: Moderate mucosal thickening is identified in the bilateral maxillary, ethmoid, and right sphenoid sinus. The mastoid air cells are clear. SOFT TISSUES/SKULL:  Moderate right parietal scalp hematoma is present. Left face soft tissue swelling is also seen. No acute fracture. No acute intracranial abnormality.  Moderate right parietal scalp hematoma. No underlying fracture. Mild left face soft tissue swelling. CT CHEST WO CONTRAST    Result Date: 2/24/2023  EXAMINATION: CT OF THE ABDOMEN AND PELVIS WITHOUT CONTRAST; CT OF THE CHEST WITHOUT CONTRAST 2/24/2023 12:39 am TECHNIQUE: CT of the abdomen and pelvis was performed without the administration of intravenous contrast. Multiplanar reformatted images are provided for review. Automated exposure control, iterative reconstruction, and/or weight based adjustment of the mA/kV was utilized to reduce the radiation dose to as low as reasonably achievable.; CT of the chest was performed without the administration of intravenous contrast. Multiplanar reformatted images are provided for review. Automated exposure control, iterative reconstruction, and/or weight based adjustment of the mA/kV was utilized to reduce the radiation dose to as low as reasonably achievable. COMPARISON: 09/12/2012 HISTORY: ORDERING SYSTEM PROVIDED HISTORY: fall TECHNOLOGIST PROVIDED HISTORY: Reason for exam:->fall Additional Contrast?->None Decision Support Exception - unselect if not a suspected or confirmed emergency medical condition->Emergency Medical Condition (MA) Reason for Exam: fall; FINDINGS: Chest: Mediastinum: Normal heart size. No pericardial effusion. Normal thoracic aortic caliber. No abnormal mediastinal or hilar lymph node endotracheal tube in place. Mildly dilated fluid-filled esophagus. Lungs/pleura: Mild dependent atelectasis. Lungs otherwise clear. No pneumothorax or pleural effusion. Airways unremarkable. Soft Tissues/Bones: Acute osseous or soft tissue abnormality. Abdomen/Pelvis: Organs: Limited evaluation due lack of intravenous contrast.  Liver, gallbladder, biliary system, pancreas, spleen, adrenal glands, and kidneys unremarkable. No hydronephrosis or urinary tract calculus. GI/Bowel: No bowel obstruction. Distended fluid, gas, and fluid-filled stomach.   No evidence of appendicitis. Pelvis: Urinary bladder and pelvic organs unremarkable. Peritoneum/Retroperitoneum: No free air or free fluid. Normal abdominal aortic caliber. No retroperitoneal a Urszula Parker within limits of this noncontrast examination. No abnormal lymph node. Bones/Soft Tissues: Acute L2 compression fracture with superior endplate concavity and subjacent fracture plane resulting in 10% anterior vertebral body height loss and 2 mm retropulsion of the posterosuperior cortex. Bones otherwise intact. No acute soft tissue abnormality. Tiny uncomplicated fat containing bilateral inguinal hernias. 1. Acute compression fracture of the L2 vertebral body with 10% anterior vertebral height loss and minimal posterior cortex retropulsion. 2. No acute abnormality in the chest. 3. No solid or hollow viscus organ injury within limits of this noncontrast examination. 4. Distended fluid-filled stomach and mildly dilated fluid-filled esophagus most consistent with reflux. CT CERVICAL SPINE WO CONTRAST    Result Date: 2/24/2023  EXAMINATION: CT OF THE CERVICAL SPINE WITHOUT CONTRAST 2/24/2023 12:39 am TECHNIQUE: CT of the cervical spine was performed without the administration of intravenous contrast. Multiplanar reformatted images are provided for review. Automated exposure control, iterative reconstruction, and/or weight based adjustment of the mA/kV was utilized to reduce the radiation dose to as low as reasonably achievable. COMPARISON: None. HISTORY: ORDERING SYSTEM PROVIDED HISTORY: fall TECHNOLOGIST PROVIDED HISTORY: Reason for exam:->fall Decision Support Exception - unselect if not a suspected or confirmed emergency medical condition->Emergency Medical Condition (MA) Reason for Exam: fall FINDINGS: BONES/ALIGNMENT: There is no acute fracture or traumatic malalignment. DEGENERATIVE CHANGES: Mild multilevel degenerative changes are present. SOFT TISSUES: There is no prevertebral soft tissue swelling.  OTHER: Trace mastoid effusions are nonspecific. No acute abnormality of the cervical spine. XR CHEST PORTABLE    Result Date: 2/24/2023  EXAMINATION: ONE XRAY VIEW OF THE CHEST 2/24/2023 2:41 am COMPARISON: 09/24/2022 HISTORY: ORDERING SYSTEM PROVIDED HISTORY: central line TECHNOLOGIST PROVIDED HISTORY: Reason for exam:->central line Reason for Exam: central line FINDINGS: Endotracheal tube terminates 4.9 cm above the kellen. Enteric tube courses below the diaphragm with the tip not imaged. Right internal jugular center venous catheter tip is in the SVC. Clear lungs. No pneumothorax or pleural effusion. Cardiac and mediastinal contours normal.  No acute osseous abnormality. 1. Endotracheal tube terminates 4.9 cm above the kellen. 2. Right internal jugular center venous catheter tip in the SVC. 3. Clear lungs. No pneumothorax. Comment: Please note this report has been produced using speech recognition software and may contain errors related to that system including errors in grammar, punctuation, and spelling, as well as words and phrases that may be inappropriate. If there are any questions or concerns please feel free to contact the dictating provider for clarification.      Electronically signed by Nirmal Ritchie MD on 2/25/2023 at 9:44 AM

## 2023-02-25 NOTE — PROGRESS NOTES
Patient appears to be going apneic, however, patient will take long deep breathes with tidal volumes [id number] - making apnea ventilation alarm. Patient is alert, following commands. RSBI is 20 and below and NIF was -50.

## 2023-02-25 NOTE — PROGRESS NOTES
V2.0  [facility] Progress Note      Name:  Cindy Maria /Age/Sex: 1980  (36 y.o. male)   MRN & CSN:  [id number] & [id number] Encounter Date/Time: 2023 2:19 PM EST    Location:  -A PCP: Cheko Marroquin MD       Hospital Day: 3    Assessment and Plan:   Cindy Maria is a 36 y.o. male with past medical history of HTN/DM/HLD/Asthma/Schizophrenia who presents S/P fall and seizure like activity with AMS and Confusion S/P Intubation and Critical Hyponatremia     - AMS/Confusion: Possibly Metabolic due to Hyponatremia and Seizure R/O Drugs versus Toxin induced  - Critical Hyponatremia   - Seizure Like activity ? ? Hyponatremia related     - Acute Hypoxemic Respiratory Failure on Ventilator due to poor airway protection  - Hypokalemia    - Right Occipital Scalp Hematoma   - S/P Fall. - Anemia: Unclear if acute blood loss versus chronic ? ?  - Metabolic acidosis   - L2 compression fracture   - Hx of DM/HTN/Asthma/HLD and Schizophrenia       Plan   Neuro - awake and following. can continue low dose precedex for agitation if needed. EEG without evidence of seizures. DC Keppra per Neuro, no further imaging needed    CV - no active issues    Resp - extubated     GI - bedside swallow. ADAT GI prophylaxis     - hyponatremia - 118 ->->-> 136 -> ->-> 126. Goal Na 132-134 by midnight. Will let patient correct on his own. ID - empiric abx. Zosyn. Heme - Hb stable since admission but lower than baseline. Send FOBT. Iron panel      Time spent 40 minutes     Diet No diet orders on file   DVT Prophylaxis [x] Lovenox, []  Heparin, [] SCDs, [] Ambulation,  [] Eliquis, [] Xarelto  [] Coumadin   GI Prophylaxis [x] Yes          [] No   Code Status Full Code    Disposition Patient requires continued ICU care due to recent extubation     Subjective:     Extubated this AM. Doing well. No further seizure activity. Na @ 126 (s/p ddavp and D5W yesterday.      No active complaints      Review of Systems:    Review of Systems   Constitutional: Negative. HENT: Negative. Respiratory: Negative. Cardiovascular: Negative. Gastrointestinal: Negative. Genitourinary: Negative. Musculoskeletal: Negative. Skin: Negative. Neurological: Negative. Objective: Intake/Output Summary (Last 24 hours) at 2/25/2023 1419  Last data filed at 2/25/2023 1300  Gross per 24 hour   Intake 4207.84 ml   Output 2330 ml   Net 1877.84 ml        Vitals:   Vitals:    02/25/23 1223   BP:    Pulse: 58   Resp: 10   Temp:    SpO2: 100%       Physical Exam:     General: NAD  Eyes: EOMI  ENT: neck supple, Occipital scalp hematoma   Cardiovascular: Regular rate. Respiratory: Clear to auscultation  Gastrointestinal: Soft, non tender  Genitourinary: no suprapubic tenderness  Musculoskeletal: No edema  Skin: warm, dry  Neuro: Alert. Following commands  Psych: Mood appropriate.      Medications:   Medications:    ketamine  200 mg IntraVENous Once    piperacillin-tazobactam  3,375 mg IntraVENous q8h    thiamine (VITAMIN B1) IVPB  300 mg IntraVENous BID    pantoprazole  40 mg IntraVENous Daily    enoxaparin  40 mg SubCUTAneous Daily      Infusions:    dexmedetomidine HCl in NaCl 0.2 mcg/kg/hr (02/25/23 1112)    propofol 5 mcg/kg/min (02/25/23 1132)    dextrose Stopped (02/25/23 0331)    DOPamine Stopped (02/25/23 1302)    fentaNYL Stopped (02/25/23 1200)    norepinephrine Stopped (02/25/23 1103)     PRN Meds: LORazepam, 1 mg, Q1H PRN  potassium chloride, 20 mEq, PRN  midazolam, 2 mg, Q2H PRN  fentanNYL, 50 mcg, Q1H PRN        Labs      Recent Results (from the past 24 hour(s))   Basic Metabolic Panel    Collection Time: 02/24/23  5:30 PM   Result Value Ref Range    Sodium 141 135 - 145 MMOL/L    Potassium 3.2 (L) 3.5 - 5.1 MMOL/L    Chloride 102 99 - 110 mMol/L    CO2 20 (L) 21 - 32 MMOL/L    Anion Gap 19 (H) 4 - 16    BUN 5 (L) 6 - 23 MG/DL    Creatinine 0.6 (L) 0.9 - 1.3 MG/DL    Est, Glom Filt Rate >60 >60 mL/min/1.73m2    Glucose 110 (H) 70 - 99 MG/DL    Calcium 7.0 (L) 8.3 - 10.6 MG/DL   Lactic Acid    Collection Time: 02/24/23  5:30 PM   Result Value Ref Range    Lactate 0.9 0.5 - 1.9 mMOL/L   Basic Metabolic Panel    Collection Time: 02/24/23  7:52 PM   Result Value Ref Range    Sodium 129 (L) 135 - 145 MMOL/L    Potassium 4.3 3.5 - 5.1 MMOL/L    Chloride 100 99 - 110 mMol/L    CO2 24 21 - 32 MMOL/L    Anion Gap 5 4 - 16    BUN 6 6 - 23 MG/DL    Creatinine 0.7 (L) 0.9 - 1.3 MG/DL    Est, Glom Filt Rate >60 >60 mL/min/1.73m2    Glucose 101 (H) 70 - 99 MG/DL    Calcium 8.1 (L) 8.3 - 10.6 MG/DL   Basic Metabolic Panel    Collection Time: 02/25/23  1:33 AM   Result Value Ref Range    Sodium 126 (L) 135 - 145 MMOL/L    Potassium 3.6 3.5 - 5.1 MMOL/L    Chloride 98 (L) 99 - 110 mMol/L    CO2 22 21 - 32 MMOL/L    Anion Gap 6 4 - 16    BUN 5 (L) 6 - 23 MG/DL    Creatinine 0.7 (L) 0.9 - 1.3 MG/DL    Est, Glom Filt Rate >60 >60 mL/min/1.73m2    Glucose 107 (H) 70 - 99 MG/DL    Calcium 7.8 (L) 8.3 - 10.6 MG/DL   Comprehensive Metabolic Panel    Collection Time: 02/25/23  5:07 AM   Result Value Ref Range    Sodium 126 (L) 135 - 145 MMOL/L    Potassium 3.7 3.5 - 5.1 MMOL/L    Chloride 96 (L) 99 - 110 mMol/L    CO2 22 21 - 32 MMOL/L    BUN 5 (L) 6 - 23 MG/DL    Creatinine 0.7 (L) 0.9 - 1.3 MG/DL    Est, Glom Filt Rate >60 >60 mL/min/1.73m2    Glucose 86 70 - 99 MG/DL    Calcium 8.1 (L) 8.3 - 10.6 MG/DL    Albumin 4.0 3.4 - 5.0 GM/DL    Total Protein 5.3 (L) 6.4 - 8.2 GM/DL    Total Bilirubin 0.2 0.0 - 1.0 MG/DL    ALT 21 10 - 40 U/L    AST 43 (H) 15 - 37 IU/L    Alkaline Phosphatase 35 (L) 40 - 128 IU/L    Anion Gap 8 4 - 16   Magnesium    Collection Time: 02/25/23  5:07 AM   Result Value Ref Range    Magnesium 1.7 (L) 1.8 - 2.4 mg/dl   Phosphorus    Collection Time: 02/25/23  5:07 AM   Result Value Ref Range    Phosphorus 2.6 2.5 - 4.9 MG/DL   CBC with Auto Differential    Collection Time: 02/25/23  5:07 AM   Result Value Ref Range    WBC 6.3 4.0 - 10.5 K/CU MM    RBC 2.86 (L) 4.6 - 6.2 M/CU MM    Hemoglobin 8.6 (L) 13.5 - 18.0 GM/DL    Hematocrit 25.9 (L) 42 - 52 %    MCV 90.6 78 - 100 FL    MCH 30.1 27 - 31 PG    MCHC 33.2 32.0 - 36.0 %    RDW 12.3 11.7 - 14.9 %    Platelets 147 140 - 440 K/CU MM    MPV 10.5 7.5 - 11.1 FL    Differential Type AUTOMATED DIFFERENTIAL     Segs Relative 54.2 36 - 66 %    Lymphocytes % 24.2 24 - 44 %    Monocytes % 5.8 (H) 0 - 4 %    Eosinophils % 15.0 (H) 0 - 3 %    Basophils % 0.5 0 - 1 %    Segs Absolute 3.4 K/CU MM    Lymphocytes Absolute 1.5 K/CU MM    Monocytes Absolute 0.4 K/CU MM    Eosinophils Absolute 1.0 K/CU MM    Basophils Absolute 0.0 K/CU MM    Nucleated RBC % 0.0 %    Total Nucleated RBC 0.0 K/CU MM    Total Immature Neutrophil 0.02 K/CU MM    Immature Neutrophil % 0.3 0 - 0.43 %   Basic Metabolic Panel    Collection Time: 02/25/23  8:38 AM   Result Value Ref Range    Sodium 126 (L) 135 - 145 MMOL/L    Potassium 3.4 (L) 3.5 - 5.1 MMOL/L    Chloride 96 (L) 99 - 110 mMol/L    CO2 23 21 - 32 MMOL/L    Anion Gap 7 4 - 16    BUN 5 (L) 6 - 23 MG/DL    Creatinine 0.6 (L) 0.9 - 1.3 MG/DL    Est, Glom Filt Rate >60 >60 mL/min/1.73m2    Glucose 90 70 - 99 MG/DL    Calcium 7.9 (L) 8.3 - 10.6 MG/DL        Imaging/Diagnostics Last 24 Hours   CT ABDOMEN PELVIS WO CONTRAST Additional Contrast? None    Result Date: 2/24/2023  EXAMINATION: CT OF THE ABDOMEN AND PELVIS WITHOUT CONTRAST; CT OF THE CHEST WITHOUT CONTRAST 2/24/2023 12:39 am TECHNIQUE: CT of the abdomen and pelvis was performed without the administration of intravenous contrast. Multiplanar reformatted images are provided for review. Automated exposure control, iterative reconstruction, and/or weight based adjustment of the mA/kV was utilized to reduce the radiation dose to as low as reasonably achievable.; CT of the chest was performed without the administration of intravenous contrast. Multiplanar reformatted images are provided for review.  Automated exposure control, iterative reconstruction, and/or weight based adjustment of the mA/kV was utilized to reduce the radiation dose to as low as reasonably achievable. COMPARISON: 09/12/2012 HISTORY: ORDERING SYSTEM PROVIDED HISTORY: fall TECHNOLOGIST PROVIDED HISTORY: Reason for exam:->fall Additional Contrast?->None Decision Support Exception - unselect if not a suspected or confirmed emergency medical condition->Emergency Medical Condition (MA) Reason for Exam: fall; FINDINGS: Chest: Mediastinum: Normal heart size. No pericardial effusion. Normal thoracic aortic caliber. No abnormal mediastinal or hilar lymph node endotracheal tube in place. Mildly dilated fluid-filled esophagus. Lungs/pleura: Mild dependent atelectasis. Lungs otherwise clear. No pneumothorax or pleural effusion. Airways unremarkable. Soft Tissues/Bones: Acute osseous or soft tissue abnormality. Abdomen/Pelvis: Organs: Limited evaluation due lack of intravenous contrast.  Liver, gallbladder, biliary system, pancreas, spleen, adrenal glands, and kidneys unremarkable. No hydronephrosis or urinary tract calculus. GI/Bowel: No bowel obstruction. Distended fluid, gas, and fluid-filled stomach. No evidence of appendicitis. Pelvis: Urinary bladder and pelvic organs unremarkable. Peritoneum/Retroperitoneum: No free air or free fluid. Normal abdominal aortic caliber. No retroperitoneal a Star Shivers within limits of this noncontrast examination. No abnormal lymph node. Bones/Soft Tissues: Acute L2 compression fracture with superior endplate concavity and subjacent fracture plane resulting in 10% anterior vertebral body height loss and 2 mm retropulsion of the posterosuperior cortex. Bones otherwise intact. No acute soft tissue abnormality. Tiny uncomplicated fat containing bilateral inguinal hernias.      1. Acute compression fracture of the L2 vertebral body with 10% anterior vertebral height loss and minimal posterior cortex retropulsion. 2. No acute abnormality in the chest. 3. No solid or hollow viscus organ injury within limits of this noncontrast examination. 4. Distended fluid-filled stomach and mildly dilated fluid-filled esophagus most consistent with reflux. XR ABDOMEN (KUB) (SINGLE AP VIEW)    Result Date: 2/24/2023  EXAMINATION: ONE SUPINE X-RAY VIEW(S) OF THE ABDOMEN 2/24/2023 2:01 am COMPARISON: February 24, 2023. HISTORY: ORDERING SYSTEM PROVIDED HISTORY:  NGT TECHNOLOGIST PROVIDED HISTORY: Reason for Exam:  NGT FINDINGS: Nasogastric tube tip terminates in the distal stomach. The side-port is beyond the GE junction. No acute or aggressive osseous lesion. Gaseous distention of the stomach is present. The bowel gas pattern is not well evaluated due to supine positioning. No gas distended loops of bowel appreciated. L2 compression deformity is seen to better advantage on prior CT. 1. Nasogastric tube tip terminates in the distal stomach. 2. Gaseous distention of the stomach. Finding is similar to the CT dated February 24, 2023. 3. L2 compression deformity is seen to better advantage on prior CT. CT HEAD WO CONTRAST    Result Date: 2/24/2023  EXAMINATION: CT OF THE HEAD WITHOUT CONTRAST  2/24/2023 12:39 am TECHNIQUE: CT of the head was performed without the administration of intravenous contrast. Automated exposure control, iterative reconstruction, and/or weight based adjustment of the mA/kV was utilized to reduce the radiation dose to as low as reasonably achievable. COMPARISON: November 15, 2016 HISTORY: ORDERING SYSTEM PROVIDED HISTORY: fall TECHNOLOGIST PROVIDED HISTORY: Has a \"code stroke\" or \"stroke alert\" been called? ->No Reason for exam:->fall Decision Support Exception - unselect if not a suspected or confirmed emergency medical condition->Emergency Medical Condition (MA) Reason for Exam: fall FINDINGS: BRAIN/VENTRICLES: There is no acute intracranial hemorrhage, mass effect or midline shift.   No abnormal extra-axial fluid collection. The gray-white differentiation is maintained without evidence of an acute infarct. There is no evidence of hydrocephalus. ORBITS: The visualized portion of the orbits demonstrate no acute abnormality. SINUSES: Moderate mucosal thickening is identified in the bilateral maxillary, ethmoid, and right sphenoid sinus. The mastoid air cells are clear. SOFT TISSUES/SKULL:  Moderate right parietal scalp hematoma is present. Left face soft tissue swelling is also seen. No acute fracture. No acute intracranial abnormality. Moderate right parietal scalp hematoma. No underlying fracture. Mild left face soft tissue swelling. CT CHEST WO CONTRAST    Result Date: 2/24/2023  EXAMINATION: CT OF THE ABDOMEN AND PELVIS WITHOUT CONTRAST; CT OF THE CHEST WITHOUT CONTRAST 2/24/2023 12:39 am TECHNIQUE: CT of the abdomen and pelvis was performed without the administration of intravenous contrast. Multiplanar reformatted images are provided for review. Automated exposure control, iterative reconstruction, and/or weight based adjustment of the mA/kV was utilized to reduce the radiation dose to as low as reasonably achievable.; CT of the chest was performed without the administration of intravenous contrast. Multiplanar reformatted images are provided for review. Automated exposure control, iterative reconstruction, and/or weight based adjustment of the mA/kV was utilized to reduce the radiation dose to as low as reasonably achievable. COMPARISON: 09/12/2012 HISTORY: ORDERING SYSTEM PROVIDED HISTORY: fall TECHNOLOGIST PROVIDED HISTORY: Reason for exam:->fall Additional Contrast?->None Decision Support Exception - unselect if not a suspected or confirmed emergency medical condition->Emergency Medical Condition (MA) Reason for Exam: fall; FINDINGS: Chest: Mediastinum: Normal heart size. No pericardial effusion. Normal thoracic aortic caliber.   No abnormal mediastinal or hilar lymph node endotracheal tube in place. Mildly dilated fluid-filled esophagus. Lungs/pleura: Mild dependent atelectasis. Lungs otherwise clear. No pneumothorax or pleural effusion. Airways unremarkable. Soft Tissues/Bones: Acute osseous or soft tissue abnormality. Abdomen/Pelvis: Organs: Limited evaluation due lack of intravenous contrast.  Liver, gallbladder, biliary system, pancreas, spleen, adrenal glands, and kidneys unremarkable. No hydronephrosis or urinary tract calculus. GI/Bowel: No bowel obstruction. Distended fluid, gas, and fluid-filled stomach. No evidence of appendicitis. Pelvis: Urinary bladder and pelvic organs unremarkable. Peritoneum/Retroperitoneum: No free air or free fluid. Normal abdominal aortic caliber. No retroperitoneal a Aleks Kallie within limits of this noncontrast examination. No abnormal lymph node. Bones/Soft Tissues: Acute L2 compression fracture with superior endplate concavity and subjacent fracture plane resulting in 10% anterior vertebral body height loss and 2 mm retropulsion of the posterosuperior cortex. Bones otherwise intact. No acute soft tissue abnormality. Tiny uncomplicated fat containing bilateral inguinal hernias. 1. Acute compression fracture of the L2 vertebral body with 10% anterior vertebral height loss and minimal posterior cortex retropulsion. 2. No acute abnormality in the chest. 3. No solid or hollow viscus organ injury within limits of this noncontrast examination. 4. Distended fluid-filled stomach and mildly dilated fluid-filled esophagus most consistent with reflux. CT CERVICAL SPINE WO CONTRAST    Result Date: 2/24/2023  EXAMINATION: CT OF THE CERVICAL SPINE WITHOUT CONTRAST 2/24/2023 12:39 am TECHNIQUE: CT of the cervical spine was performed without the administration of intravenous contrast. Multiplanar reformatted images are provided for review.  Automated exposure control, iterative reconstruction, and/or weight based adjustment of the mA/kV was utilized to reduce the radiation dose to as low as reasonably achievable. COMPARISON: None. HISTORY: ORDERING SYSTEM PROVIDED HISTORY: fall TECHNOLOGIST PROVIDED HISTORY: Reason for exam:->fall Decision Support Exception - unselect if not a suspected or confirmed emergency medical condition->Emergency Medical Condition (MA) Reason for Exam: fall FINDINGS: BONES/ALIGNMENT: There is no acute fracture or traumatic malalignment. DEGENERATIVE CHANGES: Mild multilevel degenerative changes are present. SOFT TISSUES: There is no prevertebral soft tissue swelling. OTHER: Trace mastoid effusions are nonspecific. No acute abnormality of the cervical spine. XR CHEST PORTABLE    Result Date: 2/24/2023  EXAMINATION: ONE XRAY VIEW OF THE CHEST 2/24/2023 2:41 am COMPARISON: 09/24/2022 HISTORY: ORDERING SYSTEM PROVIDED HISTORY: central line TECHNOLOGIST PROVIDED HISTORY: Reason for exam:->central line Reason for Exam: central line FINDINGS: Endotracheal tube terminates 4.9 cm above the kellen. Enteric tube courses below the diaphragm with the tip not imaged. Right internal jugular center venous catheter tip is in the SVC. Clear lungs. No pneumothorax or pleural effusion. Cardiac and mediastinal contours normal.  No acute osseous abnormality. 1. Endotracheal tube terminates 4.9 cm above the kellen. 2. Right internal jugular center venous catheter tip in the SVC. 3. Clear lungs. No pneumothorax.        Electronically signed by Rachel Reed MD on 2/25/2023 at 2:19 PM

## 2023-02-26 LAB
ANION GAP SERPL CALCULATED.3IONS-SCNC: 10 MMOL/L (ref 4–16)
ANION GAP SERPL CALCULATED.3IONS-SCNC: 11 MMOL/L (ref 4–16)
ANION GAP SERPL CALCULATED.3IONS-SCNC: 6 MMOL/L (ref 4–16)
ANION GAP SERPL CALCULATED.3IONS-SCNC: 7 MMOL/L (ref 4–16)
ANION GAP SERPL CALCULATED.3IONS-SCNC: 8 MMOL/L (ref 4–16)
ANION GAP SERPL CALCULATED.3IONS-SCNC: 8 MMOL/L (ref 4–16)
BUN SERPL-MCNC: 11 MG/DL (ref 6–23)
BUN SERPL-MCNC: 12 MG/DL (ref 6–23)
BUN SERPL-MCNC: 19 MG/DL (ref 6–23)
BUN SERPL-MCNC: 5 MG/DL (ref 6–23)
BUN SERPL-MCNC: 6 MG/DL (ref 6–23)
BUN SERPL-MCNC: 9 MG/DL (ref 6–23)
CALCIUM SERPL-MCNC: 7.8 MG/DL (ref 8.3–10.6)
CALCIUM SERPL-MCNC: 8 MG/DL (ref 8.3–10.6)
CALCIUM SERPL-MCNC: 8.6 MG/DL (ref 8.3–10.6)
CALCIUM SERPL-MCNC: 8.7 MG/DL (ref 8.3–10.6)
CALCIUM SERPL-MCNC: 8.9 MG/DL (ref 8.3–10.6)
CALCIUM SERPL-MCNC: 9 MG/DL (ref 8.3–10.6)
CHLORIDE BLD-SCNC: 106 MMOL/L (ref 99–110)
CHLORIDE BLD-SCNC: 108 MMOL/L (ref 99–110)
CHLORIDE BLD-SCNC: 108 MMOL/L (ref 99–110)
CHLORIDE BLD-SCNC: 95 MMOL/L (ref 99–110)
CHLORIDE BLD-SCNC: 96 MMOL/L (ref 99–110)
CHLORIDE BLD-SCNC: 96 MMOL/L (ref 99–110)
CHLORIDE URINE RANDOM: 95 MMOL/L (ref 43–210)
CO2: 21 MMOL/L (ref 21–32)
CO2: 22 MMOL/L (ref 21–32)
CO2: 23 MMOL/L (ref 21–32)
CO2: 24 MMOL/L (ref 21–32)
CO2: 26 MMOL/L (ref 21–32)
CO2: 26 MMOL/L (ref 21–32)
CREAT SERPL-MCNC: 0.7 MG/DL (ref 0.9–1.3)
CREAT SERPL-MCNC: 0.7 MG/DL (ref 0.9–1.3)
CREAT SERPL-MCNC: 0.8 MG/DL (ref 0.9–1.3)
CREAT UR-MCNC: 17.3 MG/DL (ref 39–259)
CULTURE: NORMAL
DIFFERENTIAL TYPE: ABNORMAL
EOSINOPHILS ABSOLUTE: 1.3 K/CU MM
EOSINOPHILS RELATIVE PERCENT: 20 % (ref 0–3)
GFR SERPL CREATININE-BSD FRML MDRD: >60 ML/MIN/1.73M2
GLUCOSE BLD-MCNC: 120 MG/DL (ref 70–99)
GLUCOSE SERPL-MCNC: 112 MG/DL (ref 70–99)
GLUCOSE SERPL-MCNC: 124 MG/DL (ref 70–99)
GLUCOSE SERPL-MCNC: 126 MG/DL (ref 70–99)
GLUCOSE SERPL-MCNC: 75 MG/DL (ref 70–99)
GLUCOSE SERPL-MCNC: 75 MG/DL (ref 70–99)
GLUCOSE SERPL-MCNC: 89 MG/DL (ref 70–99)
HCT VFR BLD CALC: 22 % (ref 42–52)
HEMOGLOBIN: 7.8 GM/DL (ref 13.5–18)
LYMPHOCYTES ABSOLUTE: 2.2 K/CU MM
LYMPHOCYTES RELATIVE PERCENT: 34 % (ref 24–44)
Lab: NORMAL
MCH RBC QN AUTO: 29.9 PG (ref 27–31)
MCHC RBC AUTO-ENTMCNC: 35.5 % (ref 32–36)
MCV RBC AUTO: 84.3 FL (ref 78–100)
MONOCYTES ABSOLUTE: 0.1 K/CU MM
MONOCYTES RELATIVE PERCENT: 2 % (ref 0–4)
NUCLEATED RBC %: 0 %
OSMOLALITY UR: 282 MOS/L (ref 292–1090)
PDW BLD-RTO: 11.9 % (ref 11.7–14.9)
PLATELET # BLD: 131 K/CU MM (ref 140–440)
PMV BLD AUTO: 10.3 FL (ref 7.5–11.1)
POTASSIUM SERPL-SCNC: 3.7 MMOL/L (ref 3.5–5.1)
POTASSIUM SERPL-SCNC: 3.8 MMOL/L (ref 3.5–5.1)
POTASSIUM SERPL-SCNC: 4 MMOL/L (ref 3.5–5.1)
POTASSIUM SERPL-SCNC: 4 MMOL/L (ref 3.5–5.1)
POTASSIUM SERPL-SCNC: 4.2 MMOL/L (ref 3.5–5.1)
POTASSIUM SERPL-SCNC: 4.3 MMOL/L (ref 3.5–5.1)
RBC # BLD: 2.61 M/CU MM (ref 4.6–6.2)
SEGMENTED NEUTROPHILS ABSOLUTE COUNT: 3 K/CU MM
SEGMENTED NEUTROPHILS RELATIVE PERCENT: 44 % (ref 36–66)
SODIUM BLD-SCNC: 126 MMOL/L (ref 135–145)
SODIUM BLD-SCNC: 127 MMOL/L (ref 135–145)
SODIUM BLD-SCNC: 129 MMOL/L (ref 135–145)
SODIUM BLD-SCNC: 138 MMOL/L (ref 135–145)
SODIUM BLD-SCNC: 140 MMOL/L (ref 135–145)
SODIUM BLD-SCNC: 141 MMOL/L (ref 135–145)
SODIUM URINE: 103 MMOL/L (ref 35–167)
SPECIMEN: NORMAL
TOTAL NUCLEATED RBC: 0 K/CU MM
WBC # BLD: 6.6 K/CU MM (ref 4–10.5)

## 2023-02-26 PROCEDURE — C9113 INJ PANTOPRAZOLE SODIUM, VIA: HCPCS | Performed by: STUDENT IN AN ORGANIZED HEALTH CARE EDUCATION/TRAINING PROGRAM

## 2023-02-26 PROCEDURE — 2580000003 HC RX 258: Performed by: STUDENT IN AN ORGANIZED HEALTH CARE EDUCATION/TRAINING PROGRAM

## 2023-02-26 PROCEDURE — 6370000000 HC RX 637 (ALT 250 FOR IP): Performed by: INTERNAL MEDICINE

## 2023-02-26 PROCEDURE — 84540 ASSAY OF URINE/UREA-N: CPT

## 2023-02-26 PROCEDURE — 85007 BL SMEAR W/DIFF WBC COUNT: CPT

## 2023-02-26 PROCEDURE — 82570 ASSAY OF URINE CREATININE: CPT

## 2023-02-26 PROCEDURE — 36592 COLLECT BLOOD FROM PICC: CPT

## 2023-02-26 PROCEDURE — 82436 ASSAY OF URINE CHLORIDE: CPT

## 2023-02-26 PROCEDURE — 80048 BASIC METABOLIC PNL TOTAL CA: CPT

## 2023-02-26 PROCEDURE — 6360000002 HC RX W HCPCS: Performed by: STUDENT IN AN ORGANIZED HEALTH CARE EDUCATION/TRAINING PROGRAM

## 2023-02-26 PROCEDURE — 2580000003 HC RX 258: Performed by: PHYSICIAN ASSISTANT

## 2023-02-26 PROCEDURE — 2000000000 HC ICU R&B

## 2023-02-26 PROCEDURE — 94761 N-INVAS EAR/PLS OXIMETRY MLT: CPT

## 2023-02-26 PROCEDURE — 84300 ASSAY OF URINE SODIUM: CPT

## 2023-02-26 PROCEDURE — 83935 ASSAY OF URINE OSMOLALITY: CPT

## 2023-02-26 PROCEDURE — 82962 GLUCOSE BLOOD TEST: CPT

## 2023-02-26 PROCEDURE — 6360000002 HC RX W HCPCS: Performed by: INTERNAL MEDICINE

## 2023-02-26 PROCEDURE — 2580000003 HC RX 258: Performed by: INTERNAL MEDICINE

## 2023-02-26 PROCEDURE — 99233 SBSQ HOSP IP/OBS HIGH 50: CPT | Performed by: NURSE PRACTITIONER

## 2023-02-26 PROCEDURE — 6370000000 HC RX 637 (ALT 250 FOR IP): Performed by: STUDENT IN AN ORGANIZED HEALTH CARE EDUCATION/TRAINING PROGRAM

## 2023-02-26 PROCEDURE — 85027 COMPLETE CBC AUTOMATED: CPT

## 2023-02-26 RX ORDER — BUSPIRONE HYDROCHLORIDE 5 MG/1
5 TABLET ORAL 2 TIMES DAILY
Status: DISCONTINUED | OUTPATIENT
Start: 2023-02-26 | End: 2023-03-01 | Stop reason: HOSPADM

## 2023-02-26 RX ORDER — ALBUTEROL SULFATE 90 UG/1
2 AEROSOL, METERED RESPIRATORY (INHALATION) 4 TIMES DAILY PRN
Status: DISCONTINUED | OUTPATIENT
Start: 2023-02-26 | End: 2023-03-01 | Stop reason: HOSPADM

## 2023-02-26 RX ORDER — SODIUM CHLORIDE 450 MG/100ML
INJECTION, SOLUTION INTRAVENOUS EVERY 4 HOURS
Status: DISCONTINUED | OUTPATIENT
Start: 2023-02-26 | End: 2023-02-26

## 2023-02-26 RX ORDER — ACETAMINOPHEN 325 MG/1
650 TABLET ORAL EVERY 4 HOURS PRN
Status: DISCONTINUED | OUTPATIENT
Start: 2023-02-26 | End: 2023-02-28

## 2023-02-26 RX ORDER — DOCUSATE SODIUM 100 MG/1
100 CAPSULE, LIQUID FILLED ORAL DAILY
Status: DISCONTINUED | OUTPATIENT
Start: 2023-02-26 | End: 2023-03-01 | Stop reason: HOSPADM

## 2023-02-26 RX ORDER — LISINOPRIL 20 MG/1
20 TABLET ORAL DAILY
Status: DISCONTINUED | OUTPATIENT
Start: 2023-02-26 | End: 2023-03-01 | Stop reason: HOSPADM

## 2023-02-26 RX ORDER — PANTOPRAZOLE SODIUM 40 MG/1
40 TABLET, DELAYED RELEASE ORAL
Status: DISCONTINUED | OUTPATIENT
Start: 2023-02-27 | End: 2023-03-01 | Stop reason: HOSPADM

## 2023-02-26 RX ORDER — SODIUM CHLORIDE 9 MG/ML
INJECTION, SOLUTION INTRAVENOUS CONTINUOUS
Status: DISCONTINUED | OUTPATIENT
Start: 2023-02-26 | End: 2023-02-27

## 2023-02-26 RX ORDER — SODIUM CHLORIDE 450 MG/100ML
INJECTION, SOLUTION INTRAVENOUS EVERY 6 HOURS
Status: DISCONTINUED | OUTPATIENT
Start: 2023-02-26 | End: 2023-02-27

## 2023-02-26 RX ORDER — OXYCODONE HYDROCHLORIDE 5 MG/1
5 TABLET ORAL EVERY 4 HOURS PRN
Status: DISCONTINUED | OUTPATIENT
Start: 2023-02-26 | End: 2023-02-28

## 2023-02-26 RX ADMIN — SODIUM CHLORIDE 1000 ML: 4.5 INJECTION, SOLUTION INTRAVENOUS at 14:31

## 2023-02-26 RX ADMIN — PANTOPRAZOLE SODIUM 40 MG: 40 INJECTION, POWDER, FOR SOLUTION INTRAVENOUS at 07:54

## 2023-02-26 RX ADMIN — Medication 15 G: at 07:53

## 2023-02-26 RX ADMIN — OXYCODONE HYDROCHLORIDE 5 MG: 5 TABLET ORAL at 21:44

## 2023-02-26 RX ADMIN — PIPERACILLIN AND TAZOBACTAM 3375 MG: 3; .375 INJECTION, POWDER, LYOPHILIZED, FOR SOLUTION INTRAVENOUS at 08:01

## 2023-02-26 RX ADMIN — BUSPIRONE HYDROCHLORIDE 5 MG: 5 TABLET ORAL at 21:20

## 2023-02-26 RX ADMIN — ENOXAPARIN SODIUM 40 MG: 100 INJECTION SUBCUTANEOUS at 07:53

## 2023-02-26 RX ADMIN — SODIUM CHLORIDE 800 ML: 4.5 INJECTION, SOLUTION INTRAVENOUS at 20:58

## 2023-02-26 RX ADMIN — PIPERACILLIN AND TAZOBACTAM 3375 MG: 3; .375 INJECTION, POWDER, LYOPHILIZED, FOR SOLUTION INTRAVENOUS at 01:26

## 2023-02-26 RX ADMIN — SODIUM CHLORIDE: 9 INJECTION, SOLUTION INTRAVENOUS at 08:00

## 2023-02-26 RX ADMIN — SODIUM CHLORIDE 725 ML: 4.5 INJECTION, SOLUTION INTRAVENOUS at 10:10

## 2023-02-26 RX ADMIN — THIAMINE HYDROCHLORIDE 300 MG: 100 INJECTION, SOLUTION INTRAMUSCULAR; INTRAVENOUS at 21:15

## 2023-02-26 RX ADMIN — THIAMINE HYDROCHLORIDE 300 MG: 100 INJECTION, SOLUTION INTRAMUSCULAR; INTRAVENOUS at 10:07

## 2023-02-26 RX ADMIN — PIPERACILLIN AND TAZOBACTAM 3375 MG: 3; .375 INJECTION, POWDER, LYOPHILIZED, FOR SOLUTION INTRAVENOUS at 16:50

## 2023-02-26 RX ADMIN — ACETAMINOPHEN 650 MG: 325 TABLET ORAL at 21:43

## 2023-02-26 ASSESSMENT — ENCOUNTER SYMPTOMS
TROUBLE SWALLOWING: 0
GASTROINTESTINAL NEGATIVE: 1
VOMITING: 0
RESPIRATORY NEGATIVE: 1
SHORTNESS OF BREATH: 0
SINUS PRESSURE: 0
VOICE CHANGE: 0
SORE THROAT: 0
COUGH: 0
BACK PAIN: 0
WHEEZING: 0
CHEST TIGHTNESS: 0
ABDOMINAL PAIN: 0
CONSTIPATION: 0
SINUS PAIN: 0
DIARRHEA: 0
COLOR CHANGE: 0
NAUSEA: 0

## 2023-02-26 ASSESSMENT — PAIN DESCRIPTION - FREQUENCY: FREQUENCY: INTERMITTENT

## 2023-02-26 ASSESSMENT — PAIN DESCRIPTION - PAIN TYPE: TYPE: CHRONIC PAIN

## 2023-02-26 ASSESSMENT — PAIN SCALES - GENERAL
PAINLEVEL_OUTOF10: 0
PAINLEVEL_OUTOF10: 0
PAINLEVEL_OUTOF10: 5
PAINLEVEL_OUTOF10: 0

## 2023-02-26 ASSESSMENT — PAIN DESCRIPTION - DESCRIPTORS: DESCRIPTORS: ACHING;DISCOMFORT

## 2023-02-26 ASSESSMENT — PAIN DESCRIPTION - ORIENTATION: ORIENTATION: LOWER

## 2023-02-26 ASSESSMENT — PAIN - FUNCTIONAL ASSESSMENT: PAIN_FUNCTIONAL_ASSESSMENT: ACTIVITIES ARE NOT PREVENTED

## 2023-02-26 ASSESSMENT — PAIN DESCRIPTION - LOCATION: LOCATION: BACK

## 2023-02-26 ASSESSMENT — PAIN DESCRIPTION - ONSET: ONSET: ON-GOING

## 2023-02-26 NOTE — PROGRESS NOTES
Increased dilute UOP discussed with Dr. Brayden Luna and Dr. Salina Hinkle. See flowsheets and eMAR for new details.

## 2023-02-26 NOTE — PROGRESS NOTES
V2.0  [facility] [place] Note      Name:  Claudia Ying /Age/Sex: 1980  (36 y.o. male)   MRN & CSN:  [id number] & [id number] Encounter Date/Time: 2023 2:19 PM EST    Location:  -A PCP: Eligio Castle MD       Hospital Day: 4    Assessment and Plan:   Claudia Ying is a 36 y.o. male with past medical history of HTN/DM/HLD/Asthma/Schizophrenia who presents S/P fall and seizure like activity with AMS and Confusion S/P Intubation and Critical Hyponatremia     - AMS/Confusion: Possibly Metabolic due to Hyponatremia and Seizure R/O Drugs versus Toxin induced  - Critical Hyponatremia   - Seizure Like activity ? ? Hyponatremia related     - Acute Hypoxemic Respiratory Failure on Ventilator due to poor airway protection  - Hypokalemia    - Right Occipital Scalp Hematoma   - S/P Fall. - Anemia: Unclear if acute blood loss versus chronic ? ?  - Metabolic acidosis   - L2 compression fracture   - Hx of DM/HTN/Asthma/HLD and Schizophrenia       Plan   Neuro - awake and following. EEG without evidence of seizures. DC Keppra per Neuro, no further imaging needed    CV - no active issues    Resp - extubated     GI - tolerating PO. Low sodium/low protein diet to limit solutes      - hyponatremia - 118 on admission . 138 today, > 48hrs, will let him correct. Was on Olanzapine, will hold. - Polyuria - Concern for DI (although would see hypernatremia with that). Will send urine lytes. May repeat ddavp. Replace 1/2 the urine volume q6 hours with 1/2NS for 1-2 days    ID - empiric abx. Zosyn. Heme - Hb stable since admission but lower than baseline. Send FOBT. Iron panel      Time spent 40 minutes     Diet ADULT DIET;  Regular; 5 carb choices (75 gm/meal)   DVT Prophylaxis [x] Lovenox, []  Heparin, [] SCDs, [] Ambulation,  [] Eliquis, [] Xarelto  [] Coumadin   GI Prophylaxis [x] Yes          [] No   Code Status Full Code    Disposition Patient doesn't requires continued ICU care Subjective:     No active complaints. Polyuric. Review of Systems:    Review of Systems   Constitutional: Negative. HENT: Negative. Respiratory: Negative. Cardiovascular: Negative. Gastrointestinal: Negative. Genitourinary: Negative. Musculoskeletal: Negative. Skin: Negative. Neurological: Negative. Objective: Intake/Output Summary (Last 24 hours) at 2/26/2023 1632  Last data filed at 2/26/2023 1600  Gross per 24 hour   Intake 3077.55 ml   Output 7700 ml   Net -4622. 45 ml          Vitals:   Vitals:    02/26/23 1500   BP: 112/61   Pulse: 86   Resp: 19   Temp:    SpO2:        Physical Exam:     General: NAD  Eyes: EOMI  ENT: neck supple, Occipital scalp hematoma   Cardiovascular: Regular rate. Respiratory: Clear to auscultation  Gastrointestinal: Soft, non tender  Genitourinary: no suprapubic tenderness  Musculoskeletal: No edema  Skin: warm, dry  Neuro: Alert. Following commands  Psych: Mood appropriate.      Medications:   Medications:    ketamine  200 mg IntraVENous Once    piperacillin-tazobactam  3,375 mg IntraVENous q8h    thiamine (VITAMIN B1) IVPB  300 mg IntraVENous BID    pantoprazole  40 mg IntraVENous Daily    enoxaparin  40 mg SubCUTAneous Daily      Infusions:    sodium chloride 50 mL/hr at 02/26/23 0800    sodium chloride 1,000 mL (02/26/23 1431)    dexmedetomidine HCl in NaCl Stopped (02/25/23 2030)    propofol Stopped (02/25/23 1137)    DOPamine Stopped (02/25/23 1302)    fentaNYL Stopped (02/25/23 1200)    norepinephrine Stopped (02/25/23 1103)     PRN Meds: LORazepam, 1 mg, Q1H PRN  potassium chloride, 20 mEq, PRN  midazolam, 2 mg, Q2H PRN  fentanNYL, 50 mcg, Q1H PRN      Labs      Recent Results (from the past 24 hour(s))   POCT Glucose    Collection Time: 02/25/23  5:22 PM   Result Value Ref Range    POC Glucose 74 70 - 99 MG/DL   Basic Metabolic Panel    Collection Time: 02/25/23  5:23 PM   Result Value Ref Range    Sodium 124 (L) 135 - 145 MMOL/L Potassium 3.9 3.5 - 5.1 MMOL/L    Chloride 94 (L) 99 - 110 mMol/L    CO2 22 21 - 32 MMOL/L    Anion Gap 8 4 - 16    BUN 5 (L) 6 - 23 MG/DL    Creatinine 0.6 (L) 0.9 - 1.3 MG/DL    Est, Glom Filt Rate >60 >60 mL/min/1.73m2    Glucose 74 70 - 99 MG/DL    Calcium 8.0 (L) 8.3 - 10.6 MG/DL   Basic Metabolic Panel    Collection Time: 02/25/23  9:15 PM   Result Value Ref Range    Sodium 126 (L) 135 - 145 MMOL/L    Potassium 4.0 3.5 - 5.1 MMOL/L    Chloride 95 (L) 99 - 110 mMol/L    CO2 24 21 - 32 MMOL/L    Anion Gap 7 4 - 16    BUN 6 6 - 23 MG/DL    Creatinine 0.7 (L) 0.9 - 1.3 MG/DL    Est, Glom Filt Rate >60 >60 mL/min/1.73m2    Glucose 107 (H) 70 - 99 MG/DL    Calcium 8.2 (L) 8.3 - 10.6 MG/DL   Basic Metabolic Panel    Collection Time: 02/26/23  1:30 AM   Result Value Ref Range    Sodium 127 (L) 135 - 145 MMOL/L    Potassium 4.0 3.5 - 5.1 MMOL/L    Chloride 96 (L) 99 - 110 mMol/L    CO2 21 21 - 32 MMOL/L    Anion Gap 10 4 - 16    BUN 6 6 - 23 MG/DL    Creatinine 0.7 (L) 0.9 - 1.3 MG/DL    Est, Glom Filt Rate >60 >60 mL/min/1.73m2    Glucose 75 70 - 99 MG/DL    Calcium 8.0 (L) 8.3 - 10.6 MG/DL   Basic Metabolic Panel    Collection Time: 02/26/23  4:45 AM   Result Value Ref Range    Sodium 126 (L) 135 - 145 MMOL/L    Potassium 3.8 3.5 - 5.1 MMOL/L    Chloride 96 (L) 99 - 110 mMol/L    CO2 22 21 - 32 MMOL/L    Anion Gap 8 4 - 16    BUN 5 (L) 6 - 23 MG/DL    Creatinine 0.7 (L) 0.9 - 1.3 MG/DL    Est, Glom Filt Rate >60 >60 mL/min/1.73m2    Glucose 75 70 - 99 MG/DL    Calcium 7.8 (L) 8.3 - 10.6 MG/DL   CBC with Auto Differential    Collection Time: 02/26/23  4:45 AM   Result Value Ref Range    WBC 6.6 4.0 - 10.5 K/CU MM    RBC 2.61 (L) 4.6 - 6.2 M/CU MM    Hemoglobin 7.8 (L) 13.5 - 18.0 GM/DL    Hematocrit 22.0 (L) 42 - 52 %    MCV 84.3 78 - 100 FL    MCH 29.9 27 - 31 PG    MCHC 35.5 32.0 - 36.0 %    RDW 11.9 11.7 - 14.9 %    Platelets 137 (L) 705 - 440 K/CU MM    MPV 10.3 7.5 - 11.1 FL    Differential Type MANUAL DIFFERENTIAL     Nucleated RBC % 0.0 %    Total Nucleated RBC 0.0 K/CU MM    Segs Relative 44.0 36 - 66 %    Eosinophils % 20.0 (H) 0 - 3 %    Lymphocytes % 34.0 24 - 44 %    Monocytes % 2.0 0 - 4 %    Segs Absolute 3.0 K/CU MM    Eosinophils Absolute 1.3 K/CU MM    Lymphocytes Absolute 2.2 K/CU MM    Monocytes Absolute 0.1 K/CU MM   Basic Metabolic Panel    Collection Time: 02/26/23  9:01 AM   Result Value Ref Range    Sodium 129 (L) 135 - 145 MMOL/L    Potassium 3.7 3.5 - 5.1 MMOL/L    Chloride 95 (L) 99 - 110 mMol/L    CO2 23 21 - 32 MMOL/L    Anion Gap 11 4 - 16    BUN 19 6 - 23 MG/DL    Creatinine 0.8 (L) 0.9 - 1.3 MG/DL    Est, Glom Filt Rate >60 >60 mL/min/1.73m2    Glucose 89 70 - 99 MG/DL    Calcium 8.7 8.3 - 10.6 MG/DL   Basic Metabolic Panel    Collection Time: 02/26/23  1:35 PM   Result Value Ref Range    Sodium 138 135 - 145 MMOL/L    Potassium 4.3 3.5 - 5.1 MMOL/L    Chloride 106 99 - 110 mMol/L    CO2 24 21 - 32 MMOL/L    Anion Gap 8 4 - 16    BUN 12 6 - 23 MG/DL    Creatinine 0.8 (L) 0.9 - 1.3 MG/DL    Est, Glom Filt Rate >60 >60 mL/min/1.73m2    Glucose 112 (H) 70 - 99 MG/DL    Calcium 8.6 8.3 - 10.6 MG/DL   Sodium, urine, random    Collection Time: 02/26/23  2:35 PM   Result Value Ref Range    Sodium, Ur 103 35 - 167 MMOL/L   Creatinine, urine, random    Collection Time: 02/26/23  2:35 PM   Result Value Ref Range    Creatinine, Ur 17.3 (L) 39 - 259 MG/DL   Chloride, urine, random    Collection Time: 02/26/23  2:35 PM   Result Value Ref Range    Chloride 95 43 - 210 MMOL/L   Osmolality, urine    Collection Time: 02/26/23  2:35 PM   Result Value Ref Range    Osmolality, Ur 282 (L) 292 - 1090 MOS/L        Imaging/Diagnostics Last 24 Hours   CT ABDOMEN PELVIS WO CONTRAST Additional Contrast? None    Result Date: 2/24/2023  EXAMINATION: CT OF THE ABDOMEN AND PELVIS WITHOUT CONTRAST; CT OF THE CHEST WITHOUT CONTRAST 2/24/2023 12:39 am TECHNIQUE: CT of the abdomen and pelvis was performed without the administration of intravenous contrast. Multiplanar reformatted images are provided for review. Automated exposure control, iterative reconstruction, and/or weight based adjustment of the mA/kV was utilized to reduce the radiation dose to as low as reasonably achievable.; CT of the chest was performed without the administration of intravenous contrast. Multiplanar reformatted images are provided for review. Automated exposure control, iterative reconstruction, and/or weight based adjustment of the mA/kV was utilized to reduce the radiation dose to as low as reasonably achievable. COMPARISON: 09/12/2012 HISTORY: ORDERING SYSTEM PROVIDED HISTORY: fall TECHNOLOGIST PROVIDED HISTORY: Reason for exam:->fall Additional Contrast?->None Decision Support Exception - unselect if not a suspected or confirmed emergency medical condition->Emergency Medical Condition (MA) Reason for Exam: fall; FINDINGS: Chest: Mediastinum: Normal heart size. No pericardial effusion. Normal thoracic aortic caliber. No abnormal mediastinal or hilar lymph node endotracheal tube in place. Mildly dilated fluid-filled esophagus. Lungs/pleura: Mild dependent atelectasis. Lungs otherwise clear. No pneumothorax or pleural effusion. Airways unremarkable. Soft Tissues/Bones: Acute osseous or soft tissue abnormality. Abdomen/Pelvis: Organs: Limited evaluation due lack of intravenous contrast.  Liver, gallbladder, biliary system, pancreas, spleen, adrenal glands, and kidneys unremarkable. No hydronephrosis or urinary tract calculus. GI/Bowel: No bowel obstruction. Distended fluid, gas, and fluid-filled stomach. No evidence of appendicitis. Pelvis: Urinary bladder and pelvic organs unremarkable. Peritoneum/Retroperitoneum: No free air or free fluid. Normal abdominal aortic caliber. No retroperitoneal a Randine Bianka within limits of this noncontrast examination. No abnormal lymph node.  Bones/Soft Tissues: Acute L2 compression fracture with superior endplate concavity and subjacent fracture plane resulting in 10% anterior vertebral body height loss and 2 mm retropulsion of the posterosuperior cortex. Bones otherwise intact. No acute soft tissue abnormality. Tiny uncomplicated fat containing bilateral inguinal hernias. 1. Acute compression fracture of the L2 vertebral body with 10% anterior vertebral height loss and minimal posterior cortex retropulsion. 2. No acute abnormality in the chest. 3. No solid or hollow viscus organ injury within limits of this noncontrast examination. 4. Distended fluid-filled stomach and mildly dilated fluid-filled esophagus most consistent with reflux. XR ABDOMEN (KUB) (SINGLE AP VIEW)    Result Date: 2/24/2023  EXAMINATION: ONE SUPINE X-RAY VIEW(S) OF THE ABDOMEN 2/24/2023 2:01 am COMPARISON: February 24, 2023. HISTORY: ORDERING SYSTEM PROVIDED HISTORY:  NGT TECHNOLOGIST PROVIDED HISTORY: Reason for Exam:  NGT FINDINGS: Nasogastric tube tip terminates in the distal stomach. The side-port is beyond the GE junction. No acute or aggressive osseous lesion. Gaseous distention of the stomach is present. The bowel gas pattern is not well evaluated due to supine positioning. No gas distended loops of bowel appreciated. L2 compression deformity is seen to better advantage on prior CT. 1. Nasogastric tube tip terminates in the distal stomach. 2. Gaseous distention of the stomach. Finding is similar to the CT dated February 24, 2023. 3. L2 compression deformity is seen to better advantage on prior CT. CT HEAD WO CONTRAST    Result Date: 2/24/2023  EXAMINATION: CT OF THE HEAD WITHOUT CONTRAST  2/24/2023 12:39 am TECHNIQUE: CT of the head was performed without the administration of intravenous contrast. Automated exposure control, iterative reconstruction, and/or weight based adjustment of the mA/kV was utilized to reduce the radiation dose to as low as reasonably achievable.  COMPARISON: November 15, 2016 HISTORY: ORDERING SYSTEM PROVIDED HISTORY: fall TECHNOLOGIST PROVIDED HISTORY: Has a \"code stroke\" or \"stroke alert\" been called? ->No Reason for exam:->fall Decision Support Exception - unselect if not a suspected or confirmed emergency medical condition->Emergency Medical Condition (MA) Reason for Exam: fall FINDINGS: BRAIN/VENTRICLES: There is no acute intracranial hemorrhage, mass effect or midline shift. No abnormal extra-axial fluid collection. The gray-white differentiation is maintained without evidence of an acute infarct. There is no evidence of hydrocephalus. ORBITS: The visualized portion of the orbits demonstrate no acute abnormality. SINUSES: Moderate mucosal thickening is identified in the bilateral maxillary, ethmoid, and right sphenoid sinus. The mastoid air cells are clear. SOFT TISSUES/SKULL:  Moderate right parietal scalp hematoma is present. Left face soft tissue swelling is also seen. No acute fracture. No acute intracranial abnormality. Moderate right parietal scalp hematoma. No underlying fracture. Mild left face soft tissue swelling. CT CHEST WO CONTRAST    Result Date: 2/24/2023  EXAMINATION: CT OF THE ABDOMEN AND PELVIS WITHOUT CONTRAST; CT OF THE CHEST WITHOUT CONTRAST 2/24/2023 12:39 am TECHNIQUE: CT of the abdomen and pelvis was performed without the administration of intravenous contrast. Multiplanar reformatted images are provided for review. Automated exposure control, iterative reconstruction, and/or weight based adjustment of the mA/kV was utilized to reduce the radiation dose to as low as reasonably achievable.; CT of the chest was performed without the administration of intravenous contrast. Multiplanar reformatted images are provided for review. Automated exposure control, iterative reconstruction, and/or weight based adjustment of the mA/kV was utilized to reduce the radiation dose to as low as reasonably achievable.  COMPARISON: 09/12/2012 HISTORY: ORDERING SYSTEM PROVIDED HISTORY: fall TECHNOLOGIST PROVIDED HISTORY: Reason for exam:->fall Additional Contrast?->None Decision Support Exception - unselect if not a suspected or confirmed emergency medical condition->Emergency Medical Condition (MA) Reason for Exam: fall; FINDINGS: Chest: Mediastinum: Normal heart size.  No pericardial effusion.  Normal thoracic aortic caliber.  No abnormal mediastinal or hilar lymph node endotracheal tube in place.  Mildly dilated fluid-filled esophagus. Lungs/pleura: Mild dependent atelectasis.  Lungs otherwise clear.  No pneumothorax or pleural effusion.  Airways unremarkable. Soft Tissues/Bones: Acute osseous or soft tissue abnormality. Abdomen/Pelvis: Organs: Limited evaluation due lack of intravenous contrast.  Liver, gallbladder, biliary system, pancreas, spleen, adrenal glands, and kidneys unremarkable.  No hydronephrosis or urinary tract calculus. GI/Bowel: No bowel obstruction.  Distended fluid, gas, and fluid-filled stomach.  No evidence of appendicitis. Pelvis: Urinary bladder and pelvic organs unremarkable. Peritoneum/Retroperitoneum: No free air or free fluid.  Normal abdominal aortic caliber.  No retroperitoneal a Winston within limits of this noncontrast examination.  No abnormal lymph node. Bones/Soft Tissues: Acute L2 compression fracture with superior endplate concavity and subjacent fracture plane resulting in 10% anterior vertebral body height loss and 2 mm retropulsion of the posterosuperior cortex.  Bones otherwise intact.  No acute soft tissue abnormality.  Tiny uncomplicated fat containing bilateral inguinal hernias.     1. Acute compression fracture of the L2 vertebral body with 10% anterior vertebral height loss and minimal posterior cortex retropulsion. 2. No acute abnormality in the chest. 3. No solid or hollow viscus organ injury within limits of this noncontrast examination. 4. Distended fluid-filled stomach and mildly dilated fluid-filled esophagus most consistent with  reflux. CT CERVICAL SPINE WO CONTRAST    Result Date: 2/24/2023  EXAMINATION: CT OF THE CERVICAL SPINE WITHOUT CONTRAST 2/24/2023 12:39 am TECHNIQUE: CT of the cervical spine was performed without the administration of intravenous contrast. Multiplanar reformatted images are provided for review. Automated exposure control, iterative reconstruction, and/or weight based adjustment of the mA/kV was utilized to reduce the radiation dose to as low as reasonably achievable. COMPARISON: None. HISTORY: ORDERING SYSTEM PROVIDED HISTORY: fall TECHNOLOGIST PROVIDED HISTORY: Reason for exam:->fall Decision Support Exception - unselect if not a suspected or confirmed emergency medical condition->Emergency Medical Condition (MA) Reason for Exam: fall FINDINGS: BONES/ALIGNMENT: There is no acute fracture or traumatic malalignment. DEGENERATIVE CHANGES: Mild multilevel degenerative changes are present. SOFT TISSUES: There is no prevertebral soft tissue swelling. OTHER: Trace mastoid effusions are nonspecific. No acute abnormality of the cervical spine. XR CHEST PORTABLE    Result Date: 2/24/2023  EXAMINATION: ONE XRAY VIEW OF THE CHEST 2/24/2023 2:41 am COMPARISON: 09/24/2022 HISTORY: ORDERING SYSTEM PROVIDED HISTORY: central line TECHNOLOGIST PROVIDED HISTORY: Reason for exam:->central line Reason for Exam: central line FINDINGS: Endotracheal tube terminates 4.9 cm above the kellen. Enteric tube courses below the diaphragm with the tip not imaged. Right internal jugular center venous catheter tip is in the SVC. Clear lungs. No pneumothorax or pleural effusion. Cardiac and mediastinal contours normal.  No acute osseous abnormality. 1. Endotracheal tube terminates 4.9 cm above the kellen. 2. Right internal jugular center venous catheter tip in the SVC. 3. Clear lungs. No pneumothorax.        Electronically signed by Ben Spangler MD on 2/26/2023 at 4:32 PM

## 2023-02-26 NOTE — PROGRESS NOTES
Discussed Na increase from 129 to 138 with Dr. Neena Palomino and Dr. Jesus Aguirre. Discussed 4L UOP over the past four hours with Dr. Neena Palomino. To give 1L 0.45% bolus over two hours at this time. See other orders for details.

## 2023-02-26 NOTE — PROGRESS NOTES
Nephrology Progress Note  2/26/2023 11:28 AM  Subjective: Interval History: Lizeth Cordero is a 36 y.o. male more awake interactive not on the ventilator appears stable in bed      Data:   Scheduled Meds:   ketamine  200 mg IntraVENous Once    piperacillin-tazobactam  3,375 mg IntraVENous q8h    thiamine (VITAMIN B1) IVPB  300 mg IntraVENous BID    pantoprazole  40 mg IntraVENous Daily    enoxaparin  40 mg SubCUTAneous Daily     Continuous Infusions:   sodium chloride 50 mL/hr at 02/26/23 0800    sodium chloride 725 mL (02/26/23 1010)    dexmedetomidine HCl in NaCl Stopped (02/25/23 2030)    propofol Stopped (02/25/23 1137)    DOPamine Stopped (02/25/23 1302)    fentaNYL Stopped (02/25/23 1200)    norepinephrine Stopped (02/25/23 1103)         CBC   Recent Labs     02/24/23  0915 02/25/23  0507 02/26/23  0445   WBC 4.9 6.3 6.6   HGB 8.1* 8.6* 7.8*   HCT 24.2* 25.9* 22.0*    147 131*      BMP   Recent Labs     02/24/23  0225 02/24/23  0320 02/24/23  1306 02/24/23  1730 02/25/23  0507 02/25/23  0838 02/26/23  0130 02/26/23  0445 02/26/23  0901   *   < > 134*   < > 126*   < > 127* 126* 129*   K 2.9*   < > 3.9   < > 3.7   < > 4.0 3.8 3.7   CL 90*   < > 104   < > 96*   < > 96* 96* 95*   CO2 19*   < > 24   < > 22   < > 21 22 23   PHOS 1.6*  --  2.6  --  2.6  --   --   --   --    BUN 8   < > 6   < > 5*   < > 6 5* 19   CREATININE 0.6*   < > 0.7*   < > 0.7*   < > 0.7* 0.7* 0.8*    < > = values in this interval not displayed. Hepatic:   Recent Labs     02/24/23  0015 02/25/23  0507   AST 27 43*   ALT 22 21   BILITOT 0.6 0.2   ALKPHOS 35* 35*     Troponin: No results for input(s): TROPONINI in the last 72 hours. BNP: No results for input(s): BNP in the last 72 hours. Lipids: No results for input(s): CHOL, HDL in the last 72 hours.     Invalid input(s): LDLCALCU  ABGs:   Lab Results   Component Value Date/Time    PO2ART 248 02/24/2023 02:15 AM    DMO6AQG 31.0 02/24/2023 02:15 AM     INR:   Recent Labs 02/24/23  0225   INR 0.91     Renal Labs  Albumin:    Lab Results   Component Value Date/Time    LABALBU 4.0 02/25/2023 05:07 AM     Calcium:    Lab Results   Component Value Date/Time    CALCIUM 8.7 02/26/2023 09:01 AM     Phosphorus:    Lab Results   Component Value Date/Time    PHOS 2.6 02/25/2023 05:07 AM     U/A:    Lab Results   Component Value Date/Time    NITRU NEGATIVE 02/24/2023 01:10 AM    COLORU YELLOW 02/24/2023 01:10 AM    WBCUA 1 12/28/2018 12:31 PM    RBCUA NONE SEEN 12/28/2018 12:31 PM    MUCUS RARE 12/28/2018 12:31 PM    TRICHOMONAS NONE SEEN 12/28/2018 12:31 PM    BACTERIA NEGATIVE 12/28/2018 12:31 PM    CLARITYU CLEAR 02/24/2023 01:10 AM    SPECGRAV <1.005 02/24/2023 01:10 AM    UROBILINOGEN 0.2 02/24/2023 01:10 AM    BILIRUBINUR NEGATIVE 02/24/2023 01:10 AM    BLOODU NEGATIVE 02/24/2023 01:10 AM    KETUA NEGATIVE 02/24/2023 01:10 AM     ABG:    Lab Results   Component Value Date/Time    SCV1AIP 31.0 02/24/2023 02:15 AM    PO2ART 248 02/24/2023 02:15 AM    DRP2WMG 22.0 02/24/2023 02:15 AM     HgBA1c:    Lab Results   Component Value Date/Time    LABA1C 5.9 01/26/2022 01:56 PM     Microalbumen/Creatinine ratio:  No components found for: RUCREAT  TSH:  No results found for: TSH  IRON:  No results found for: IRON  Iron Saturation:  No components found for: PERCENTFE  TIBC:  No results found for: TIBC  FERRITIN:  No results found for: FERRITIN  RPR:  No results found for: RPR  TENISHA:    Lab Results   Component Value Date/Time    TENISHA None Detected 04/26/2012 10:05 AM     24 Hour Urine for Creatinine Clearance:  No components found for: CREAT4, UHRS10, UTV10      Objective:   I/O: 02/25 0701 - 02/26 0700  In: 1231.3 [I.V.:721.8]  Out: 3128 [Urine:1675]  I/O last 3 completed shifts:   In: 1231.3 [I.V.:721.8; IV Piggyback:509.5]  Out: 0850 [Urine:2775]  I/O this shift:  In: -   Out: 2850 [Urine:2850]  Vitals: BP (!) 101/50   Pulse 86   Temp 99.1 °F (37.3 °C) (Bladder)   Resp 18   Ht 5' 9\" (1.753 m) Wt 174 lb 6.1 oz (79.1 kg)   SpO2 99%   BMI 25.75 kg/m²  {  General appearance: Awake weak  HEENT: Head: Normal, normocephalic, atraumatic.   Neck: supple, symmetrical, trachea midline  Lungs: diminished breath sounds bilaterally  Heart: S1, S2 normal  Abdomen: abnormal findings:  soft nt  Extremities: edema trace  Neurologic: Mental status: alertness: Awake        Assessment and Plan:      IMP:  #1 acute on chronic hyponatremia  #2 respiratory failure  #3 recent seizure  #4 hypotension  #5 hypokalemia    Plan     #1 sodium started to improve 129 we will not need urea tablets at this time supportive care water restriction will monitor  #2 concern although not on DDAVP currently significant urine output possibility diabetes insipidus monitor ins and outs at this time and monitor trend  #3 oxygenation stable off the ventilator  #4 neuro status monitor more awake interactive  #5 blood pressure low stable  #6 potassium stable  We will follow monitor is try slowly correct sodium           Alexy Sands MD, MD

## 2023-02-26 NOTE — PROGRESS NOTES
Neurology Service Progress Note   [address]   Patient Name: Cindy Maria  : 1980        Subjective:   Patient seen and examined today  He is extubated   No repeat seizures in the last 48 hours  Non focal exam  Following commands  No family   Discussed plan with Critical Care attending   Hemodynamically stable  No fever   No distress noted   He tells me he was not eating or drinking in residential also tells me that he has never had a seizure in the past.     Past Medical History:   Diagnosis Date    Anxiety     Asthma     Chronic back pain     Diabetes mellitus ([facility] [address]     H/O 24 hour EKG monitoring 2013    EVENT MONITOR-normal sinus rhythm    H/O echocardiogram 2017    EF 55% Normal LV with normal systolic function. No significant valvulopathy is seen    Hx of echocardiogram 13 Mitrall annular calcification with a trace to mild MR    Hyperlipidemia     Hypertension     Schizophrenia ([facility] [address]     :   Past Surgical History:   Procedure Laterality Date    HEMORRHOID SURGERY       Medications:  Scheduled Meds:   urea  15 g Oral Daily    ketamine  200 mg IntraVENous Once    piperacillin-tazobactam  3,375 mg IntraVENous q8h    thiamine (VITAMIN B1) IVPB  300 mg IntraVENous BID    pantoprazole  40 mg IntraVENous Daily    enoxaparin  40 mg SubCUTAneous Daily     Continuous Infusions:   sodium chloride 50 mL/hr at 23 0800    dexmedetomidine HCl in NaCl Stopped (23 2030)    propofol Stopped (23 1137)    DOPamine Stopped (23 1302)    fentaNYL Stopped (23 1200)    norepinephrine Stopped (23 1103)     PRN Meds:. LORazepam, potassium chloride, midazolam, fentanNYL    Allergies   Allergen Reactions    Cat Hair Extract     No Known Allergies     Pollen Extract     Seasonal      Social History     Socioeconomic History    Marital status: Single     Spouse name: Not on file    Number of children: Not on file    Years of education: Not on file Highest education level: Not on file   Occupational History    Not on file   Tobacco Use    Smoking status: Every Day     Packs/day: 0.50     Years: 20.00     Pack years: 10.00     Types: Cigarettes    Smokeless tobacco: Current     Types: Snuff   Vaping Use    Vaping Use: Never used   Substance and Sexual Activity    Alcohol use: No    Drug use: Yes     Types: Cocaine, Marijuana (Weed)    Sexual activity: Not Currently     Comment: single    Other Topics Concern    Not on file   Social History Narrative    Not on file     Social Determinants of Health     Financial Resource Strain: Low Risk     Difficulty of Paying Living Expenses: Not hard at all   Food Insecurity: No Food Insecurity    Worried About Running Out of Food in the Last Year: Never true    Ran Out of Food in the Last Year: Never true   Transportation Needs: Not on file   Physical Activity: Not on file   Stress: Not on file   Social Connections: Not on file   Intimate Partner Violence: Not on file   Housing Stability: Not on file      Family History   Problem Relation Age of Onset    Emphysema Mother     Heart Disease Father     High Blood Pressure Father          ROS (10 systems)  Intubated, sedated and unable to answer questions    Physical Exam:         Wt Readings from Last 3 Encounters:   02/24/23 174 lb 6.1 oz (79.1 kg)   09/28/22 174 lb (78.9 kg)   09/24/22 170 lb (77.1 kg)     Temp Readings from Last 3 Encounters:   02/26/23 99.1 °F (37.3 °C) (Bladder)   09/24/22 97.9 °F (36.6 °C) (Oral)   03/31/22 97.8 °F (36.6 °C) (Oral)     BP Readings from Last 3 Encounters:   02/26/23 105/62   09/28/22 136/82   09/24/22 (!) 144/93     Pulse Readings from Last 3 Encounters:   02/26/23 84   09/28/22 76   09/24/22 92        Gen: A&Ox4, no distress   HEENT: NC/AT, EOMI, PERRL with sluggish response, mmm, , neck supple, no meningeal signs  Heart: NSR/SB  Lungs: Intubated, ventilated  Ext: no edema, no calf tenderness b/l  Psych: Noninteractive  Skin: no rashes  or lesions    NEUROLOGIC EXAM:    Mental Status: extubated, oriented x4, NAD, following commands, speech clear, language fluent     Cranial Nerve Exam:   CN II-XII: PERRL, VFF, no nystagmus, no gaze paresis, sensation V1-V3 intact b/l, muscles of facial expression symmetric; hearing intact to conversational tone, unable to assess palate due to endotracheal tube,     Motor Exam:       Antigravity x4    Deep Tendon Reflexes: 1 4 biceps, triceps, brachioradialis, patellar, and achilles b/l; flexor plantar responses b/l    Sensation: Intact light touch/pain UE's/LE's b/l    Coordination/Cerebellum:       Tremors--none       Gait and stance:      Gait: deferred      LABS:     Recent Labs     02/24/23  0225 02/24/23  0320 02/24/23  0915 02/24/23  1306 02/25/23  0507 02/25/23  0838 02/26/23  0130 02/26/23  0445 02/26/23  0901   WBC 11.5*  --  4.9  --  6.3  --   --  6.6  --    *   < > 136   < > 126*   < > 127* 126* 129*   K 2.9*   < > 4.5   < > 3.7   < > 4.0 3.8 3.7   CL 90*   < > 106   < > 96*   < > 96* 96* 95*   CO2 19*   < > 24   < > 22   < > 21 22 23   BUN 8   < > 6   < > 5*   < > 6 5* 19   CREATININE 0.6*   < > 0.7*   < > 0.7*   < > 0.7* 0.7* 0.8*   GLUCOSE 89   < > 102*   < > 86   < > 75 75 89   INR 0.91  --   --   --   --   --   --   --   --     < > = values in this interval not displayed. IMAGING:    CT head w/o contrast:      Impression   No acute intracranial abnormality. Moderate right parietal scalp hematoma. No underlying fracture. Mild left face soft tissue swelling. CT cervical spine w/o contrast:  Impression   No acute abnormality of the cervical spine. Routine EEG:  EEG Interpretation:   The EEG was abnormal due to the presence of:   Moderate to severe generalized slowing. This is a non-specific finding but is consistent with a generalized disturbance of cerebral function.  It may be seen in a variety of conditions, such as toxic, metabolic, post-anoxic, multi-focal or diffuse structural abnormalities. No electrographic seizures or non-convulsive status epilepticus was seen over the entire monitoring period. All imaging was personally reviewed   ASSESSMENT/PLAN:     Fall and concern for seizure, likely reactive secondary to hyponatremia-resolving, extubation today, no status epilepticus noted   Neuroimaging as above  We will hold on further neuroimaging pending neurologic improvement  Routine EEG as above  No evidence for seizure  D/C keppra   Recommend weaning sedating medications as patient tolerates  NA managed by critical care, thank you   We will sign off, he is stable for discharge from our POV         Thank you for allowing us to participate in the care of your patient. If there are any questions regarding evaluation please feel free to contact us.      Julia Urbina, SARAH - CNP, 2/26/2023

## 2023-02-26 NOTE — PROGRESS NOTES
V2.0  [facility] Hospitalist Progress Note      Name:  Peter Blanco /Age/Sex: 1980  (42 y.o. male)   MRN & CSN:  [id number] & [id number] Encounter Date/Time: 2023 11:25 AM EST    Location:  -A PCP: ALDO TRINIDAD MD       Hospital Day: 4    Assessment and Plan:   Peter Blanco is a 42 y.o. male with pmh of DM II, HTN, HLD, COPD, Schizophrenia who presents with Seizure 2/2 hyponatremia.      Plan:    Metabolic encephalopathy - 2/2 to hyponatremia. Episode of seizure like activity.  Utox negative. CT head - No acute intracranial abnormality. Moderate right parietal scalp hematoma. Neurology consulted. Q4 neurocheck. On keppra.Had to be intubated in ED to protect the airway.  Was able to successfully extubated on 2023.     Severe hyponatremia -was initially given 3% saline as patient has seizure from hyponatremia. Nephrology consulted. BMP q4. Target correction no more than 8 meq in 24 hrs. Overcorrected and on D5%.  Managed by nephrology and critical care    Seizure-patient with a seizure on presentation due to severe hyponatremia.  Neurology consulted.  Patient will not be taken off of Keppra as sodium has improved     Anemia - Hb 8.8 on admission. Last hb few months back was 14. Currently no sign of overt bleeding. CT abdomen did not reveal any fluid collection. NG and Urine Output is clear.  Need further workup with iron panel, Folate, B12, FOBT. Monitor vital and h/h in the mean time. Transfuse as needed     HLD - on statin     HTN - currently BP soft     COPD - does not look in exacerbation. currently on vent. Duoneb  PRN     Schizophrenia  - meds currently not resumed    Diet ADULT DIET; Regular; 5 carb choices (75 gm/meal)   DVT Prophylaxis [] Lovenox, []  Heparin, [] SCDs, [] Ambulation,    [] Eliquis, [] Xarelto  [] Coumadin [] other   Code Status Full Code   Disposition From: long-term  Expected Disposition: TBD  Estimated Date of Discharge: TBD  Patient requires continued admission due  to respiratory failure and hyponatremia   Surrogate Decision Maker/ POA      Subjective:     Chief Complaint: Fall     Patient successfully extubated. Passes swallow unable to have a diet. No further seizure activity at this time. Sodium continues to correct. Review of Systems:    Review of Systems   Constitutional:  Positive for fatigue. Negative for activity change, appetite change, chills and fever. HENT:  Negative for congestion, hearing loss, sinus pressure, sinus pain, sore throat, trouble swallowing and voice change. Eyes:  Negative for visual disturbance. Respiratory:  Negative for cough, chest tightness, shortness of breath and wheezing. Cardiovascular:  Negative for chest pain, palpitations and leg swelling. Gastrointestinal:  Negative for abdominal pain, constipation, diarrhea, nausea and vomiting. Endocrine: Negative for cold intolerance, heat intolerance and polyuria. Genitourinary:  Negative for dysuria. Musculoskeletal:  Negative for arthralgias, back pain and gait problem. Skin:  Negative for color change. Neurological:  Negative for dizziness, weakness and headaches. Psychiatric/Behavioral:  Negative for agitation and confusion. The patient is not nervous/anxious. Objective: Intake/Output Summary (Last 24 hours) at 2/26/2023 0933  Last data filed at 2/26/2023 0801  Gross per 24 hour   Intake 499.56 ml   Output 1400 ml   Net -900.44 ml          Vitals:   Vitals:    02/26/23 0800   BP: 105/62   Pulse: 84   Resp: 18   Temp: 99.1 °F (37.3 °C)   SpO2: 97%       Physical Exam:     Physical Exam  Constitutional:       General: He is not in acute distress. Appearance: Normal appearance. HENT:      Head: Normocephalic and atraumatic. Nose: Nose normal.      Mouth/Throat:      Mouth: Mucous membranes are moist.      Pharynx: Oropharynx is clear. Eyes:      Extraocular Movements: Extraocular movements intact.       Conjunctiva/sclera: Conjunctivae normal. Pupils: Pupils are equal, round, and reactive to light. Cardiovascular:      Rate and Rhythm: Normal rate and regular rhythm. Pulses: Normal pulses. Heart sounds: Normal heart sounds. Pulmonary:      Effort: Pulmonary effort is normal.      Comments: intubated  Abdominal:      General: Abdomen is flat. Bowel sounds are normal.      Palpations: Abdomen is soft. Musculoskeletal:         General: Normal range of motion. Cervical back: Normal range of motion and neck supple. Skin:     General: Skin is warm and dry. Neurological:      General: No focal deficit present. Mental Status: He is alert and oriented to person, place, and time. Mental status is at baseline. Psychiatric:         Mood and Affect: Mood normal.         Behavior: Behavior normal.         Thought Content:  Thought content normal.       Medications:   Medications:    urea  15 g Oral Daily    ketamine  200 mg IntraVENous Once    piperacillin-tazobactam  3,375 mg IntraVENous q8h    thiamine (VITAMIN B1) IVPB  300 mg IntraVENous BID    pantoprazole  40 mg IntraVENous Daily    enoxaparin  40 mg SubCUTAneous Daily      Infusions:    sodium chloride 50 mL/hr at 02/26/23 0800    dexmedetomidine HCl in NaCl Stopped (02/25/23 2030)    propofol Stopped (02/25/23 1137)    DOPamine Stopped (02/25/23 1302)    fentaNYL Stopped (02/25/23 1200)    norepinephrine Stopped (02/25/23 1103)     PRN Meds: LORazepam, 1 mg, Q1H PRN  potassium chloride, 20 mEq, PRN  midazolam, 2 mg, Q2H PRN  fentanNYL, 50 mcg, Q1H PRN      Labs      Recent Results (from the past 24 hour(s))   Basic Metabolic Panel    Collection Time: 02/25/23  2:08 PM   Result Value Ref Range    Sodium 125 (L) 135 - 145 MMOL/L    Potassium 4.0 3.5 - 5.1 MMOL/L    Chloride 96 (L) 99 - 110 mMol/L    CO2 22 21 - 32 MMOL/L    Anion Gap 7 4 - 16    BUN 4 (L) 6 - 23 MG/DL    Creatinine 0.6 (L) 0.9 - 1.3 MG/DL    Est, Glom Filt Rate >60 >60 mL/min/1.73m2    Glucose 75 70 - 99 MG/DL Calcium 7.8 (L) 8.3 - 10.6 MG/DL   POCT Glucose    Collection Time: 02/25/23  5:22 PM   Result Value Ref Range    POC Glucose 74 70 - 99 MG/DL   Basic Metabolic Panel    Collection Time: 02/25/23  5:23 PM   Result Value Ref Range    Sodium 124 (L) 135 - 145 MMOL/L    Potassium 3.9 3.5 - 5.1 MMOL/L    Chloride 94 (L) 99 - 110 mMol/L    CO2 22 21 - 32 MMOL/L    Anion Gap 8 4 - 16    BUN 5 (L) 6 - 23 MG/DL    Creatinine 0.6 (L) 0.9 - 1.3 MG/DL    Est, Glom Filt Rate >60 >60 mL/min/1.73m2    Glucose 74 70 - 99 MG/DL    Calcium 8.0 (L) 8.3 - 10.6 MG/DL   Basic Metabolic Panel    Collection Time: 02/25/23  9:15 PM   Result Value Ref Range    Sodium 126 (L) 135 - 145 MMOL/L    Potassium 4.0 3.5 - 5.1 MMOL/L    Chloride 95 (L) 99 - 110 mMol/L    CO2 24 21 - 32 MMOL/L    Anion Gap 7 4 - 16    BUN 6 6 - 23 MG/DL    Creatinine 0.7 (L) 0.9 - 1.3 MG/DL    Est, Glom Filt Rate >60 >60 mL/min/1.73m2    Glucose 107 (H) 70 - 99 MG/DL    Calcium 8.2 (L) 8.3 - 10.6 MG/DL   Basic Metabolic Panel    Collection Time: 02/26/23  1:30 AM   Result Value Ref Range    Sodium 127 (L) 135 - 145 MMOL/L    Potassium 4.0 3.5 - 5.1 MMOL/L    Chloride 96 (L) 99 - 110 mMol/L    CO2 21 21 - 32 MMOL/L    Anion Gap 10 4 - 16    BUN 6 6 - 23 MG/DL    Creatinine 0.7 (L) 0.9 - 1.3 MG/DL    Est, Glom Filt Rate >60 >60 mL/min/1.73m2    Glucose 75 70 - 99 MG/DL    Calcium 8.0 (L) 8.3 - 10.6 MG/DL   Basic Metabolic Panel    Collection Time: 02/26/23  4:45 AM   Result Value Ref Range    Sodium 126 (L) 135 - 145 MMOL/L    Potassium 3.8 3.5 - 5.1 MMOL/L    Chloride 96 (L) 99 - 110 mMol/L    CO2 22 21 - 32 MMOL/L    Anion Gap 8 4 - 16    BUN 5 (L) 6 - 23 MG/DL    Creatinine 0.7 (L) 0.9 - 1.3 MG/DL    Est, Glom Filt Rate >60 >60 mL/min/1.73m2    Glucose 75 70 - 99 MG/DL    Calcium 7.8 (L) 8.3 - 10.6 MG/DL   CBC with Auto Differential    Collection Time: 02/26/23  4:45 AM   Result Value Ref Range    WBC 6.6 4.0 - 10.5 K/CU MM    RBC 2.61 (L) 4.6 - 6.2 M/CU MM Hemoglobin 7.8 (L) 13.5 - 18.0 GM/DL    Hematocrit 22.0 (L) 42 - 52 %    MCV 84.3 78 - 100 FL    MCH 29.9 27 - 31 PG    MCHC 35.5 32.0 - 36.0 %    RDW 11.9 11.7 - 14.9 %    Platelets 001 (L) 678 - 440 K/CU MM    MPV 10.3 7.5 - 11.1 FL    Differential Type AUTOMATED DIFFERENTIAL     Nucleated RBC % 0.0 %    Total Nucleated RBC 0.0 K/CU MM        Imaging/Diagnostics Last 24 Hours   CT ABDOMEN PELVIS WO CONTRAST Additional Contrast? None    Result Date: 2/24/2023  EXAMINATION: CT OF THE ABDOMEN AND PELVIS WITHOUT CONTRAST; CT OF THE CHEST WITHOUT CONTRAST 2/24/2023 12:39 am TECHNIQUE: CT of the abdomen and pelvis was performed without the administration of intravenous contrast. Multiplanar reformatted images are provided for review. Automated exposure control, iterative reconstruction, and/or weight based adjustment of the mA/kV was utilized to reduce the radiation dose to as low as reasonably achievable.; CT of the chest was performed without the administration of intravenous contrast. Multiplanar reformatted images are provided for review. Automated exposure control, iterative reconstruction, and/or weight based adjustment of the mA/kV was utilized to reduce the radiation dose to as low as reasonably achievable. COMPARISON: 09/12/2012 HISTORY: ORDERING SYSTEM PROVIDED HISTORY: fall TECHNOLOGIST PROVIDED HISTORY: Reason for exam:->fall Additional Contrast?->None Decision Support Exception - unselect if not a suspected or confirmed emergency medical condition->Emergency Medical Condition (MA) Reason for Exam: fall; FINDINGS: Chest: Mediastinum: Normal heart size. No pericardial effusion. Normal thoracic aortic caliber. No abnormal mediastinal or hilar lymph node endotracheal tube in place. Mildly dilated fluid-filled esophagus. Lungs/pleura: Mild dependent atelectasis. Lungs otherwise clear. No pneumothorax or pleural effusion. Airways unremarkable. Soft Tissues/Bones: Acute osseous or soft tissue abnormality. Abdomen/Pelvis: Organs: Limited evaluation due lack of intravenous contrast.  Liver, gallbladder, biliary system, pancreas, spleen, adrenal glands, and kidneys unremarkable. No hydronephrosis or urinary tract calculus. GI/Bowel: No bowel obstruction. Distended fluid, gas, and fluid-filled stomach. No evidence of appendicitis. Pelvis: Urinary bladder and pelvic organs unremarkable. Peritoneum/Retroperitoneum: No free air or free fluid. Normal abdominal aortic caliber. No retroperitoneal a Veria Efrain within limits of this noncontrast examination. No abnormal lymph node. Bones/Soft Tissues: Acute L2 compression fracture with superior endplate concavity and subjacent fracture plane resulting in 10% anterior vertebral body height loss and 2 mm retropulsion of the posterosuperior cortex. Bones otherwise intact. No acute soft tissue abnormality. Tiny uncomplicated fat containing bilateral inguinal hernias. 1. Acute compression fracture of the L2 vertebral body with 10% anterior vertebral height loss and minimal posterior cortex retropulsion. 2. No acute abnormality in the chest. 3. No solid or hollow viscus organ injury within limits of this noncontrast examination. 4. Distended fluid-filled stomach and mildly dilated fluid-filled esophagus most consistent with reflux. XR ABDOMEN (KUB) (SINGLE AP VIEW)    Result Date: 2/24/2023  EXAMINATION: ONE SUPINE X-RAY VIEW(S) OF THE ABDOMEN 2/24/2023 2:01 am COMPARISON: February 24, 2023. HISTORY: ORDERING SYSTEM PROVIDED HISTORY:  NGT TECHNOLOGIST PROVIDED HISTORY: Reason for Exam:  NGT FINDINGS: Nasogastric tube tip terminates in the distal stomach. The side-port is beyond the GE junction. No acute or aggressive osseous lesion. Gaseous distention of the stomach is present. The bowel gas pattern is not well evaluated due to supine positioning. No gas distended loops of bowel appreciated. L2 compression deformity is seen to better advantage on prior CT.      1. Nasogastric tube tip terminates in the distal stomach. 2. Gaseous distention of the stomach. Finding is similar to the CT dated February 24, 2023. 3. L2 compression deformity is seen to better advantage on prior CT. CT HEAD WO CONTRAST    Result Date: 2/24/2023  EXAMINATION: CT OF THE HEAD WITHOUT CONTRAST  2/24/2023 12:39 am TECHNIQUE: CT of the head was performed without the administration of intravenous contrast. Automated exposure control, iterative reconstruction, and/or weight based adjustment of the mA/kV was utilized to reduce the radiation dose to as low as reasonably achievable. COMPARISON: November 15, 2016 HISTORY: ORDERING SYSTEM PROVIDED HISTORY: fall TECHNOLOGIST PROVIDED HISTORY: Has a \"code stroke\" or \"stroke alert\" been called? ->No Reason for exam:->fall Decision Support Exception - unselect if not a suspected or confirmed emergency medical condition->Emergency Medical Condition (MA) Reason for Exam: fall FINDINGS: BRAIN/VENTRICLES: There is no acute intracranial hemorrhage, mass effect or midline shift. No abnormal extra-axial fluid collection. The gray-white differentiation is maintained without evidence of an acute infarct. There is no evidence of hydrocephalus. ORBITS: The visualized portion of the orbits demonstrate no acute abnormality. SINUSES: Moderate mucosal thickening is identified in the bilateral maxillary, ethmoid, and right sphenoid sinus. The mastoid air cells are clear. SOFT TISSUES/SKULL:  Moderate right parietal scalp hematoma is present. Left face soft tissue swelling is also seen. No acute fracture. No acute intracranial abnormality. Moderate right parietal scalp hematoma. No underlying fracture. Mild left face soft tissue swelling.      CT CHEST WO CONTRAST    Result Date: 2/24/2023  EXAMINATION: CT OF THE ABDOMEN AND PELVIS WITHOUT CONTRAST; CT OF THE CHEST WITHOUT CONTRAST 2/24/2023 12:39 am TECHNIQUE: CT of the abdomen and pelvis was performed without the administration of intravenous contrast. Multiplanar reformatted images are provided for review. Automated exposure control, iterative reconstruction, and/or weight based adjustment of the mA/kV was utilized to reduce the radiation dose to as low as reasonably achievable.; CT of the chest was performed without the administration of intravenous contrast. Multiplanar reformatted images are provided for review. Automated exposure control, iterative reconstruction, and/or weight based adjustment of the mA/kV was utilized to reduce the radiation dose to as low as reasonably achievable. COMPARISON: 09/12/2012 HISTORY: ORDERING SYSTEM PROVIDED HISTORY: fall TECHNOLOGIST PROVIDED HISTORY: Reason for exam:->fall Additional Contrast?->None Decision Support Exception - unselect if not a suspected or confirmed emergency medical condition->Emergency Medical Condition (MA) Reason for Exam: fall; FINDINGS: Chest: Mediastinum: Normal heart size. No pericardial effusion. Normal thoracic aortic caliber. No abnormal mediastinal or hilar lymph node endotracheal tube in place. Mildly dilated fluid-filled esophagus. Lungs/pleura: Mild dependent atelectasis. Lungs otherwise clear. No pneumothorax or pleural effusion. Airways unremarkable. Soft Tissues/Bones: Acute osseous or soft tissue abnormality. Abdomen/Pelvis: Organs: Limited evaluation due lack of intravenous contrast.  Liver, gallbladder, biliary system, pancreas, spleen, adrenal glands, and kidneys unremarkable. No hydronephrosis or urinary tract calculus. GI/Bowel: No bowel obstruction. Distended fluid, gas, and fluid-filled stomach. No evidence of appendicitis. Pelvis: Urinary bladder and pelvic organs unremarkable. Peritoneum/Retroperitoneum: No free air or free fluid. Normal abdominal aortic caliber. No retroperitoneal a Lloyd Grapes within limits of this noncontrast examination. No abnormal lymph node.  Bones/Soft Tissues: Acute L2 compression fracture with superior endplate concavity and subjacent fracture plane resulting in 10% anterior vertebral body height loss and 2 mm retropulsion of the posterosuperior cortex. Bones otherwise intact. No acute soft tissue abnormality. Tiny uncomplicated fat containing bilateral inguinal hernias. 1. Acute compression fracture of the L2 vertebral body with 10% anterior vertebral height loss and minimal posterior cortex retropulsion. 2. No acute abnormality in the chest. 3. No solid or hollow viscus organ injury within limits of this noncontrast examination. 4. Distended fluid-filled stomach and mildly dilated fluid-filled esophagus most consistent with reflux. CT CERVICAL SPINE WO CONTRAST    Result Date: 2/24/2023  EXAMINATION: CT OF THE CERVICAL SPINE WITHOUT CONTRAST 2/24/2023 12:39 am TECHNIQUE: CT of the cervical spine was performed without the administration of intravenous contrast. Multiplanar reformatted images are provided for review. Automated exposure control, iterative reconstruction, and/or weight based adjustment of the mA/kV was utilized to reduce the radiation dose to as low as reasonably achievable. COMPARISON: None. HISTORY: ORDERING SYSTEM PROVIDED HISTORY: fall TECHNOLOGIST PROVIDED HISTORY: Reason for exam:->fall Decision Support Exception - unselect if not a suspected or confirmed emergency medical condition->Emergency Medical Condition (MA) Reason for Exam: fall FINDINGS: BONES/ALIGNMENT: There is no acute fracture or traumatic malalignment. DEGENERATIVE CHANGES: Mild multilevel degenerative changes are present. SOFT TISSUES: There is no prevertebral soft tissue swelling. OTHER: Trace mastoid effusions are nonspecific. No acute abnormality of the cervical spine.      XR CHEST PORTABLE    Result Date: 2/24/2023  EXAMINATION: ONE XRAY VIEW OF THE CHEST 2/24/2023 2:41 am COMPARISON: 09/24/2022 HISTORY: ORDERING SYSTEM PROVIDED HISTORY: central line TECHNOLOGIST PROVIDED HISTORY: Reason for exam:->central line Reason for Exam: central line FINDINGS: Endotracheal tube terminates 4.9 cm above the kellen.  Enteric tube courses below the diaphragm with the tip not imaged.  Right internal jugular center venous catheter tip is in the SVC.  Clear lungs.  No pneumothorax or pleural effusion.  Cardiac and mediastinal contours normal.  No acute osseous abnormality.     1. Endotracheal tube terminates 4.9 cm above the kellen. 2. Right internal jugular center venous catheter tip in the SVC. 3. Clear lungs.  No pneumothorax.       Comment: Please note this report has been produced using speech recognition software and may contain errors related to that system including errors in grammar, punctuation, and spelling, as well as words and phrases that may be inappropriate. If there are any questions or concerns please feel free to contact the dictating provider for clarification.     Electronically signed by Peter Corral MD on 2/26/2023 at 9:33 AM

## 2023-02-27 LAB
ANION GAP SERPL CALCULATED.3IONS-SCNC: 11 MMOL/L (ref 4–16)
BASOPHILS ABSOLUTE: 0 K/CU MM
BASOPHILS RELATIVE PERCENT: 0.5 % (ref 0–1)
BUN SERPL-MCNC: 7 MG/DL (ref 6–23)
CALCIUM SERPL-MCNC: 8.8 MG/DL (ref 8.3–10.6)
CHLORIDE BLD-SCNC: 107 MMOL/L (ref 99–110)
CO2: 23 MMOL/L (ref 21–32)
CREAT SERPL-MCNC: 0.7 MG/DL (ref 0.9–1.3)
DIFFERENTIAL TYPE: ABNORMAL
EOSINOPHILS ABSOLUTE: 0.9 K/CU MM
EOSINOPHILS RELATIVE PERCENT: 13.6 % (ref 0–3)
GFR SERPL CREATININE-BSD FRML MDRD: >60 ML/MIN/1.73M2
GLUCOSE SERPL-MCNC: 85 MG/DL (ref 70–99)
HCT VFR BLD CALC: 25.9 % (ref 42–52)
HEMOGLOBIN: 8.6 GM/DL (ref 13.5–18)
IMMATURE NEUTROPHIL %: 0.3 % (ref 0–0.43)
LYMPHOCYTES ABSOLUTE: 1.1 K/CU MM
LYMPHOCYTES RELATIVE PERCENT: 18.1 % (ref 24–44)
MCH RBC QN AUTO: 30.3 PG (ref 27–31)
MCHC RBC AUTO-ENTMCNC: 33.2 % (ref 32–36)
MCV RBC AUTO: 91.2 FL (ref 78–100)
MONOCYTES ABSOLUTE: 0.5 K/CU MM
MONOCYTES RELATIVE PERCENT: 7.7 % (ref 0–4)
NUCLEATED RBC %: 0 %
PDW BLD-RTO: 12.7 % (ref 11.7–14.9)
PLATELET # BLD: 154 K/CU MM (ref 140–440)
PMV BLD AUTO: 10.6 FL (ref 7.5–11.1)
POTASSIUM SERPL-SCNC: 3.9 MMOL/L (ref 3.5–5.1)
RBC # BLD: 2.84 M/CU MM (ref 4.6–6.2)
SEGMENTED NEUTROPHILS ABSOLUTE COUNT: 3.7 K/CU MM
SEGMENTED NEUTROPHILS RELATIVE PERCENT: 59.8 % (ref 36–66)
SODIUM BLD-SCNC: 141 MMOL/L (ref 135–145)
TOTAL IMMATURE NEUTOROPHIL: 0.02 K/CU MM
TOTAL NUCLEATED RBC: 0 K/CU MM
WBC # BLD: 6.2 K/CU MM (ref 4–10.5)

## 2023-02-27 PROCEDURE — 2580000003 HC RX 258: Performed by: STUDENT IN AN ORGANIZED HEALTH CARE EDUCATION/TRAINING PROGRAM

## 2023-02-27 PROCEDURE — 94761 N-INVAS EAR/PLS OXIMETRY MLT: CPT

## 2023-02-27 PROCEDURE — 6360000002 HC RX W HCPCS: Performed by: STUDENT IN AN ORGANIZED HEALTH CARE EDUCATION/TRAINING PROGRAM

## 2023-02-27 PROCEDURE — 80048 BASIC METABOLIC PNL TOTAL CA: CPT

## 2023-02-27 PROCEDURE — 94664 DEMO&/EVAL PT USE INHALER: CPT

## 2023-02-27 PROCEDURE — 94640 AIRWAY INHALATION TREATMENT: CPT

## 2023-02-27 PROCEDURE — 85025 COMPLETE CBC W/AUTO DIFF WBC: CPT

## 2023-02-27 PROCEDURE — 2060000000 HC ICU INTERMEDIATE R&B

## 2023-02-27 PROCEDURE — 6370000000 HC RX 637 (ALT 250 FOR IP): Performed by: STUDENT IN AN ORGANIZED HEALTH CARE EDUCATION/TRAINING PROGRAM

## 2023-02-27 PROCEDURE — 36592 COLLECT BLOOD FROM PICC: CPT

## 2023-02-27 PROCEDURE — 6370000000 HC RX 637 (ALT 250 FOR IP): Performed by: INTERNAL MEDICINE

## 2023-02-27 PROCEDURE — 2580000003 HC RX 258: Performed by: INTERNAL MEDICINE

## 2023-02-27 PROCEDURE — 6360000002 HC RX W HCPCS: Performed by: INTERNAL MEDICINE

## 2023-02-27 RX ORDER — LANOLIN ALCOHOL/MO/W.PET/CERES
100 CREAM (GRAM) TOPICAL DAILY
Status: DISCONTINUED | OUTPATIENT
Start: 2023-03-01 | End: 2023-03-01 | Stop reason: HOSPADM

## 2023-02-27 RX ORDER — LANOLIN ALCOHOL/MO/W.PET/CERES
100 CREAM (GRAM) TOPICAL 2 TIMES DAILY
Status: COMPLETED | OUTPATIENT
Start: 2023-02-27 | End: 2023-02-28

## 2023-02-27 RX ADMIN — PIPERACILLIN AND TAZOBACTAM 3375 MG: 3; .375 INJECTION, POWDER, LYOPHILIZED, FOR SOLUTION INTRAVENOUS at 08:16

## 2023-02-27 RX ADMIN — SODIUM CHLORIDE: 9 INJECTION, SOLUTION INTRAVENOUS at 03:51

## 2023-02-27 RX ADMIN — ENOXAPARIN SODIUM 40 MG: 100 INJECTION SUBCUTANEOUS at 08:11

## 2023-02-27 RX ADMIN — OXYCODONE HYDROCHLORIDE 5 MG: 5 TABLET ORAL at 17:57

## 2023-02-27 RX ADMIN — TIOTROPIUM BROMIDE INHALATION SPRAY 2 PUFF: 3.12 SPRAY, METERED RESPIRATORY (INHALATION) at 07:23

## 2023-02-27 RX ADMIN — SODIUM CHLORIDE 675 ML: 4.5 INJECTION, SOLUTION INTRAVENOUS at 03:09

## 2023-02-27 RX ADMIN — THIAMINE HYDROCHLORIDE 300 MG: 100 INJECTION, SOLUTION INTRAMUSCULAR; INTRAVENOUS at 08:14

## 2023-02-27 RX ADMIN — OXYCODONE HYDROCHLORIDE 5 MG: 5 TABLET ORAL at 05:15

## 2023-02-27 RX ADMIN — PIPERACILLIN AND TAZOBACTAM 3375 MG: 3; .375 INJECTION, POWDER, LYOPHILIZED, FOR SOLUTION INTRAVENOUS at 00:33

## 2023-02-27 RX ADMIN — BUSPIRONE HYDROCHLORIDE 5 MG: 5 TABLET ORAL at 20:01

## 2023-02-27 RX ADMIN — DOCUSATE SODIUM 100 MG: 100 CAPSULE, LIQUID FILLED ORAL at 08:11

## 2023-02-27 RX ADMIN — Medication 100 MG: at 20:01

## 2023-02-27 RX ADMIN — PANTOPRAZOLE SODIUM 40 MG: 40 TABLET, DELAYED RELEASE ORAL at 05:15

## 2023-02-27 RX ADMIN — PIPERACILLIN AND TAZOBACTAM 3375 MG: 3; .375 INJECTION, POWDER, LYOPHILIZED, FOR SOLUTION INTRAVENOUS at 15:31

## 2023-02-27 RX ADMIN — BUSPIRONE HYDROCHLORIDE 5 MG: 5 TABLET ORAL at 08:11

## 2023-02-27 ASSESSMENT — ENCOUNTER SYMPTOMS
SINUS PAIN: 0
SORE THROAT: 0
SINUS PRESSURE: 0
COUGH: 0
VOICE CHANGE: 0
WHEEZING: 0
TROUBLE SWALLOWING: 0
COLOR CHANGE: 0
BACK PAIN: 0
CHEST TIGHTNESS: 0
ABDOMINAL PAIN: 0
SHORTNESS OF BREATH: 0
NAUSEA: 0
CONSTIPATION: 0
VOMITING: 0
DIARRHEA: 0

## 2023-02-27 ASSESSMENT — PAIN DESCRIPTION - DESCRIPTORS: DESCRIPTORS: ACHING

## 2023-02-27 ASSESSMENT — PAIN DESCRIPTION - ORIENTATION: ORIENTATION: LOWER

## 2023-02-27 ASSESSMENT — PAIN DESCRIPTION - LOCATION: LOCATION: BACK

## 2023-02-27 ASSESSMENT — PAIN - FUNCTIONAL ASSESSMENT: PAIN_FUNCTIONAL_ASSESSMENT: ACTIVITIES ARE NOT PREVENTED

## 2023-02-27 ASSESSMENT — PAIN SCALES - GENERAL: PAINLEVEL_OUTOF10: 5

## 2023-02-27 NOTE — PROGRESS NOTES
Pt asked if his grandmother has contacted, pt notified that contact # is incorrect. Pt informed that contact info for Grady Elizalde is listed and pt states that is his  and we may contact her for grandmothers # and may give information to her for updates on hospital stay and condition.

## 2023-02-27 NOTE — PROGRESS NOTES
Nephrology Progress Note  2/27/2023 7:28 AM        Subjective:   Admit Date: 2/23/2023  PCP: Joanna Blanchard MD    Interval History: Weekend events reviewed-he was successfully extubated  Alert awake and oriented and eating and drinking    Diet: Reasonable    ROS: No overt confusion  Urine output almost 10 L- he also received-normal saline half-normal saline-we will stop all fluid now  No fever, acceptable blood pressure    Data:     Current meds:    busPIRone  5 mg Oral BID    docusate sodium  100 mg Oral Daily    [Held by provider] lisinopril  20 mg Oral Daily    pantoprazole  40 mg Oral QAM AC    tiotropium  2 puff Inhalation Daily    piperacillin-tazobactam  3,375 mg IntraVENous q8h    thiamine (VITAMIN B1) IVPB  300 mg IntraVENous BID    enoxaparin  40 mg SubCUTAneous Daily           I/O last 3 completed shifts: In: 6814.7 [P.O.:400;  I.V.:5393.4; IV Piggyback:1021.3]  Out: 49917 [[id number]]    CBC:   Recent Labs     02/25/23  0507 02/26/23  0445 02/27/23  0615   WBC 6.3 6.6 6.2   HGB 8.6* 7.8* 8.6*    131* 154          Recent Labs     02/26/23  1654 02/26/23  2110 02/27/23  0308    140 141   K 4.2 4.0 3.9    108 107   CO2 26 26 23   BUN 11 9 7   CREATININE 0.8* 0.8* 0.7*   GLUCOSE 124* 126* 85       Lab Results   Component Value Date    CALCIUM 8.8 02/27/2023    PHOS 2.6 02/25/2023       Objective:     Vitals: /78   Pulse 79   Temp 98.8 °F (37.1 °C) (Bladder)   Resp 16   Ht 5' 9\" (1.753 m)   Wt 158 lb 1.1 oz (71.7 kg)   SpO2 98%   BMI 23.34 kg/m² ,    General appearance: Thin, alert, awake and oriented  HEENT: At least 2+ conjunctival pallor, no scleral icterus  Neck: Supple  Lungs: No gross crackles but has coarse breath sound  Heart: Regular rate and rhythm  Abdomen: Soft, nontender  Extremities: No gross edema has Ríos catheter      Problem List :         Impression :     Hyponatremia-acute on chronic-initially mainly from hypovolemia-rise of the sodium was meticulously controlled-sodium is 141 mEq/L now  She likely has probably partial diabetes insipidus-more than likely from previous lithium therapy-of course is endogenous ADH-as well as synthetic DDAVP-both worked for him-but as he is alert awake and able to eat and drink-he should be able to match up the water loss-  Multiple electrolyte derangement mainly from poor oral intake and poor total body store-also had brief period of intubation from airway protection from presumed seizure  Underlying mental disorder  Multifactorial anemia  Recommendation/Plan  :     Discontinue all fluid and Ríos catheter-we can measure urine output otherwise-see how his kidney behaves-he is alert and awake and should be able to drink water-expect his thirst center to be functioning for now-I do not see any hard evidence of acute bacterial infection-if it is too I will discontinue antibiotic-hopefully discharge soon-probably reasonable to keep 1 more day to see how his kidney behaves-follow clinically and biochemically-some of his urine output due to osmotic diuresis-induced by a normal saline half-normal saline      Ellen Pope MD MD

## 2023-02-27 NOTE — PROGRESS NOTES
Inpatient Progress Note 2/27/2023        Natasha Martin  1980  [id number]      Assessment/Plan:  Natasha Martin is a 36 y.o. male with past medical history of HTN/DM/HLD/Asthma/Schizophrenia who presents S/P fall and seizure like activity with AMS and Confusion S/P Intubation and Critical Hyponatremia     Problem list  - AMS/Confusion: Possibly Metabolic due to Hyponatremia and Seizure R/O Drugs versus Toxin induced  - Critical Hyponatremia   - Seizure Like activity ? ? Hyponatremia related     - Acute Hypoxemic Respiratory Failure on Ventilator due to poor airway protection  - Hypokalemia    - Right Occipital Scalp Hematoma   - S/P Fall. - Anemia: Unclear if acute blood loss versus chronic ? ?  - Metabolic acidosis   - L2 compression fracture   - Hx of DM/HTN/Asthma/HLD and Schizophrenia        Neuro: No acute issues, alert following commands. Neurology was following no concern for seizure based on EEG and exam.  Pain controlled current regimen. Monitor adjust as needed  Cardio: Hemodynamically stable, no acute issues. Monitor and adjust as needed. Resp: No acute issues, tolerating well on nasal cannula, titrate off as tolerated. Incentive spirometry, chest PT as needed. GI: Advance diet as tolerated. Low-sodium low-protein. : Hyponatremia to 118 on admission improved today 141, etiology could be medication induced diabetes insipidus, given polyuria?  (Olanzapine on hold as potential cause? Or previous lithium's therapy?). Diuresed 3 L of urine overnight, replaced with half-normal saline over the last 1 or 2 days. May need DDAVP, nephrology following recs appreciated. Ríos removed, monitor urine output if persistently elevated will need administration of endogenous ADH/DDAVP. Heme: Hemoglobin stable. Monitor and transfuse as needed. DVT prophylaxis Lovenox. ID: WBC 6.1, continue 5-day course of antibiotics. No obvious source. Endo: Blood glucose appropriate. ISS as needed.   MSK: Out of bed to chair. PT OT if needed. Tubes/Lines/Drains:  PIV    Code Status: Full      Subjective:    Patient was seen and evaluated bedside, resting comfortably in bed, NAD, VSS. No acute events overnight. Remained stable. Denies any pain or discomfort at this time. Otherwise patient denies any fevers, chills, nausea, vomiting, headache, dizziness, lightheadedness, focal neurological deficits, chest pain, shortness of breath, abdominal pain, urinary or bowel symptoms      Physical Exam:    BP (!) 108/58   Pulse 73   Temp 98 °F (36.7 °C) (Oral)   Resp 18   Ht 5' 9\" (1.753 m)   Wt 158 lb 1.1 oz (71.7 kg)   SpO2 98%   BMI 23.34 kg/m²     General: NAD  Eyes: EOMI  ENT: neck supple  Cardiovascular: Regular rate. Respiratory: Clear to auscultation  Gastrointestinal: Soft, non tender  Genitourinary: no suprapubic tenderness  Musculoskeletal: No edema. Skin: warm, dry  Neuro: Alert. Psych: Mood appropriate. Current Medications:   thiamine  100 mg Oral BID    [START ON 3/1/2023] thiamine  100 mg Oral Daily    busPIRone  5 mg Oral BID    docusate sodium  100 mg Oral Daily    [Held by provider] lisinopril  20 mg Oral Daily    pantoprazole  40 mg Oral QAM AC    tiotropium  2 puff Inhalation Daily    piperacillin-tazobactam  3,375 mg IntraVENous q8h    enoxaparin  40 mg SubCUTAneous Daily       Labs, Imaging and Studies reviewed:    No results found.     Recent Results (from the past 24 hour(s))   Basic Metabolic Panel    Collection Time: 02/26/23  4:54 PM   Result Value Ref Range    Sodium 141 135 - 145 MMOL/L    Potassium 4.2 3.5 - 5.1 MMOL/L    Chloride 108 99 - 110 mMol/L    CO2 26 21 - 32 MMOL/L    Anion Gap 7 4 - 16    BUN 11 6 - 23 MG/DL    Creatinine 0.8 (L) 0.9 - 1.3 MG/DL    Est, Glom Filt Rate >60 >60 mL/min/1.73m2    Glucose 124 (H) 70 - 99 MG/DL    Calcium 8.9 8.3 - 10.6 MG/DL   Basic Metabolic Panel    Collection Time: 02/26/23  9:10 PM   Result Value Ref Range    Sodium 140 135 - 145 MMOL/L Potassium 4.0 3.5 - 5.1 MMOL/L    Chloride 108 99 - 110 mMol/L    CO2 26 21 - 32 MMOL/L    Anion Gap 6 4 - 16    BUN 9 6 - 23 MG/DL    Creatinine 0.8 (L) 0.9 - 1.3 MG/DL    Est, Glom Filt Rate >60 >60 mL/min/1.73m2    Glucose 126 (H) 70 - 99 MG/DL    Calcium 9.0 8.3 - 10.6 MG/DL   POCT Glucose    Collection Time: 02/26/23  9:10 PM   Result Value Ref Range    POC Glucose 120 (H) 70 - 99 MG/DL   Basic Metabolic Panel    Collection Time: 02/27/23  3:08 AM   Result Value Ref Range    Sodium 141 135 - 145 MMOL/L    Potassium 3.9 3.5 - 5.1 MMOL/L    Chloride 107 99 - 110 mMol/L    CO2 23 21 - 32 MMOL/L    Anion Gap 11 4 - 16    BUN 7 6 - 23 MG/DL    Creatinine 0.7 (L) 0.9 - 1.3 MG/DL    Est, Glom Filt Rate >60 >60 mL/min/1.73m2    Glucose 85 70 - 99 MG/DL    Calcium 8.8 8.3 - 10.6 MG/DL   CBC with Auto Differential    Collection Time: 02/27/23  6:15 AM   Result Value Ref Range    WBC 6.2 4.0 - 10.5 K/CU MM    RBC 2.84 (L) 4.6 - 6.2 M/CU MM    Hemoglobin 8.6 (L) 13.5 - 18.0 GM/DL    Hematocrit 25.9 (L) 42 - 52 %    MCV 91.2 78 - 100 FL    MCH 30.3 27 - 31 PG    MCHC 33.2 32.0 - 36.0 %    RDW 12.7 11.7 - 14.9 %    Platelets 953 270 - 533 K/CU MM    MPV 10.6 7.5 - 11.1 FL    Differential Type AUTOMATED DIFFERENTIAL     Segs Relative 59.8 36 - 66 %    Lymphocytes % 18.1 (L) 24 - 44 %    Monocytes % 7.7 (H) 0 - 4 %    Eosinophils % 13.6 (H) 0 - 3 %    Basophils % 0.5 0 - 1 %    Segs Absolute 3.7 K/CU MM    Lymphocytes Absolute 1.1 K/CU MM    Monocytes Absolute 0.5 K/CU MM    Eosinophils Absolute 0.9 K/CU MM    Basophils Absolute 0.0 K/CU MM    Nucleated RBC % 0.0 %    Total Nucleated RBC 0.0 K/CU MM    Total Immature Neutrophil 0.02 K/CU MM    Immature Neutrophil % 0.3 0 - 0.43 %       Electronically signed by Natanael Ryan MD on 2/27/2023 at 3:02 PM

## 2023-02-27 NOTE — PROGRESS NOTES
V2.0  [facility] Hospitalist Progress Note      Name:  Erica Montoya /Age/Sex: 1980  (36 y.o. male)   MRN & CSN:  [id number] & [id number] Encounter Date/Time: 2023 11:25 AM EST    Location:  -A PCP: Sonu Arriola MD       Hospital Day: 5    Assessment and Plan:   Erica Montoya is a 36 y.o. male with pmh of DM II, HTN, HLD, COPD, Schizophrenia who presents with Seizure 2/2 hyponatremia. Plan:    Metabolic encephalopathy - 2/2 to hyponatremia. Episode of seizure like activity. Utox negative. CT head - No acute intracranial abnormality. Moderate right parietal scalp hematoma. Neurology consulted. Q4 neurocheck. On keppra. Had to be intubated in ED to protect the airway. Was able to successfully extubated on 2023. Severe hyponatremia -was initially given 3% saline as patient has seizure from hyponatremia. Nephrology consulted. BMP q4. Target correction no more than 8 meq in 24 hrs. Overcorrected and on D5%. Managed by nephrology and critical care    Seizure-patient with a seizure on presentation due to severe hyponatremia. Neurology consulted. Patient will not be taken off of Keppra as sodium has improved     Anemia - Hb 8.8 on admission. Last hb few months back was 14. Currently no sign of overt bleeding. CT abdomen did not reveal any fluid collection. NG and Urine Output is clear. Need further workup with iron panel, Folate, B12, FOBT. Monitor vital and h/h in the mean time. Transfuse as needed     HLD - on statin     HTN - currently BP soft     COPD - does not look in exacerbation. currently on vent. Duoneb  PRN     Paranoid Schizophrenia  -   Patient on multiple agents including Zyprexa. Still currently on hold. Recommend psychiatric evaluation prior to patient being discharged to ensure that he is on proper medications for his paranoid schizophrenia and ensuring he is not on medications that will cause him to become hyponatremic again in the future.     Diet ADULT DIET; Regular; 5 carb choices (75 gm/meal); Low Sodium (2 gm); Less than 60 gm; 2000 ml   DVT Prophylaxis [] Lovenox, []  Heparin, [] SCDs, [] Ambulation,    [] Eliquis, [] Xarelto  [] Coumadin [] other   Code Status Full Code   Disposition From: nursing home  Expected Disposition: TBD  Estimated Date of Discharge: TBD  Patient requires continued admission due to  hyponatremia and needing psychiatric evaluation   Surrogate Decision Maker/ POA      Subjective:     Chief Complaint: Fall     Patient sodium improved today. Now off IV fluids. Patient tolerating diet and having states that he is feeling better. Review of Systems:    Review of Systems   Constitutional:  Positive for fatigue. Negative for activity change, appetite change, chills and fever. HENT:  Negative for congestion, hearing loss, sinus pressure, sinus pain, sore throat, trouble swallowing and voice change. Eyes:  Negative for visual disturbance. Respiratory:  Negative for cough, chest tightness, shortness of breath and wheezing. Cardiovascular:  Negative for chest pain, palpitations and leg swelling. Gastrointestinal:  Negative for abdominal pain, constipation, diarrhea, nausea and vomiting. Endocrine: Negative for cold intolerance, heat intolerance and polyuria. Genitourinary:  Negative for dysuria. Musculoskeletal:  Negative for arthralgias, back pain and gait problem. Skin:  Negative for color change. Neurological:  Negative for dizziness, weakness and headaches. Psychiatric/Behavioral:  Negative for agitation and confusion. The patient is not nervous/anxious. Objective:      Intake/Output Summary (Last 24 hours) at 2/27/2023 0741  Last data filed at 2/27/2023 0130  Gross per 24 hour   Intake 6814.73 ml   Output 71541 ml   Net -3285.27 ml          Vitals:   Vitals:    02/27/23 0723   BP:    Pulse: 79   Resp: 16   Temp:    SpO2: 98%       Physical Exam:     Physical Exam  Constitutional:       General: He is not in acute distress. Appearance: Normal appearance. HENT:      Head: Normocephalic and atraumatic. Nose: Nose normal.      Mouth/Throat:      Mouth: Mucous membranes are moist.      Pharynx: Oropharynx is clear. Eyes:      Extraocular Movements: Extraocular movements intact. Conjunctiva/sclera: Conjunctivae normal.      Pupils: Pupils are equal, round, and reactive to light. Cardiovascular:      Rate and Rhythm: Normal rate and regular rhythm. Pulses: Normal pulses. Heart sounds: Normal heart sounds. Pulmonary:      Effort: Pulmonary effort is normal.      Comments: intubated  Abdominal:      General: Abdomen is flat. Bowel sounds are normal.      Palpations: Abdomen is soft. Musculoskeletal:         General: Normal range of motion. Cervical back: Normal range of motion and neck supple. Skin:     General: Skin is warm and dry. Neurological:      General: No focal deficit present. Mental Status: He is alert and oriented to person, place, and time. Mental status is at baseline. Psychiatric:         Mood and Affect: Mood normal.         Behavior: Behavior normal.         Thought Content:  Thought content normal.       Medications:   Medications:    busPIRone  5 mg Oral BID    docusate sodium  100 mg Oral Daily    [Held by provider] lisinopril  20 mg Oral Daily    pantoprazole  40 mg Oral QAM AC    tiotropium  2 puff Inhalation Daily    piperacillin-tazobactam  3,375 mg IntraVENous q8h    thiamine (VITAMIN B1) IVPB  300 mg IntraVENous BID    enoxaparin  40 mg SubCUTAneous Daily      Infusions:       PRN Meds: albuterol sulfate HFA, 2 puff, 4x Daily PRN  acetaminophen, 650 mg, Q4H PRN  oxyCODONE, 5 mg, Q4H PRN  LORazepam, 1 mg, Q1H PRN  potassium chloride, 20 mEq, PRN      Labs      Recent Results (from the past 24 hour(s))   Basic Metabolic Panel    Collection Time: 02/26/23  9:01 AM   Result Value Ref Range    Sodium 129 (L) 135 - 145 MMOL/L    Potassium 3.7 3.5 - 5.1 MMOL/L Chloride 95 (L) 99 - 110 mMol/L    CO2 23 21 - 32 MMOL/L    Anion Gap 11 4 - 16    BUN 19 6 - 23 MG/DL    Creatinine 0.8 (L) 0.9 - 1.3 MG/DL    Est, Glom Filt Rate >60 >60 mL/min/1.73m2    Glucose 89 70 - 99 MG/DL    Calcium 8.7 8.3 - 10.6 MG/DL   Basic Metabolic Panel    Collection Time: 02/26/23  1:35 PM   Result Value Ref Range    Sodium 138 135 - 145 MMOL/L    Potassium 4.3 3.5 - 5.1 MMOL/L    Chloride 106 99 - 110 mMol/L    CO2 24 21 - 32 MMOL/L    Anion Gap 8 4 - 16    BUN 12 6 - 23 MG/DL    Creatinine 0.8 (L) 0.9 - 1.3 MG/DL    Est, Glom Filt Rate >60 >60 mL/min/1.73m2    Glucose 112 (H) 70 - 99 MG/DL    Calcium 8.6 8.3 - 10.6 MG/DL   Sodium, urine, random    Collection Time: 02/26/23  2:35 PM   Result Value Ref Range    Sodium, Ur 103 35 - 167 MMOL/L   Creatinine, urine, random    Collection Time: 02/26/23  2:35 PM   Result Value Ref Range    Creatinine, Ur 17.3 (L) 39 - 259 MG/DL   Chloride, urine, random    Collection Time: 02/26/23  2:35 PM   Result Value Ref Range    Chloride 95 43 - 210 MMOL/L   Osmolality, urine    Collection Time: 02/26/23  2:35 PM   Result Value Ref Range    Osmolality, Ur 282 (L) 292 - 1090 MOS/L   Basic Metabolic Panel    Collection Time: 02/26/23  4:54 PM   Result Value Ref Range    Sodium 141 135 - 145 MMOL/L    Potassium 4.2 3.5 - 5.1 MMOL/L    Chloride 108 99 - 110 mMol/L    CO2 26 21 - 32 MMOL/L    Anion Gap 7 4 - 16    BUN 11 6 - 23 MG/DL    Creatinine 0.8 (L) 0.9 - 1.3 MG/DL    Est, Glom Filt Rate >60 >60 mL/min/1.73m2    Glucose 124 (H) 70 - 99 MG/DL    Calcium 8.9 8.3 - 10.6 MG/DL   Basic Metabolic Panel    Collection Time: 02/26/23  9:10 PM   Result Value Ref Range    Sodium 140 135 - 145 MMOL/L    Potassium 4.0 3.5 - 5.1 MMOL/L    Chloride 108 99 - 110 mMol/L    CO2 26 21 - 32 MMOL/L    Anion Gap 6 4 - 16    BUN 9 6 - 23 MG/DL    Creatinine 0.8 (L) 0.9 - 1.3 MG/DL    Est, Glom Filt Rate >60 >60 mL/min/1.73m2    Glucose 126 (H) 70 - 99 MG/DL    Calcium 9.0 8.3 - 10.6 MG/DL   POCT Glucose    Collection Time: 02/26/23  9:10 PM   Result Value Ref Range    POC Glucose 120 (H) 70 - 99 MG/DL   Basic Metabolic Panel    Collection Time: 02/27/23  3:08 AM   Result Value Ref Range    Sodium 141 135 - 145 MMOL/L    Potassium 3.9 3.5 - 5.1 MMOL/L    Chloride 107 99 - 110 mMol/L    CO2 23 21 - 32 MMOL/L    Anion Gap 11 4 - 16    BUN 7 6 - 23 MG/DL    Creatinine 0.7 (L) 0.9 - 1.3 MG/DL    Est, Glom Filt Rate >60 >60 mL/min/1.73m2    Glucose 85 70 - 99 MG/DL    Calcium 8.8 8.3 - 10.6 MG/DL   CBC with Auto Differential    Collection Time: 02/27/23  6:15 AM   Result Value Ref Range    WBC 6.2 4.0 - 10.5 K/CU MM    RBC 2.84 (L) 4.6 - 6.2 M/CU MM    Hemoglobin 8.6 (L) 13.5 - 18.0 GM/DL    Hematocrit 25.9 (L) 42 - 52 %    MCV 91.2 78 - 100 FL    MCH 30.3 27 - 31 PG    MCHC 33.2 32.0 - 36.0 %    RDW 12.7 11.7 - 14.9 %    Platelets 575 150 - 069 K/CU MM    MPV 10.6 7.5 - 11.1 FL    Differential Type AUTOMATED DIFFERENTIAL     Segs Relative 59.8 36 - 66 %    Lymphocytes % 18.1 (L) 24 - 44 %    Monocytes % 7.7 (H) 0 - 4 %    Eosinophils % 13.6 (H) 0 - 3 %    Basophils % 0.5 0 - 1 %    Segs Absolute 3.7 K/CU MM    Lymphocytes Absolute 1.1 K/CU MM    Monocytes Absolute 0.5 K/CU MM    Eosinophils Absolute 0.9 K/CU MM    Basophils Absolute 0.0 K/CU MM    Nucleated RBC % 0.0 %    Total Nucleated RBC 0.0 K/CU MM    Total Immature Neutrophil 0.02 K/CU MM    Immature Neutrophil % 0.3 0 - 0.43 %        Imaging/Diagnostics Last 24 Hours   CT ABDOMEN PELVIS WO CONTRAST Additional Contrast? None    Result Date: 2/24/2023  EXAMINATION: CT OF THE ABDOMEN AND PELVIS WITHOUT CONTRAST; CT OF THE CHEST WITHOUT CONTRAST 2/24/2023 12:39 am TECHNIQUE: CT of the abdomen and pelvis was performed without the administration of intravenous contrast. Multiplanar reformatted images are provided for review.  Automated exposure control, iterative reconstruction, and/or weight based adjustment of the mA/kV was utilized to reduce the radiation dose to as low as reasonably achievable.; CT of the chest was performed without the administration of intravenous contrast. Multiplanar reformatted images are provided for review. Automated exposure control, iterative reconstruction, and/or weight based adjustment of the mA/kV was utilized to reduce the radiation dose to as low as reasonably achievable. COMPARISON: 09/12/2012 HISTORY: ORDERING SYSTEM PROVIDED HISTORY: fall TECHNOLOGIST PROVIDED HISTORY: Reason for exam:->fall Additional Contrast?->None Decision Support Exception - unselect if not a suspected or confirmed emergency medical condition->Emergency Medical Condition (MA) Reason for Exam: fall; FINDINGS: Chest: Mediastinum: Normal heart size. No pericardial effusion. Normal thoracic aortic caliber. No abnormal mediastinal or hilar lymph node endotracheal tube in place. Mildly dilated fluid-filled esophagus. Lungs/pleura: Mild dependent atelectasis. Lungs otherwise clear. No pneumothorax or pleural effusion. Airways unremarkable. Soft Tissues/Bones: Acute osseous or soft tissue abnormality. Abdomen/Pelvis: Organs: Limited evaluation due lack of intravenous contrast.  Liver, gallbladder, biliary system, pancreas, spleen, adrenal glands, and kidneys unremarkable. No hydronephrosis or urinary tract calculus. GI/Bowel: No bowel obstruction. Distended fluid, gas, and fluid-filled stomach. No evidence of appendicitis. Pelvis: Urinary bladder and pelvic organs unremarkable. Peritoneum/Retroperitoneum: No free air or free fluid. Normal abdominal aortic caliber. No retroperitoneal a Liang Loose within limits of this noncontrast examination. No abnormal lymph node. Bones/Soft Tissues: Acute L2 compression fracture with superior endplate concavity and subjacent fracture plane resulting in 10% anterior vertebral body height loss and 2 mm retropulsion of the posterosuperior cortex. Bones otherwise intact. No acute soft tissue abnormality.   Tiny uncomplicated fat containing bilateral inguinal hernias. 1. Acute compression fracture of the L2 vertebral body with 10% anterior vertebral height loss and minimal posterior cortex retropulsion. 2. No acute abnormality in the chest. 3. No solid or hollow viscus organ injury within limits of this noncontrast examination. 4. Distended fluid-filled stomach and mildly dilated fluid-filled esophagus most consistent with reflux. XR ABDOMEN (KUB) (SINGLE AP VIEW)    Result Date: 2/24/2023  EXAMINATION: ONE SUPINE X-RAY VIEW(S) OF THE ABDOMEN 2/24/2023 2:01 am COMPARISON: February 24, 2023. HISTORY: ORDERING SYSTEM PROVIDED HISTORY:  NGT TECHNOLOGIST PROVIDED HISTORY: Reason for Exam:  NGT FINDINGS: Nasogastric tube tip terminates in the distal stomach. The side-port is beyond the GE junction. No acute or aggressive osseous lesion. Gaseous distention of the stomach is present. The bowel gas pattern is not well evaluated due to supine positioning. No gas distended loops of bowel appreciated. L2 compression deformity is seen to better advantage on prior CT. 1. Nasogastric tube tip terminates in the distal stomach. 2. Gaseous distention of the stomach. Finding is similar to the CT dated February 24, 2023. 3. L2 compression deformity is seen to better advantage on prior CT. CT HEAD WO CONTRAST    Result Date: 2/24/2023  EXAMINATION: CT OF THE HEAD WITHOUT CONTRAST  2/24/2023 12:39 am TECHNIQUE: CT of the head was performed without the administration of intravenous contrast. Automated exposure control, iterative reconstruction, and/or weight based adjustment of the mA/kV was utilized to reduce the radiation dose to as low as reasonably achievable. COMPARISON: November 15, 2016 HISTORY: ORDERING SYSTEM PROVIDED HISTORY: fall TECHNOLOGIST PROVIDED HISTORY: Has a \"code stroke\" or \"stroke alert\" been called? ->No Reason for exam:->fall Decision Support Exception - unselect if not a suspected or confirmed emergency medical condition->Emergency Medical Condition (MA) Reason for Exam: fall FINDINGS: BRAIN/VENTRICLES: There is no acute intracranial hemorrhage, mass effect or midline shift. No abnormal extra-axial fluid collection. The gray-white differentiation is maintained without evidence of an acute infarct. There is no evidence of hydrocephalus. ORBITS: The visualized portion of the orbits demonstrate no acute abnormality. SINUSES: Moderate mucosal thickening is identified in the bilateral maxillary, ethmoid, and right sphenoid sinus. The mastoid air cells are clear. SOFT TISSUES/SKULL:  Moderate right parietal scalp hematoma is present. Left face soft tissue swelling is also seen. No acute fracture. No acute intracranial abnormality. Moderate right parietal scalp hematoma. No underlying fracture. Mild left face soft tissue swelling. CT CHEST WO CONTRAST    Result Date: 2/24/2023  EXAMINATION: CT OF THE ABDOMEN AND PELVIS WITHOUT CONTRAST; CT OF THE CHEST WITHOUT CONTRAST 2/24/2023 12:39 am TECHNIQUE: CT of the abdomen and pelvis was performed without the administration of intravenous contrast. Multiplanar reformatted images are provided for review. Automated exposure control, iterative reconstruction, and/or weight based adjustment of the mA/kV was utilized to reduce the radiation dose to as low as reasonably achievable.; CT of the chest was performed without the administration of intravenous contrast. Multiplanar reformatted images are provided for review. Automated exposure control, iterative reconstruction, and/or weight based adjustment of the mA/kV was utilized to reduce the radiation dose to as low as reasonably achievable.  COMPARISON: 09/12/2012 HISTORY: ORDERING SYSTEM PROVIDED HISTORY: fall TECHNOLOGIST PROVIDED HISTORY: Reason for exam:->fall Additional Contrast?->None Decision Support Exception - unselect if not a suspected or confirmed emergency medical condition->Emergency Medical Condition (MA) Reason for Exam: fall; FINDINGS: Chest: Mediastinum: Normal heart size. No pericardial effusion. Normal thoracic aortic caliber. No abnormal mediastinal or hilar lymph node endotracheal tube in place. Mildly dilated fluid-filled esophagus. Lungs/pleura: Mild dependent atelectasis. Lungs otherwise clear. No pneumothorax or pleural effusion. Airways unremarkable. Soft Tissues/Bones: Acute osseous or soft tissue abnormality. Abdomen/Pelvis: Organs: Limited evaluation due lack of intravenous contrast.  Liver, gallbladder, biliary system, pancreas, spleen, adrenal glands, and kidneys unremarkable. No hydronephrosis or urinary tract calculus. GI/Bowel: No bowel obstruction. Distended fluid, gas, and fluid-filled stomach. No evidence of appendicitis. Pelvis: Urinary bladder and pelvic organs unremarkable. Peritoneum/Retroperitoneum: No free air or free fluid. Normal abdominal aortic caliber. No retroperitoneal a Andriette Copas within limits of this noncontrast examination. No abnormal lymph node. Bones/Soft Tissues: Acute L2 compression fracture with superior endplate concavity and subjacent fracture plane resulting in 10% anterior vertebral body height loss and 2 mm retropulsion of the posterosuperior cortex. Bones otherwise intact. No acute soft tissue abnormality. Tiny uncomplicated fat containing bilateral inguinal hernias. 1. Acute compression fracture of the L2 vertebral body with 10% anterior vertebral height loss and minimal posterior cortex retropulsion. 2. No acute abnormality in the chest. 3. No solid or hollow viscus organ injury within limits of this noncontrast examination. 4. Distended fluid-filled stomach and mildly dilated fluid-filled esophagus most consistent with reflux.      CT CERVICAL SPINE WO CONTRAST    Result Date: 2/24/2023  EXAMINATION: CT OF THE CERVICAL SPINE WITHOUT CONTRAST 2/24/2023 12:39 am TECHNIQUE: CT of the cervical spine was performed without the administration of intravenous contrast. Multiplanar reformatted images are provided for review. Automated exposure control, iterative reconstruction, and/or weight based adjustment of the mA/kV was utilized to reduce the radiation dose to as low as reasonably achievable. COMPARISON: None. HISTORY: ORDERING SYSTEM PROVIDED HISTORY: fall TECHNOLOGIST PROVIDED HISTORY: Reason for exam:->fall Decision Support Exception - unselect if not a suspected or confirmed emergency medical condition->Emergency Medical Condition (MA) Reason for Exam: fall FINDINGS: BONES/ALIGNMENT: There is no acute fracture or traumatic malalignment. DEGENERATIVE CHANGES: Mild multilevel degenerative changes are present. SOFT TISSUES: There is no prevertebral soft tissue swelling. OTHER: Trace mastoid effusions are nonspecific. No acute abnormality of the cervical spine. XR CHEST PORTABLE    Result Date: 2/24/2023  EXAMINATION: ONE XRAY VIEW OF THE CHEST 2/24/2023 2:41 am COMPARISON: 09/24/2022 HISTORY: ORDERING SYSTEM PROVIDED HISTORY: central line TECHNOLOGIST PROVIDED HISTORY: Reason for exam:->central line Reason for Exam: central line FINDINGS: Endotracheal tube terminates 4.9 cm above the kellen. Enteric tube courses below the diaphragm with the tip not imaged. Right internal jugular center venous catheter tip is in the SVC. Clear lungs. No pneumothorax or pleural effusion. Cardiac and mediastinal contours normal.  No acute osseous abnormality. 1. Endotracheal tube terminates 4.9 cm above the kellen. 2. Right internal jugular center venous catheter tip in the SVC. 3. Clear lungs. No pneumothorax. Comment: Please note this report has been produced using speech recognition software and may contain errors related to that system including errors in grammar, punctuation, and spelling, as well as words and phrases that may be inappropriate.  If there are any questions or concerns please feel free to contact the dictating provider for clarification.      Electronically signed by Artis Perkins MD on 2/27/2023 at 7:41 AM

## 2023-02-27 NOTE — CARE COORDINATION
Chart reviewed. CM intern in to speak with pt. Pt was staying at is Uncle's house prior to [facility]. Pt does not know if he can go back to Uncle's house. Pt does not have his number. Pt asked that CM call Yves Perry CM from [facility]. Awaiting psych consult as well. [id number] CM called and spoke with Yves Perry and updated about pt. Yves Perry was not expecting pt to be released so soon and will look into possible housing for pt. CM requested call back when Yves Perry has ideas. CM to follow.

## 2023-02-28 PROBLEM — F20.0 ACUTE EXACERBATION OF CHRONIC PARANOID SCHIZOPHRENIA (HCC): Status: ACTIVE | Noted: 2023-02-28

## 2023-02-28 LAB
ALBUMIN SERPL-MCNC: 4 GM/DL (ref 3.4–5)
ALP BLD-CCNC: 36 IU/L (ref 40–128)
ALT SERPL-CCNC: 36 U/L (ref 10–40)
ANION GAP SERPL CALCULATED.3IONS-SCNC: 11 MMOL/L (ref 4–16)
AST SERPL-CCNC: 158 IU/L (ref 15–37)
BASOPHILS ABSOLUTE: 0 K/CU MM
BASOPHILS RELATIVE PERCENT: 0.6 % (ref 0–1)
BILIRUB SERPL-MCNC: 0.3 MG/DL (ref 0–1)
BILIRUBIN URINE: NEGATIVE MG/DL
BLOOD, URINE: NEGATIVE
BUN SERPL-MCNC: 6 MG/DL (ref 6–23)
CALCIUM SERPL-MCNC: 9.5 MG/DL (ref 8.3–10.6)
CHLORIDE BLD-SCNC: 103 MMOL/L (ref 99–110)
CLARITY: CLEAR
CO2: 26 MMOL/L (ref 21–32)
COLOR: YELLOW
COMMENT UA: NORMAL
COMMENT: ABNORMAL
CREAT SERPL-MCNC: 0.7 MG/DL (ref 0.9–1.3)
DIFFERENTIAL TYPE: ABNORMAL
DIFFERENTIAL TYPE: ABNORMAL
EOSINOPHILS ABSOLUTE: 1.3 K/CU MM
EOSINOPHILS RELATIVE PERCENT: 21.2 % (ref 0–3)
GFR SERPL CREATININE-BSD FRML MDRD: >60 ML/MIN/1.73M2
GLUCOSE SERPL-MCNC: 86 MG/DL (ref 70–99)
GLUCOSE, URINE: NEGATIVE MG/DL
HCT VFR BLD CALC: 28.2 % (ref 42–52)
HEMOGLOBIN: 9.5 GM/DL (ref 13.5–18)
IMMATURE NEUTROPHIL %: 0.2 % (ref 0–0.43)
KETONES, URINE: NEGATIVE MG/DL
LEUKOCYTE ESTERASE, URINE: NEGATIVE
LYMPHOCYTES ABSOLUTE: 2 K/CU MM
LYMPHOCYTES RELATIVE PERCENT: 32.1 % (ref 24–44)
MAGNESIUM: 1.6 MG/DL (ref 1.8–2.4)
MCH RBC QN AUTO: 29.9 PG (ref 27–31)
MCHC RBC AUTO-ENTMCNC: 33.7 % (ref 32–36)
MCV RBC AUTO: 88.7 FL (ref 78–100)
MONOCYTES ABSOLUTE: 0.5 K/CU MM
MONOCYTES RELATIVE PERCENT: 7.5 % (ref 0–4)
NITRITE URINE, QUANTITATIVE: NEGATIVE
NUCLEATED RBC %: 0 %
PDW BLD-RTO: 12.7 % (ref 11.7–14.9)
PH, URINE: 7.5 (ref 5–8)
PHOSPHORUS: 3.9 MG/DL (ref 2.5–4.9)
PLATELET # BLD: 184 K/CU MM (ref 140–440)
PMV BLD AUTO: 9.6 FL (ref 7.5–11.1)
POTASSIUM SERPL-SCNC: 4.7 MMOL/L (ref 3.5–5.1)
PROTEIN UA: NEGATIVE MG/DL
RAPID INFLUENZA  B AGN: NEGATIVE
RAPID INFLUENZA A AGN: NEGATIVE
RBC # BLD: 3.18 M/CU MM (ref 4.6–6.2)
SARS-COV-2 RDRP RESP QL NAA+PROBE: NOT DETECTED
SEGMENTED NEUTROPHILS ABSOLUTE COUNT: 2.4 K/CU MM
SEGMENTED NEUTROPHILS RELATIVE PERCENT: 38.4 % (ref 36–66)
SODIUM BLD-SCNC: 140 MMOL/L (ref 135–145)
SOURCE: NORMAL
SPECIFIC GRAVITY UA: 1.01 (ref 1–1.03)
TOTAL IMMATURE NEUTOROPHIL: 0.01 K/CU MM
TOTAL NUCLEATED RBC: 0 K/CU MM
TOTAL PROTEIN: 5.9 GM/DL (ref 6.4–8.2)
UROBILINOGEN, URINE: 0.2 MG/DL (ref 0.2–1)
WBC # BLD: 6.2 K/CU MM (ref 4–10.5)

## 2023-02-28 PROCEDURE — 83735 ASSAY OF MAGNESIUM: CPT

## 2023-02-28 PROCEDURE — 6370000000 HC RX 637 (ALT 250 FOR IP): Performed by: STUDENT IN AN ORGANIZED HEALTH CARE EDUCATION/TRAINING PROGRAM

## 2023-02-28 PROCEDURE — 6360000002 HC RX W HCPCS: Performed by: STUDENT IN AN ORGANIZED HEALTH CARE EDUCATION/TRAINING PROGRAM

## 2023-02-28 PROCEDURE — 94761 N-INVAS EAR/PLS OXIMETRY MLT: CPT

## 2023-02-28 PROCEDURE — 2580000003 HC RX 258: Performed by: STUDENT IN AN ORGANIZED HEALTH CARE EDUCATION/TRAINING PROGRAM

## 2023-02-28 PROCEDURE — 81003 URINALYSIS AUTO W/O SCOPE: CPT

## 2023-02-28 PROCEDURE — 87635 SARS-COV-2 COVID-19 AMP PRB: CPT

## 2023-02-28 PROCEDURE — 6360000002 HC RX W HCPCS: Performed by: INTERNAL MEDICINE

## 2023-02-28 PROCEDURE — 84100 ASSAY OF PHOSPHORUS: CPT

## 2023-02-28 PROCEDURE — 85025 COMPLETE CBC W/AUTO DIFF WBC: CPT

## 2023-02-28 PROCEDURE — 2060000000 HC ICU INTERMEDIATE R&B

## 2023-02-28 PROCEDURE — 80053 COMPREHEN METABOLIC PANEL: CPT

## 2023-02-28 PROCEDURE — 94640 AIRWAY INHALATION TREATMENT: CPT

## 2023-02-28 PROCEDURE — 6370000000 HC RX 637 (ALT 250 FOR IP): Performed by: INTERNAL MEDICINE

## 2023-02-28 PROCEDURE — 87804 INFLUENZA ASSAY W/OPTIC: CPT

## 2023-02-28 RX ORDER — ACETAMINOPHEN 325 MG/1
650 TABLET ORAL EVERY 4 HOURS PRN
Status: DISCONTINUED | OUTPATIENT
Start: 2023-02-28 | End: 2023-03-01 | Stop reason: HOSPADM

## 2023-02-28 RX ORDER — MAGNESIUM SULFATE 4 G/50ML
4000 INJECTION INTRAVENOUS ONCE
Status: COMPLETED | OUTPATIENT
Start: 2023-02-28 | End: 2023-02-28

## 2023-02-28 RX ORDER — LANOLIN ALCOHOL/MO/W.PET/CERES
400 CREAM (GRAM) TOPICAL 2 TIMES DAILY
Status: DISCONTINUED | OUTPATIENT
Start: 2023-02-28 | End: 2023-03-01 | Stop reason: HOSPADM

## 2023-02-28 RX ADMIN — DOCUSATE SODIUM 100 MG: 100 CAPSULE, LIQUID FILLED ORAL at 08:40

## 2023-02-28 RX ADMIN — PANTOPRAZOLE SODIUM 40 MG: 40 TABLET, DELAYED RELEASE ORAL at 05:36

## 2023-02-28 RX ADMIN — OXYCODONE HYDROCHLORIDE 5 MG: 5 TABLET ORAL at 00:29

## 2023-02-28 RX ADMIN — OXYCODONE HYDROCHLORIDE 5 MG: 5 TABLET ORAL at 05:36

## 2023-02-28 RX ADMIN — MAGNESIUM OXIDE 400 MG (241.3 MG MAGNESIUM) TABLET 400 MG: TABLET at 21:00

## 2023-02-28 RX ADMIN — MAGNESIUM OXIDE 400 MG (241.3 MG MAGNESIUM) TABLET 400 MG: TABLET at 08:40

## 2023-02-28 RX ADMIN — ENOXAPARIN SODIUM 40 MG: 100 INJECTION SUBCUTANEOUS at 08:40

## 2023-02-28 RX ADMIN — BUSPIRONE HYDROCHLORIDE 5 MG: 5 TABLET ORAL at 08:40

## 2023-02-28 RX ADMIN — TIOTROPIUM BROMIDE INHALATION SPRAY 2 PUFF: 3.12 SPRAY, METERED RESPIRATORY (INHALATION) at 07:22

## 2023-02-28 RX ADMIN — MAGNESIUM SULFATE HEPTAHYDRATE 4000 MG: 80 INJECTION, SOLUTION INTRAVENOUS at 08:51

## 2023-02-28 RX ADMIN — PIPERACILLIN AND TAZOBACTAM 3375 MG: 3; .375 INJECTION, POWDER, LYOPHILIZED, FOR SOLUTION INTRAVENOUS at 08:45

## 2023-02-28 RX ADMIN — BUSPIRONE HYDROCHLORIDE 5 MG: 5 TABLET ORAL at 21:00

## 2023-02-28 RX ADMIN — PIPERACILLIN AND TAZOBACTAM 3375 MG: 3; .375 INJECTION, POWDER, LYOPHILIZED, FOR SOLUTION INTRAVENOUS at 00:23

## 2023-02-28 RX ADMIN — Medication 100 MG: at 08:40

## 2023-02-28 ASSESSMENT — ENCOUNTER SYMPTOMS
CONSTIPATION: 0
DIARRHEA: 0
COLOR CHANGE: 0
ABDOMINAL PAIN: 0
WHEEZING: 0
SINUS PAIN: 0
VOICE CHANGE: 0
VOMITING: 0
TROUBLE SWALLOWING: 0
NAUSEA: 0
SORE THROAT: 0
SINUS PRESSURE: 0
SHORTNESS OF BREATH: 0
CHEST TIGHTNESS: 0
BACK PAIN: 0
COUGH: 0

## 2023-02-28 ASSESSMENT — PAIN DESCRIPTION - LOCATION
LOCATION: BACK
LOCATION: BACK

## 2023-02-28 ASSESSMENT — PAIN SCALES - GENERAL
PAINLEVEL_OUTOF10: 5
PAINLEVEL_OUTOF10: 5

## 2023-02-28 NOTE — PROGRESS NOTES
02/28/23 1136   Encounter Summary   Encounter Overview/Reason  Initial Encounter   Service Provided For: Patient   Referral/Consult From: [facility] [place]   Support System Significant other; Other (Comment)   Last Encounter  02/28/23  (patient was passive, and denied any needs at this time.  But appreciated the visit.)   Complexity of Encounter Low   Begin Time 1130   End Time  1137   Total Time Calculated 7 min   Encounter    Type Initial Screen/Assessment   Spiritual/Emotional needs   Type Spiritual Support   Grief, Loss, and Adjustments   Type Life Adjustments   Assessment/Intervention/Outcome   Assessment Coping;Passive   Intervention Active listening;Empowerment   Outcome Coping;Expressed Gratitude   Plan and Referrals   Plan/Referrals Continue to visit, (comment)  (as needed)

## 2023-02-28 NOTE — PROGRESS NOTES
Comprehensive Nutrition Assessment    Type and Reason for Visit:  Reassess    Nutrition Recommendations/Plan:   Continue with current diet order Diabetic 5 carb choices/2 gm sodium  Monitor po intakes for potential intervention needs. Please continue to document po intakes. Malnutrition Assessment:  Malnutrition Status: At risk for malnutrition (Comment) (02/24/23 4267)    Context:  Acute Illness (Unable to assess d/t pt was uncooperative and would not answer questions to interview. Pt also laying at odd angle in bed.)       Nutrition Assessment:    Extubated. Pt reports he was unable to get meals prior to admission but after that pt would not cooperate and answer questions. Noted diet order 5 carb choices/2 gm sodium with po intakes of %. Nutrition Related Findings:    Meds reviewed. Mg 1.6 L, Phos WNL, K+ WNL. Wound Type: None       Current Nutrition Intake & Therapies:    Average Meal Intake: % (Per flowsheet)  Average Supplements Intake: None Ordered (Monitor po intakes for possible need)  ADULT DIET; Regular; 5 carb choices (75 gm/meal); Low Sodium (2 gm); Less than 60 gm; 2000 ml    Anthropometric Measures:  Height: 5' 9\" (175.3 cm)  Ideal Body Weight (IBW): 160 lbs (73 kg)    Admission Body Weight: 174 lb (78.9 kg) (Question accuracy of weights d/t significant wt loss noted over just a couple of days. Do expect some wt fluctuations d/t fluid-will monitor.)  Current Body Weight: 154 lb (69.9 kg), 96.3 % IBW.  Weight Source: Bed Scale  Current BMI (kg/m2): 22.7  Usual Body Weight:  (Pt unable to answer but noted per chart review UBW around 181#.)  % Weight Change (Calculated): -3.9  Weight Adjustment For: No Adjustment  BMI Categories: Normal Weight (BMI 22.0 to 24.9) age over 72    Estimated Daily Nutrient Needs:  Energy Requirements Based On: Kcal/kg  Weight Used for Energy Requirements: Current  Energy (kcal/day): 5870-2239 kcals (25-30 kcals/kg CBW)  Weight Used for Protein Requirements: Ideal  Protein (g/day): 72-94 g (1-1.3 g/kg IBW)  Method Used for Fluid Requirements: 1 ml/kcal  Fluid (ml/day): 8557-9304 mL    Nutrition Diagnosis:   No nutrition diagnosis at this time related to impaired respiratory function as evidenced by intubation, NPO or clear liquid status due to medical condition    Nutrition Interventions:   Food and/or Nutrient Delivery: Continue Current Diet, Snacks (Comment)  Nutrition Education/Counseling: Education not indicated  Coordination of Nutrition Care: Continue to monitor while inpatient  Plan of Care discussed with: Attempted to discuss with patient.     Goals:  Previous Goal Met: No Progress toward Goal(s) (Pt was extubated and EN not initiated)  Goals: Meet at least 75% of estimated needs       Nutrition Monitoring and Evaluation:   Behavioral-Environmental Outcomes: None Identified  Food/Nutrient Intake Outcomes: Food and Nutrient Intake  Physical Signs/Symptoms Outcomes: Biochemical Data, Meal Time Behavior, Weight    Discharge Planning:    No discharge needs at this time     Trista Wallace RD  Contact: [phone]

## 2023-02-28 NOTE — CONSULTS
Initial Psychiatric History and Physical    Lizeth Cordero  [id number]  2/23/2023 02/28/23    ID: Patient is a 36 yrs y.o. male    CC: none noted    HPI: Lizeth Cordero is a 36 y.o. male with past medical history of HTN/DM/HLD/Asthma/Schizophrenia who presents S/P fall and seizure like activity with AMS and Confusion S/P Intubation and Critical Hyponatremia. Consults include CC. Psychiatry consulted by Dr Shon Alcala due to paranoid psych and meds. Met with patient at bedside. Patient is alert and oriented x 3. He denies SI/HI. Notes auditory and visual hallucinations. He is paranoid, guarded and limited in speech saying \"I don't know often\". Eye contact is avoidant. When he looks at provider, he appears to be responding to internal stimuli. Voices are command in nature and tell him not to answer. While watching him, it is obvious that his tongue continues to flap inside his mouth. This does  bothers him. He wants to be psychiatrically admitted but refuses to answer further. It does appear to be in his best interest that when medically appropriate  he be dc to psych hospitalization for further assessment and treatment. Insight and judgment are limited. Past Psychiatric History:   Patient is linked with [facility]- CM has been in contact for possible housing. .  Current psychiatric medications  Buspar 5 mg bid       Home medications include  Zyprexa 10 mg po at bedtime  Buspar 5 mg bid ( patient taking 10 mg tid)  Cogentin 1 mg po at bedtime, atarax 25 mg bid  Fluphenazine decanoate (prolixin)12.5 mg every 14 days injected into muscle  Melatonin 5 mg disintegrating tablet- 10 mg po at bedtime  Gabapentin 600 mg tid      Substance Use history  Tobacco- \"I want to. \" Declines nicotine patch. Smokes one half ppd  Recreational drugs- denies  Alcohol- denies  Caffeine- admits to several nathaniel a day    Social History  Prior to half-way he was living with his uncle. He went to half-way due to \"stealing. \" He has a CM, Loy Tse at [facility]. Family Psychiatric History:   Family History   Problem Relation Age of Onset    Emphysema Mother     Heart Disease Father     High Blood Pressure Father         Allergies:   Allergies   Allergen Reactions    Cat Hair Extract     No Known Allergies     Pollen Extract     Seasonal         OBJECTIVE  Vital Signs:  Vitals:    02/28/23 0842   BP: 115/64   Pulse: 93   Resp: 12   Temp: 98.3 °F (36.8 °C)   SpO2:        Labs:  Recent Results (from the past 48 hour(s))   Basic Metabolic Panel    Collection Time: 02/26/23  1:35 PM   Result Value Ref Range    Sodium 138 135 - 145 MMOL/L    Potassium 4.3 3.5 - 5.1 MMOL/L    Chloride 106 99 - 110 mMol/L    CO2 24 21 - 32 MMOL/L    Anion Gap 8 4 - 16    BUN 12 6 - 23 MG/DL    Creatinine 0.8 (L) 0.9 - 1.3 MG/DL    Est, Glom Filt Rate >60 >60 mL/min/1.73m2    Glucose 112 (H) 70 - 99 MG/DL    Calcium 8.6 8.3 - 10.6 MG/DL   Sodium, urine, random    Collection Time: 02/26/23  2:35 PM   Result Value Ref Range    Sodium, Ur 103 35 - 167 MMOL/L   Creatinine, urine, random    Collection Time: 02/26/23  2:35 PM   Result Value Ref Range    Creatinine, Ur 17.3 (L) 39 - 259 MG/DL   Chloride, urine, random    Collection Time: 02/26/23  2:35 PM   Result Value Ref Range    Chloride 95 43 - 210 MMOL/L   Osmolality, urine    Collection Time: 02/26/23  2:35 PM   Result Value Ref Range    Osmolality, Ur 282 (L) 292 - 1090 MOS/L   Basic Metabolic Panel    Collection Time: 02/26/23  4:54 PM   Result Value Ref Range    Sodium 141 135 - 145 MMOL/L    Potassium 4.2 3.5 - 5.1 MMOL/L    Chloride 108 99 - 110 mMol/L    CO2 26 21 - 32 MMOL/L    Anion Gap 7 4 - 16    BUN 11 6 - 23 MG/DL    Creatinine 0.8 (L) 0.9 - 1.3 MG/DL    Est, Glom Filt Rate >60 >60 mL/min/1.73m2    Glucose 124 (H) 70 - 99 MG/DL    Calcium 8.9 8.3 - 10.6 MG/DL   Basic Metabolic Panel    Collection Time: 02/26/23  9:10 PM   Result Value Ref Range    Sodium 140 135 - 145 MMOL/L    Potassium 4.0 3.5 - 5.1 MMOL/L Chloride 108 99 - 110 mMol/L    CO2 26 21 - 32 MMOL/L    Anion Gap 6 4 - 16    BUN 9 6 - 23 MG/DL    Creatinine 0.8 (L) 0.9 - 1.3 MG/DL    Est, Glom Filt Rate >60 >60 mL/min/1.73m2    Glucose 126 (H) 70 - 99 MG/DL    Calcium 9.0 8.3 - 10.6 MG/DL   POCT Glucose    Collection Time: 02/26/23  9:10 PM   Result Value Ref Range    POC Glucose 120 (H) 70 - 99 MG/DL   Basic Metabolic Panel    Collection Time: 02/27/23  3:08 AM   Result Value Ref Range    Sodium 141 135 - 145 MMOL/L    Potassium 3.9 3.5 - 5.1 MMOL/L    Chloride 107 99 - 110 mMol/L    CO2 23 21 - 32 MMOL/L    Anion Gap 11 4 - 16    BUN 7 6 - 23 MG/DL    Creatinine 0.7 (L) 0.9 - 1.3 MG/DL    Est, Glom Filt Rate >60 >60 mL/min/1.73m2    Glucose 85 70 - 99 MG/DL    Calcium 8.8 8.3 - 10.6 MG/DL   CBC with Auto Differential    Collection Time: 02/27/23  6:15 AM   Result Value Ref Range    WBC 6.2 4.0 - 10.5 K/CU MM    RBC 2.84 (L) 4.6 - 6.2 M/CU MM    Hemoglobin 8.6 (L) 13.5 - 18.0 GM/DL    Hematocrit 25.9 (L) 42 - 52 %    MCV 91.2 78 - 100 FL    MCH 30.3 27 - 31 PG    MCHC 33.2 32.0 - 36.0 %    RDW 12.7 11.7 - 14.9 %    Platelets 628 732 - 566 K/CU MM    MPV 10.6 7.5 - 11.1 FL    Differential Type AUTOMATED DIFFERENTIAL     Segs Relative 59.8 36 - 66 %    Lymphocytes % 18.1 (L) 24 - 44 %    Monocytes % 7.7 (H) 0 - 4 %    Eosinophils % 13.6 (H) 0 - 3 %    Basophils % 0.5 0 - 1 %    Segs Absolute 3.7 K/CU MM    Lymphocytes Absolute 1.1 K/CU MM    Monocytes Absolute 0.5 K/CU MM    Eosinophils Absolute 0.9 K/CU MM    Basophils Absolute 0.0 K/CU MM    Nucleated RBC % 0.0 %    Total Nucleated RBC 0.0 K/CU MM    Total Immature Neutrophil 0.02 K/CU MM    Immature Neutrophil % 0.3 0 - 0.43 %   Comprehensive Metabolic Panel    Collection Time: 02/28/23  4:53 AM   Result Value Ref Range    Sodium 140 135 - 145 MMOL/L    Potassium 4.7 3.5 - 5.1 MMOL/L    Chloride 103 99 - 110 mMol/L    CO2 26 21 - 32 MMOL/L    BUN 6 6 - 23 MG/DL    Creatinine 0.7 (L) 0.9 - 1.3 MG/DL    Est, Glom Filt Rate >60 >60 mL/min/1.73m2    Glucose 86 70 - 99 MG/DL    Calcium 9.5 8.3 - 10.6 MG/DL    Albumin 4.0 3.4 - 5.0 GM/DL    Total Protein 5.9 (L) 6.4 - 8.2 GM/DL    Total Bilirubin 0.3 0.0 - 1.0 MG/DL    ALT 36 10 - 40 U/L     (H) 15 - 37 IU/L    Alkaline Phosphatase 36 (L) 40 - 128 IU/L    Anion Gap 11 4 - 16   Magnesium    Collection Time: 02/28/23  4:53 AM   Result Value Ref Range    Magnesium 1.6 (L) 1.8 - 2.4 mg/dl   Phosphorus    Collection Time: 02/28/23  4:53 AM   Result Value Ref Range    Phosphorus 3.9 2.5 - 4.9 MG/DL   CBC with Auto Differential    Collection Time: 02/28/23  4:53 AM   Result Value Ref Range    WBC 6.2 4.0 - 10.5 K/CU MM    RBC 3.18 (L) 4.6 - 6.2 M/CU MM    Hemoglobin 9.5 (L) 13.5 - 18.0 GM/DL    Hematocrit 28.2 (L) 42 - 52 %    MCV 88.7 78 - 100 FL    MCH 29.9 27 - 31 PG    MCHC 33.7 32.0 - 36.0 %    RDW 12.7 11.7 - 14.9 %    Platelets 866 460 - 334 K/CU MM    MPV 9.6 7.5 - 11.1 FL    Differential Type AUTOMATED DIFFERENTIAL     Segs Relative 38.4 36 - 66 %    Lymphocytes % 32.1 24 - 44 %    Monocytes % 7.5 (H) 0 - 4 %    Eosinophils % 21.2 (H) 0 - 3 %    Basophils % 0.6 0 - 1 %    Segs Absolute 2.4 K/CU MM    Lymphocytes Absolute 2.0 K/CU MM    Monocytes Absolute 0.5 K/CU MM    Eosinophils Absolute 1.3 K/CU MM    Basophils Absolute 0.0 K/CU MM    Nucleated RBC % 0.0 %    Total Nucleated RBC 0.0 K/CU MM    Total Immature Neutrophil 0.01 K/CU MM    Immature Neutrophil % 0.2 0 - 0.43 %    Differential Type AUTOMATED DIFF RESULTS CONFIRMED BY SMEAR REVIEW     Comment EOSINOPHIL CT MATCHES PREIVOUS EOSINOPHIL CT        Review of Systems:  Reports of no current cardiovascular, respiratory, gastrointestinal, genitourinary, integumentary, neurological, muscuoskeletal, or immunological symptoms today. PSYCHIATRIC: See HPI above.     PSYCHIATRIC EXAMINATION / MENTAL STATUS EXAM    CONSTITUTIONAL:    Vitals:   Vitals:    02/28/23 0842   BP: 115/64   Pulse: 93   Resp: 12   Temp: 98.3 °F (36.8 °C)   SpO2:       General appearance: [x] appears age, []  appears older than stated age,               [x]  adequately dressed and groomed, [] disheveled,               []  in no acute distress, [x] appears mildly distressed, [] other           MUSCULOSKELETAL:   Gait:   [] normal, [] antalgic, [] unsteady, [x] gait not evaluated   Station:             [] erect, [x] sitting, [] recumbent, [] other        Strength/tone:  [x] muscle strength and tone appear consistent with age and                                        condition     [] atrophy      [] abnormal movements     Vitals: Blood pressure 115/64, pulse 93, temperature 98.3 °F (36.8 °C), temperature source Oral, resp. rate 12, height 5' 9\" (1.753 m), weight 154 lb 12.2 oz (70.2 kg), SpO2 97 %. CONSTITUTIONAL:    Appearance: appears stated age. alert and oriented to person, place, time & unable to determine if oriented to situation. Mild distress. Fair grooming and hygeine. Avoidant eye contact. No prominent physical abnormalities. Attitude: Manner is limited cooperative, guarded and parnaoid  Motor: Noted psychomotor agitation- restless and fidgeting , no retardation noted tongue abnormal movements noted  Speech: Clearly articulated; decreased rate, volume, tone & amount. Language: intact understanding and production  Mood: depressed, paranoid  Affect: flat,  congruent with mood and content of speech  Thought Production: Spontaneous. Thought Form: Coherent, linear, ? logical & goal-directed. No tangentiality or circumstantiality. No flight of ideas or loosening of associations. Thought Content/Perceptions: No SANDRINE, noted AVH, noted paranoid delusion  Insight: impaired  Judgment impaired  Memory: Immediate, recent, and remote appear intact, though not formally tested.   Attention: maintained throughout interview  Fund of knowledge: Average  Gait/Balance: VISHAL    Impression:   Acute on chronic schizophrenic exacerbation f 20.00    Problem List: Seizure [facility])    Plan:   Recommend inpatient psychiatric hospitalization when medically appropriate. Status pending  For MAC- AS patient is a [facility] patient, please try [facility] first  Patient will require covid and flu testing  Recommend sitter, EP until patient is transferred due to unpredictableness of pt  Will defer starting other psychiatric medications to accepting facility. Psychiatry will follow until patient is transferred  Thank you for this consult  PS to Dr Elsie Reardon regarding recommendations.     Electronically signed by SARAH Jaramillo CNP on 2/28/2023 at 9:25 AM

## 2023-02-28 NOTE — PROGRESS NOTES
V2.0  [facility] [place] Hospitalist Progress Note      Name:  Anoop Alonso /Age/Sex: 1980  (36 y.o. male)   MRN & CSN:  [id number] & [id number] Encounter Date/Time: 2023 11:25 AM EST    Location:  -A PCP: Emma Valdivia MD       Hospital Day: 6    Assessment and Plan:   Anoop Alonso is a 36 y.o. male with pmh of DM II, HTN, HLD, COPD, Schizophrenia who presents with Seizure 2/2 hyponatremia. Plan:    Metabolic encephalopathy - 2/2 to hyponatremia. Episode of seizure like activity. Utox negative. CT head - No acute intracranial abnormality. Moderate right parietal scalp hematoma. Neurology consulted. Q4 neurocheck. On keppra. Had to be intubated in ED to protect the airway. Was able to successfully extubated on 2023. Now resolved at baseline status now. Severe hyponatremia -was initially given 3% saline as patient has seizure from hyponatremia. Nephrology consulted. Sodium now within normal range. Will need outpatient labs. Seizure-patient with a seizure on presentation due to severe hyponatremia. Neurology consulted. No further seizure episodes. Anemia - Hb 8.8 on admission. Last hb few months back was 14. Currently no sign of overt bleeding. CT abdomen did not reveal any fluid collection. NG and Urine Output is clear. Need further workup with iron panel, Folate, B12, FOBT. Monitor vital and h/h in the mean time. Transfuse as needed     HLD - on statin     HTN - currently BP soft     COPD - does not look in exacerbation. currently on vent. Duoneb  PRN     Paranoid Schizophrenia  -   Patient on multiple agents including Zyprexa. Still currently on hold given hyponatremia. Psych consulted. Recommend inpatient psychiatric admission given limited insight and judgment and given he is off his meds due to hyponatremia. .  Medically stable for discharge. Pending placement to inpatient psych. Diet ADULT DIET; Regular; 5 carb choices (75 gm/meal); Low Sodium (2 gm);  Less than 60 gm; 2000 ml   DVT Prophylaxis [] Lovenox, []  Heparin, [] SCDs, [] Ambulation,    [] Eliquis, [] Xarelto  [] Coumadin [] other   Code Status Full Code   Disposition From: care home  Expected Disposition: Inpatient psych  Estimated Date of Discharge: Medically stable     Surrogate Decision Maker/ POA Grandparent Rose     Subjective:     Chief Complaint: Fall     Feels okay. Reports he wants to leave the hospital.  Denies any lightheadedness or dizziness, nausea, vomiting, chest pain, shortness of breath. Review of Systems:    Review of Systems   Constitutional:  Positive for fatigue. Negative for activity change, appetite change, chills and fever. HENT:  Negative for congestion, hearing loss, sinus pressure, sinus pain, sore throat, trouble swallowing and voice change. Eyes:  Negative for visual disturbance. Respiratory:  Negative for cough, chest tightness, shortness of breath and wheezing. Cardiovascular:  Negative for chest pain, palpitations and leg swelling. Gastrointestinal:  Negative for abdominal pain, constipation, diarrhea, nausea and vomiting. Endocrine: Negative for cold intolerance, heat intolerance and polyuria. Genitourinary:  Negative for dysuria. Musculoskeletal:  Negative for arthralgias, back pain and gait problem. Skin:  Negative for color change. Neurological:  Negative for dizziness, weakness and headaches. Psychiatric/Behavioral:  Negative for agitation and confusion. The patient is not nervous/anxious. Objective: Intake/Output Summary (Last 24 hours) at 2/28/2023 1231  Last data filed at 2/28/2023 1126  Gross per 24 hour   Intake --   Output 2260 ml   Net -2260 ml          Vitals:   Vitals:    02/28/23 0842   BP: 115/64   Pulse: 93   Resp: 12   Temp: 98.3 °F (36.8 °C)   SpO2:        Physical Exam:     Physical Exam  Constitutional:       General: He is not in acute distress. Appearance: Normal appearance.    HENT:      Head: Normocephalic and atraumatic. Nose: Nose normal.      Mouth/Throat:      Mouth: Mucous membranes are moist.      Pharynx: Oropharynx is clear. Eyes:      Extraocular Movements: Extraocular movements intact. Conjunctiva/sclera: Conjunctivae normal.      Pupils: Pupils are equal, round, and reactive to light. Cardiovascular:      Rate and Rhythm: Normal rate and regular rhythm. Pulses: Normal pulses. Heart sounds: Normal heart sounds. Pulmonary:      Effort: Pulmonary effort is normal.   Abdominal:      General: Abdomen is flat. Bowel sounds are normal.      Palpations: Abdomen is soft. Musculoskeletal:         General: Normal range of motion. Cervical back: Normal range of motion and neck supple. Skin:     General: Skin is warm and dry. Neurological:      General: No focal deficit present. Mental Status: He is alert and oriented to person, place, and time. Mental status is at baseline. Psychiatric:         Mood and Affect: Mood normal.         Behavior: Behavior normal.         Thought Content:  Thought content normal.       Medications:   Medications:    magnesium sulfate  4,000 mg IntraVENous Once    magnesium oxide  400 mg Oral BID    [START ON 3/1/2023] thiamine  100 mg Oral Daily    busPIRone  5 mg Oral BID    docusate sodium  100 mg Oral Daily    [Held by provider] lisinopril  20 mg Oral Daily    pantoprazole  40 mg Oral QAM AC    tiotropium  2 puff Inhalation Daily    piperacillin-tazobactam  3,375 mg IntraVENous q8h    enoxaparin  40 mg SubCUTAneous Daily      Infusions:       PRN Meds: albuterol sulfate HFA, 2 puff, 4x Daily PRN  acetaminophen, 650 mg, Q4H PRN  oxyCODONE, 5 mg, Q4H PRN  LORazepam, 1 mg, Q1H PRN  potassium chloride, 20 mEq, PRN      Labs      Recent Results (from the past 24 hour(s))   Comprehensive Metabolic Panel    Collection Time: 02/28/23  4:53 AM   Result Value Ref Range    Sodium 140 135 - 145 MMOL/L    Potassium 4.7 3.5 - 5.1 MMOL/L    Chloride 103 99 - 110 mMol/L    CO2 26 21 - 32 MMOL/L    BUN 6 6 - 23 MG/DL    Creatinine 0.7 (L) 0.9 - 1.3 MG/DL    Est, Glom Filt Rate >60 >60 mL/min/1.73m2    Glucose 86 70 - 99 MG/DL    Calcium 9.5 8.3 - 10.6 MG/DL    Albumin 4.0 3.4 - 5.0 GM/DL    Total Protein 5.9 (L) 6.4 - 8.2 GM/DL    Total Bilirubin 0.3 0.0 - 1.0 MG/DL    ALT 36 10 - 40 U/L     (H) 15 - 37 IU/L    Alkaline Phosphatase 36 (L) 40 - 128 IU/L    Anion Gap 11 4 - 16   Magnesium    Collection Time: 02/28/23  4:53 AM   Result Value Ref Range    Magnesium 1.6 (L) 1.8 - 2.4 mg/dl   Phosphorus    Collection Time: 02/28/23  4:53 AM   Result Value Ref Range    Phosphorus 3.9 2.5 - 4.9 MG/DL   CBC with Auto Differential    Collection Time: 02/28/23  4:53 AM   Result Value Ref Range    WBC 6.2 4.0 - 10.5 K/CU MM    RBC 3.18 (L) 4.6 - 6.2 M/CU MM    Hemoglobin 9.5 (L) 13.5 - 18.0 GM/DL    Hematocrit 28.2 (L) 42 - 52 %    MCV 88.7 78 - 100 FL    MCH 29.9 27 - 31 PG    MCHC 33.7 32.0 - 36.0 %    RDW 12.7 11.7 - 14.9 %    Platelets 700 501 - 353 K/CU MM    MPV 9.6 7.5 - 11.1 FL    Differential Type AUTOMATED DIFFERENTIAL     Segs Relative 38.4 36 - 66 %    Lymphocytes % 32.1 24 - 44 %    Monocytes % 7.5 (H) 0 - 4 %    Eosinophils % 21.2 (H) 0 - 3 %    Basophils % 0.6 0 - 1 %    Segs Absolute 2.4 K/CU MM    Lymphocytes Absolute 2.0 K/CU MM    Monocytes Absolute 0.5 K/CU MM    Eosinophils Absolute 1.3 K/CU MM    Basophils Absolute 0.0 K/CU MM    Nucleated RBC % 0.0 %    Total Nucleated RBC 0.0 K/CU MM    Total Immature Neutrophil 0.01 K/CU MM    Immature Neutrophil % 0.2 0 - 0.43 %    Differential Type AUTOMATED DIFF RESULTS CONFIRMED BY SMEAR REVIEW     Comment EOSINOPHIL CT MATCHES PREIVOUS EOSINOPHIL CT    COVID-19, Rapid    Collection Time: 02/28/23 11:00 AM    Specimen: Nasopharyngeal   Result Value Ref Range    Source NARES     SARS-CoV-2, NAAT NOT DETECTED NOT DETECTED   Rapid influenza A/B antigens    Collection Time: 02/28/23 11:00 AM    Specimen: Nasopharyngeal Result Value Ref Range    Rapid Influenza A Ag NEGATIVE NEGATIVE    Rapid Influenza B Ag NEGATIVE NEGATIVE        Imaging/Diagnostics Last 24 Hours   CT ABDOMEN PELVIS WO CONTRAST Additional Contrast? None    Result Date: 2/24/2023  EXAMINATION: CT OF THE ABDOMEN AND PELVIS WITHOUT CONTRAST; CT OF THE CHEST WITHOUT CONTRAST 2/24/2023 12:39 am TECHNIQUE: CT of the abdomen and pelvis was performed without the administration of intravenous contrast. Multiplanar reformatted images are provided for review. Automated exposure control, iterative reconstruction, and/or weight based adjustment of the mA/kV was utilized to reduce the radiation dose to as low as reasonably achievable.; CT of the chest was performed without the administration of intravenous contrast. Multiplanar reformatted images are provided for review. Automated exposure control, iterative reconstruction, and/or weight based adjustment of the mA/kV was utilized to reduce the radiation dose to as low as reasonably achievable. COMPARISON: 09/12/2012 HISTORY: ORDERING SYSTEM PROVIDED HISTORY: fall TECHNOLOGIST PROVIDED HISTORY: Reason for exam:->fall Additional Contrast?->None Decision Support Exception - unselect if not a suspected or confirmed emergency medical condition->Emergency Medical Condition (MA) Reason for Exam: fall; FINDINGS: Chest: Mediastinum: Normal heart size. No pericardial effusion. Normal thoracic aortic caliber. No abnormal mediastinal or hilar lymph node endotracheal tube in place. Mildly dilated fluid-filled esophagus. Lungs/pleura: Mild dependent atelectasis. Lungs otherwise clear. No pneumothorax or pleural effusion. Airways unremarkable. Soft Tissues/Bones: Acute osseous or soft tissue abnormality. Abdomen/Pelvis: Organs: Limited evaluation due lack of intravenous contrast.  Liver, gallbladder, biliary system, pancreas, spleen, adrenal glands, and kidneys unremarkable. No hydronephrosis or urinary tract calculus.  GI/Bowel: No bowel obstruction. Distended fluid, gas, and fluid-filled stomach. No evidence of appendicitis. Pelvis: Urinary bladder and pelvic organs unremarkable. Peritoneum/Retroperitoneum: No free air or free fluid. Normal abdominal aortic caliber. No retroperitoneal a Anice Ra within limits of this noncontrast examination. No abnormal lymph node. Bones/Soft Tissues: Acute L2 compression fracture with superior endplate concavity and subjacent fracture plane resulting in 10% anterior vertebral body height loss and 2 mm retropulsion of the posterosuperior cortex. Bones otherwise intact. No acute soft tissue abnormality. Tiny uncomplicated fat containing bilateral inguinal hernias. 1. Acute compression fracture of the L2 vertebral body with 10% anterior vertebral height loss and minimal posterior cortex retropulsion. 2. No acute abnormality in the chest. 3. No solid or hollow viscus organ injury within limits of this noncontrast examination. 4. Distended fluid-filled stomach and mildly dilated fluid-filled esophagus most consistent with reflux. XR ABDOMEN (KUB) (SINGLE AP VIEW)    Result Date: 2/24/2023  EXAMINATION: ONE SUPINE X-RAY VIEW(S) OF THE ABDOMEN 2/24/2023 2:01 am COMPARISON: February 24, 2023. HISTORY: ORDERING SYSTEM PROVIDED HISTORY:  NGT TECHNOLOGIST PROVIDED HISTORY: Reason for Exam:  NGT FINDINGS: Nasogastric tube tip terminates in the distal stomach. The side-port is beyond the GE junction. No acute or aggressive osseous lesion. Gaseous distention of the stomach is present. The bowel gas pattern is not well evaluated due to supine positioning. No gas distended loops of bowel appreciated. L2 compression deformity is seen to better advantage on prior CT. 1. Nasogastric tube tip terminates in the distal stomach. 2. Gaseous distention of the stomach. Finding is similar to the CT dated February 24, 2023. 3. L2 compression deformity is seen to better advantage on prior CT.      CT HEAD WO CONTRAST    Result Date: 2/24/2023  EXAMINATION: CT OF THE HEAD WITHOUT CONTRAST  2/24/2023 12:39 am TECHNIQUE: CT of the head was performed without the administration of intravenous contrast. Automated exposure control, iterative reconstruction, and/or weight based adjustment of the mA/kV was utilized to reduce the radiation dose to as low as reasonably achievable. COMPARISON: November 15, 2016 HISTORY: ORDERING SYSTEM PROVIDED HISTORY: fall TECHNOLOGIST PROVIDED HISTORY: Has a \"code stroke\" or \"stroke alert\" been called? ->No Reason for exam:->fall Decision Support Exception - unselect if not a suspected or confirmed emergency medical condition->Emergency Medical Condition (MA) Reason for Exam: fall FINDINGS: BRAIN/VENTRICLES: There is no acute intracranial hemorrhage, mass effect or midline shift. No abnormal extra-axial fluid collection. The gray-white differentiation is maintained without evidence of an acute infarct. There is no evidence of hydrocephalus. ORBITS: The visualized portion of the orbits demonstrate no acute abnormality. SINUSES: Moderate mucosal thickening is identified in the bilateral maxillary, ethmoid, and right sphenoid sinus. The mastoid air cells are clear. SOFT TISSUES/SKULL:  Moderate right parietal scalp hematoma is present. Left face soft tissue swelling is also seen. No acute fracture. No acute intracranial abnormality. Moderate right parietal scalp hematoma. No underlying fracture. Mild left face soft tissue swelling. CT CHEST WO CONTRAST    Result Date: 2/24/2023  EXAMINATION: CT OF THE ABDOMEN AND PELVIS WITHOUT CONTRAST; CT OF THE CHEST WITHOUT CONTRAST 2/24/2023 12:39 am TECHNIQUE: CT of the abdomen and pelvis was performed without the administration of intravenous contrast. Multiplanar reformatted images are provided for review.  Automated exposure control, iterative reconstruction, and/or weight based adjustment of the mA/kV was utilized to reduce the radiation dose to as low as reasonably achievable.; CT of the chest was performed without the administration of intravenous contrast. Multiplanar reformatted images are provided for review. Automated exposure control, iterative reconstruction, and/or weight based adjustment of the mA/kV was utilized to reduce the radiation dose to as low as reasonably achievable. COMPARISON: 09/12/2012 HISTORY: ORDERING SYSTEM PROVIDED HISTORY: fall TECHNOLOGIST PROVIDED HISTORY: Reason for exam:->fall Additional Contrast?->None Decision Support Exception - unselect if not a suspected or confirmed emergency medical condition->Emergency Medical Condition (MA) Reason for Exam: fall; FINDINGS: Chest: Mediastinum: Normal heart size. No pericardial effusion. Normal thoracic aortic caliber. No abnormal mediastinal or hilar lymph node endotracheal tube in place. Mildly dilated fluid-filled esophagus. Lungs/pleura: Mild dependent atelectasis. Lungs otherwise clear. No pneumothorax or pleural effusion. Airways unremarkable. Soft Tissues/Bones: Acute osseous or soft tissue abnormality. Abdomen/Pelvis: Organs: Limited evaluation due lack of intravenous contrast.  Liver, gallbladder, biliary system, pancreas, spleen, adrenal glands, and kidneys unremarkable. No hydronephrosis or urinary tract calculus. GI/Bowel: No bowel obstruction. Distended fluid, gas, and fluid-filled stomach. No evidence of appendicitis. Pelvis: Urinary bladder and pelvic organs unremarkable. Peritoneum/Retroperitoneum: No free air or free fluid. Normal abdominal aortic caliber. No retroperitoneal a [place] within limits of this noncontrast examination. No abnormal lymph node. Bones/Soft Tissues: Acute L2 compression fracture with superior endplate concavity and subjacent fracture plane resulting in 10% anterior vertebral body height loss and 2 mm retropulsion of the posterosuperior cortex. Bones otherwise intact. No acute soft tissue abnormality.   Tiny uncomplicated fat containing bilateral inguinal hernias. 1. Acute compression fracture of the L2 vertebral body with 10% anterior vertebral height loss and minimal posterior cortex retropulsion. 2. No acute abnormality in the chest. 3. No solid or hollow viscus organ injury within limits of this noncontrast examination. 4. Distended fluid-filled stomach and mildly dilated fluid-filled esophagus most consistent with reflux. CT CERVICAL SPINE WO CONTRAST    Result Date: 2/24/2023  EXAMINATION: CT OF THE CERVICAL SPINE WITHOUT CONTRAST 2/24/2023 12:39 am TECHNIQUE: CT of the cervical spine was performed without the administration of intravenous contrast. Multiplanar reformatted images are provided for review. Automated exposure control, iterative reconstruction, and/or weight based adjustment of the mA/kV was utilized to reduce the radiation dose to as low as reasonably achievable. COMPARISON: None. HISTORY: ORDERING SYSTEM PROVIDED HISTORY: fall TECHNOLOGIST PROVIDED HISTORY: Reason for exam:->fall Decision Support Exception - unselect if not a suspected or confirmed emergency medical condition->Emergency Medical Condition (MA) Reason for Exam: fall FINDINGS: BONES/ALIGNMENT: There is no acute fracture or traumatic malalignment. DEGENERATIVE CHANGES: Mild multilevel degenerative changes are present. SOFT TISSUES: There is no prevertebral soft tissue swelling. OTHER: Trace mastoid effusions are nonspecific. No acute abnormality of the cervical spine. XR CHEST PORTABLE    Result Date: 2/24/2023  EXAMINATION: ONE XRAY VIEW OF THE CHEST 2/24/2023 2:41 am COMPARISON: 09/24/2022 HISTORY: ORDERING SYSTEM PROVIDED HISTORY: central line TECHNOLOGIST PROVIDED HISTORY: Reason for exam:->central line Reason for Exam: central line FINDINGS: Endotracheal tube terminates 4.9 cm above the kellen. Enteric tube courses below the diaphragm with the tip not imaged. Right internal jugular center venous catheter tip is in the SVC.   Clear lungs.  No pneumothorax or pleural effusion. Cardiac and mediastinal contours normal.  No acute osseous abnormality. 1. Endotracheal tube terminates 4.9 cm above the kellen. 2. Right internal jugular center venous catheter tip in the SVC. 3. Clear lungs. No pneumothorax. Comment: Please note this report has been produced using speech recognition software and may contain errors related to that system including errors in grammar, punctuation, and spelling, as well as words and phrases that may be inappropriate. If there are any questions or concerns please feel free to contact the dictating provider for clarification.      Electronically signed by Flo Yarbrough MD on 2/28/2023 at 12:31 PM

## 2023-02-28 NOTE — CARE COORDINATION
Psych rec IP placement. PS sent to Dr Joaquina Duavl for needs. Shine Hodgson, RN     FOR [facility]-  FLU- WITHIN 24 HRS  COVID - WITHIN 24 HRS  U/A  CBC  CHEM PROFILE  STATEMENT OF MEDICAL STABILITY FOR IP PSYCH  PINK SLIP    [address] [facility]; [facility] Tanya. [facility] Shine Hogdson, RN

## 2023-02-28 NOTE — PROGRESS NOTES
Nephrology Progress Note  2/28/2023 7:49 AM        Subjective:   Admit Date: 2/23/2023  PCP: Mendez Lazcano MD    Interval History: No major event, overnight    Diet: Eating some    ROS: No confusion  Urine output dropped to 1.5 L for the last 24 hours  No fever, acceptable blood pressure    Data:     Current meds:    thiamine  100 mg Oral BID    [START ON 3/1/2023] thiamine  100 mg Oral Daily    busPIRone  5 mg Oral BID    docusate sodium  100 mg Oral Daily    [Held by provider] lisinopril  20 mg Oral Daily    pantoprazole  40 mg Oral QAM AC    tiotropium  2 puff Inhalation Daily    piperacillin-tazobactam  3,375 mg IntraVENous q8h    enoxaparin  40 mg SubCUTAneous Daily           I/O last 3 completed shifts: In: 3582.3 [P.O.:240;  I.V.:3172.5; IV Piggyback:169.8]  Out: 2444 [XXCPZ:9231]    CBC:   Recent Labs     02/26/23  0445 02/27/23  0615 02/28/23  0453   WBC 6.6 6.2 6.2   HGB 7.8* 8.6* 9.5*   * 154 184          Recent Labs     02/26/23  2110 02/27/23  0308 02/28/23  0453    141 140   K 4.0 3.9 4.7    107 103   CO2 26 23 26   BUN 9 7 6   CREATININE 0.8* 0.7* 0.7*   GLUCOSE 126* 85 86       Lab Results   Component Value Date    CALCIUM 9.5 02/28/2023    PHOS 3.9 02/28/2023       Objective:     Vitals: BP (!) 128/90   Pulse 71   Temp 98.3 °F (36.8 °C) (Oral)   Resp 22   Ht 5' 9\" (1.753 m)   Wt 154 lb 12.2 oz (70.2 kg)   SpO2 97%   BMI 22.85 kg/m² ,    General appearance: Thin, alert, awake and oriented  HEENT: No gross conjunctival pallor or scleral icterus  Neck: Supple  Lungs: No crackles on auscultation  Heart: Regular rate and rhythm  Abdomen: Soft nontender  Extremities: No edema      Problem List :         Impression :     Hyponatremia corrected-Rise of sodium was meticulously controlled-should remain in normal range now-as long as he is alert awake and can drink  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 magnesium oxide 400 to twice daily-he can be discharged but he has several social and psychiatric issues-if he can go to mental health facility would be the best option-as he is otherwise homeless-and has no social support-extremely poor insight-and mental illness-of course this is the worst combination one can have .   Follow-up as an outpatient with yakelin Fofana MD MD

## 2023-03-01 VITALS
SYSTOLIC BLOOD PRESSURE: 130 MMHG | OXYGEN SATURATION: 97 % | WEIGHT: 154.76 LBS | TEMPERATURE: 98.6 F | BODY MASS INDEX: 22.92 KG/M2 | DIASTOLIC BLOOD PRESSURE: 86 MMHG | HEIGHT: 69 IN | HEART RATE: 78 BPM | RESPIRATION RATE: 16 BRPM

## 2023-03-01 LAB
ALBUMIN SERPL-MCNC: 4.4 GM/DL (ref 3.4–5)
ALP BLD-CCNC: 44 IU/L (ref 40–128)
ALT SERPL-CCNC: 35 U/L (ref 10–40)
ANION GAP SERPL CALCULATED.3IONS-SCNC: 15 MMOL/L (ref 4–16)
AST SERPL-CCNC: 119 IU/L (ref 15–37)
BASOPHILS ABSOLUTE: 0 K/CU MM
BASOPHILS RELATIVE PERCENT: 0.4 % (ref 0–1)
BILIRUB SERPL-MCNC: 0.3 MG/DL (ref 0–1)
BUN SERPL-MCNC: 9 MG/DL (ref 6–23)
CALCIUM SERPL-MCNC: 10.4 MG/DL (ref 8.3–10.6)
CHLORIDE BLD-SCNC: 101 MMOL/L (ref 99–110)
CO2: 23 MMOL/L (ref 21–32)
CREAT SERPL-MCNC: 0.8 MG/DL (ref 0.9–1.3)
CULTURE: NORMAL
DIFFERENTIAL TYPE: ABNORMAL
EOSINOPHILS ABSOLUTE: 1.4 K/CU MM
EOSINOPHILS RELATIVE PERCENT: 18.4 % (ref 0–3)
GFR SERPL CREATININE-BSD FRML MDRD: >60 ML/MIN/1.73M2
GLUCOSE SERPL-MCNC: 89 MG/DL (ref 70–99)
HCT VFR BLD CALC: 30.5 % (ref 42–52)
HEMOGLOBIN: 10.3 GM/DL (ref 13.5–18)
IMMATURE NEUTROPHIL %: 0.3 % (ref 0–0.43)
LYMPHOCYTES ABSOLUTE: 2 K/CU MM
LYMPHOCYTES RELATIVE PERCENT: 26.2 % (ref 24–44)
Lab: NORMAL
MAGNESIUM: 2 MG/DL (ref 1.8–2.4)
MCH RBC QN AUTO: 30.2 PG (ref 27–31)
MCHC RBC AUTO-ENTMCNC: 33.8 % (ref 32–36)
MCV RBC AUTO: 89.4 FL (ref 78–100)
MONOCYTES ABSOLUTE: 0.4 K/CU MM
MONOCYTES RELATIVE PERCENT: 5.5 % (ref 0–4)
NUCLEATED RBC %: 0 %
PDW BLD-RTO: 12.7 % (ref 11.7–14.9)
PHOSPHORUS: 4.4 MG/DL (ref 2.5–4.9)
PLATELET # BLD: 249 K/CU MM (ref 140–440)
PMV BLD AUTO: 9.4 FL (ref 7.5–11.1)
POTASSIUM SERPL-SCNC: 4.4 MMOL/L (ref 3.5–5.1)
RBC # BLD: 3.41 M/CU MM (ref 4.6–6.2)
SEGMENTED NEUTROPHILS ABSOLUTE COUNT: 3.7 K/CU MM
SEGMENTED NEUTROPHILS RELATIVE PERCENT: 49.2 % (ref 36–66)
SODIUM BLD-SCNC: 139 MMOL/L (ref 135–145)
SPECIMEN: NORMAL
TOTAL IMMATURE NEUTOROPHIL: 0.02 K/CU MM
TOTAL NUCLEATED RBC: 0 K/CU MM
TOTAL PROTEIN: 6.7 GM/DL (ref 6.4–8.2)
UUN 24H UR-MCNC: 136 MG/DL
WBC # BLD: 7.5 K/CU MM (ref 4–10.5)

## 2023-03-01 PROCEDURE — 84100 ASSAY OF PHOSPHORUS: CPT

## 2023-03-01 PROCEDURE — 6370000000 HC RX 637 (ALT 250 FOR IP): Performed by: NURSE PRACTITIONER

## 2023-03-01 PROCEDURE — 6370000000 HC RX 637 (ALT 250 FOR IP): Performed by: STUDENT IN AN ORGANIZED HEALTH CARE EDUCATION/TRAINING PROGRAM

## 2023-03-01 PROCEDURE — 6370000000 HC RX 637 (ALT 250 FOR IP): Performed by: INTERNAL MEDICINE

## 2023-03-01 PROCEDURE — 94640 AIRWAY INHALATION TREATMENT: CPT

## 2023-03-01 PROCEDURE — 80053 COMPREHEN METABOLIC PANEL: CPT

## 2023-03-01 PROCEDURE — 6360000002 HC RX W HCPCS: Performed by: STUDENT IN AN ORGANIZED HEALTH CARE EDUCATION/TRAINING PROGRAM

## 2023-03-01 PROCEDURE — 85025 COMPLETE CBC W/AUTO DIFF WBC: CPT

## 2023-03-01 PROCEDURE — 94761 N-INVAS EAR/PLS OXIMETRY MLT: CPT

## 2023-03-01 PROCEDURE — 83735 ASSAY OF MAGNESIUM: CPT

## 2023-03-01 RX ORDER — LANOLIN ALCOHOL/MO/W.PET/CERES
100 CREAM (GRAM) TOPICAL DAILY
Qty: 30 TABLET | Refills: 1
Start: 2023-03-02

## 2023-03-01 RX ORDER — BUSPIRONE HYDROCHLORIDE 5 MG/1
10 TABLET ORAL 3 TIMES DAILY
Qty: 90 TABLET | Refills: 0
Start: 2023-03-01

## 2023-03-01 RX ORDER — TRAMADOL HYDROCHLORIDE 50 MG/1
50 TABLET ORAL ONCE
Status: COMPLETED | OUTPATIENT
Start: 2023-03-01 | End: 2023-03-01

## 2023-03-01 RX ADMIN — TIOTROPIUM BROMIDE INHALATION SPRAY 2 PUFF: 3.12 SPRAY, METERED RESPIRATORY (INHALATION) at 08:29

## 2023-03-01 RX ADMIN — DOCUSATE SODIUM 100 MG: 100 CAPSULE, LIQUID FILLED ORAL at 08:22

## 2023-03-01 RX ADMIN — PANTOPRAZOLE SODIUM 40 MG: 40 TABLET, DELAYED RELEASE ORAL at 05:57

## 2023-03-01 RX ADMIN — Medication 100 MG: at 08:20

## 2023-03-01 RX ADMIN — BUSPIRONE HYDROCHLORIDE 5 MG: 5 TABLET ORAL at 08:20

## 2023-03-01 RX ADMIN — TRAMADOL HYDROCHLORIDE 50 MG: 50 TABLET, COATED ORAL at 00:46

## 2023-03-01 RX ADMIN — MAGNESIUM OXIDE 400 MG (241.3 MG MAGNESIUM) TABLET 400 MG: TABLET at 08:20

## 2023-03-01 RX ADMIN — ENOXAPARIN SODIUM 40 MG: 100 INJECTION SUBCUTANEOUS at 08:20

## 2023-03-01 ASSESSMENT — PAIN DESCRIPTION - ORIENTATION: ORIENTATION: LOWER

## 2023-03-01 ASSESSMENT — PAIN SCALES - GENERAL: PAINLEVEL_OUTOF10: 10

## 2023-03-01 ASSESSMENT — PAIN DESCRIPTION - LOCATION: LOCATION: BACK

## 2023-03-01 ASSESSMENT — PAIN DESCRIPTION - DESCRIPTORS: DESCRIPTORS: ACHING

## 2023-03-01 NOTE — CARE COORDINATION
Received VM from Access [facility]; Rosa Oro RN. Awaiting Pink Slip. Janet Brown RN     1000 Pink Slip received. [address]; spoke to Red [place]. Patient accepted at [facility]. Fax [phone]. Janet Brown RN     1010 Faxed pink slip to [phone]. Original pink slip in packet for transfer. Janet Brown RN     1020 Received call from [facility]; spoke to [facility]. [place] Slip received. Jaent Brown RN     1200 Received call from [employer]; spoke to [place]. [facility] wants pink slip refaxed- refaxed to [phone].  Janet Brown RN

## 2023-03-01 NOTE — PLAN OF CARE
Problem: Discharge Planning  Goal: Discharge to home or other facility with appropriate resources  Outcome: Completed     Problem: Safety - Adult  Goal: Free from fall injury  Outcome: Completed     Problem: ABCDS Injury Assessment  Goal: Absence of physical injury  Outcome: Completed     Problem: Pain  Goal: Verbalizes/displays adequate comfort level or baseline comfort level  Outcome: Completed     Problem: Chronic Conditions and Co-morbidities  Goal: Patient's chronic conditions and co-morbidity symptoms are monitored and maintained or improved  Outcome: Completed     Problem: Skin/Tissue Integrity  Goal: Absence of new skin breakdown  Description: 1. Monitor for areas of redness and/or skin breakdown  2. Assess vascular access sites hourly  3. Every 4-6 hours minimum:  Change oxygen saturation probe site  4. Every 4-6 hours:  If on nasal continuous positive airway pressure, respiratory therapy assess nares and determine need for appliance change or resting period.   Outcome: Completed

## 2023-03-01 NOTE — PROGRESS NOTES
Pt left at this time via [facility]. Report called to [facility] for Psychiatry. Discharge paperwork complete and original pink slip and face sheet sent with transport.

## 2023-03-01 NOTE — DISCHARGE SUMMARY
V2.0  Discharge Summary    Name:  Wai Quiroga /Age/Sex: 1980 (4 y.o. male)   Admit Date: 2023  Discharge Date: 3/1/23    MRN & CSN:  [id number] & [id number] Encounter Date and Time 3/1/23 10:47 AM EST    Attending:  Katie Cespedes MD Discharging Provider: Katie Cespedes MD       Hospital Course:     Brief HPI: Wai Quiroga is a 36 y.o. male with pmh as mentioned above who was brought by EMS from group home after reported fall from the top bunk. There is concern that the patient may have had a seizure but history is somewhat unclear. Patient has not been cooperative in route. He came with c-collar. Occipital hematoma is noted. Blood glucose in the 100s prior to arrival. Patient was intubated for airway protection in order to facilitate trauma/medical work-up by ED. Brief Problem Based Course:     Metabolic encephalopathy - 2/2 to hyponatremia. Episode of seizure like activity. Utox negative. CT head - No acute intracranial abnormality. Moderate right parietal scalp hematoma. Neurology consulted. Q4 neurocheck. On keppra. Had to be intubated in ED to protect the airway. Was able to successfully extubated on 2023. Now resolved at baseline status now. Severe hyponatremia -was initially given 3% saline as patient has seizure from hyponatremia. Nephrology consulted. Sodium now within normal range. 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 Psych meds discontinued. Charlett Bigger     Seizure-patient with a seizure on presentation due to severe hyponatremia. Neurology consulted. No further seizure episodes. Anemia - Hb 8.8 on admission. Last hb few months back was 14. Currently no sign of overt bleeding. CT abdomen did not reveal any fluid collection. NG and Urine Output is clear. Need further workup with iron panel, Folate, B12, FOBT. Monitor H&H and further work-up outpatient.      HLD - on statin     HTN -soft BP holding lisinopril on discharge. COPD -not in acute exacerbation. Paranoid Schizophrenia  -   Patient on multiple agents including Zyprexa. currently on hold given hyponatremia. Psych consulted. Recommend inpatient psychiatric admission given limited insight and judgment and given he is off his meds due to hyponatremia. .  Medically stable for discharge. Discharged to inpatient psych. Recommending starting on psych meds gradually keeping in mind he had profound hyponatremia from SIADH. The patient expressed appropriate understanding of, and agreement with the discharge recommendations, medications, and plan. Consults this admission:  IP CONSULT TO NEPHROLOGY  IP CONSULT TO CRITICAL CARE  IP CONSULT TO NEUROLOGY  IP CONSULT TO PSYCHIATRY    Discharge Diagnosis:   Seizure ([facility] [address]  Metabolic encephalopathy  Severe hyponatremia  Anemia  Hyperlipidemia  Hypertension  COPD  Paranoid schizophrenia      Discharge Instruction:   Follow up appointments: Psych, PCP  Primary care physician: Kaleigh Lee MD within 2 weeks  Diet: regular diet   Activity: activity as tolerated  Disposition: Discharged to: Inpatient psych  Condition on discharge: Stable  Labs and Tests to be Followed up as an outpatient by PCP or Specialist: BMP. Gradual restart of psych meds.     Discharge Medications:        Medication List        START taking these medications      thiamine 100 MG tablet  Take 1 tablet by mouth daily  Start taking on: March 2, 2023            CONTINUE taking these medications      AEROCHAMBER Misc  Inhale 1 Device into the lungs every 6 hours     albuterol sulfate  (90 Base) MCG/ACT inhaler  Commonly known as: Ventolin HFA  Inhale 2 puffs into the lungs 4 times daily as needed for Wheezing     atorvastatin 40 MG tablet  Commonly known as: LIPITOR  TAKE ONE (1) TABLET BY MOUTH DAILY     blood glucose monitor kit and supplies  Dispense sufficient amount for indicated testing frequency plus additional to accommodate PRN testing needs. Dispense all needed supplies to include: monitor, strips, lancing device, lancets, control solutions, alcohol swabs.      busPIRone 5 MG tablet  Commonly known as: BUSPAR  Take 2 tablets by mouth 3 times daily     docusate sodium 100 MG capsule  Commonly known as: COLACE  TAKE ONE (1) CAPSULE BY MOUTH TWO (2) TIMES DAILY     fenofibrate 145 MG tablet  Commonly known as: TRICOR  TAKE ONE (1) TABLET BY MOUTH ONCE DAILY     ferrous sulfate 325 (65 Fe) MG tablet  Commonly known as: IRON 325  Take 1 tablet by mouth 2 times daily     hydrOXYzine HCl 25 MG tablet  Commonly known as: ATARAX  Take 1 tablet by mouth 2 times daily     melatonin 5 MG Tbdp disintegrating tablet     metFORMIN 500 MG tablet  Commonly known as: GLUCOPHAGE  TAKE ONE (1) TABLET BY MOUTH TWO TIMES DAILY WITH MEALS     montelukast 10 MG tablet  Commonly known as: SINGULAIR  TAKE ONE (1) TABLET BY MOUTH EVERY NIGHT     omeprazole 40 MG delayed release capsule  Commonly known as: PRILOSEC  TAKE ONE (1) CAPSULE BY MOUTH DAILY     tiotropium 18 MCG inhalation capsule  Commonly known as: SPIRIVA            STOP taking these medications      benztropine 1 MG tablet  Commonly known as: COGENTIN     fluPHENAZine decanoate 25 MG/ML injection  Commonly known as: PROLIXIN     gabapentin 300 MG capsule  Commonly known as: NEURONTIN     lisinopril 20 MG tablet  Commonly known as: PRINIVIL;ZESTRIL     naproxen 500 MG tablet  Commonly known as: NAPROSYN     OLANZapine 10 MG tablet  Commonly known as: ZYPREXA     David-24 100 MG extended release capsule  Generic drug: theophylline               Where to Get Your Medications        Information about where to get these medications is not yet available    Ask your nurse or doctor about these medications  busPIRone 5 MG tablet  thiamine 100 MG tablet        Objective Findings at Discharge:   /70   Pulse 76   Temp 98.4 °F (36.9 °C) (Oral)   Resp 13   Ht 5' 9\" (1.753 m)   Wt 154 lb 12.2 oz (70.2 kg)   SpO2 97%   BMI 22.85 kg/m²       Physical Exam:   Constitutional:       General: He is not in acute distress. Appearance: Normal appearance. HENT:      Head: Normocephalic and atraumatic. Nose: Nose normal.      Mouth/Throat:      Mouth: Mucous membranes are moist.      Pharynx: Oropharynx is clear. Eyes:      Extraocular Movements: Extraocular movements intact. Conjunctiva/sclera: Conjunctivae normal.      Pupils: Pupils are equal, round, and reactive to light. Cardiovascular:      Rate and Rhythm: Normal rate and regular rhythm. Pulses: Normal pulses. Heart sounds: Normal heart sounds. Pulmonary:      Effort: Pulmonary effort is normal.   Abdominal:      General: Abdomen is flat. Bowel sounds are normal.      Palpations: Abdomen is soft. Musculoskeletal:         General: Normal range of motion. Cervical back: Normal range of motion and neck supple. Skin:     General: Skin is warm and dry. Neurological:      General: No focal deficit present. Mental Status: He is alert and oriented to person, place, and time. Mental status is at baseline. Psychiatric:         Mood and Affect: Mood normal.         Behavior: Behavior normal.         Thought Content: Thought content normal        Labs and Imaging   CT ABDOMEN PELVIS WO CONTRAST Additional Contrast? None    Result Date: 2/24/2023  EXAMINATION: CT OF THE ABDOMEN AND PELVIS WITHOUT CONTRAST; CT OF THE CHEST WITHOUT CONTRAST 2/24/2023 12:39 am TECHNIQUE: CT of the abdomen and pelvis was performed without the administration of intravenous contrast. Multiplanar reformatted images are provided for review. Automated exposure control, iterative reconstruction, and/or weight based adjustment of the mA/kV was utilized to reduce the radiation dose to as low as reasonably achievable.; CT of the chest was performed without the administration of intravenous contrast. Multiplanar reformatted images are provided for review.  Automated exposure control, iterative reconstruction, and/or weight based adjustment of the mA/kV was utilized to reduce the radiation dose to as low as reasonably achievable. COMPARISON: 09/12/2012 HISTORY: ORDERING SYSTEM PROVIDED HISTORY: fall TECHNOLOGIST PROVIDED HISTORY: Reason for exam:->fall Additional Contrast?->None Decision Support Exception - unselect if not a suspected or confirmed emergency medical condition->Emergency Medical Condition (MA) Reason for Exam: fall; FINDINGS: Chest: Mediastinum: Normal heart size. No pericardial effusion. Normal thoracic aortic caliber. No abnormal mediastinal or hilar lymph node endotracheal tube in place. Mildly dilated fluid-filled esophagus. Lungs/pleura: Mild dependent atelectasis. Lungs otherwise clear. No pneumothorax or pleural effusion. Airways unremarkable. Soft Tissues/Bones: Acute osseous or soft tissue abnormality. Abdomen/Pelvis: Organs: Limited evaluation due lack of intravenous contrast.  Liver, gallbladder, biliary system, pancreas, spleen, adrenal glands, and kidneys unremarkable. No hydronephrosis or urinary tract calculus. GI/Bowel: No bowel obstruction. Distended fluid, gas, and fluid-filled stomach. No evidence of appendicitis. Pelvis: Urinary bladder and pelvic organs unremarkable. Peritoneum/Retroperitoneum: No free air or free fluid. Normal abdominal aortic caliber. No retroperitoneal a Lannette Tyler within limits of this noncontrast examination. No abnormal lymph node. Bones/Soft Tissues: Acute L2 compression fracture with superior endplate concavity and subjacent fracture plane resulting in 10% anterior vertebral body height loss and 2 mm retropulsion of the posterosuperior cortex. Bones otherwise intact. No acute soft tissue abnormality. Tiny uncomplicated fat containing bilateral inguinal hernias. 1. Acute compression fracture of the L2 vertebral body with 10% anterior vertebral height loss and minimal posterior cortex retropulsion.  2. No acute abnormality in the chest. 3. No solid or hollow viscus organ injury within limits of this noncontrast examination. 4. Distended fluid-filled stomach and mildly dilated fluid-filled esophagus most consistent with reflux. XR ABDOMEN (KUB) (SINGLE AP VIEW)    Result Date: 2/27/2023  EXAMINATION: ONE SUPINE X-RAY VIEW(S) OF THE ABDOMEN 2/24/2023 2:01 am COMPARISON: February 24, 2023. HISTORY: ORDERING SYSTEM PROVIDED HISTORY:  NGT TECHNOLOGIST PROVIDED HISTORY: Reason for Exam:  NGT FINDINGS: Nasogastric tube tip terminates in the distal stomach. The side-port is beyond the GE junction. No acute or aggressive osseous lesion. Gaseous distention of the stomach is present. The bowel gas pattern is not well evaluated due to supine positioning. No gas distended loops of bowel appreciated. L2 compression deformity is seen to better advantage on prior CT. 1. Nasogastric tube tip terminates in the distal stomach. 2. Gaseous distention of the stomach. Finding is similar to the CT dated February 24, 2023. 3. L2 compression deformity is seen to better advantage on prior CT. CT HEAD WO CONTRAST    Result Date: 2/27/2023  EXAMINATION: CT OF THE HEAD WITHOUT CONTRAST  2/24/2023 12:39 am TECHNIQUE: CT of the head was performed without the administration of intravenous contrast. Automated exposure control, iterative reconstruction, and/or weight based adjustment of the mA/kV was utilized to reduce the radiation dose to as low as reasonably achievable. COMPARISON: November 15, 2016 HISTORY: ORDERING SYSTEM PROVIDED HISTORY: fall TECHNOLOGIST PROVIDED HISTORY: Has a \"code stroke\" or \"stroke alert\" been called? ->No Reason for exam:->fall Decision Support Exception - unselect if not a suspected or confirmed emergency medical condition->Emergency Medical Condition (MA) Reason for Exam: fall FINDINGS: BRAIN/VENTRICLES: There is no acute intracranial hemorrhage, mass effect or midline shift.   No abnormal extra-axial fluid collection. The gray-white differentiation is maintained without evidence of an acute infarct. There is no evidence of hydrocephalus. ORBITS: The visualized portion of the orbits demonstrate no acute abnormality. SINUSES: Moderate mucosal thickening is identified in the bilateral maxillary, ethmoid, and right sphenoid sinus. The mastoid air cells are clear. SOFT TISSUES/SKULL:  Moderate right parietal scalp hematoma is present. Left face soft tissue swelling is also seen. No acute fracture. No acute intracranial abnormality. Moderate right parietal scalp hematoma. No underlying fracture. Mild left face soft tissue swelling. CT CHEST WO CONTRAST    Result Date: 2/24/2023  EXAMINATION: CT OF THE ABDOMEN AND PELVIS WITHOUT CONTRAST; CT OF THE CHEST WITHOUT CONTRAST 2/24/2023 12:39 am TECHNIQUE: CT of the abdomen and pelvis was performed without the administration of intravenous contrast. Multiplanar reformatted images are provided for review. Automated exposure control, iterative reconstruction, and/or weight based adjustment of the mA/kV was utilized to reduce the radiation dose to as low as reasonably achievable.; CT of the chest was performed without the administration of intravenous contrast. Multiplanar reformatted images are provided for review. Automated exposure control, iterative reconstruction, and/or weight based adjustment of the mA/kV was utilized to reduce the radiation dose to as low as reasonably achievable. COMPARISON: 09/12/2012 HISTORY: ORDERING SYSTEM PROVIDED HISTORY: fall TECHNOLOGIST PROVIDED HISTORY: Reason for exam:->fall Additional Contrast?->None Decision Support Exception - unselect if not a suspected or confirmed emergency medical condition->Emergency Medical Condition (MA) Reason for Exam: fall; FINDINGS: Chest: Mediastinum: Normal heart size. No pericardial effusion. Normal thoracic aortic caliber.   No abnormal mediastinal or hilar lymph node endotracheal tube in place.  Mildly dilated fluid-filled esophagus. Lungs/pleura: Mild dependent atelectasis. Lungs otherwise clear. No pneumothorax or pleural effusion. Airways unremarkable. Soft Tissues/Bones: Acute osseous or soft tissue abnormality. Abdomen/Pelvis: Organs: Limited evaluation due lack of intravenous contrast.  Liver, gallbladder, biliary system, pancreas, spleen, adrenal glands, and kidneys unremarkable. No hydronephrosis or urinary tract calculus. GI/Bowel: No bowel obstruction. Distended fluid, gas, and fluid-filled stomach. No evidence of appendicitis. Pelvis: Urinary bladder and pelvic organs unremarkable. Peritoneum/Retroperitoneum: No free air or free fluid. Normal abdominal aortic caliber. No retroperitoneal a Tommy Garcia within limits of this noncontrast examination. No abnormal lymph node. Bones/Soft Tissues: Acute L2 compression fracture with superior endplate concavity and subjacent fracture plane resulting in 10% anterior vertebral body height loss and 2 mm retropulsion of the posterosuperior cortex. Bones otherwise intact. No acute soft tissue abnormality. Tiny uncomplicated fat containing bilateral inguinal hernias. 1. Acute compression fracture of the L2 vertebral body with 10% anterior vertebral height loss and minimal posterior cortex retropulsion. 2. No acute abnormality in the chest. 3. No solid or hollow viscus organ injury within limits of this noncontrast examination. 4. Distended fluid-filled stomach and mildly dilated fluid-filled esophagus most consistent with reflux. CT CERVICAL SPINE WO CONTRAST    Result Date: 2/27/2023  EXAMINATION: CT OF THE CERVICAL SPINE WITHOUT CONTRAST 2/24/2023 12:39 am TECHNIQUE: CT of the cervical spine was performed without the administration of intravenous contrast. Multiplanar reformatted images are provided for review.  Automated exposure control, iterative reconstruction, and/or weight based adjustment of the mA/kV was utilized to reduce the radiation dose to as low as reasonably achievable. COMPARISON: None. HISTORY: ORDERING SYSTEM PROVIDED HISTORY: fall TECHNOLOGIST PROVIDED HISTORY: Reason for exam:->fall Decision Support Exception - unselect if not a suspected or confirmed emergency medical condition->Emergency Medical Condition (MA) Reason for Exam: fall FINDINGS: BONES/ALIGNMENT: There is no acute fracture or traumatic malalignment. DEGENERATIVE CHANGES: Mild multilevel degenerative changes are present. SOFT TISSUES: There is no prevertebral soft tissue swelling. OTHER: Trace mastoid effusions are nonspecific. No acute abnormality of the cervical spine. XR CHEST PORTABLE    Result Date: 2/24/2023  EXAMINATION: ONE XRAY VIEW OF THE CHEST 2/24/2023 2:41 am COMPARISON: 09/24/2022 HISTORY: ORDERING SYSTEM PROVIDED HISTORY: central line TECHNOLOGIST PROVIDED HISTORY: Reason for exam:->central line Reason for Exam: central line FINDINGS: Endotracheal tube terminates 4.9 cm above the kellen. Enteric tube courses below the diaphragm with the tip not imaged. Right internal jugular center venous catheter tip is in the SVC. Clear lungs. No pneumothorax or pleural effusion. Cardiac and mediastinal contours normal.  No acute osseous abnormality. 1. Endotracheal tube terminates 4.9 cm above the kellen. 2. Right internal jugular center venous catheter tip in the SVC. 3. Clear lungs. No pneumothorax.        CBC:   Recent Labs     02/27/23  0615 02/28/23  0453 03/01/23  0400   WBC 6.2 6.2 7.5   HGB 8.6* 9.5* 10.3*    184 249     BMP:    Recent Labs     02/27/23  0308 02/28/23  0453 03/01/23  0400    140 139   K 3.9 4.7 4.4    103 101   CO2 23 26 23   BUN 7 6 9   CREATININE 0.7* 0.7* 0.8*   GLUCOSE 85 86 89     Hepatic:   Recent Labs     02/28/23  0453 03/01/23  0400   * 119*   ALT 36 35   BILITOT 0.3 0.3   ALKPHOS 36* 44     Lipids:   Lab Results   Component Value Date/Time    CHOL 154 10/05/2022 10:05 AM    HDL 73 10/05/2022 10:05 AM    TRIG 76 10/05/2022 10:05 AM     Hemoglobin A1C:   Lab Results   Component Value Date/Time    LABA1C 5.9 01/26/2022 01:56 PM       Troponin:   Lab Results   Component Value Date/Time    TROPONINT <0.010 02/24/2023 02:25 AM    TROPONINT <0.010 02/24/2022 01:09 AM    TROPONINT <0.010 01/27/2021 08:30 AM       UA:  Lab Results   Component Value Date/Time    NITRU NEGATIVE 02/28/2023 03:00 PM    COLORU YELLOW 02/28/2023 03:00 PM    WBCUA 1 12/28/2018 12:31 PM    RBCUA NONE SEEN 12/28/2018 12:31 PM    MUCUS RARE 12/28/2018 12:31 PM    TRICHOMONAS NONE SEEN 12/28/2018 12:31 PM    BACTERIA NEGATIVE 12/28/2018 12:31 PM    CLARITYU CLEAR 02/28/2023 03:00 PM    SPECGRAV 1.010 02/28/2023 03:00 PM    LEUKOCYTESUR NEGATIVE 02/28/2023 03:00 PM    UROBILINOGEN 0.2 02/28/2023 03:00 PM    BILIRUBINUR NEGATIVE 02/28/2023 03:00 PM    BLOODU NEGATIVE 02/28/2023 03:00 PM    KETUA NEGATIVE 02/28/2023 03:00 PM         Time Spent Discharging patient 35 minutes    Electronically signed by Vivienne Ackerman MD on 3/1/2023 at 10:47 AM

## 2023-03-01 NOTE — DISCHARGE INSTR - COC
Continuity of Care Form    Patient Name: Miguel Angel Dennison   :  1980  MRN:  [id number]    Admit date:  2023  Discharge date:  ***    Code Status Order: Full Code   Advance Directives:     Admitting Physician:  No admitting provider for patient encounter.   PCP: Agnes Serrano MD    Discharging Nurse: DULCE MARIA PICKERING [facility] Unit/Room#: 2106/2106-A  Discharging Unit Phone Number: [phone]    Emergency Contact:   Extended Emergency Contact Information  Primary Emergency Contact: Rose Sanders  Home Phone: [phone]  Relation: Grandparent  Secondary Emergency Contact: [address] [facility] Phone: [phone]  Relation: Other    Past Surgical History:  Past Surgical History:   Procedure Laterality Date    HEMORRHOID SURGERY         Immunization History:   Immunization History   Administered Date(s) Administered    Influenza Vaccine, unspecified formulation 10/14/2016    Influenza, FLUARIX, FLULAVAL, FLUZONE (age 10 mo+) AND AFLURIA, (age 1 y+), PF, 0.5mL 2020    Influenza, FLUCELVAX, (age 10 mo+), MDCK, PF, 0.5mL 10/21/2021       Active Problems:  Patient Active Problem List   Diagnosis Code    Essential hypertension I10    Schizophrenia ([address] F20.9    Anxiety F41.9    Diabetic polyneuropathy associated with type 2 diabetes mellitus ([address] E11.42    Mixed hyperlipidemia E78.2    Tobacco abuse Z72.0    Chronic obstructive pulmonary disease ([address] J44.9    Seizure ([address] R56.9    Convulsions ([address] R56.9    Hyponatremia E87.1    Acute respiratory failure ([address] J96.00    Compression fracture of L2 lumbar vertebra ([address] S32.020A    Closed head injury S09.90XA    Acute exacerbation of chronic paranoid schizophrenia ([address] F20.0       Isolation/Infection:   Isolation            No Isolation          Patient Infection Status       Infection Onset Added Last Indicated Last Indicated By Review Planned Expiration Resolved Resolved By    None active    Resolved    COVID-19 (Rule Out) 21 COVID-19, Rapid (Ordered)   06/24/21 Rule-Out Test Resulted            Nurse Assessment:  Last Vital Signs: /70   Pulse 76   Temp 98.4 °F (36.9 °C) (Oral)   Resp 13   Ht 5' 9\" (1.753 m)   Wt 154 lb 12.2 oz (70.2 kg)   SpO2 97%   BMI 22.85 kg/m²     Last documented pain score (0-10 scale): Pain Level: 10  Last Weight:   Wt Readings from Last 1 Encounters:   02/28/23 154 lb 12.2 oz (70.2 kg)     Mental Status:  Oriented; replies \"I don't know\" to most orientation questions. IV Access:  - None    Nursing Mobility/ADLs:  Walking   Independent  Transfer  Independent  Bathing  Independent  Dressing  Independent  [address]  Independent  Med Delivery   none    Wound Care Documentation and Therapy:        Elimination:  Continence: Bowel: Yes  Bladder: Yes  Urinary Catheter: None   Colostomy/Ileostomy/Ileal Conduit: No       Date of Last BM: Unknown    Intake/Output Summary (Last 24 hours) at 3/1/2023 1041  Last data filed at 2/28/2023 1801  Gross per 24 hour   Intake 420 ml   Output 610 ml   Net -190 ml     I/O last 3 completed shifts: In: 420 [P.O.:420]  Out: 2260 [Urine:2260]    Safety Concerns:     None and At Risk for Falls    Impairments/Disabilities:      None    Nutrition Therapy:  Current Nutrition Therapy:   - Oral Diet:  General    Routes of Feeding: Oral  Liquids: No Restrictions  Daily Fluid Restriction: no  Last Modified Barium Swallow with Video (Video Swallowing Test): not done    Treatments at the Time of Hospital Discharge:   Respiratory Treatments: Spiriva; Albulterol  Oxygen Therapy:  is not on home oxygen therapy.   Ventilator:    - No ventilator support    Rehab Therapies: None  Weight Bearing Status/Restrictions: No weight bearing restrictions  Other Medical Equipment (for information only, NOT a DME order):  None  Other Treatments: None    Patient's personal belongings (please select all that are sent with patient):  None    RN SIGNATURE: Electronically signed by Joaquim Diego RN on 3/1/23 at 11:14 AM EST    CASE MANAGEMENT/SOCIAL WORK SECTION    Inpatient Status Date: ***    Readmission Risk Assessment Score:  Readmission Risk              Risk of Unplanned Readmission:  16           Discharging to Facility/ Agency   Name:   Address:  Phone:  Fax:    Dialysis Facility (if applicable)   Name:  Address:  Dialysis Schedule:  Phone:  Fax:    / signature: {Esignature:[id number]}    PHYSICIAN SECTION    Prognosis: Good    Condition at Discharge: Stable    Rehab Potential (if transferring to Rehab): Good    Recommended Labs or Other Treatments After Discharge: Psych medications were stopped due to profound hyponatremia requiring ICU admission. Restart as appropriate while keeping in mind, very susceptible to SIADH. Glory Boyd Physician Certification: I certify the above information and transfer of Sabrina Wooten  is necessary for the continuing treatment of the diagnosis listed and that he requires inpatient psych for greater 30 days.      Update Admission H&P: Changes in H&P as follows - Hyponatremia    PHYSICIAN SIGNATURE:  Electronically signed by Ricardo Padilla MD on 3/1/23 at 10:42 AM EST

## 2023-03-01 NOTE — PROGRESS NOTES
Nephrology Progress Note  3/1/2023 8:25 AM        Subjective:   Admit Date: 2/23/2023  PCP: Lisa Rodriguez MD    Interval History: No major event, he will go to a psychiatric facility    Diet: Some    ROS: No overt confusion, he was pleasant and answer all my questions  He did not oppose for transfer either  No fever and acceptable blood pressure  Urine output recorded about 1.1 L for the last 24 hours    Data:     Current meds:    magnesium oxide  400 mg Oral BID    thiamine  100 mg Oral Daily    busPIRone  5 mg Oral BID    docusate sodium  100 mg Oral Daily    [Held by provider] lisinopril  20 mg Oral Daily    pantoprazole  40 mg Oral QAM AC    tiotropium  2 puff Inhalation Daily    enoxaparin  40 mg SubCUTAneous Daily           I/O last 3 completed shifts: In: 420 [P.O.:420]  Out: 2260 [Urine:2260]    CBC:   Recent Labs     02/27/23  0615 02/28/23  0453 03/01/23  0400   WBC 6.2 6.2 7.5   HGB 8.6* 9.5* 10.3*    184 249          Recent Labs     02/27/23  0308 02/28/23  0453 03/01/23  0400    140 139   K 3.9 4.7 4.4    103 101   CO2 23 26 23   BUN 7 6 9   CREATININE 0.7* 0.7* 0.8*   GLUCOSE 85 86 89       Lab Results   Component Value Date    CALCIUM 10.4 03/01/2023    PHOS 4.4 03/01/2023       Objective:     Vitals: /70   Pulse 85   Temp 98.4 °F (36.9 °C) (Oral)   Resp 14   Ht 5' 9\" (1.753 m)   Wt 154 lb 12.2 oz (70.2 kg)   SpO2 96%   BMI 22.85 kg/m² ,    General appearance: Thin, alert, awake and oriented  HEENT: Mild conjunctival pallor no scleral icterus  Neck: Supple  Lungs: No crackles few rhonchi  Heart: Regular rate and rhythm  Abdomen: Soft  Extremities: No edema      Problem List :         Impression :     Hyponatremia has been corrected  Did have underlying partial DI with underlying psychiatric disorder-magnesium was repleted    Recommendation/Plan  :     Hopefully he can be discharged to psychiatric institution today.   I would avoid lithium for him in the future, as long as he can drink adequate water, sodium expected to remain normal-we might need to replete the magnesium and recheck it in psychiatric institution-outpatient follow-up with yakelin Fraga MD MD

## 2023-03-16 ENCOUNTER — HOSPITAL ENCOUNTER (EMERGENCY)
Age: 43
Discharge: HOME | End: 2023-03-16
Payer: COMMERCIAL

## 2023-03-16 VITALS
HEART RATE: 82 BPM | WEIGHT: 179 LBS | TEMPERATURE: 98.2 F | OXYGEN SATURATION: 99 % | HEIGHT: 69 IN | BODY MASS INDEX: 26.51 KG/M2 | RESPIRATION RATE: 18 BRPM | SYSTOLIC BLOOD PRESSURE: 129 MMHG | DIASTOLIC BLOOD PRESSURE: 91 MMHG

## 2023-03-16 DIAGNOSIS — F41.1 ANXIETY STATE: ICD-10-CM

## 2023-03-16 DIAGNOSIS — J45.909 ASTHMA, UNSPECIFIED ASTHMA SEVERITY, UNSPECIFIED WHETHER COMPLICATED, UNSPECIFIED WHETHER PERSISTENT: ICD-10-CM

## 2023-03-16 DIAGNOSIS — F20.9 SCHIZOPHRENIA, UNSPECIFIED TYPE (HCC): Primary | ICD-10-CM

## 2023-03-16 PROCEDURE — 99283 EMERGENCY DEPT VISIT LOW MDM: CPT

## 2023-03-16 RX ORDER — DEXAMETHASONE SODIUM PHOSPHATE 10 MG/ML
10 INJECTION, SOLUTION INTRAMUSCULAR; INTRAVENOUS ONCE
Status: DISCONTINUED | OUTPATIENT
Start: 2023-03-16 | End: 2023-03-16 | Stop reason: HOSPADM

## 2023-03-16 RX ORDER — HYDROXYZINE PAMOATE 25 MG/1
25-50 CAPSULE ORAL 3 TIMES DAILY PRN
Qty: 30 CAPSULE | Refills: 0 | Status: SHIPPED | OUTPATIENT
Start: 2023-03-16 | End: 2023-03-23

## 2023-03-16 RX ORDER — ALBUTEROL SULFATE 90 UG/1
2 AEROSOL, METERED RESPIRATORY (INHALATION) 4 TIMES DAILY PRN
Qty: 54 G | Refills: 0 | Status: SHIPPED | OUTPATIENT
Start: 2023-03-16

## 2023-03-16 ASSESSMENT — ENCOUNTER SYMPTOMS
SHORTNESS OF BREATH: 1
COUGH: 1

## 2023-03-16 NOTE — ED NOTES
Discharge instructions given. Pt verbalized understanding. Pt ambulated to waiting room.         Leticia Krishnamurthy RN  03/16/23 5195

## 2023-03-16 NOTE — DISCHARGE INSTRUCTIONS
As discussed with you, continue your treatment for schizophrenia and anxiety with your mental health provider in the outpatient setting or if needed care home. Meanwhile, you could use the prescription medication prescribed to you today with help with your anxiety. Follow-up with a primary care provider for reevaluation of your asthma. We have given you a prescription refill for your inhaler that you can utilize as needed. Call the crisis line number if you have any thoughts of harming yourself or others. Return to emergency department with any worsening symptoms or any new concerns.

## 2023-03-16 NOTE — ED PROVIDER NOTES
**ADVANCED PRACTICE PROVIDER, I HAVE EVALUATED THIS PATIENT**        [address] ENCOUNTER      Pt Name: Ta Giordano  ZFY:[id number]  [place] 1980  Date of evaluation: 3/16/2023  Provider: Adela Greco PA-C      Chief Complaint:    Chief Complaint   Patient presents with    Medication Refill    Shortness of Breath         Nursing Notes, Past Medical Hx, Past Surgical Hx, Social Hx, Allergies, and Family Hx were all reviewed and agreed with or any disagreements were addressed in the HPI.    HPI: (Location, Duration, Timing, Severity, Quality, Assoc Sx, Context, Modifying Factors)     History from : Patient and nursing    Limitations to history : None    Ta Giordano is a 36 y.o. male who presents as stated history of schizophrenia hand anxiety presents with request for medications. He mentions that he has been unable to get his weekly shot of Prolixin describing that his  at [facility] tells him that he can only get medications when he [facility] to penitentiary. He mentions he was in penitentiary but had broken his hip from falling off the top bunk about a month . He describes being furloughed for treatment of his hip and plans to go back later this month. He describes feeling more anxious as well as his family members feeling that he need to be divided for his anxiety. He does mention anxiety is making his shortness of breath worse mentioning history of asthma, and also mentions he has not had his inhaler as well. He does describe some chronic visualizing spots no different than previous, but otherwise denies any new auditory or visual hallucinations. He describes some anxiety, but denies any suicidal or homicidal ideations.     PastMedical/Surgical History:      Diagnosis Date    Anxiety     Asthma     Chronic back pain     Diabetes mellitus ([facility]     H/O 24 hour EKG monitoring 09/19/2013    EVENT MONITOR-normal sinus rhythm H/O echocardiogram 04/20/2017    EF 55% Normal LV with normal systolic function. No significant valvulopathy is seen    Hx of echocardiogram 08/16/13 08/13 Mitrall annular calcification with a trace to mild MR    Hyperlipidemia     Hypertension     Schizophrenia ([facility] [address] [facility]          Procedure Laterality Date    HEMORRHOID SURGERY         Medications:  Discharge Medication List as of 3/16/2023 12:13 PM        CONTINUE these medications which have NOT CHANGED    Details   busPIRone (BUSPAR) 5 MG tablet Take 2 tablets by mouth 3 times daily, Disp-90 tablet, R-0NO PRINT      thiamine 100 MG tablet Take 1 tablet by mouth daily, Disp-30 tablet, R-1NO PRINT      atorvastatin (LIPITOR) 40 MG tablet TAKE ONE (1) TABLET BY MOUTH DAILY, Disp-30 tablet, R-3Normal      docusate sodium (COLACE) 100 MG capsule TAKE ONE (1) CAPSULE BY MOUTH TWO (2) TIMES DAILY, Disp-60 capsule, R-3Normal      blood glucose monitor kit and supplies Dispense sufficient amount for indicated testing frequency plus additional to accommodate PRN testing needs.  Dispense all needed supplies to include: monitor, strips, lancing device, lancets, control solutions, alcohol swabs., Disp-1 kit, R-0, Normal      omeprazole (PRILOSEC) 40 MG delayed release capsule TAKE ONE (1) CAPSULE BY MOUTH DAILY, Disp-30 capsule, R-3Normal      fenofibrate (TRICOR) 145 MG tablet TAKE ONE (1) TABLET BY MOUTH ONCE DAILY, Disp-30 tablet, R-3Normal      metFORMIN (GLUCOPHAGE) 500 MG tablet TAKE ONE (1) TABLET BY MOUTH TWO TIMES DAILY WITH MEALS, Disp-60 tablet, R-3Normal      montelukast (SINGULAIR) 10 MG tablet TAKE ONE (1) TABLET BY MOUTH EVERY NIGHT, Disp-30 tablet, R-3Normal      ferrous sulfate (IRON 325) 325 (65 Fe) MG tablet Take 1 tablet by mouth 2 times daily, Disp-30 tablet, R-3Normal      hydrOXYzine (ATARAX) 25 MG tablet Take 1 tablet by mouth 2 times daily, Disp-60 tablet, R-3Normal      Spacer/Aero-Holding Chambers (AEROCHAMBER) MISC EVERY 6 HOURS Starting KATHY Singh 1/27/2021, Disp-1 each, R-0, Normal      tiotropium (SPIRIVA) 18 MCG inhalation capsule Inhale 18 mcg into the lungs dailyHistorical Med      melatonin 5 MG TBDP disintegrating tablet Take 10 mg by mouth nightly Historical Med               Review of Systems:  (1 systems needed)  Pertinent positives and negatives are stated within HPI, all other systems reviewed and are negative. Review of Systems   Constitutional:  Negative for fever. Respiratory:  Positive for cough (chronic) and shortness of breath. Psychiatric/Behavioral:  Positive for hallucinations. Negative for self-injury and suicidal ideas. Physical Exam:  Physical Exam  Vitals and nursing note reviewed. Constitutional:       Appearance: Normal appearance. He is well-developed. He is not ill-appearing or diaphoretic. HENT:      Head: Normocephalic and atraumatic. Right Ear: External ear normal.      Left Ear: External ear normal.      Nose: Nose normal.   Eyes:      General:         Right eye: No discharge. Left eye: No discharge. Pulmonary:      Effort: Pulmonary effort is normal. No respiratory distress. Breath sounds: No stridor. No wheezing. Musculoskeletal:         General: Normal range of motion. Cervical back: Normal range of motion and neck supple. Skin:     General: Skin is warm and dry. Coloration: Skin is not pale. Neurological:      General: No focal deficit present. Mental Status: He is alert and oriented to person, place, and time. Psychiatric:         Attention and Perception: Attention normal.         Mood and Affect: Mood is anxious. Behavior: Behavior normal.         Thought Content: Thought content does not include homicidal or suicidal ideation.        ED COURSE / MEDICAL DECISION MAKING :   Vitals:    03/16/23 0907   BP: (!) 129/91   Pulse: 82   Resp: 18   Temp: 98.2 °F (36.8 °C)   TempSrc: Oral   SpO2: 99%   Weight: 179 lb (81.2 kg)   Height: 5' 9\" (1.753 m) LABS:Labs Reviewed - No data to display     Remainder of labs reviewed and were negative at this time or not returned at the time of this note. RADIOLOGY:   Non-plain film images such as CT, Ultrasound and MRI are read by the radiologist. Vidhya Handley PA-C have directly visualized the radiologic plain film image(s) with the below findings:    Interpretation per the Radiologist below, if available at the time of this note:    No orders to display           PROCEDURES:   Procedures    None    Patient was given:  Medications - No data to display      COURSE, HPI SUMMARY, DDX,  REASSESSMENT, MEDICAL DECISION MAKING :     50-year-old male ambulates from home to this ED with above HPI. Patient describes having a hairline fracture in his left hip last month that required admission. However I did review EMR, patient has been admitted approximately 3 weeks ago after possible seizure-like activity and/or fall from top bunk. He was not cooperative in route, and placed on vent in ED and ultimately admitted for metabolic encephalopathy secondary to hyponatremia with reported seizure-like activity. He was also noted to have hemoglobin 8.8 on admission. Does have a history of COPD but was not in exacerbation. He does have a history of schizophrenia. During this last admission he was evaluated by psych, who advised for inpatient admission due to his hyponatremia and medication needs. He was discharged from the inpatient care team and transferred to [facility]. Per his history today, he apparently was discharged from there, has been following up as an outpatient locally, and he does describe being furloughed needing to return to senior living later this month. Today he also mentions that he had a \"hairline\" fracture in his left hip, but reviewing visit notes, it appears he had a compression fracture of L2, but from not seeing any mentioning of hip issues at this time.   He tells me the only medications that he is on is the shot that he gets for his schizophrenia, as well as Neurontin for his hip pain. He mentions that he has had a history of asthma typically just uses inhaler as needed but he has been out of of that as well. My examination today, vitals within normal limits. He does appear to be anxious. He is in no acute distress, no labored breathing no conversational dyspnea. His lung sounds are clear. No evidence of injury or trauma. He appears anxious but does not appear to be under the influence  He is pleasant and cooperative, no evidence of disorganized thoughts or confusion, I do not feel he he needs to be pink slipped at this time. Patient was discussed with ED mental health provider Augusto. Considering need for medical clearance work-up for psychiatric evaluation versus clarification on outpatient management needs. @ 11:11 AM EDT: Patient discussed with ED CHAZ Cooley who had FaceTime patient, and also spoke with patient's outpatient care team at [facility]. Apparently patient returns to group home in 4 days, and there is a psychiatric provider available to see him at that time, plans to reinitiate his schizophrenia medications. And they are actually suggesting to hold off on initiating any treatment until patient returns to group home. Otherwise patient does appear to be at his baseline with no evidence of acute psychosis. Do not feel patient needs emergent evaluation or inpatient placement at this time. We will offer patient a course of hydroxyzine for his anxiety, and refill his albuterol prescription and give him a dose of Decadron here. Differential diagnosis considered. Overall presentation is consistent with anxiety. Low suspicion for acute psychosis, suicidal ideations, septic arthritis, cauda equina, cord compression,. Disposition: Discussed need to follow up diagnostics, including incidental findings.  Discharged with instructions to obtain outpatient follow up of patient's symptoms and findings, with strict return precautions if patient develops new or worsening symptoms. Follow-up plan and return precautions were provided and discussed in detail patient in agreement. Patient was given the following medications:  Medications - No data to display    Independent Imaging Interpretation by me:     EKG: When ordered, EKG's are interpreted by the Emergency Department Physician in the absence of a cardiologist.  Please see their note for interpretation of EKG. Chronic conditions affecting care:    has a past medical history of Anxiety, Asthma, Chronic back pain, Diabetes mellitus ([facility] [address] H/O 24 hour EKG monitoring (09/19/2013), H/O echocardiogram (04/20/2017), echocardiogram (08/16/13), Hyperlipidemia, Hypertension, and Schizophrenia ([facility] Discussion with Other Profesionals : Social Work [employer]    Social Determinants of Health : None  Records Reviewed : Outpatient Notes       Disposition Considerations (tests considered but not done, Shared Decision Making, Pt Expectation of Test or Tx.): none (unless indicated in ED Course, Summary, Reassessment, or MDM and   medical work-up for clearance for psychiatric evaluation however patient is showing no sign of emergent psychiatric evaluation and has outpatient care with plans to follow-up with psychiatry next week in senior care    Appropriate for outpatient management      I am the Primary Clinician of Record. This patient was evaluated, managed, and treated in the context of the COVID-19 pandemic. As such, associated resources were utilized in his care. I did don/doff appropriate PPE (including N95 mask, safety glasses, gown, gloves), as recommended by the health facility/national standard best practice, during my bedside interactions with the patient. The patient tolerated their visit well. I evaluated the patient. The physician was available for consultation as needed.   The patient and / or the family were informed of the results of any tests, a time was given to answer questions, a plan was proposed and they agreed with plan. CLINICAL IMPRESSION:  1. Schizophrenia, unspecified type ([address]    2. Anxiety state    3.  Asthma, unspecified asthma severity, unspecified whether complicated, unspecified whether persistent        DISPOSITION Decision To Discharge 03/16/2023 12:00:48 PM      PATIENT REFERRED TO:  MD Luz Cristina  [address] [place] [id number] [phone]          DISCHARGE MEDICATIONS:  Discharge Medication List as of 3/16/2023 12:13 PM        START taking these medications    Details   hydrOXYzine pamoate (VISTARIL) 25 MG capsule Take 1-2 capsules by mouth 3 times daily as needed for Anxiety, Disp-30 capsule, R-0Normal      albuterol sulfate HFA (VENTOLIN HFA) 108 (90 Base) MCG/ACT inhaler Inhale 2 puffs into the lungs 4 times daily as needed for Wheezing, Disp-54 g, R-0Normal             DISCONTINUED MEDICATIONS:  Discharge Medication List as of 3/16/2023 12:13 PM                 (Please note the MDM and HPI sections of this note were completed with a voice recognition program.  Efforts were made to edit the dictations but occasionally words are mis-transcribed.)    Electronically signed, Sandoval Juarez PA-C,          Sandoval Juarez PA-C  03/17/23 5939

## 2023-03-16 NOTE — ED TRIAGE NOTES
Pt states that he is out of his medications and is having shortness of breath. Been out from about 9-10 days. Pt states that he was diagnosed with fracture in the hip and also having hip and leg pain. Pharmacy [facility].

## 2023-03-16 NOTE — ED NOTES
Chief Complaint:    Medication refill      Provisional Diagnosis:   Schizophrenia  Hx of: Chronic schizophrenia exacerbation      Risk, Psychosocial and Contextual Factors: Drug use, MH hx      Current MH Treatment: Patient is active with [facility]. He sees YASMIN Freed. His  with [facility] is Armando Sanchez. Present Suicidal Behavior:DENIES    Verbal:    Attempt:      Access to Weapons: DENIES      C-SSRS Current Suicide Risk: Low, Moderate or High:        NO RISK  Past Suicidal Behavior: DENIES    Verbal:    Attempts:      Self-Injurious/Self-Mutilation: Denies      Traumatic Event Within Past 2 Weeks: Patient states sister passed away recently. His mom is also  and grandmother is sick. Current Abuse:  Denies      Legal: Patient is scheduled to report back to [facility] on Monday 3/20/23. Violence: Not violent      Protective Factors:  Step dad is supportive. Patient is active with R @ [address] [place] [address] Street: Lives with step-dad      Clinical Summary:  Patient comes to ED for medication refill. Patient states the only medication he takes is Prolixin. He states he last took it a couple weeks ago but was in [facility]. While he was in [facility] , he fell out of bed and ended up being admitted to hospital for tx in February. Patient discharged from hospital to [facility] for treatment of schizophrenia. Patient was supposed to return to [facility] after stay at [facility]. He did not. He is now scheduled to return to [facility] on 3/20/23. Spoke with  at [facility]. She states patient needs to return to [facility] on Monday and that is the only day his provider is in clinic for patient to be seen. He will need to consult the CNP in the [facility] for meds and report to [facility] once his [facility] sentence is over. Patient denies SI/HI, plan or intent. Patient states he sees color spots, light blue in particular, but this is baseline for line. Patient denies AH. Patient states sleep is poor, but does not give an average # of hrs.  Of sleep. Patient states states appetite is good. Patient denies alcohol use but admits to use of crack cocaine. Patient would benefit [facility] and consult with psychiatric CNP @ [facility]. Level of Care Disposition:      Consulted with medical provider. Patient is medically stabilized. Consulted with patients RN about abnormalities or medical concerns. No abnormalities or medical concerns noted. Consulted with__Camilo Villegas PA-C and SARAH Arellano-NARA_Patient to follow up with Nuris Glover services @ [facility]. CHAZ Huddleston  03/16/23 1053

## 2023-03-17 NOTE — PROGRESS NOTES
Spoke to the patients . The patient is scheduled to report to snf on Monday 3/20/23. He is scheduled to be there until at least 4/23/23. After he is officially released she will schedule him back here.

## 2023-06-06 ENCOUNTER — HOSPITAL ENCOUNTER (OUTPATIENT)
Dept: PSYCHIATRY | Age: 43
Setting detail: THERAPIES SERIES
Discharge: HOME | End: 2023-06-06
Payer: COMMERCIAL

## 2023-06-06 PROCEDURE — 80305 DRUG TEST PRSMV DIR OPT OBS: CPT

## 2023-06-06 PROCEDURE — 90791 PSYCH DIAGNOSTIC EVALUATION: CPT

## 2023-06-06 NOTE — PROGRESS NOTES
client documentation for resources. Trauma history, Behavior Screening, Mental status, Alcohol screening and Drug screening. P: Plan to review urine drug screen, complete progress report, finalize remaining psychosocial assessment, complete treatment plan and establish adequate level of care and recommendations.              Electronically signed by Haseeb Junior LICDC-CS on 9/7/6322 at 4:47 PM         Elba Li, MRC, LSW, LICDC-CS
02/24/2023    Closed head injury 02/24/2023    Tobacco abuse 02/18/2021    Chronic obstructive pulmonary disease ([facility] [address] [facility] 02/18/2021    Diabetic polyneuropathy associated with type 2 diabetes mellitus ([facility] [address] [facility] 11/19/2020    Mixed hyperlipidemia 11/19/2020    Essential hypertension     Schizophrenia ([facility] [address]     Anxiety        GUS Saez  4/6/6780/4:25 PM      DSM-V TR  CRITERIA DIAGNOSIS      Substance use diagnosis:    F14.20 Cocaine dependence-unspecified use         _____ No diagnosis  ____ Mild (2-3) SIMEON,  ___X_ Moderate (4-5) SIMEON,  ____ Severe ( 6+) SIMEON     2. Substance use diagnosis:    F12.20 Cannabis dependence-unspecified use           ____ Mild (2-3) SIMEON,  _X___ Moderate (4-5) SIMEON,  ____ Severe ( 6+) SIMEON     3. Substance use diagnosis:             ____ Mild (2-3) SIMEON,  ____ Moderate (4-5) SIMEON,  ____ Severe ( 6+) SIMEON     4. Substance use diagnosis:           ____ Mild (2-3) SIMEON, ____ Moderate (4-5), ____ Severe (6+) SIMEON    Level of Care  1 Outpatient Services    Frequency  Individual  , Urinalysis  , Case Management  , Crisis Management  , and Urinalysis            Treatment available:  __X__ yes  ____ no          Name of program referred:    __X__ [facility],  ____ [facility],     Other/ identify,            Electronically signed by GUS Saez on 6/9/0525 at 5:21 PM    Medical Director [address] . Erika Carlisle MD    Signature:

## 2023-06-07 ENCOUNTER — HOSPITAL ENCOUNTER (OUTPATIENT)
Age: 43
Setting detail: SPECIMEN
Discharge: HOME | End: 2023-06-07
Payer: COMMERCIAL

## 2023-06-07 LAB
25(OH)D3 SERPL-MCNC: 22.49 NG/ML
ALBUMIN SERPL-MCNC: 4.8 GM/DL (ref 3.4–5)
ALP BLD-CCNC: 59 IU/L (ref 40–128)
ALT SERPL-CCNC: 16 U/L (ref 10–40)
ANION GAP SERPL CALCULATED.3IONS-SCNC: 11 MMOL/L (ref 4–16)
AST SERPL-CCNC: 17 IU/L (ref 15–37)
BASOPHILS ABSOLUTE: 0.1 K/CU MM
BASOPHILS RELATIVE PERCENT: 1 % (ref 0–1)
BILIRUB SERPL-MCNC: 0.3 MG/DL (ref 0–1)
BUN SERPL-MCNC: 13 MG/DL (ref 6–23)
CALCIUM SERPL-MCNC: 9.8 MG/DL (ref 8.3–10.6)
CHLORIDE BLD-SCNC: 102 MMOL/L (ref 99–110)
CHOLEST SERPL-MCNC: 196 MG/DL
CO2: 23 MMOL/L (ref 21–32)
CREAT SERPL-MCNC: 0.7 MG/DL (ref 0.9–1.3)
DIFFERENTIAL TYPE: ABNORMAL
EOSINOPHILS ABSOLUTE: 1.1 K/CU MM
EOSINOPHILS RELATIVE PERCENT: 17.7 % (ref 0–3)
GFR SERPL CREATININE-BSD FRML MDRD: >60 ML/MIN/1.73M2
GLUCOSE SERPL-MCNC: 117 MG/DL (ref 70–99)
HCT VFR BLD CALC: 46.4 % (ref 42–52)
HDLC SERPL-MCNC: 67 MG/DL
HEMOGLOBIN: 15 GM/DL (ref 13.5–18)
IMMATURE NEUTROPHIL %: 0.3 % (ref 0–0.43)
LDLC SERPL CALC-MCNC: 113 MG/DL
LYMPHOCYTES ABSOLUTE: 2 K/CU MM
LYMPHOCYTES RELATIVE PERCENT: 32 % (ref 24–44)
MCH RBC QN AUTO: 29 PG (ref 27–31)
MCHC RBC AUTO-ENTMCNC: 32.3 % (ref 32–36)
MCV RBC AUTO: 89.7 FL (ref 78–100)
MONOCYTES ABSOLUTE: 0.5 K/CU MM
MONOCYTES RELATIVE PERCENT: 8.1 % (ref 0–4)
NUCLEATED RBC %: 0 %
PDW BLD-RTO: 12.6 % (ref 11.7–14.9)
PLATELET # BLD: 296 K/CU MM (ref 140–440)
PMV BLD AUTO: 10.6 FL (ref 7.5–11.1)
POTASSIUM SERPL-SCNC: 4.6 MMOL/L (ref 3.5–5.1)
RBC # BLD: 5.17 M/CU MM (ref 4.6–6.2)
SEGMENTED NEUTROPHILS ABSOLUTE COUNT: 2.5 K/CU MM
SEGMENTED NEUTROPHILS RELATIVE PERCENT: 40.9 % (ref 36–66)
SODIUM BLD-SCNC: 136 MMOL/L (ref 135–145)
TOTAL IMMATURE NEUTOROPHIL: 0.02 K/CU MM
TOTAL NUCLEATED RBC: 0 K/CU MM
TOTAL PROTEIN: 7.1 GM/DL (ref 6.4–8.2)
TRIGL SERPL-MCNC: 81 MG/DL
TSH SERPL DL<=0.005 MIU/L-ACNC: 0.5 UIU/ML (ref 0.27–4.2)
WBC # BLD: 6.2 K/CU MM (ref 4–10.5)

## 2023-06-07 PROCEDURE — 85025 COMPLETE CBC W/AUTO DIFF WBC: CPT

## 2023-06-07 PROCEDURE — 82306 VITAMIN D 25 HYDROXY: CPT

## 2023-06-07 PROCEDURE — 36415 COLL VENOUS BLD VENIPUNCTURE: CPT

## 2023-06-07 PROCEDURE — 80061 LIPID PANEL: CPT

## 2023-06-07 PROCEDURE — 80053 COMPREHEN METABOLIC PANEL: CPT

## 2023-06-07 PROCEDURE — 84443 ASSAY THYROID STIM HORMONE: CPT

## 2023-06-13 PROBLEM — R73.9 ELEVATED BLOOD SUGAR: Status: ACTIVE | Noted: 2023-06-13

## 2023-06-20 ENCOUNTER — HOSPITAL ENCOUNTER (OUTPATIENT)
Dept: PSYCHIATRY | Age: 43
Setting detail: THERAPIES SERIES
Discharge: HOME | End: 2023-06-20
Payer: COMMERCIAL

## 2023-06-20 PROCEDURE — 90834 PSYTX W PT 45 MINUTES: CPT

## 2023-06-20 PROCEDURE — 80305 DRUG TEST PRSMV DIR OPT OBS: CPT

## 2023-06-20 NOTE — PROGRESS NOTES
[facility] TREATMENT PLAN           Location: [x] [place] [facility]        Treatment plan: Initial          Strengths; , [facility], residing with supportive relative            Weakness/Limitations:  legally involved , limited sobriety, history of non compliant with psychiatric medications               Service/Frequency/Duration: Individual Session x 1 time weekly x 90 days or as needed, Urinalysis one time monthly x 90 days. Diagnosis:         F14.20 Cocaine Dependence Disorder   F12.20 Cannabis Use Disorder                                  Level of Care:  Standard  Outpatient and Individual sessions         Problem: History of Substance use            Goal: Enhance personalized knowledge and insight associated with mood altering substances x 90 days        Objectives:           1) Remind 2 to 6  detrimental consequences in major life areas regarding substance  use in 90 days Evaluation Date: 9/13/2023 Code: C Continue TBD        On 6-20-23: Olu Celi arrived extremely hyper, anxious: needing constant redirection: indicated he has not slept in 2 or 3 days; smoking more cigarettes: drinking multiple cups of coffee: admitted over taking his psychiatric medication: specifically Buspar: drinking energy drinks: hyper and walking all over [place]: limited eating: memory inconsistent; indicated he found a joint on the ground yesterday: smoked it. Expressed conflict with step father, expressed unwillingness to attend drop off center and denies desire to attend social group: expressed desire to have custody of his own money; denies any crack use: expressed wanting marijuana card. 2) Identify 4 to 8 psychological or physiological benefits /  gratitudes due to remaining substance free in 90 days: Evaluation Date: 9/13/2023  Code: C Continue TBD                    3) Identify and explain 2 to 4 explanations about relapse triggers.  Explain 2 to 4 things about
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
## 2023-06-22 ENCOUNTER — HOSPITAL ENCOUNTER (OUTPATIENT)
Dept: PSYCHIATRY | Age: 43
Setting detail: THERAPIES SERIES
Discharge: HOME | End: 2023-06-22
Payer: COMMERCIAL

## 2023-06-22 PROCEDURE — 80305 DRUG TEST PRSMV DIR OPT OBS: CPT

## 2023-06-22 PROCEDURE — 90834 PSYTX W PT 45 MINUTES: CPT

## 2023-06-22 NOTE — PROGRESS NOTES
[facility]           Location: [x] [place] [] Maria Victoria valentino        Treatment plan: Initial          Strengths; , [facility], residing with supportive relative            Weakness/Limitations:  legally involved , limited sobriety, history of non compliant with psychiatric medications               Service/Frequency/Duration: Individual Session x 1 time weekly x 90 days or as needed, Urinalysis one time monthly x 90 days. Diagnosis:         F14.20 Cocaine Dependence Disorder   F12.20 Cannabis Use Disorder                                  Level of Care:  Standard  Outpatient and Individual sessions         Problem: History of Substance use            Goal: Enhance personalized knowledge and insight associated with mood altering substances x 90 days        Objectives:           1) Remind 2 to 6  detrimental consequences in major life areas regarding substance  use in 90 days Evaluation Date: 9/13/2023 Code: On 6-20-23: Marisabel Martinez arrived extremely hyper, anxious: needing constant redirection: indicated he has not slept in 2 or 3 days; smoking more cigarettes: drinking multiple cups of coffee: admitted over taking his psychiatric medication: specifically Buspar: drinking energy drinks: hyper and walking all over [place]: limited eating: memory inconsistent; indicated he found a joint on the ground yesterday: smoked it. Expressed conflict with step father, expressed unwillingness to attend drop off center and denies desire to attend social group: expressed desire to have custody of his own money; denies any crack use: expressed wanting marijuana card.            On 6-22-23: Marisabel Martinez arrived late: admitted he took the $30 dollars given by case management: purchased crack cocaine: admitted using last 6-19-23: prior to last session: used crack: this session he arrived appearance improved, indicated he has been sleeping and eating: present lip service: willing to
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
## 2023-06-27 ENCOUNTER — HOSPITAL ENCOUNTER (OUTPATIENT)
Dept: PSYCHIATRY | Age: 43
Setting detail: THERAPIES SERIES
Discharge: HOME | End: 2023-06-27
Payer: COMMERCIAL

## 2023-06-27 PROCEDURE — 80305 DRUG TEST PRSMV DIR OPT OBS: CPT

## 2023-06-27 PROCEDURE — 90834 PSYTX W PT 45 MINUTES: CPT

## 2023-06-29 ENCOUNTER — HOSPITAL ENCOUNTER (OUTPATIENT)
Dept: PSYCHIATRY | Age: 43
Setting detail: THERAPIES SERIES
Discharge: HOME | End: 2023-06-29
Payer: COMMERCIAL

## 2023-06-29 PROCEDURE — 80305 DRUG TEST PRSMV DIR OPT OBS: CPT

## 2023-06-29 PROCEDURE — 90834 PSYTX W PT 45 MINUTES: CPT

## 2023-07-05 ENCOUNTER — HOSPITAL ENCOUNTER (OUTPATIENT)
Dept: PSYCHIATRY | Age: 43
Setting detail: THERAPIES SERIES
Discharge: HOME | End: 2023-07-05
Payer: COMMERCIAL

## 2023-07-05 PROCEDURE — 80305 DRUG TEST PRSMV DIR OPT OBS: CPT

## 2023-07-06 NOTE — PROGRESS NOTES
500 [facility]        Individual  Progress Note    Location: [x] [place] [facility]                   Patient Name: Chantal Boyer   : 1980     Case # :  [id number]  Therapist: GUS Whitehead        30 minutes         Case management: Yoly Mccracken arrived late by 50 minutes: appeared disheveled, rapid speech, anxious: collected urine screen: once he left admitted to  he used crack cocaine,  indicated he step father put him out: he got $70 dollars Monday from his money: informed  he needs no money given to him he will only use for drugs: plan seek residential treatment and need housing.                Electronically signed by GUS Whitehead on 4677 at 1:30 PM       ESTER Willett, LSW, PAULACS

## 2023-07-11 ENCOUNTER — HOSPITAL ENCOUNTER (OUTPATIENT)
Dept: PSYCHIATRY | Age: 43
Setting detail: THERAPIES SERIES
Discharge: HOME | End: 2023-07-11
Payer: COMMERCIAL

## 2023-07-11 PROCEDURE — 80305 DRUG TEST PRSMV DIR OPT OBS: CPT

## 2023-07-11 PROCEDURE — 90834 PSYTX W PT 45 MINUTES: CPT

## 2023-07-11 NOTE — PROGRESS NOTES
500 [facility]        Individual  Progress Note    Location: [x] [place] [] [facility]                   Patient Name: Jose Guido   : 1980     Case # :  [id number]  Therapist: LAUREN Parkinson        Objective/Service/Time:   CASE MANAGEMENT  Problem 2 objective 3    S:  I met with client about housing. I contacted [employer], shelter for single men, and reportedly they do have availability. Client and I discussed a background check at police department, ID, and no warrants. Shelter is open form 8 pm-8 am and client would need to find a place to go for 12 hours during day. I also spoke with Christine Cline to check on status of volunteer application. It is currently being reviewed. O:  Client was oriented and open to discussion. A:  Client stated he would get a background check. P:  I will continue to follow up with [facility].  Client will continue to speak with Therapist or  about requests for assistance as needed.       SCHUYLER Parkinson, [facility], CTTS       Electronically signed by Rashi Herron on 2023 at 3:27 PM

## 2023-07-11 NOTE — PROGRESS NOTES
[facility]           Location: [x] [place] [] [facility]        Treatment plan: Initial          Strengths; , [facility], residing with supportive relative            Weakness/Limitations:  legally involved , limited sobriety, history of non compliant with psychiatric medications               Service/Frequency/Duration: Individual Session x 1 time weekly x 90 days or as needed, Urinalysis one time monthly x 90 days. Diagnosis:         F14.20 Cocaine Dependence Disorder   F12.20 Cannabis Use Disorder                                  Level of Care:  Standard  Outpatient and Individual sessions         Problem: History of Substance use            Goal: Enhance personalized knowledge and insight associated with mood altering substances x 90 days        Objectives:           1) Remind 2 to 6  detrimental consequences in major life areas regarding substance  use in 90 days Evaluation Date: 9/13/2023 Code: On 6-20-23: Jonathan Chau arrived extremely hyper, anxious: needing constant redirection: indicated he has not slept in 2 or 3 days; smoking more cigarettes: drinking multiple cups of coffee: admitted over taking his psychiatric medication: specifically Buspar: drinking energy drinks: hyper and walking all over [place]: limited eating: memory inconsistent; indicated he found a joint on the ground yesterday: smoked it. Expressed conflict with step father, expressed unwillingness to attend drop off center and denies desire to attend social group: expressed desire to have custody of his own money; denies any crack use: expressed wanting marijuana card.            On 6-22-23: Jonathan Chau arrived late: admitted he took the $30 dollars given by case management: purchased crack cocaine: admitted using last 6-19-23: prior to last session: used crack: this session he arrived appearance improved, indicated he has been sleeping and eating: present lip service: willing to
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
## 2023-07-12 ENCOUNTER — HOSPITAL ENCOUNTER (OUTPATIENT)
Dept: PSYCHIATRY | Age: 43
Setting detail: THERAPIES SERIES
Discharge: HOME | End: 2023-07-12
Payer: COMMERCIAL

## 2023-07-12 PROCEDURE — 90834 PSYTX W PT 45 MINUTES: CPT
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
## 2023-07-18 ENCOUNTER — HOSPITAL ENCOUNTER (OUTPATIENT)
Dept: PSYCHIATRY | Age: 43
Setting detail: THERAPIES SERIES
Discharge: HOME | End: 2023-07-18
Payer: COMMERCIAL

## 2023-07-18 PROCEDURE — 90834 PSYTX W PT 45 MINUTES: CPT

## 2023-07-18 PROCEDURE — 80305 DRUG TEST PRSMV DIR OPT OBS: CPT

## 2023-07-19 ENCOUNTER — HOSPITAL ENCOUNTER (OUTPATIENT)
Dept: PSYCHIATRY | Age: 43
Setting detail: THERAPIES SERIES
Discharge: HOME | End: 2023-07-19
Payer: COMMERCIAL

## 2023-07-19 NOTE — PROGRESS NOTES
500 [facility]        Individual  Progress Note    Location: [x] [place] [] [facility]                   Patient Name: Storm Yusuf   : 1980     Case # :  [id number]   Therapist: GUS Amaya          Did not show: left message with            Electronically signed by GUS Amaya on  at 1:17 PM         Sabra Mijares, [address] [place] [address], [facility]
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
## 2023-07-25 ENCOUNTER — HOSPITAL ENCOUNTER (OUTPATIENT)
Dept: PSYCHIATRY | Age: 43
Setting detail: THERAPIES SERIES
Discharge: HOME | End: 2023-07-25
Payer: COMMERCIAL

## 2023-07-25 PROCEDURE — 80305 DRUG TEST PRSMV DIR OPT OBS: CPT

## 2023-07-25 PROCEDURE — 90834 PSYTX W PT 45 MINUTES: CPT
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
## 2023-07-25 NOTE — PROGRESS NOTES
[facility]           Location: [x] [place] [] [facility]        Treatment plan: Initial          Strengths; , [facility], residing with supportive relative            Weakness/Limitations:  legally involved , limited sobriety, history of non compliant with psychiatric medications               Service/Frequency/Duration: Individual Session x 1 time weekly x 90 days or as needed, Urinalysis one time monthly x 90 days. Diagnosis:         F14.20 Cocaine Dependence Disorder   F12.20 Cannabis Use Disorder                                  Level of Care:  Standard  Outpatient and Individual sessions         Problem: History of Substance use            Goal: Enhance personalized knowledge and insight associated with mood altering substances x 90 days        Objectives:           1) Remind 2 to 6  detrimental consequences in major life areas regarding substance  use in 90 days Evaluation Date: 9/13/2023 Code: On 6-20-23: Gerardo Soto arrived extremely hyper, anxious: needing constant redirection: indicated he has not slept in 2 or 3 days; smoking more cigarettes: drinking multiple cups of coffee: admitted over taking his psychiatric medication: specifically Buspar: drinking energy drinks: hyper and walking all over [place]: limited eating: memory inconsistent; indicated he found a joint on the ground yesterday: smoked it. Expressed conflict with step father, expressed unwillingness to attend drop off center and denies desire to attend social group: expressed desire to have custody of his own money; denies any crack use: expressed wanting marijuana card.            On 6-22-23: Gerardo Soto arrived late: admitted he took the $30 dollars given by case management: purchased crack cocaine: admitted using last 6-19-23: prior to last session: used crack: this session he arrived appearance improved, indicated he has been sleeping and eating: present lip service: willing to

## 2023-07-26 ENCOUNTER — HOSPITAL ENCOUNTER (OUTPATIENT)
Dept: PSYCHIATRY | Age: 43
Setting detail: THERAPIES SERIES
Discharge: HOME | End: 2023-07-26
Payer: COMMERCIAL

## 2023-07-26 PROCEDURE — 90834 PSYTX W PT 45 MINUTES: CPT
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
## 2023-08-01 ENCOUNTER — HOSPITAL ENCOUNTER (OUTPATIENT)
Dept: PSYCHIATRY | Age: 43
Setting detail: THERAPIES SERIES
Discharge: HOME | End: 2023-08-01
Payer: COMMERCIAL

## 2023-08-01 PROCEDURE — 80305 DRUG TEST PRSMV DIR OPT OBS: CPT

## 2023-08-01 PROCEDURE — 90834 PSYTX W PT 45 MINUTES: CPT

## 2023-08-02 ENCOUNTER — HOSPITAL ENCOUNTER (OUTPATIENT)
Dept: PSYCHIATRY | Age: 43
Setting detail: THERAPIES SERIES
Discharge: HOME | End: 2023-08-02
Payer: COMMERCIAL

## 2023-08-02 PROCEDURE — 80305 DRUG TEST PRSMV DIR OPT OBS: CPT

## 2023-08-02 PROCEDURE — 90834 PSYTX W PT 45 MINUTES: CPT

## 2023-08-02 NOTE — PROGRESS NOTES
[facility]           Location: [x] [place] [] [facility]        Treatment plan: Initial          Strengths; , [facility], residing with supportive relative            Weakness/Limitations:  legally involved , limited sobriety, history of non compliant with psychiatric medications               Service/Frequency/Duration: Individual Session x 1 time weekly x 90 days or as needed, Urinalysis one time monthly x 90 days. Diagnosis:         F14.20 Cocaine Dependence Disorder   F12.20 Cannabis Use Disorder                                  Level of Care:  Standard  Outpatient and Individual sessions         Problem: History of Substance use            Goal: Enhance personalized knowledge and insight associated with mood altering substances x 90 days        Objectives:           1) Remind 2 to 6  detrimental consequences in major life areas regarding substance  use in 90 days Evaluation Date: 9/13/2023 Code: On 6-20-23: Marlo Lennox arrived extremely hyper, anxious: needing constant redirection: indicated he has not slept in 2 or 3 days; smoking more cigarettes: drinking multiple cups of coffee: admitted over taking his psychiatric medication: specifically Buspar: drinking energy drinks: hyper and walking all over [place]: limited eating: memory inconsistent; indicated he found a joint on the ground yesterday: smoked it. Expressed conflict with step father, expressed unwillingness to attend drop off center and denies desire to attend social group: expressed desire to have custody of his own money; denies any crack use: expressed wanting marijuana card.            On 6-22-23: Marlo Lennox arrived late: admitted he took the $30 dollars given by case management: purchased crack cocaine: admitted using last 6-19-23: prior to last session: used crack: this session he arrived appearance improved, indicated he has been sleeping and eating: present lip service: willing to

## 2023-08-02 NOTE — PROGRESS NOTES
[facility]           Location: [x] [place] [] [facility]        Treatment plan: Initial          Strengths; , [facility], residing with supportive relative            Weakness/Limitations:  legally involved , limited sobriety, history of non compliant with psychiatric medications               Service/Frequency/Duration: Individual Session x 1 time weekly x 90 days or as needed, Urinalysis one time monthly x 90 days. Diagnosis:         F14.20 Cocaine Dependence Disorder   F12.20 Cannabis Use Disorder                                  Level of Care:  Standard  Outpatient and Individual sessions         Problem: History of Substance use            Goal: Enhance personalized knowledge and insight associated with mood altering substances x 90 days        Objectives:           1) Remind 2 to 6  detrimental consequences in major life areas regarding substance  use in 90 days Evaluation Date: 9/13/2023 Code: On 6-20-23: Janie Grossman arrived extremely hyper, anxious: needing constant redirection: indicated he has not slept in 2 or 3 days; smoking more cigarettes: drinking multiple cups of coffee: admitted over taking his psychiatric medication: specifically Buspar: drinking energy drinks: hyper and walking all over [place]: limited eating: memory inconsistent; indicated he found a joint on the ground yesterday: smoked it. Expressed conflict with step father, expressed unwillingness to attend drop off center and denies desire to attend social group: expressed desire to have custody of his own money; denies any crack use: expressed wanting marijuana card.            On 6-22-23: Janie Grossman arrived late: admitted he took the $30 dollars given by case management: purchased crack cocaine: admitted using last 6-19-23: prior to last session: used crack: this session he arrived appearance improved, indicated he has been sleeping and eating: present lip service: willing to
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
## 2023-08-02 NOTE — PROGRESS NOTES
[facility]        Individual  Progress Note    Location: [x] [place] [] [facility]                   Patient Name: Kirk Peters   : 1980     Case # :  [id number]  Therapist: LAUREN Jaime        Objective/Service/Time:   CASE MANAGEMENT  Problem 2 Objective 3    S:  I met with therapist and client. Reportedly, client was asked to leave his step fathers house. Client signed LULU for [employer] and we made contact. The shelter has a male bed today. We notified Martinez Sujey, [address] at [facility], but she is not available to take Efraín Choe to get his background check at police station today. We notified [employer]. And will call again tomorrow for availability. The shelter is open form 8 pm-8 am.   Efraín Choe will have access to [facility] drop in center Monday-Friday from 8:30 am -3 pm.      O:  Client was oriented and open to discussion. A:  Client was provided resource about Shelter and drop in center. P:  Client will continue to speak with Therapist or  about requests for assistance if needed.       SCHUYLER Jaime, [facility], CTTS       Electronically signed by Andrade Victoria on 2023 at 9:46 AM

## 2023-08-08 ENCOUNTER — HOSPITAL ENCOUNTER (OUTPATIENT)
Dept: PSYCHIATRY | Age: 43
Setting detail: THERAPIES SERIES
Discharge: HOME | End: 2023-08-08
Payer: COMMERCIAL

## 2023-08-08 PROCEDURE — 90834 PSYTX W PT 45 MINUTES: CPT

## 2023-08-08 PROCEDURE — 80305 DRUG TEST PRSMV DIR OPT OBS: CPT

## 2023-08-09 ENCOUNTER — HOSPITAL ENCOUNTER (OUTPATIENT)
Dept: PSYCHIATRY | Age: 43
Setting detail: THERAPIES SERIES
Discharge: HOME | End: 2023-08-09
Payer: COMMERCIAL

## 2023-08-09 PROCEDURE — 90834 PSYTX W PT 45 MINUTES: CPT
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
## 2023-08-10 NOTE — PROGRESS NOTES
[facility]           Location: [x] [place] [] [facility]        Treatment plan: Initial          Strengths; , [facility], residing with supportive relative            Weakness/Limitations:  legally involved , limited sobriety, history of non compliant with psychiatric medications               Service/Frequency/Duration: Individual Session x 1 time weekly x 90 days or as needed, Urinalysis one time monthly x 90 days. Diagnosis:         F14.20 Cocaine Dependence Disorder   F12.20 Cannabis Use Disorder                                  Level of Care:  Standard  Outpatient and Individual sessions         Problem: History of Substance use            Goal: Enhance personalized knowledge and insight associated with mood altering substances x 90 days        Objectives:           1) Remind 2 to 6  detrimental consequences in major life areas regarding substance  use in 90 days Evaluation Date: 9/13/2023 Code: On 6-20-23: Mikey Aguilera arrived extremely hyper, anxious: needing constant redirection: indicated he has not slept in 2 or 3 days; smoking more cigarettes: drinking multiple cups of coffee: admitted over taking his psychiatric medication: specifically Buspar: drinking energy drinks: hyper and walking all over [place]: limited eating: memory inconsistent; indicated he found a joint on the ground yesterday: smoked it. Expressed conflict with step father, expressed unwillingness to attend drop off center and denies desire to attend social group: expressed desire to have custody of his own money; denies any crack use: expressed wanting marijuana card.            On 6-22-23: Mikey Aguilera arrived late: admitted he took the $30 dollars given by case management: purchased crack cocaine: admitted using last 6-19-23: prior to last session: used crack: this session he arrived appearance improved, indicated he has been sleeping and eating: present lip service: willing to

## 2023-08-15 ENCOUNTER — HOSPITAL ENCOUNTER (OUTPATIENT)
Dept: PSYCHIATRY | Age: 43
Setting detail: THERAPIES SERIES
Discharge: HOME | End: 2023-08-15
Payer: COMMERCIAL

## 2023-08-15 PROCEDURE — 80305 DRUG TEST PRSMV DIR OPT OBS: CPT

## 2023-08-15 PROCEDURE — 90834 PSYTX W PT 45 MINUTES: CPT

## 2023-08-16 ENCOUNTER — HOSPITAL ENCOUNTER (OUTPATIENT)
Dept: PSYCHIATRY | Age: 43
Setting detail: THERAPIES SERIES
Discharge: HOME | End: 2023-08-16
Payer: COMMERCIAL

## 2023-08-16 PROCEDURE — 90834 PSYTX W PT 45 MINUTES: CPT
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
## 2023-08-16 NOTE — PROGRESS NOTES
[facility] TREATMENT [facility]           Location: [x] [place] [] [facility]        Treatment plan: Initial          Strengths; , [facility], residing with supportive relative            Weakness/Limitations:  legally involved , limited sobriety, history of non compliant with psychiatric medications               Service/Frequency/Duration: Individual Session x 1 time weekly x 90 days or as needed, Urinalysis one time monthly x 90 days. Diagnosis:         F14.20 Cocaine Dependence Disorder   F12.20 Cannabis Use Disorder                                  Level of Care:  Standard  Outpatient and Individual sessions         Problem: History of Substance use            Goal: Enhance personalized knowledge and insight associated with mood altering substances x 90 days        Objectives:           1) Remind 2 to 6  detrimental consequences in major life areas regarding substance  use in 90 days Evaluation Date: 9/13/2023 Code: On 6-20-23: Candido Quiros arrived extremely hyper, anxious: needing constant redirection: indicated he has not slept in 2 or 3 days; smoking more cigarettes: drinking multiple cups of coffee: admitted over taking his psychiatric medication: specifically Buspar: drinking energy drinks: hyper and walking all over [place]: limited eating: memory inconsistent; indicated he found a joint on the ground yesterday: smoked it. Expressed conflict with step father, expressed unwillingness to attend drop off center and denies desire to attend social group: expressed desire to have custody of his own money; denies any crack use: expressed wanting marijuana card.            On 6-22-23: Candido Quiros arrived late: admitted he took the $30 dollars given by case management: purchased crack cocaine: admitted using last 6-19-23: prior to last session: used crack: this session he arrived appearance improved, indicated he has been sleeping and eating: present lip service: willing to
unsanitary. O: Denies any homicidal and suicidal ideation: denies any psychosis, oriented x 4                 A: reviewing  importance of self care, hygiene, washing clothing which he has places to do laundry, discussed difference between being lazy, excuses and depression: his mental view and capacity is rather childlike.            P: continue services        Electronically signed by Eligio Espinoza [facility] on 4/37/5499 at 1:22 PM         Adin Herrera, [address] [place] [address], [facility], [facility]

## 2023-08-16 NOTE — PROGRESS NOTES
[facility]REAT[facility]           Location: [x] [place] [] [facility]        Treatment plan: Initial          Strengths; , [facility], residing with supportive relative            Weakness/Limitations:  legally involved , limited sobriety, history of non compliant with psychiatric medications               Service/Frequency/Duration: Individual Session x 1 time weekly x 90 days or as needed, Urinalysis one time monthly x 90 days. Diagnosis:         F14.20 Cocaine Dependence Disorder   F12.20 Cannabis Use Disorder                                  Level of Care:  Standard  Outpatient and Individual sessions         Problem: History of Substance use            Goal: Enhance personalized knowledge and insight associated with mood altering substances x 90 days        Objectives:           1) Remind 2 to 6  detrimental consequences in major life areas regarding substance  use in 90 days Evaluation Date: 9/13/2023 Code: On 6-20-23: Soham Perla arrived extremely hyper, anxious: needing constant redirection: indicated he has not slept in 2 or 3 days; smoking more cigarettes: drinking multiple cups of coffee: admitted over taking his psychiatric medication: specifically Buspar: drinking energy drinks: hyper and walking all over [facility]: limited eating: memory inconsistent; indicated he found a joint on the ground yesterday: smoked it. Expressed conflict with step father, expressed unwillingness to attend drop off center and denies desire to attend social group: expressed desire to have custody of his own money; denies any crack use: expressed wanting marijuana card.            On 6-22-23: Soham Perla arrived late: admitted he took the $30 dollars given by case management: purchased crack cocaine: admitted using last 6-19-23: prior to last session: used crack: this session he arrived appearance improved, indicated he has been sleeping and eating: present lip service: willing to

## 2023-08-22 ENCOUNTER — HOSPITAL ENCOUNTER (OUTPATIENT)
Dept: PSYCHIATRY | Age: 43
Setting detail: THERAPIES SERIES
Discharge: HOME | End: 2023-08-22
Payer: COMMERCIAL

## 2023-08-22 PROCEDURE — 90834 PSYTX W PT 45 MINUTES: CPT

## 2023-08-22 PROCEDURE — 80305 DRUG TEST PRSMV DIR OPT OBS: CPT

## 2023-08-23 ENCOUNTER — HOSPITAL ENCOUNTER (OUTPATIENT)
Dept: PSYCHIATRY | Age: 43
Setting detail: THERAPIES SERIES
Discharge: HOME | End: 2023-08-23
Payer: COMMERCIAL

## 2023-08-23 NOTE — PROGRESS NOTES
500 [facility]        Individual  Progress Note    Location: [x] [place] [] [facility]                   Patient Name: Allyson Nelson   : 1980     Case # :  [id number]   Therapist: GUS Dubois        Cancel: left with : over prognosis of client plan to investigate contingent on length of being sober from crack: recommend group home.           Electronically signed by GUS Dubois on  at 9:31 AM       Bora Garcia, [address] [place] [address], \[facility]\"", GUS

## 2023-08-29 ENCOUNTER — HOSPITAL ENCOUNTER (OUTPATIENT)
Dept: PSYCHIATRY | Age: 43
Setting detail: THERAPIES SERIES
Discharge: HOME | End: 2023-08-29
Payer: COMMERCIAL

## 2023-08-29 PROCEDURE — 80305 DRUG TEST PRSMV DIR OPT OBS: CPT

## 2023-08-29 PROCEDURE — 90834 PSYTX W PT 45 MINUTES: CPT

## 2023-08-30 ENCOUNTER — HOSPITAL ENCOUNTER (OUTPATIENT)
Dept: PSYCHIATRY | Age: 43
Setting detail: THERAPIES SERIES
Discharge: HOME | End: 2023-08-30
Payer: COMMERCIAL

## 2023-08-30 PROCEDURE — 90834 PSYTX W PT 45 MINUTES: CPT

## 2023-08-30 PROCEDURE — 80305 DRUG TEST PRSMV DIR OPT OBS: CPT
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
## 2023-09-12 ENCOUNTER — HOSPITAL ENCOUNTER (OUTPATIENT)
Dept: PSYCHIATRY | Age: 43
Setting detail: THERAPIES SERIES
Discharge: HOME | End: 2023-09-12
Payer: COMMERCIAL

## 2023-09-12 PROCEDURE — 90834 PSYTX W PT 45 MINUTES: CPT

## 2023-09-12 PROCEDURE — 80305 DRUG TEST PRSMV DIR OPT OBS: CPT

## 2023-09-13 ENCOUNTER — OFFICE VISIT (OUTPATIENT)
Dept: INTERNAL MEDICINE CLINIC | Age: 43
End: 2023-09-13
Payer: COMMERCIAL

## 2023-09-13 ENCOUNTER — HOSPITAL ENCOUNTER (OUTPATIENT)
Dept: PSYCHIATRY | Age: 43
Setting detail: THERAPIES SERIES
Discharge: HOME | End: 2023-09-13
Payer: COMMERCIAL

## 2023-09-13 VITALS
WEIGHT: 163 LBS | BODY MASS INDEX: 24.14 KG/M2 | OXYGEN SATURATION: 98 % | SYSTOLIC BLOOD PRESSURE: 128 MMHG | DIASTOLIC BLOOD PRESSURE: 80 MMHG | HEART RATE: 94 BPM | HEIGHT: 69 IN

## 2023-09-13 DIAGNOSIS — S32.020D COMPRESSION FRACTURE OF L2 VERTEBRA WITH ROUTINE HEALING, SUBSEQUENT ENCOUNTER: ICD-10-CM

## 2023-09-13 DIAGNOSIS — Z23 NEED FOR VACCINATION WITH 20-POLYVALENT PNEUMOCOCCAL CONJUGATE VACCINE: ICD-10-CM

## 2023-09-13 DIAGNOSIS — F20.9 SCHIZOPHRENIA, UNSPECIFIED TYPE (HCC): ICD-10-CM

## 2023-09-13 DIAGNOSIS — Z72.0 TOBACCO ABUSE: ICD-10-CM

## 2023-09-13 DIAGNOSIS — Z23 NEED FOR TDAP VACCINATION: ICD-10-CM

## 2023-09-13 DIAGNOSIS — F41.9 ANXIETY: ICD-10-CM

## 2023-09-13 DIAGNOSIS — E78.2 MIXED HYPERLIPIDEMIA: ICD-10-CM

## 2023-09-13 DIAGNOSIS — J44.9 CHRONIC OBSTRUCTIVE PULMONARY DISEASE, UNSPECIFIED COPD TYPE (HCC): ICD-10-CM

## 2023-09-13 DIAGNOSIS — R73.9 ELEVATED BLOOD SUGAR: ICD-10-CM

## 2023-09-13 DIAGNOSIS — E11.42 DIABETIC POLYNEUROPATHY ASSOCIATED WITH TYPE 2 DIABETES MELLITUS (HCC): ICD-10-CM

## 2023-09-13 DIAGNOSIS — I10 ESSENTIAL HYPERTENSION: Primary | ICD-10-CM

## 2023-09-13 PROBLEM — R56.9 SEIZURE (HCC): Status: RESOLVED | Noted: 2023-02-24 | Resolved: 2023-09-13

## 2023-09-13 LAB
CHP ED QC CHECK: NORMAL
GLUCOSE BLD-MCNC: 132 MG/DL
HBA1C MFR BLD: 4.7 %

## 2023-09-13 PROCEDURE — 3023F SPIROM DOC REV: CPT | Performed by: INTERNAL MEDICINE

## 2023-09-13 PROCEDURE — 82962 GLUCOSE BLOOD TEST: CPT | Performed by: INTERNAL MEDICINE

## 2023-09-13 PROCEDURE — G8427 DOCREV CUR MEDS BY ELIG CLIN: HCPCS | Performed by: INTERNAL MEDICINE

## 2023-09-13 PROCEDURE — 2022F DILAT RTA XM EVC RTNOPTHY: CPT | Performed by: INTERNAL MEDICINE

## 2023-09-13 PROCEDURE — G8420 CALC BMI NORM PARAMETERS: HCPCS | Performed by: INTERNAL MEDICINE

## 2023-09-13 PROCEDURE — 3044F HG A1C LEVEL LT 7.0%: CPT | Performed by: INTERNAL MEDICINE

## 2023-09-13 PROCEDURE — 83036 HEMOGLOBIN GLYCOSYLATED A1C: CPT | Performed by: INTERNAL MEDICINE

## 2023-09-13 PROCEDURE — 90834 PSYTX W PT 45 MINUTES: CPT

## 2023-09-13 PROCEDURE — 3074F SYST BP LT 130 MM HG: CPT | Performed by: INTERNAL MEDICINE

## 2023-09-13 PROCEDURE — 3079F DIAST BP 80-89 MM HG: CPT | Performed by: INTERNAL MEDICINE

## 2023-09-13 PROCEDURE — 99214 OFFICE O/P EST MOD 30 MIN: CPT | Performed by: INTERNAL MEDICINE

## 2023-09-13 PROCEDURE — 4004F PT TOBACCO SCREEN RCVD TLK: CPT | Performed by: INTERNAL MEDICINE

## 2023-09-13 PROCEDURE — 80305 DRUG TEST PRSMV DIR OPT OBS: CPT

## 2023-09-13 RX ORDER — NALTREXONE HYDROCHLORIDE 50 MG/1
50 TABLET, FILM COATED ORAL DAILY
COMMUNITY

## 2023-09-13 RX ORDER — BENZTROPINE MESYLATE 1 MG/1
1 TABLET ORAL DAILY
COMMUNITY
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
## 2023-09-14 NOTE — PROGRESS NOTES
[facility]           Location: [x] [place] [] [facility]        Treatment plan: Initial          Strengths; , [facility], residing with supportive relative            Weakness/Limitations:  legally involved , limited sobriety, history of non compliant with psychiatric medications               Service/Frequency/Duration: Individual Session x 1 time weekly x 90 days or as needed, Urinalysis one time monthly x 90 days. Diagnosis:         F14.20 Cocaine Dependence Disorder   F12.20 Cannabis Use Disorder                                  Level of Care:  Standard  Outpatient and Individual sessions         Problem: History of Substance use            Goal: Enhance personalized knowledge and insight associated with mood altering substances x 90 days        Objectives:           1) Remind 2 to 6  detrimental consequences in major life areas regarding substance  use in 90 days Evaluation Date: 9/13/2023 Code: On 6-20-23: Janie Grossman arrived extremely hyper, anxious: needing constant redirection: indicated he has not slept in 2 or 3 days; smoking more cigarettes: drinking multiple cups of coffee: admitted over taking his psychiatric medication: specifically Buspar: drinking energy drinks: hyper and walking all over [place]: limited eating: memory inconsistent; indicated he found a joint on the ground yesterday: smoked it. Expressed conflict with step father, expressed unwillingness to attend drop off center and denies desire to attend social group: expressed desire to have custody of his own money; denies any crack use: expressed wanting marijuana card.            On 6-22-23: Janie Grossman arrived late: admitted he took the $30 dollars given by case management: purchased crack cocaine: admitted using last 6-19-23: prior to last session: used crack: this session he arrived appearance improved, indicated he has been sleeping and eating: present lip service: willing to
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
## 2023-09-19 ENCOUNTER — HOSPITAL ENCOUNTER (OUTPATIENT)
Dept: PSYCHIATRY | Age: 43
Setting detail: THERAPIES SERIES
Discharge: HOME | End: 2023-09-19
Payer: COMMERCIAL

## 2023-09-19 PROCEDURE — 80305 DRUG TEST PRSMV DIR OPT OBS: CPT

## 2023-09-19 PROCEDURE — 90834 PSYTX W PT 45 MINUTES: CPT
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
## 2023-09-20 ENCOUNTER — HOSPITAL ENCOUNTER (OUTPATIENT)
Dept: PSYCHIATRY | Age: 43
Discharge: HOME | End: 2023-09-20

## 2023-09-20 NOTE — PROGRESS NOTES
500 [facility]        Individual  Progress Note      Location: [x] [place] [] [facility]                   Patient Name: Ana Maria Marte   : 1980     Case # :  [id number]  Therapist: GUS Hastings          Did not show         Electronically signed by GUS Hastings on 3/67/2019 at 1:09 PM         Parisa Kraus, [address] [place] [address], [facility] [place] [facility], [place]

## 2023-09-26 ENCOUNTER — HOSPITAL ENCOUNTER (OUTPATIENT)
Dept: PSYCHIATRY | Age: 43
Setting detail: THERAPIES SERIES
Discharge: HOME | End: 2023-09-26
Payer: COMMERCIAL

## 2023-09-26 PROCEDURE — 90834 PSYTX W PT 45 MINUTES: CPT

## 2023-09-26 PROCEDURE — 80305 DRUG TEST PRSMV DIR OPT OBS: CPT

## 2023-09-27 ENCOUNTER — HOSPITAL ENCOUNTER (OUTPATIENT)
Dept: PSYCHIATRY | Age: 43
Setting detail: THERAPIES SERIES
Discharge: HOME | End: 2023-09-27
Payer: COMMERCIAL

## 2023-09-27 PROCEDURE — 80305 DRUG TEST PRSMV DIR OPT OBS: CPT

## 2023-09-27 PROCEDURE — 90834 PSYTX W PT 45 MINUTES: CPT
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
## 2023-10-03 ENCOUNTER — HOSPITAL ENCOUNTER (OUTPATIENT)
Dept: PSYCHIATRY | Age: 43
Setting detail: THERAPIES SERIES
Discharge: HOME | End: 2023-10-03
Payer: COMMERCIAL

## 2023-10-03 PROCEDURE — 80305 DRUG TEST PRSMV DIR OPT OBS: CPT

## 2023-10-03 PROCEDURE — 90834 PSYTX W PT 45 MINUTES: CPT
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
## 2023-10-04 ENCOUNTER — HOSPITAL ENCOUNTER (OUTPATIENT)
Dept: PSYCHIATRY | Age: 43
Discharge: HOME | End: 2023-10-04

## 2023-10-05 NOTE — PROGRESS NOTES
500 [facility]        Individual  Progress Note        Location: [x] [place] [] [facility]                   Patient Name: Maria Luz Kincaid   : 1980     Case # :  [id number]  Therapist: GUS Khalil        Did not show           Electronically signed by GUS Khalil on 4556 at 3:05 PM         Joselito Gilbert, [address] [place] [address], [facility], PAULA

## 2023-10-10 ENCOUNTER — HOSPITAL ENCOUNTER (OUTPATIENT)
Dept: PSYCHIATRY | Age: 43
Setting detail: THERAPIES SERIES
Discharge: HOME | End: 2023-10-10
Payer: COMMERCIAL

## 2023-10-10 NOTE — PROGRESS NOTES
500 [facility]        Individual  Progress Note    Location: [x] [place] [] [place]                   Patient Name: Tonya Umanzor   : 1980     Case # :  [id number]  Therapist: GUS Moore          Cancel: therapist contacted  concerned client arrived early at 8:30 AM: therapist had another client until 5 Am: Mina Jones let and never returned.            Electronically signed by GUS Moore on  at 4:52 PM         Robina Guo, [address] [place] [address], [facility], PAULACS

## 2023-10-11 ENCOUNTER — HOSPITAL ENCOUNTER (OUTPATIENT)
Dept: PSYCHIATRY | Age: 43
Setting detail: THERAPIES SERIES
Discharge: HOME | End: 2023-10-11
Payer: COMMERCIAL

## 2023-10-11 PROCEDURE — 90834 PSYTX W PT 45 MINUTES: CPT

## 2023-10-11 PROCEDURE — 80305 DRUG TEST PRSMV DIR OPT OBS: CPT

## 2023-10-11 NOTE — PROGRESS NOTES
crack; which is progressing towards 2 months. O: Denies any homicidal and suicidal ideation: denies any psychosis, oriented x 4                 A: reviewed history of being sober, intervals of being sober: and relapse .               P: continue services      Electronically signed by Danica Anguiano [facility]-TIARA on 31/04/3825 at 3:49 PM         Delmer Wooten, [address] [place] [address], [facility], [facility]-CS
On 6-27-23: identified concerns of idle time: and Alexy Brewster expressed he \" likes using\" identified he did not use this week because he did not have any money, expressed paying his step father back because he borrowed money and used it for crack cocaine:   not giving him cash and plans at to seek potential volunteer facility to assist with structure. On 6-29-23: Alexy Brewster arrived, disheveled; denied any substance use: completed application for volunteer services; discussed missing his family although he used with parents and sister: indicated closet with mother because Jacki Cuadra took care of me\" and he \"blames family for leaving him\": admitted he \"likes using crack\" ; indicated he went to drop in center Tuesday, ate but has not showered since Monday. On 7-25-23: Alexy Brewster arrived, disheveled: indicated he came to session but he\" just got out of bed: denied any substance use:\" indicated TV and play station is not currently working, was working: identified struggle with desire from attention from step father. On 7-25-23: Alexy Brewster arrived, appearance disheveled, he indicated eating breakfast: he report needing to do laundry, clean room: tends to reflect being lazy, tends to desire someone else to do for him, admit he tends to desire stp father attention. On 8-16-23: Alexy Brewster arrived, externally appeared disheveled, he admitted to having the same clothing as yesterday: indicated he has not been smoking marijuana: recognized he has progressed in amount of cigarettes he has been smoking: admitted he has been depressed for years, admitted he does not like thinking about some of the things that come to mind and admitted he has cravings for crack \" most days\". 2.    Problem: Limited knowledge regarding disease concept and substance use. Goal:  Enhance knowledge and understanding regarding disease concept associated with substance use.          Objectives:

## 2023-10-17 ENCOUNTER — HOSPITAL ENCOUNTER (OUTPATIENT)
Dept: PSYCHIATRY | Age: 43
Setting detail: THERAPIES SERIES
Discharge: HOME | End: 2023-10-17
Payer: COMMERCIAL

## 2023-10-17 ENCOUNTER — APPOINTMENT (OUTPATIENT)
Dept: PSYCHIATRY | Age: 43
End: 2023-10-17
Payer: COMMERCIAL

## 2023-10-17 PROCEDURE — 80305 DRUG TEST PRSMV DIR OPT OBS: CPT

## 2023-10-17 PROCEDURE — 90834 PSYTX W PT 45 MINUTES: CPT

## 2023-10-17 NOTE — PROGRESS NOTES
[facility]           Location: [facility]x] [place] [facility]        Treatment plan: Initial          Strengths; , [facility], residing with supportive relative            Weakness/Limitations:  legally involved , limited sobriety, history of non compliant with psychiatric medications               Service/Frequency/Duration: Individual Session x 1 time weekly x 90 days or as needed, Urinalysis one time monthly x 90 days. Diagnosis:         F14.20 Cocaine Dependence Disorder   F12.20 Cannabis Use Disorder                                  Level of Care:  Standard  Outpatient and Individual sessions         Problem: History of Substance use            Goal: Enhance personalized knowledge and insight associated with mood altering substances x 90 days        Objectives:           1) Remind 2 to 6  detrimental consequences in major life areas regarding substance  use in 90 days Evaluation Date: 12/13/2023 Code: On 6-20-23: Javier Lewis arrived extremely hyper, anxious: needing constant redirection: indicated he has not slept in 2 or 3 days; smoking more cigarettes: drinking multiple cups of coffee: admitted over taking his psychiatric medication: specifically Buspar: drinking energy drinks: hyper and walking all over [address] [employer] [place]: limited eating: memory inconsistent; indicated he found a joint on the ground yesterday: smoked it. Expressed conflict with step father, expressed unwillingness to attend drop off center and denies desire to attend social group: expressed desire to have custody of his own money; denies any crack use: expressed wanting marijuana card.            On 6-22-23: Javier Lewis arrived late: admitted he took the $30 dollars given by case management: purchased crack cocaine: admitted using last 6-19-23: prior to last session: used crack: this session he arrived appearance improved, indicated he has been sleeping and eating: present lip service: willing
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
## 2023-10-24 ENCOUNTER — HOSPITAL ENCOUNTER (OUTPATIENT)
Dept: PSYCHIATRY | Age: 43
Setting detail: THERAPIES SERIES
Discharge: HOME | End: 2023-10-24
Payer: COMMERCIAL

## 2023-10-24 ENCOUNTER — APPOINTMENT (OUTPATIENT)
Dept: PSYCHIATRY | Age: 43
End: 2023-10-24
Payer: COMMERCIAL

## 2023-10-24 PROCEDURE — 90834 PSYTX W PT 45 MINUTES: CPT

## 2023-10-24 PROCEDURE — 80305 DRUG TEST PRSMV DIR OPT OBS: CPT
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
## 2023-10-26 ENCOUNTER — HOSPITAL ENCOUNTER (EMERGENCY)
Age: 43
Discharge: HOME | End: 2023-10-26
Attending: EMERGENCY MEDICINE
Payer: COMMERCIAL

## 2023-10-26 ENCOUNTER — APPOINTMENT (OUTPATIENT)
Dept: GENERAL RADIOLOGY | Age: 43
End: 2023-10-26
Payer: COMMERCIAL

## 2023-10-26 VITALS
OXYGEN SATURATION: 99 % | RESPIRATION RATE: 18 BRPM | HEART RATE: 87 BPM | TEMPERATURE: 97.4 F | SYSTOLIC BLOOD PRESSURE: 134 MMHG | DIASTOLIC BLOOD PRESSURE: 95 MMHG

## 2023-10-26 DIAGNOSIS — J45.901 MODERATE ASTHMA WITH ACUTE EXACERBATION, UNSPECIFIED WHETHER PERSISTENT: Primary | ICD-10-CM

## 2023-10-26 DIAGNOSIS — J44.9 CHRONIC OBSTRUCTIVE PULMONARY DISEASE, UNSPECIFIED COPD TYPE (HCC): ICD-10-CM

## 2023-10-26 PROCEDURE — 6370000000 HC RX 637 (ALT 250 FOR IP): Performed by: EMERGENCY MEDICINE

## 2023-10-26 PROCEDURE — 93005 ELECTROCARDIOGRAM TRACING: CPT | Performed by: EMERGENCY MEDICINE

## 2023-10-26 PROCEDURE — 99283 EMERGENCY DEPT VISIT LOW MDM: CPT

## 2023-10-26 RX ORDER — PREDNISONE 20 MG/1
60 TABLET ORAL DAILY
Qty: 12 TABLET | Refills: 0 | Status: SHIPPED | OUTPATIENT
Start: 2023-10-26 | End: 2023-10-27

## 2023-10-26 RX ORDER — ALBUTEROL SULFATE 90 UG/1
2 AEROSOL, METERED RESPIRATORY (INHALATION) 4 TIMES DAILY PRN
Qty: 54 G | Refills: 0 | Status: SHIPPED | OUTPATIENT
Start: 2023-10-26 | End: 2023-10-27

## 2023-10-26 RX ORDER — PREDNISONE 20 MG/1
60 TABLET ORAL ONCE
Status: COMPLETED | OUTPATIENT
Start: 2023-10-26 | End: 2023-10-26

## 2023-10-26 RX ADMIN — PREDNISONE 60 MG: 20 TABLET ORAL at 02:28

## 2023-10-27 ENCOUNTER — HOSPITAL ENCOUNTER (EMERGENCY)
Age: 43
Discharge: HOME | End: 2023-10-27
Payer: COMMERCIAL

## 2023-10-27 VITALS
BODY MASS INDEX: 24.07 KG/M2 | DIASTOLIC BLOOD PRESSURE: 91 MMHG | OXYGEN SATURATION: 98 % | HEART RATE: 72 BPM | RESPIRATION RATE: 18 BRPM | WEIGHT: 163 LBS | TEMPERATURE: 97.8 F | SYSTOLIC BLOOD PRESSURE: 146 MMHG

## 2023-10-27 DIAGNOSIS — J45.901 EXACERBATION OF ASTHMA, UNSPECIFIED ASTHMA SEVERITY, UNSPECIFIED WHETHER PERSISTENT: Primary | ICD-10-CM

## 2023-10-27 LAB
EKG ATRIAL RATE: 87 BPM
EKG DIAGNOSIS: NORMAL
EKG P AXIS: 64 DEGREES
EKG P-R INTERVAL: 136 MS
EKG Q-T INTERVAL: 364 MS
EKG QRS DURATION: 84 MS
EKG QTC CALCULATION (BAZETT): 438 MS
EKG R AXIS: 76 DEGREES
EKG T AXIS: 47 DEGREES
EKG VENTRICULAR RATE: 87 BPM

## 2023-10-27 PROCEDURE — 99284 EMERGENCY DEPT VISIT MOD MDM: CPT

## 2023-10-27 PROCEDURE — 94644 CONT INHLJ TX 1ST HOUR: CPT

## 2023-10-27 PROCEDURE — 96365 THER/PROPH/DIAG IV INF INIT: CPT

## 2023-10-27 PROCEDURE — 6360000002 HC RX W HCPCS: Performed by: PHYSICIAN ASSISTANT

## 2023-10-27 PROCEDURE — 96375 TX/PRO/DX INJ NEW DRUG ADDON: CPT

## 2023-10-27 PROCEDURE — 93010 ELECTROCARDIOGRAM REPORT: CPT | Performed by: INTERNAL MEDICINE

## 2023-10-27 PROCEDURE — 6370000000 HC RX 637 (ALT 250 FOR IP): Performed by: PHYSICIAN ASSISTANT

## 2023-10-27 RX ORDER — ALBUTEROL SULFATE 90 UG/1
2 AEROSOL, METERED RESPIRATORY (INHALATION) 4 TIMES DAILY PRN
Qty: 54 G | Refills: 1 | Status: SHIPPED | OUTPATIENT
Start: 2023-10-27

## 2023-10-27 RX ORDER — AZITHROMYCIN 250 MG/1
250 TABLET, FILM COATED ORAL DAILY
Qty: 4 TABLET | Refills: 0 | Status: SHIPPED | OUTPATIENT
Start: 2023-10-28 | End: 2023-11-01

## 2023-10-27 RX ORDER — IPRATROPIUM BROMIDE AND ALBUTEROL SULFATE 2.5; .5 MG/3ML; MG/3ML
3 SOLUTION RESPIRATORY (INHALATION) ONCE
Status: COMPLETED | OUTPATIENT
Start: 2023-10-27 | End: 2023-10-27

## 2023-10-27 RX ORDER — PREDNISONE 20 MG/1
60 TABLET ORAL DAILY
Qty: 15 TABLET | Refills: 0 | Status: SHIPPED | OUTPATIENT
Start: 2023-10-28 | End: 2023-11-02

## 2023-10-27 RX ORDER — METHYLPREDNISOLONE SODIUM SUCCINATE 125 MG/2ML
125 INJECTION, POWDER, LYOPHILIZED, FOR SOLUTION INTRAMUSCULAR; INTRAVENOUS ONCE
Status: COMPLETED | OUTPATIENT
Start: 2023-10-27 | End: 2023-10-27

## 2023-10-27 RX ORDER — AZITHROMYCIN 250 MG/1
500 TABLET, FILM COATED ORAL ONCE
Status: COMPLETED | OUTPATIENT
Start: 2023-10-27 | End: 2023-10-27

## 2023-10-27 RX ORDER — MAGNESIUM SULFATE IN WATER 40 MG/ML
2000 INJECTION, SOLUTION INTRAVENOUS ONCE
Status: COMPLETED | OUTPATIENT
Start: 2023-10-27 | End: 2023-10-27

## 2023-10-27 RX ADMIN — MAGNESIUM SULFATE HEPTAHYDRATE 2000 MG: 40 INJECTION, SOLUTION INTRAVENOUS at 11:00

## 2023-10-27 RX ADMIN — METHYLPREDNISOLONE SODIUM SUCCINATE 125 MG: 125 INJECTION, POWDER, LYOPHILIZED, FOR SOLUTION INTRAMUSCULAR; INTRAVENOUS at 10:58

## 2023-10-27 RX ADMIN — IPRATROPIUM BROMIDE AND ALBUTEROL SULFATE 3 DOSE: 2.5; .5 SOLUTION RESPIRATORY (INHALATION) at 11:08

## 2023-10-27 RX ADMIN — AZITHROMYCIN DIHYDRATE 500 MG: 250 TABLET ORAL at 10:59

## 2023-10-27 ASSESSMENT — ENCOUNTER SYMPTOMS
SHORTNESS OF BREATH: 1
TROUBLE SWALLOWING: 0
VOICE CHANGE: 0
WHEEZING: 1

## 2023-10-27 ASSESSMENT — PAIN - FUNCTIONAL ASSESSMENT: PAIN_FUNCTIONAL_ASSESSMENT: 0-10

## 2023-10-27 ASSESSMENT — PAIN SCALES - GENERAL: PAINLEVEL_OUTOF10: 0

## 2023-10-27 NOTE — DISCHARGE INSTRUCTIONS
Follow-up with your primary care provider for reevaluation of your  discuss your visit to the emergency department and if needed any additional treatments or referrals. Return with any difficulty breathing, chest pain, confusion, coughing up blood, worsening symptoms or any new concerns. Continue to use the prescription medications prescribed to you today including your inhaler.

## 2023-10-27 NOTE — ED PROVIDER NOTES
with his history of asthma, low suspicion for PE just that he is PERC negative low risk for PE per Wells criteria. He has no chest pain exertional worsening symptoms diaphoresis low suspicion for ACS    Condition is: moderate stable chronic illness    Disposition Considerations (tests considered but not done, Shared Decision Making, Pt Expectation of Test or Tx.): CTA chest to evaluate for possible PE, however patient is PERC negative and low risk for PE per Wells Criteria    Discussion with Other Professionals : As indicated in SUMMARY,  [address], [address], MDM    External Records Reviewed : None    Escalation of care, including admission/OBS considered : Escalation of care, including admission/OBS considered: However patient responded well to treatment here on repeat examination vital stable, lung sounds improved p.o. Safe for outpatient management         Chronic conditions affecting care:    has a past medical history of Anxiety, Asthma, Chronic back pain, Diabetes mellitus ([address]), H/O 24 hour EKG monitoring (09/19/2013), H/O echocardiogram (04/20/2017), echocardiogram (08/16/13), Hyperlipidemia, Hypertension, and Schizophrenia ([address] Social Determinants Significantly Affecting  Health:  Social Determinants of Health : None     Disposition: Discussed need to follow up diagnostics, including incidental findings. Discharged with instructions to obtain outpatient follow up of patient's symptoms and findings, with strict return precautions if patient develops new or worsening symptoms. Follow-up plan and return precautions were provided and discussed in detail patient in agreement. I am the Primary Clinician of Record. Disposition: Discussed need to follow up diagnostics, including incidental findings. Discharged with instructions to obtain outpatient follow up of patient's symptoms and findings, with strict return precautions if patient develops new or worsening symptoms.   Follow-up plan and

## 2023-10-27 NOTE — ED TRIAGE NOTES
Pt presents for SOB, pg states it has been going on \"for 3 weeks. \" Pt hilda any chest pain, pt asks for \"breathing tx\" on arrival. Pt ambulated without assistance to ED 10 from medic unit. Pt 98% on room air.

## 2023-10-29 LAB
EKG ATRIAL RATE: 77 BPM
EKG DIAGNOSIS: NORMAL
EKG P AXIS: 49 DEGREES
EKG P-R INTERVAL: 158 MS
EKG Q-T INTERVAL: 364 MS
EKG QRS DURATION: 80 MS
EKG QTC CALCULATION (BAZETT): 411 MS
EKG R AXIS: 80 DEGREES
EKG T AXIS: 59 DEGREES
EKG VENTRICULAR RATE: 77 BPM

## 2023-10-31 ENCOUNTER — APPOINTMENT (OUTPATIENT)
Dept: PSYCHIATRY | Age: 43
End: 2023-10-31
Payer: COMMERCIAL

## 2023-10-31 PROCEDURE — 80305 DRUG TEST PRSMV DIR OPT OBS: CPT

## 2023-10-31 PROCEDURE — 90834 PSYTX W PT 45 MINUTES: CPT

## 2023-10-31 NOTE — PROGRESS NOTES
[facility]           Location: [x] [place] [] [facility]        Treatment plan: Initial          Strengths; , [facility], residing with supportive relative            Weakness/Limitations:  legally involved , limited sobriety, history of non compliant with psychiatric medications               Service/Frequency/Duration: Individual Session x 1 time weekly x 90 days or as needed, Urinalysis one time monthly x 90 days. Diagnosis:         F14.20 Cocaine Dependence Disorder   F12.20 Cannabis Use Disorder                                  Level of Care:  Standard  Outpatient and Individual sessions         Problem: History of Substance use            Goal: Enhance personalized knowledge and insight associated with mood altering substances x 90 days        Objectives:           1) Remind 2 to 6  detrimental consequences in major life areas regarding substance  use in 90 days Evaluation Date: 12/13/2023 Code: On 6-20-23: Candido Quiros arrived extremely hyper, anxious: needing constant redirection: indicated he has not slept in 2 or 3 days; smoking more cigarettes: drinking multiple cups of coffee: admitted over taking his psychiatric medication: specifically Buspar: drinking energy drinks: hyper and walking all over [place]: limited eating: memory inconsistent; indicated he found a joint on the ground yesterday: smoked it. Expressed conflict with step father, expressed unwillingness to attend drop off center and denies desire to attend social group: expressed desire to have custody of his own money; denies any crack use: expressed wanting marijuana card.            On 6-22-23: Candido Quiros arrived late: admitted he took the $30 dollars given by case management: purchased crack cocaine: admitted using last 6-19-23: prior to last session: used crack: this session he arrived appearance improved, indicated he has been sleeping and eating: present lip service: willing
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
## 2023-11-07 ENCOUNTER — APPOINTMENT (OUTPATIENT)
Dept: PSYCHIATRY | Age: 43
End: 2023-11-07
Payer: COMMERCIAL

## 2023-11-07 ENCOUNTER — HOSPITAL ENCOUNTER (OUTPATIENT)
Dept: PSYCHIATRY | Age: 43
Setting detail: THERAPIES SERIES
Discharge: HOME | End: 2023-11-07
Payer: COMMERCIAL

## 2023-11-07 PROCEDURE — 80305 DRUG TEST PRSMV DIR OPT OBS: CPT

## 2023-11-07 PROCEDURE — 90834 PSYTX W PT 45 MINUTES: CPT
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
## 2023-11-14 ENCOUNTER — APPOINTMENT (OUTPATIENT)
Dept: PSYCHIATRY | Age: 43
End: 2023-11-14
Payer: COMMERCIAL

## 2023-11-14 ENCOUNTER — HOSPITAL ENCOUNTER (OUTPATIENT)
Dept: PSYCHIATRY | Age: 43
Setting detail: THERAPIES SERIES
Discharge: HOME | End: 2023-11-14
Payer: COMMERCIAL

## 2023-11-14 PROCEDURE — 80305 DRUG TEST PRSMV DIR OPT OBS: CPT

## 2023-11-14 PROCEDURE — 90834 PSYTX W PT 45 MINUTES: CPT
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
## 2023-11-21 ENCOUNTER — HOSPITAL ENCOUNTER (OUTPATIENT)
Dept: PSYCHIATRY | Age: 43
Setting detail: THERAPIES SERIES
Discharge: HOME | End: 2023-11-21
Payer: COMMERCIAL

## 2023-11-21 ENCOUNTER — APPOINTMENT (OUTPATIENT)
Dept: PSYCHIATRY | Age: 43
End: 2023-11-21
Payer: COMMERCIAL

## 2023-11-21 NOTE — PROGRESS NOTES
500 [facility]        Individual  Progress Note      Location: [x] [place] [] Margarita Love                   Patient Name: Feliz Srinivasan   : 1980     Case # :  [id number]  Therapist: GUS Medina        Therapist off work: therefore therapist cancelled session.        Electronically signed by GUS Medina on  at 8:56 AM       ESTER Jackson, CHAZ, GUS

## 2023-11-22 NOTE — PROGRESS NOTES
500 [facility]        Individual  Progress Note    Location: [x] [place] [] [facility]                   Patient Name: Brandon Or   : 1980     Case # :  [id number]  Therapist: ED HoltIII        Objective/Service/Time:   CASE MANAGEMENT  . 50  Problem 2 Objective 3    S:  I spoke with Neil Thomas,  [facility], about client's potential interview at [facility]. She explained that she could assist client with phone assessment if needed. I then spoke with client and he stated that he has spoken with [facility], but application needed to be submitted. I explained that I resubmitted the application and [facility] should be reaching out to him. He stated that he has the number and I suggested that he attempt contact as well. O:  Client was oriented and open to discussion. A:  [facility]. P:  I will reach back out to [facility] and see if they can try to contact , Neil Thomas as well. Client will continue to speak with Therapist or  about requests for assistance if needed.       SCHUYLER Holt, [facility], CTTS       Electronically signed by Ruddy Sandhu on 2023 at 2:51 PM

## 2023-11-28 ENCOUNTER — HOSPITAL ENCOUNTER (OUTPATIENT)
Dept: PSYCHIATRY | Age: 43
Setting detail: THERAPIES SERIES
Discharge: HOME | End: 2023-11-28
Payer: COMMERCIAL

## 2023-11-28 NOTE — PROGRESS NOTES
500 [facility]        Individual  Progress Note      Location: [x] [place] [] [facility]                   Patient Name: Hemant Lea   : 1980     Case # :  [id number]  Therapist: GUS Solomon      Did not show: left message for : still awaiting [facility].           Electronically signed by GUS Solomon on  at 5:10 PM       [facility], [address] [place] [address], [facility], GUS

## 2023-12-05 ENCOUNTER — HOSPITAL ENCOUNTER (OUTPATIENT)
Dept: PSYCHIATRY | Age: 43
Setting detail: THERAPIES SERIES
Discharge: HOME | End: 2023-12-05
Payer: COMMERCIAL

## 2023-12-05 ENCOUNTER — APPOINTMENT (OUTPATIENT)
Dept: PSYCHIATRY | Age: 43
End: 2023-12-05
Payer: COMMERCIAL

## 2023-12-05 PROCEDURE — 80305 DRUG TEST PRSMV DIR OPT OBS: CPT

## 2023-12-05 PROCEDURE — 90834 PSYTX W PT 45 MINUTES: CPT

## 2023-12-08 NOTE — PROGRESS NOTES
500 [facility]        Individual  Progress Note    Location: [x] [place] [] [facility]                   Patient Name: Brandon Or   : 1980     Case # :  [id number]  Therapist: Ruddy Sandhu        Objective/Service/Time:   CASE MANAGEMENT  2.0    S:  I contacted client to see if he was available to meet today to discuss residential placement. Client came to office at 2 pm.  We made contact with [facility] and completed the assessment. Client has been accepted and may arrive to the [facility] location on  or  by 2 pm; call in advance. Client needs to bring 30 day supply of medication. He will be responsible for $120/week, but [facility] provides the shelter, food, clothing, toiletries etc.  Client has to be substance free on arrival.  I spoke with Karena Jacques, . She will inquire about getting a 30 day supply of medications and let me know. She may also be able to transport on 23. We will discuss again. O:  Client was oriented and open to discussion. Client answered all questions during assessment. Client stated \"I'm nervous about going. \"      A:  Client and I also contacted [facility] for back up plan and completed prescreen. Reportedly an outreach  will contact me with more information on different facility for dual diagnosis. P:  I will follow up with client,  and [facility] to determine the date of arrival.  Client will continue to speak with Therapist or  about requests for assistance if needed.       SCHUYLER Holt, [facility], [facility]       Electronically signed by Ruddy Sandhu on 2023 at 4:27 PM
[facility]           Location: [facility]x] [place] [facility]        Treatment plan: Initial          Strengths; , [facility], residing with supportive relative            Weakness/Limitations:  legally involved , limited sobriety, history of non compliant with psychiatric medications               Service/Frequency/Duration: Individual Session x 1 time weekly x 90 days or as needed, Urinalysis one time monthly x 90 days. Diagnosis:         F14.20 Cocaine Dependence Disorder   F12.20 Cannabis Use Disorder                                  Level of Care:  Standard  Outpatient and Individual sessions         Problem: History of Substance use            Goal: Enhance personalized knowledge and insight associated with mood altering substances x 90 days        Objectives:           1) Remind 2 to 6  detrimental consequences in major life areas regarding substance  use in 90 days Evaluation Date: 12/13/2023 Code: On 6-20-23: Greta Thao arrived extremely hyper, anxious: needing constant redirection: indicated he has not slept in 2 or 3 days; smoking more cigarettes: drinking multiple cups of coffee: admitted over taking his psychiatric medication: specifically Buspar: drinking energy drinks: hyper and walking all over [place]: limited eating: memory inconsistent; indicated he found a joint on the ground yesterday: smoked it. Expressed conflict with step father, expressed unwillingness to attend drop off center and denies desire to attend social group: expressed desire to have custody of his own money; denies any crack use: expressed wanting marijuana card.            On 6-22-23: Greta Thao arrived late: admitted he took the $30 dollars given by case management: purchased crack cocaine: admitted using last 6-19-23: prior to last session: used crack: this session he arrived appearance improved, indicated he has been sleeping and eating: present lip service: willing
and suicidal ideation: denies any psychosis, oriented x 4                 A: collected urine screen, reviewed and engaged with case management concerning [facility] due to step father desires him to leave and lack of being sober.                      P: continue services          Electronically signed by Julio Josue [facility] on 74/0/8026 at 7:56 PM     Carla Chery, [facility], LSW, [facility]-CS

## 2023-12-12 ENCOUNTER — APPOINTMENT (OUTPATIENT)
Dept: PSYCHIATRY | Age: 43
End: 2023-12-12
Payer: COMMERCIAL

## 2023-12-13 ENCOUNTER — HOSPITAL ENCOUNTER (OUTPATIENT)
Dept: PSYCHIATRY | Age: 43
Setting detail: THERAPIES SERIES
Discharge: HOME | End: 2023-12-13
Payer: COMMERCIAL

## 2023-12-14 NOTE — PROGRESS NOTES
Services        Client's Outcomes Treatment: (Review of participation, successes, insight, etc.)         Denis Quiros admitted to [facility],  through [address] transported Denis Quiros tension within residence with step father estranged. In addition, Denis Quiros demonstrated positive urine screen: November 14, 2023: Cocaine 5432: marijuana 75 ml) : November 7, 2023: cocaine : 643 ng/ ml: marijuana 42 ng/ml: cocaine 2549 ng/ml: October 24, 2023: Amphetamine 1718 ng./ ml : methamphetamine 19,445, cocaine 12,063; marijuana 31 ng/ml: demonstrated inability to remain sober in outpatient program.        Service History Appointments:       Scheduled  26      Kept   20      Cancelled    2      Did not show   4    Discharge Code: A referral to higher level of care        Reason for discharge: Recommended  and admitted to Higher Level of Care: Mattie Adamson: Андрей         Recommendations/Referrals: Recommended  and admitted to Higher Level of Care: Mattie Chiang         Upon involuntary termination from service, client has received Client Rights as to their right to file an appeal.    Adult/Adolescent Discharge  Level of Care Criteria to be completed separately       Completed Level of care attached with Discharge Summary           Emra Morrell [facility]   27/72/8965 / 12:08 PM       Medical Director     MISSY Mccall MD    Signature:

## 2023-12-19 ENCOUNTER — APPOINTMENT (OUTPATIENT)
Dept: PSYCHIATRY | Age: 43
End: 2023-12-19
Payer: COMMERCIAL

## 2023-12-21 ENCOUNTER — APPOINTMENT (OUTPATIENT)
Dept: PSYCHIATRY | Age: 43
End: 2023-12-21
Payer: COMMERCIAL

## 2023-12-26 ENCOUNTER — APPOINTMENT (OUTPATIENT)
Dept: PSYCHIATRY | Age: 43
End: 2023-12-26
Payer: COMMERCIAL
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
## 2024-01-02 ENCOUNTER — HOSPITAL ENCOUNTER (EMERGENCY)
Age: 44
Discharge: HOME | End: 2024-01-02
Attending: EMERGENCY MEDICINE
Payer: COMMERCIAL

## 2024-01-02 VITALS
BODY MASS INDEX: 27.94 KG/M2 | HEART RATE: 85 BPM | TEMPERATURE: 98.2 F | DIASTOLIC BLOOD PRESSURE: 93 MMHG | RESPIRATION RATE: 20 BRPM | HEIGHT: 68 IN | OXYGEN SATURATION: 100 % | WEIGHT: 184.34 LBS | SYSTOLIC BLOOD PRESSURE: 145 MMHG

## 2024-01-02 DIAGNOSIS — J45.41 MODERATE PERSISTENT ASTHMA WITH EXACERBATION: Primary | ICD-10-CM

## 2024-01-02 PROCEDURE — 99283 EMERGENCY DEPT VISIT LOW MDM: CPT

## 2024-01-02 RX ORDER — ALBUTEROL SULFATE 90 UG/1
2 AEROSOL, METERED RESPIRATORY (INHALATION) 4 TIMES DAILY PRN
Qty: 18 G | Refills: 0 | Status: SHIPPED | OUTPATIENT
Start: 2024-01-02

## 2024-01-02 RX ORDER — METHYLPREDNISOLONE 4 MG/1
TABLET ORAL
Qty: 1 KIT | Refills: 0 | Status: SHIPPED | OUTPATIENT
Start: 2024-01-02 | End: 2024-01-02

## 2024-01-02 RX ORDER — ALBUTEROL SULFATE 90 UG/1
2 AEROSOL, METERED RESPIRATORY (INHALATION) 4 TIMES DAILY PRN
Qty: 18 G | Refills: 0 | Status: SHIPPED | OUTPATIENT
Start: 2024-01-02 | End: 2024-01-02

## 2024-01-02 RX ORDER — METHYLPREDNISOLONE 4 MG/1
TABLET ORAL
Qty: 1 KIT | Refills: 0 | Status: SHIPPED | OUTPATIENT
Start: 2024-01-02 | End: 2024-01-08

## 2024-01-02 ASSESSMENT — PAIN - FUNCTIONAL ASSESSMENT
PAIN_FUNCTIONAL_ASSESSMENT: NONE - DENIES PAIN

## 2024-01-02 NOTE — ED PROVIDER NOTES
EMERGENCY DEPARTMENT ENCOUNTER     [facility] EMERGENCY DEPARTMENT     Pt Name: Peter Blanco   MRN: [id number]   Birthdate 1980   Date of evaluation: 1/2/2024   Provider: Sam Henriquez II, DO   PCP: Prasanna Salazar MD   Note Started: 4:05 PM EST 1/2/24     CHIEF COMPLAINT     Chief Complaint   Patient presents with    Shortness of Breath        HISTORY OF PRESENT ILLNESS:  History from : Patient   Limitations to history : None     Peter Blanco is a 43 y.o. male who presents to the emergency department complaining of shortness of breath.  Patient states he has longstanding asthma and has not been able to obtain his albuterol as he is currently residing in a homeless shelter.  Patient reports occasional nonproductive cough which he states is chronic for him.  No fevers, chills, or sweats.  No lower extremity swelling or edema.  No sick contacts.    Nursing Notes were all reviewed and agreed with or any disagreements were addressed in the HPI.     ROS: Positives and Pertinent negatives as per HPI.    PAST MEDICAL HISTORY     PHYSICAL EXAM:  ED Triage Vitals [01/02/24 1114]   BP Temp Temp Source Pulse Respirations SpO2 Height Weight - Scale   (!) 145/93 98.2 °F (36.8 °C) Oral 85 20 100 % 1.727 m (5' 8\") 83.6 kg (184 lb 5.4 oz)        Physical Exam   General: No acute distress.  Patient resting comfortably.  Head: Normocephalic/atraumatic.  HEENT: PERRLA.  EOMI.  Moist mucous membranes.  Heart: Regular rate and rhythm.  Normal S1-S2.  Lungs: Diffuse wheezing/rhonchi.  No rales.  Abdomen: Soft.  Extremities: No swelling or edema.         DIAGNOSTIC RESULTS   LABS:   Labs Reviewed - No data to display   When ordered only abnormal lab results are displayed. All other labs were within normal range or not returned as of this dictation.       EMERGENCY DEPARTMENT COURSE and DIFFERENTIAL DIAGNOSIS/MDM:     Vitals:    Vitals:    01/02/24 1114   BP: (!) 145/93   Pulse: 85   Resp: 20   Temp: 98.2 °F (36.8 °C)

## 2024-01-02 NOTE — DISCHARGE INSTR - COC
Continuity of Care Form    Patient Name: Peter Blanco   :  1980  MRN:  [id number]    Admit date:  2024  Discharge date:  ***    Code Status Order: Prior   Advance Directives:     Admitting Physician:  No admitting provider for patient encounter.  PCP: Prasanna Salazar MD    Discharging Nurse: ***  Discharging Hospital Unit/Room#:   Discharging Unit Phone Number: ***    Emergency Contact:   Extended Emergency Contact Information  Primary Emergency Contact: Rashaun Esquivel  Home Phone: [phone]  Work Phone: [phone]  Mobile Phone: [phone]  Relation: Other   needed? No  Secondary Emergency Contact: Rose Sanders  Home Phone: [phone]  Relation: Grandparent    Past Surgical History:  Past Surgical History:   Procedure Laterality Date    HEMORRHOID SURGERY         Immunization History:   Immunization History   Administered Date(s) Administered    Influenza Vaccine, unspecified formulation 10/14/2016    Influenza, FLUARIX, FLULAVAL, FLUZONE (age 6 mo+) AND AFLURIA, (age 3 y+), PF, 0.5mL 2020    Influenza, FLUCELVAX, (age 6 mo+), MDCK, PF, 0.5mL 10/21/2021       Active Problems:  Patient Active Problem List   Diagnosis Code    Essential hypertension I10    Schizophrenia (HCC) F20.9    Anxiety F41.9    Diabetic polyneuropathy associated with type 2 diabetes mellitus (HCC) E11.42    Mixed hyperlipidemia E78.2    Tobacco abuse Z72.0    Chronic obstructive pulmonary disease (HCC) J44.9    Convulsions (HCC) R56.9    Hyponatremia E87.1    Acute respiratory failure (HCC) J96.00    Compression fracture of L2 lumbar vertebra ([facility]) S32.020A    Closed head injury S09.90XA    Acute exacerbation of chronic paranoid schizophrenia (HCC) F20.0    Elevated blood sugar R73.9       Isolation/Infection:   Isolation            No Isolation          Patient Infection Status       None to display                     Nurse Assessment:  Last Vital Signs: BP (!) 145/93   Pulse 85   Temp 98.2 °F

## 2024-01-02 NOTE — ED NOTES
Patient given prescription, discharge instructions verbal and written, patient verbalized understanding.  Alert/oriented X4, Clear speech.  Patient exhibits no distress, ambulates with steady gait per self leaving unit, no further request.

## 2024-01-03 ENCOUNTER — HOSPITAL ENCOUNTER (EMERGENCY)
Age: 44
Discharge: HOME | End: 2024-01-03
Attending: STUDENT IN AN ORGANIZED HEALTH CARE EDUCATION/TRAINING PROGRAM
Payer: COMMERCIAL

## 2024-01-03 VITALS
BODY MASS INDEX: 27.58 KG/M2 | HEART RATE: 102 BPM | OXYGEN SATURATION: 96 % | WEIGHT: 182 LBS | RESPIRATION RATE: 20 BRPM | HEIGHT: 68 IN | SYSTOLIC BLOOD PRESSURE: 177 MMHG | DIASTOLIC BLOOD PRESSURE: 102 MMHG | TEMPERATURE: 98 F

## 2024-01-03 DIAGNOSIS — R25.1 SHAKING: Primary | ICD-10-CM

## 2024-01-03 LAB
ANION GAP SERPL CALCULATED.3IONS-SCNC: 13 MMOL/L (ref 3–16)
BASOPHILS # BLD: 0.1 K/UL (ref 0–0.2)
BASOPHILS NFR BLD: 0.5 %
BUN SERPL-MCNC: 7 MG/DL (ref 7–20)
CALCIUM SERPL-MCNC: 9.3 MG/DL (ref 8.3–10.6)
CHLORIDE SERPL-SCNC: 99 MMOL/L (ref 99–110)
CHP ED QC CHECK: YES
CO2 SERPL-SCNC: 21 MMOL/L (ref 21–32)
CREAT SERPL-MCNC: 0.7 MG/DL (ref 0.9–1.3)
DEPRECATED RDW RBC AUTO: 12.9 % (ref 12.4–15.4)
EOSINOPHIL # BLD: 0.1 K/UL (ref 0–0.6)
EOSINOPHIL NFR BLD: 0.5 %
GFR SERPLBLD CREATININE-BSD FMLA CKD-EPI: >60 ML/MIN/{1.73_M2}
GLUCOSE BLD-MCNC: 123 MG/DL
GLUCOSE BLD-MCNC: 123 MG/DL (ref 70–99)
GLUCOSE SERPL-MCNC: 140 MG/DL (ref 70–99)
HCT VFR BLD AUTO: 44.4 % (ref 40.5–52.5)
HGB BLD-MCNC: 15.1 G/DL (ref 13.5–17.5)
LYMPHOCYTES # BLD: 1.5 K/UL (ref 1–5.1)
LYMPHOCYTES NFR BLD: 11.4 %
MCH RBC QN AUTO: 30.8 PG (ref 26–34)
MCHC RBC AUTO-ENTMCNC: 34 G/DL (ref 31–36)
MCV RBC AUTO: 90.7 FL (ref 80–100)
MONOCYTES # BLD: 0.7 K/UL (ref 0–1.3)
MONOCYTES NFR BLD: 5 %
NEUTROPHILS # BLD: 11.2 K/UL (ref 1.7–7.7)
NEUTROPHILS NFR BLD: 82.6 %
PERFORMED ON: ABNORMAL
PLATELET # BLD AUTO: 233 K/UL (ref 135–450)
PMV BLD AUTO: 8.5 FL (ref 5–10.5)
POTASSIUM SERPL-SCNC: 3.7 MMOL/L (ref 3.5–5.1)
RBC # BLD AUTO: 4.9 M/UL (ref 4.2–5.9)
SODIUM SERPL-SCNC: 133 MMOL/L (ref 136–145)
WBC # BLD AUTO: 13.6 K/UL (ref 4–11)

## 2024-01-03 PROCEDURE — 85025 COMPLETE CBC W/AUTO DIFF WBC: CPT

## 2024-01-03 PROCEDURE — 93005 ELECTROCARDIOGRAM TRACING: CPT | Performed by: STUDENT IN AN ORGANIZED HEALTH CARE EDUCATION/TRAINING PROGRAM

## 2024-01-03 PROCEDURE — 99284 EMERGENCY DEPT VISIT MOD MDM: CPT

## 2024-01-03 PROCEDURE — 96372 THER/PROPH/DIAG INJ SC/IM: CPT

## 2024-01-03 PROCEDURE — 2580000003 HC RX 258: Performed by: STUDENT IN AN ORGANIZED HEALTH CARE EDUCATION/TRAINING PROGRAM

## 2024-01-03 PROCEDURE — 80048 BASIC METABOLIC PNL TOTAL CA: CPT

## 2024-01-03 PROCEDURE — 6360000002 HC RX W HCPCS: Performed by: STUDENT IN AN ORGANIZED HEALTH CARE EDUCATION/TRAINING PROGRAM

## 2024-01-03 RX ADMIN — WATER 20 MG: 1 INJECTION INTRAMUSCULAR; INTRAVENOUS; SUBCUTANEOUS at 10:42

## 2024-01-03 NOTE — ED NOTES
Pt stepped out of room to go outside and smoke. Told patient he cannot leave d/t the medicine he received. Pt went back into room

## 2024-01-03 NOTE — ED PROVIDER NOTES
hands    Additional Exams:  na    DiagnosticResults     ECG    I have reviewed the ECG as follows:    Sinus rhythm at a rate of 88 without ectopy.  Normal axis and intervals.  No evidence of acute ischemia.      RADIOLOGY:    My independent review of medical imaging is as follows:    na    Final interpretation of all medical imaging per radiology as below:    No orders to display       LABS:   Results for orders placed or performed during the hospital encounter of 01/03/24   CBC with Auto Differential   Result Value Ref Range    WBC 13.6 (H) 4.0 - 11.0 K/uL    RBC 4.90 4.20 - 5.90 M/uL    Hemoglobin 15.1 13.5 - 17.5 g/dL    Hematocrit 44.4 40.5 - 52.5 %    MCV 90.7 80.0 - 100.0 fL    MCH 30.8 26.0 - 34.0 pg    MCHC 34.0 31.0 - 36.0 g/dL    RDW 12.9 12.4 - 15.4 %    Platelets 233 135 - 450 K/uL    MPV 8.5 5.0 - 10.5 fL    Neutrophils % 82.6 %    Lymphocytes % 11.4 %    Monocytes % 5.0 %    Eosinophils % 0.5 %    Basophils % 0.5 %    Neutrophils Absolute 11.2 (H) 1.7 - 7.7 K/uL    Lymphocytes Absolute 1.5 1.0 - 5.1 K/uL    Monocytes Absolute 0.7 0.0 - 1.3 K/uL    Eosinophils Absolute 0.1 0.0 - 0.6 K/uL    Basophils Absolute 0.1 0.0 - 0.2 K/uL   BMP w/ Reflex to MG   Result Value Ref Range    Sodium 133 (L) 136 - 145 mmol/L    Potassium reflex Magnesium 3.7 3.5 - 5.1 mmol/L    Chloride 99 99 - 110 mmol/L    CO2 21 21 - 32 mmol/L    Anion Gap 13 3 - 16    Glucose 140 (H) 70 - 99 mg/dL    BUN 7 7 - 20 mg/dL    Creatinine 0.7 (L) 0.9 - 1.3 mg/dL    Est, Glom Filt Rate >60 >60    Calcium 9.3 8.3 - 10.6 mg/dL   POCT Glucose   Result Value Ref Range    Glucose 123 mg/dL    QC OK? YES    POCT Glucose   Result Value Ref Range    POC Glucose 123 (H) 70 - 99 mg/dl    Performed on ACCU-CHEK    EKG 12 Lead   Result Value Ref Range    Ventricular Rate 88 BPM    Atrial Rate 88 BPM    P-R Interval 154 ms    QRS Duration 88 ms    Q-T Interval 342 ms    QTc Calculation (Bazett) 413 ms    P Axis 45 degrees    R Axis 75 degrees    T Axis

## 2024-01-03 NOTE — DISCHARGE INSTRUCTIONS
You were evaluated in the emergency department for shaking. Assessments and testing completed during your visit were reassuring and at this time there is no indication for further testing, treatment or admission to the hospital. Given this it is appropriate to discharge you from the emergency department. At the time of discharge we discussed the following:    Please visit with your mental health specialist for further recommendations.    Please note that sometimes it is difficult to diagnose a medical condition early in the disease process before the disease is fully manifest. Because of this, should you develop any new or worsening symptoms, you may return at any time to the emergency department for another evaluation. If available you are also recommended to review this visit with your primary care physician or other medical provider in the next 7 days. Thank you for allowing us to care for you today.

## 2024-01-04 LAB
EKG ATRIAL RATE: 88 BPM
EKG DIAGNOSIS: NORMAL
EKG P AXIS: 45 DEGREES
EKG P-R INTERVAL: 154 MS
EKG Q-T INTERVAL: 342 MS
EKG QRS DURATION: 88 MS
EKG QTC CALCULATION (BAZETT): 413 MS
EKG R AXIS: 75 DEGREES
EKG T AXIS: 58 DEGREES
EKG VENTRICULAR RATE: 88 BPM

## 2024-01-04 PROCEDURE — 93010 ELECTROCARDIOGRAM REPORT: CPT | Performed by: INTERNAL MEDICINE

## 2024-01-23 ENCOUNTER — OFFICE VISIT (OUTPATIENT)
Dept: INTERNAL MEDICINE CLINIC | Age: 44
End: 2024-01-23
Payer: COMMERCIAL

## 2024-01-23 ENCOUNTER — TELEPHONE (OUTPATIENT)
Dept: PULMONOLOGY | Age: 44
End: 2024-01-23

## 2024-01-23 VITALS
DIASTOLIC BLOOD PRESSURE: 78 MMHG | HEART RATE: 84 BPM | SYSTOLIC BLOOD PRESSURE: 126 MMHG | BODY MASS INDEX: 25.79 KG/M2 | OXYGEN SATURATION: 97 % | HEIGHT: 68 IN | WEIGHT: 170.2 LBS

## 2024-01-23 DIAGNOSIS — J44.9 CHRONIC OBSTRUCTIVE PULMONARY DISEASE, UNSPECIFIED COPD TYPE (HCC): Primary | ICD-10-CM

## 2024-01-23 DIAGNOSIS — S32.020D COMPRESSION FRACTURE OF L2 VERTEBRA WITH ROUTINE HEALING, SUBSEQUENT ENCOUNTER: ICD-10-CM

## 2024-01-23 DIAGNOSIS — I10 ESSENTIAL HYPERTENSION: ICD-10-CM

## 2024-01-23 DIAGNOSIS — E78.2 MIXED HYPERLIPIDEMIA: ICD-10-CM

## 2024-01-23 DIAGNOSIS — F20.9 SCHIZOPHRENIA, UNSPECIFIED TYPE (HCC): ICD-10-CM

## 2024-01-23 DIAGNOSIS — M54.9 DORSALGIA: ICD-10-CM

## 2024-01-23 DIAGNOSIS — F41.9 ANXIETY: ICD-10-CM

## 2024-01-23 DIAGNOSIS — Z72.0 TOBACCO ABUSE: ICD-10-CM

## 2024-01-23 PROBLEM — E11.42 DIABETIC POLYNEUROPATHY ASSOCIATED WITH TYPE 2 DIABETES MELLITUS (HCC): Status: RESOLVED | Noted: 2020-11-19 | Resolved: 2024-01-23

## 2024-01-23 PROCEDURE — 99214 OFFICE O/P EST MOD 30 MIN: CPT | Performed by: INTERNAL MEDICINE

## 2024-01-23 PROCEDURE — 3074F SYST BP LT 130 MM HG: CPT | Performed by: INTERNAL MEDICINE

## 2024-01-23 PROCEDURE — G8427 DOCREV CUR MEDS BY ELIG CLIN: HCPCS | Performed by: INTERNAL MEDICINE

## 2024-01-23 PROCEDURE — G8419 CALC BMI OUT NRM PARAM NOF/U: HCPCS | Performed by: INTERNAL MEDICINE

## 2024-01-23 PROCEDURE — 3078F DIAST BP <80 MM HG: CPT | Performed by: INTERNAL MEDICINE

## 2024-01-23 PROCEDURE — 4004F PT TOBACCO SCREEN RCVD TLK: CPT | Performed by: INTERNAL MEDICINE

## 2024-01-23 PROCEDURE — 3023F SPIROM DOC REV: CPT | Performed by: INTERNAL MEDICINE

## 2024-01-23 PROCEDURE — G8484 FLU IMMUNIZE NO ADMIN: HCPCS | Performed by: INTERNAL MEDICINE

## 2024-01-23 RX ORDER — ALBUTEROL SULFATE 90 UG/1
2 AEROSOL, METERED RESPIRATORY (INHALATION) 4 TIMES DAILY PRN
Qty: 1 EACH | Refills: 3 | Status: SHIPPED | OUTPATIENT
Start: 2024-01-23

## 2024-01-23 RX ORDER — FLUPHENAZINE HYDROCHLORIDE 10 MG/1
10 TABLET ORAL DAILY
COMMUNITY
Start: 2023-12-20

## 2024-01-23 ASSESSMENT — PATIENT HEALTH QUESTIONNAIRE - PHQ9
1. LITTLE INTEREST OR PLEASURE IN DOING THINGS: 0
SUM OF ALL RESPONSES TO PHQ9 QUESTIONS 1 & 2: 0
SUM OF ALL RESPONSES TO PHQ QUESTIONS 1-9: 0
2. FEELING DOWN, DEPRESSED OR HOPELESS: 0
SUM OF ALL RESPONSES TO PHQ QUESTIONS 1-9: 0

## 2024-01-23 NOTE — PROGRESS NOTES
Name: Peter Blanco  [id number]  Age: 43 y.o.  YOB: 1980  Sex: male    CHIEF COMPLAINT:    Chief Complaint   Patient presents with    Hypertension    COPD    Other     Other chronic conditions         HISTORY OF PRESENT ILLNESS:     This is a pleasant  43 y.o. male  is seen today for management of chronic medical problems and medications refills.  Previous records reviewed .    C/O increased SOB.. Takes albuterol HFA  and it helps.. Taking more frequently now.  Continuing to smoke 1 1/2 PPD..  Unable to quit smoking..  Refusing any help to quit smoking.  Will try on his own to cut back on his smoking.    Denies CP.       Still with lower back pain . Medications are not helping him much,.  He is taking naproxen and Tylenol as needed  He fell in care home from top bunk and was sent to ER in February 2023 and had multiple imaging done.     CT abdomen /pelvis showed:     1. Acute compression fracture of the L2 vertebral body with 10% anterior  vertebral height loss and minimal posterior cortex retropulsion.  2. No acute abnormality in the chest.  3. No solid or hollow viscus organ injury within limits of this noncontrast  examination.  4. Distended fluid-filled stomach and mildly dilated fluid-filled esophagus  most consistent with reflux.     CT head: No acute intracranial abnormality. Moderate right parietal scalp hematoma.  No underlying fracture. Mild left face soft tissue swelling.  CT neck: No acute abnormality of the cervical spine.      Denies any abdominal pain.  Appetite OK.  Bowels moving Ok.  No urinary symptoms.        vision Ok with glasses.  Denies  any significant skin lesions.  He is seeing his psychiatrist regularly and taking psych medications regularly from them.     No other complaints.  Has reservations regarding Covid vaccinations. Tried to reassure. Patient not convinced.  He did not get a flu shot since 2021.  Labs reviewed from 6/7/2023 and 1/3/2024 and explained to the

## 2024-02-14 ENCOUNTER — HOSPITAL ENCOUNTER (EMERGENCY)
Age: 44
Discharge: HOME | End: 2024-02-14
Attending: EMERGENCY MEDICINE
Payer: COMMERCIAL

## 2024-02-14 ENCOUNTER — APPOINTMENT (OUTPATIENT)
Dept: GENERAL RADIOLOGY | Age: 44
End: 2024-02-14
Payer: COMMERCIAL

## 2024-02-14 VITALS
HEART RATE: 89 BPM | OXYGEN SATURATION: 98 % | DIASTOLIC BLOOD PRESSURE: 97 MMHG | RESPIRATION RATE: 20 BRPM | TEMPERATURE: 97.9 F | SYSTOLIC BLOOD PRESSURE: 133 MMHG

## 2024-02-14 DIAGNOSIS — J45.41 MODERATE PERSISTENT ASTHMA WITH EXACERBATION: Primary | ICD-10-CM

## 2024-02-14 PROCEDURE — 99283 EMERGENCY DEPT VISIT LOW MDM: CPT

## 2024-02-14 PROCEDURE — 6370000000 HC RX 637 (ALT 250 FOR IP): Performed by: EMERGENCY MEDICINE

## 2024-02-14 PROCEDURE — 71045 X-RAY EXAM CHEST 1 VIEW: CPT

## 2024-02-14 PROCEDURE — 94640 AIRWAY INHALATION TREATMENT: CPT

## 2024-02-14 RX ORDER — PREDNISONE 20 MG/1
60 TABLET ORAL ONCE
Status: COMPLETED | OUTPATIENT
Start: 2024-02-14 | End: 2024-02-14

## 2024-02-14 RX ORDER — IPRATROPIUM BROMIDE AND ALBUTEROL SULFATE 2.5; .5 MG/3ML; MG/3ML
2 SOLUTION RESPIRATORY (INHALATION) ONCE
Status: COMPLETED | OUTPATIENT
Start: 2024-02-14 | End: 2024-02-14

## 2024-02-14 RX ORDER — PREDNISONE 20 MG/1
60 TABLET ORAL DAILY
Qty: 12 TABLET | Refills: 0 | Status: SHIPPED | OUTPATIENT
Start: 2024-02-14 | End: 2024-02-18

## 2024-02-14 RX ADMIN — IPRATROPIUM BROMIDE AND ALBUTEROL SULFATE 2 DOSE: 2.5; .5 SOLUTION RESPIRATORY (INHALATION) at 03:18

## 2024-02-14 RX ADMIN — PREDNISONE 60 MG: 20 TABLET ORAL at 03:20

## 2024-02-14 NOTE — ED PROVIDER NOTES
Triage Chief Complaint:   Shortness of Breath and Wheezing    [place]:  Peter Blanco is a 43 y.o. male that presents with a few hours of shortness of breath and wheezing consistent with prior history of asthma.  Denies any cough, fever, chest pain, abdominal pain, nausea or vomiting.  No other acute complaints.  No recent antibiotic or steroid use.    ROS:  At least 10 systems reviewed and otherwise acutely negative except as in the [place].    Past Medical History:   Diagnosis Date    Anxiety     Asthma     Chronic back pain     Diabetes mellitus (HCC)     H/O 24 hour EKG monitoring 09/19/2013    EVENT MONITOR-normal sinus rhythm    H/O echocardiogram 04/20/2017    EF 55% Normal LV with normal systolic function. No significant valvulopathy is seen    Hx of echocardiogram 08/16/13 08/13 Mitrall annular calcification with a trace to mild MR    Hyperlipidemia     Hypertension     Schizophrenia (HCC)      Past Surgical History:   Procedure Laterality Date    HEMORRHOID SURGERY       Family History   Problem Relation Age of Onset    Emphysema Mother     Heart Disease Father     High Blood Pressure Father      Social History     Socioeconomic History    Marital status: Single     Spouse name: Not on file    Number of children: Not on file    Years of education: Not on file    Highest education level: Not on file   Occupational History    Not on file   Tobacco Use    Smoking status: Every Day     Current packs/day: 0.50     Average packs/day: 0.5 packs/day for 20.0 years (10.0 ttl pk-yrs)     Types: Cigarettes    Smokeless tobacco: Current     Types: Snuff   Vaping Use    Vaping Use: Never used   Substance and Sexual Activity    Alcohol use: No    Drug use: Yes     Types: Cocaine, Marijuana (Weed)    Sexual activity: Not Currently     Comment: single    Other Topics Concern    Not on file   Social History Narrative    Not on file     Social Determinants of Health     Financial Resource Strain: Low Risk  (6/13/2023)

## 2024-04-08 ENCOUNTER — HOSPITAL ENCOUNTER (EMERGENCY)
Age: 44
Discharge: HOME | End: 2024-04-08
Attending: EMERGENCY MEDICINE
Payer: COMMERCIAL

## 2024-04-08 VITALS
TEMPERATURE: 97.8 F | SYSTOLIC BLOOD PRESSURE: 129 MMHG | HEART RATE: 86 BPM | RESPIRATION RATE: 16 BRPM | OXYGEN SATURATION: 98 % | DIASTOLIC BLOOD PRESSURE: 90 MMHG

## 2024-04-08 DIAGNOSIS — J40 BRONCHITIS: Primary | ICD-10-CM

## 2024-04-08 PROCEDURE — 94640 AIRWAY INHALATION TREATMENT: CPT

## 2024-04-08 PROCEDURE — 6370000000 HC RX 637 (ALT 250 FOR IP): Performed by: EMERGENCY MEDICINE

## 2024-04-08 PROCEDURE — 94664 DEMO&/EVAL PT USE INHALER: CPT

## 2024-04-08 PROCEDURE — 99283 EMERGENCY DEPT VISIT LOW MDM: CPT

## 2024-04-08 RX ORDER — ACETAMINOPHEN 500 MG
1000 TABLET ORAL
Status: COMPLETED | OUTPATIENT
Start: 2024-04-08 | End: 2024-04-08

## 2024-04-08 RX ORDER — PREDNISONE 20 MG/1
40 TABLET ORAL ONCE
Status: COMPLETED | OUTPATIENT
Start: 2024-04-08 | End: 2024-04-08

## 2024-04-08 RX ORDER — PREDNISONE 10 MG/1
40 TABLET ORAL DAILY
Qty: 16 TABLET | Refills: 0 | Status: SHIPPED | OUTPATIENT
Start: 2024-04-08 | End: 2024-04-12

## 2024-04-08 RX ORDER — IPRATROPIUM BROMIDE AND ALBUTEROL SULFATE 2.5; .5 MG/3ML; MG/3ML
1 SOLUTION RESPIRATORY (INHALATION) ONCE
Status: COMPLETED | OUTPATIENT
Start: 2024-04-08 | End: 2024-04-08

## 2024-04-08 RX ORDER — IBUPROFEN 600 MG/1
600 TABLET ORAL EVERY 6 HOURS PRN
Qty: 20 TABLET | Refills: 0 | Status: SHIPPED | OUTPATIENT
Start: 2024-04-08 | End: 2024-05-08

## 2024-04-08 RX ORDER — BENZONATATE 200 MG/1
200 CAPSULE ORAL 3 TIMES DAILY PRN
Qty: 21 CAPSULE | Refills: 0 | Status: SHIPPED | OUTPATIENT
Start: 2024-04-08 | End: 2024-04-15

## 2024-04-08 RX ADMIN — IPRATROPIUM BROMIDE AND ALBUTEROL SULFATE 1 DOSE: 2.5; .5 SOLUTION RESPIRATORY (INHALATION) at 11:07

## 2024-04-08 RX ADMIN — ACETAMINOPHEN 1000 MG: 500 TABLET ORAL at 11:04

## 2024-04-08 RX ADMIN — PREDNISONE 40 MG: 20 TABLET ORAL at 11:04

## 2024-04-08 NOTE — ED PROVIDER NOTES
Triage Chief Complaint:    No chief complaint on file.    [facility]   Peter Blanco is a 43 y.o. male that presents for evaluation of cough congestion shortness of breath.  Feels as though is similar to prior episodes where he is required breathing treatments.  Patient has been using his inhaler without significant improvement.  He has not noticed any chest pain.  He has not noticed any productive cough.  He had a mild sore throat as well as some congestion.  Patient has not noticed any abdominal pain nausea vomiting.  No change to bowel movements or urination.  No recent steroids or antibiotics.  He does feel as though steroids have helped him in the past.    History from : Patient    Limitations to history : None    ROS:  10 systems reviewed and negative except as above.     Past Medical History:   Diagnosis Date    Anxiety     Asthma     Chronic back pain     Diabetes mellitus (HCC)     H/O 24 hour EKG monitoring 09/19/2013    EVENT MONITOR-normal sinus rhythm    H/O echocardiogram 04/20/2017    EF 55% Normal LV with normal systolic function. No significant valvulopathy is seen    Hx of echocardiogram 08/16/13 08/13 Mitrall annular calcification with a trace to mild MR    Hyperlipidemia     Hypertension     Schizophrenia (HCC)      Past Surgical History:   Procedure Laterality Date    HEMORRHOID SURGERY       Family History   Problem Relation Age of Onset    Emphysema Mother     Heart Disease Father     High Blood Pressure Father      Social History     Socioeconomic History    Marital status: Single     Spouse name: Not on file    Number of children: Not on file    Years of education: Not on file    Highest education level: Not on file   Occupational History    Not on file   Tobacco Use    Smoking status: Every Day     Current packs/day: 0.50     Average packs/day: 0.5 packs/day for 20.0 years (10.0 ttl pk-yrs)     Types: Cigarettes    Smokeless tobacco: Current     Types: Snuff   Vaping Use    Vaping Use: Never 
immobilization/prescription medication

## 2024-04-11 ENCOUNTER — CARE COORDINATION (OUTPATIENT)
Dept: CARE COORDINATION | Age: 44
End: 2024-04-11

## 2024-04-11 NOTE — CARE COORDINATION
ACM attempted to call patient for ED follow-up, but phone number on file is invalid & rings to a law office. There are phone numbers listed for emergency contacts, but the patient does not give permission for any health information to be discussed with anyone.     ACM will route to PCP office requesting phone number be updated at next office visit.

## 2024-04-14 ENCOUNTER — HOSPITAL ENCOUNTER (EMERGENCY)
Age: 44
Discharge: HOME | End: 2024-04-14
Attending: EMERGENCY MEDICINE
Payer: COMMERCIAL

## 2024-04-14 VITALS
WEIGHT: 170 LBS | BODY MASS INDEX: 25.76 KG/M2 | DIASTOLIC BLOOD PRESSURE: 91 MMHG | HEIGHT: 68 IN | HEART RATE: 77 BPM | OXYGEN SATURATION: 97 % | RESPIRATION RATE: 19 BRPM | SYSTOLIC BLOOD PRESSURE: 153 MMHG | TEMPERATURE: 98.6 F

## 2024-04-14 DIAGNOSIS — F19.930 SUBSTANCE WITHDRAWAL WITHOUT COMPLICATION (HCC): ICD-10-CM

## 2024-04-14 DIAGNOSIS — F19.10 POLYSUBSTANCE ABUSE (HCC): Primary | ICD-10-CM

## 2024-04-14 LAB
ALBUMIN SERPL-MCNC: 4 GM/DL (ref 3.4–5)
ALP BLD-CCNC: 69 IU/L (ref 40–129)
ALT SERPL-CCNC: 14 U/L (ref 10–40)
AMPHETAMINES: NEGATIVE
ANION GAP SERPL CALCULATED.3IONS-SCNC: 11 MMOL/L (ref 7–16)
AST SERPL-CCNC: 25 IU/L (ref 15–37)
BARBITURATE SCREEN URINE: NEGATIVE
BASOPHILS ABSOLUTE: 0 K/CU MM
BASOPHILS RELATIVE PERCENT: 0.4 % (ref 0–1)
BENZODIAZEPINE SCREEN, URINE: NEGATIVE
BILIRUB SERPL-MCNC: 0.7 MG/DL (ref 0–1)
BUN SERPL-MCNC: 9 MG/DL (ref 6–23)
CALCIUM SERPL-MCNC: 8.6 MG/DL (ref 8.3–10.6)
CANNABINOID SCREEN URINE: NEGATIVE
CHLORIDE BLD-SCNC: 99 MMOL/L (ref 99–110)
CO2: 22 MMOL/L (ref 21–32)
COCAINE METABOLITE: NEGATIVE
CREAT SERPL-MCNC: 0.5 MG/DL (ref 0.9–1.3)
DIFFERENTIAL TYPE: ABNORMAL
EKG ATRIAL RATE: 73 BPM
EKG DIAGNOSIS: NORMAL
EKG P AXIS: 29 DEGREES
EKG P-R INTERVAL: 168 MS
EKG Q-T INTERVAL: 404 MS
EKG QRS DURATION: 80 MS
EKG QTC CALCULATION (BAZETT): 445 MS
EKG R AXIS: 46 DEGREES
EKG T AXIS: 40 DEGREES
EKG VENTRICULAR RATE: 73 BPM
EOSINOPHILS ABSOLUTE: 0.5 K/CU MM
EOSINOPHILS RELATIVE PERCENT: 6.3 % (ref 0–3)
FENTANYL URINE: NEGATIVE
GFR SERPL CREATININE-BSD FRML MDRD: >90 ML/MIN/1.73M2
GLUCOSE SERPL-MCNC: 99 MG/DL (ref 70–99)
HCT VFR BLD CALC: 43.2 % (ref 42–52)
HEMOGLOBIN: 14.7 GM/DL (ref 13.5–18)
IMMATURE NEUTROPHIL %: 0.4 % (ref 0–0.43)
LYMPHOCYTES ABSOLUTE: 1.3 K/CU MM
LYMPHOCYTES RELATIVE PERCENT: 16.2 % (ref 24–44)
MAGNESIUM: 2 MG/DL (ref 1.8–2.4)
MCH RBC QN AUTO: 29.9 PG (ref 27–31)
MCHC RBC AUTO-ENTMCNC: 34 % (ref 32–36)
MCV RBC AUTO: 87.8 FL (ref 78–100)
MONOCYTES ABSOLUTE: 0.4 K/CU MM
MONOCYTES RELATIVE PERCENT: 5.4 % (ref 0–4)
NEUTROPHILS RELATIVE PERCENT: 71.3 % (ref 36–66)
NUCLEATED RBC %: 0 %
OPIATES, URINE: NEGATIVE
OXYCODONE: NEGATIVE
PDW BLD-RTO: 11.9 % (ref 11.7–14.9)
PLATELET # BLD: 212 K/CU MM (ref 140–440)
PMV BLD AUTO: 9.7 FL (ref 7.5–11.1)
POTASSIUM SERPL-SCNC: 3.8 MMOL/L (ref 3.5–5.1)
RBC # BLD: 4.92 M/CU MM (ref 4.6–6.2)
SEGMENTED NEUTROPHILS ABSOLUTE COUNT: 5.7 K/CU MM
SODIUM BLD-SCNC: 132 MMOL/L (ref 135–145)
TOTAL IMMATURE NEUTOROPHIL: 0.03 K/CU MM
TOTAL NUCLEATED RBC: 0 K/CU MM
TOTAL PROTEIN: 6.8 GM/DL (ref 6.4–8.2)
WBC # BLD: 7.9 K/CU MM (ref 4–10.5)

## 2024-04-14 PROCEDURE — 83735 ASSAY OF MAGNESIUM: CPT

## 2024-04-14 PROCEDURE — 99284 EMERGENCY DEPT VISIT MOD MDM: CPT

## 2024-04-14 PROCEDURE — 80307 DRUG TEST PRSMV CHEM ANLYZR: CPT

## 2024-04-14 PROCEDURE — 80053 COMPREHEN METABOLIC PANEL: CPT

## 2024-04-14 PROCEDURE — 6370000000 HC RX 637 (ALT 250 FOR IP): Performed by: EMERGENCY MEDICINE

## 2024-04-14 PROCEDURE — 85025 COMPLETE CBC W/AUTO DIFF WBC: CPT

## 2024-04-14 PROCEDURE — 93010 ELECTROCARDIOGRAM REPORT: CPT | Performed by: INTERNAL MEDICINE

## 2024-04-14 PROCEDURE — 93005 ELECTROCARDIOGRAM TRACING: CPT | Performed by: EMERGENCY MEDICINE

## 2024-04-14 RX ORDER — ONDANSETRON 2 MG/ML
4 INJECTION INTRAMUSCULAR; INTRAVENOUS EVERY 30 MIN PRN
Status: DISCONTINUED | OUTPATIENT
Start: 2024-04-14 | End: 2024-04-14 | Stop reason: HOSPADM

## 2024-04-14 RX ORDER — LORAZEPAM 1 MG/1
1 TABLET ORAL ONCE
Status: COMPLETED | OUTPATIENT
Start: 2024-04-14 | End: 2024-04-14

## 2024-04-14 RX ADMIN — LORAZEPAM 1 MG: 1 TABLET ORAL at 12:28

## 2024-04-14 ASSESSMENT — PAIN - FUNCTIONAL ASSESSMENT: PAIN_FUNCTIONAL_ASSESSMENT: NONE - DENIES PAIN

## 2024-04-14 NOTE — DISCHARGE INSTR - LAB
Substance Abuse Resources    [facility]   [phone] or [phone]   [address]  Intensive Outpatient and Outpatient treatment for both men & women    [facility]   [phone] [address] [facility]   Intensive outpatient and residential for men & Intensive outpatient for women     [facility]      [phone]                      [address] [facility]  Comprehensive Outpatient Addiction Treatment    Outpatient treatment for both men & women:   Call for insurance eligibility vs cost  [facility] [phone]  [facility] [phone]  [facility] [phone]  [facility] [phone]    Drug and Alcohol Treatment Facilities:   Call for insurance eligibility vs cost  [place] [phone]  [facility] [phone]  [facility] [phone]  [facility] [phone]  [facility] [phone]  [facility] [phone]  [facility] [phone]  [facility] [phone]  [facility] [phone]  [facility] [phone]  [facility] [phone]    Alcoholics Anonymous/ [place]/ALATEEN [phone] or [phone]  https://cogf.meetingfor.me/meetings or www.aacentral[place].org      Narcotics Anonymous [phone] or [phone]   http://sascna.org/ or www.nacentral[place].org  Cocaine Anonymous [phone] www.caohio.org   Marijuana Anonymous [phone] www.marijuana-anonymous.org     Mental Health Crisis Line: 910-5336  [facility]: [phone]  [place] Quit Line: (smoking) https://[place].quitlogix.org 800-QUIT-NOW (007-043-0596)  24/7 crisis line for [facility]: [phone]  24-hour crisis text hotline: Text the word \"4hope\" to 380-569 for crisis support    Information & Referral for [facility]  Referral line to connect with available resources/services  [facility] [phone] or [facility]

## 2024-04-14 NOTE — ED TRIAGE NOTES
Pt [facility] with c/o shakiness and \"feeling unwell\". Pt states they use mariajuana, cocaine, and meth \"as often as they can get it\".  Pt states he used cocaine last night. Pt denies alcohol use and denies any pain.

## 2024-04-14 NOTE — CARE COORDINATION
CM received consult on patient for drug rehab resources. CM placed drug resources in patients' AVS. CM in to see patient. CM introduced self and explained job role and provided patient with physical resources. CM spoke to patient about possible interest in inpatient or outpatient treatment.     Patient interested in going to inpatient. CM called [facility] @ [phone] and gave referral to [place]. [facility] and sent Uber to .     Uber drivers name: Osman  w/pickup time of 2:23 pm    [employer] w/License Number [id number].    CM updated Dr. Perez. Patient [facility] for [facility] days with aftercare and sober living that will be offered.     While [employer]  was pulling up; patient asked this CM \"Why couldn't I just go back to the shelter where I came?\" CM stated that patient had asked this CM to arrange inpatient for 30 days at [facility]. Patient stated OK and then got into [facility] for rehab.     No further needs.

## 2024-06-19 ENCOUNTER — HOSPITAL ENCOUNTER (EMERGENCY)
Age: 44
Discharge: HOME | End: 2024-06-19
Attending: EMERGENCY MEDICINE
Payer: COMMERCIAL

## 2024-06-19 VITALS
HEART RATE: 89 BPM | OXYGEN SATURATION: 94 % | DIASTOLIC BLOOD PRESSURE: 84 MMHG | WEIGHT: 170 LBS | TEMPERATURE: 98.3 F | SYSTOLIC BLOOD PRESSURE: 146 MMHG | RESPIRATION RATE: 13 BRPM | BODY MASS INDEX: 25.85 KG/M2

## 2024-06-19 DIAGNOSIS — J45.41 MODERATE PERSISTENT ASTHMA WITH EXACERBATION: Primary | ICD-10-CM

## 2024-06-19 LAB — GLUCOSE BLD-MCNC: 114 MG/DL (ref 70–99)

## 2024-06-19 PROCEDURE — 99283 EMERGENCY DEPT VISIT LOW MDM: CPT

## 2024-06-19 PROCEDURE — 6370000000 HC RX 637 (ALT 250 FOR IP): Performed by: EMERGENCY MEDICINE

## 2024-06-19 PROCEDURE — 82962 GLUCOSE BLOOD TEST: CPT

## 2024-06-19 PROCEDURE — 94640 AIRWAY INHALATION TREATMENT: CPT

## 2024-06-19 RX ORDER — PREDNISONE 10 MG/1
40 TABLET ORAL DAILY
Qty: 25 TABLET | Refills: 0 | Status: SHIPPED | OUTPATIENT
Start: 2024-06-19 | End: 2024-06-25

## 2024-06-19 RX ORDER — PREDNISONE 20 MG/1
60 TABLET ORAL ONCE
Status: COMPLETED | OUTPATIENT
Start: 2024-06-19 | End: 2024-06-19

## 2024-06-19 RX ORDER — CETIRIZINE HYDROCHLORIDE 10 MG/1
10 TABLET ORAL ONCE
Status: COMPLETED | OUTPATIENT
Start: 2024-06-19 | End: 2024-06-19

## 2024-06-19 RX ORDER — IPRATROPIUM BROMIDE AND ALBUTEROL SULFATE 2.5; .5 MG/3ML; MG/3ML
3 SOLUTION RESPIRATORY (INHALATION) ONCE
Status: COMPLETED | OUTPATIENT
Start: 2024-06-19 | End: 2024-06-19

## 2024-06-19 RX ORDER — BENZONATATE 100 MG/1
100 CAPSULE ORAL ONCE
Status: COMPLETED | OUTPATIENT
Start: 2024-06-19 | End: 2024-06-19

## 2024-06-19 RX ORDER — ALBUTEROL SULFATE 90 UG/1
2 AEROSOL, METERED RESPIRATORY (INHALATION) 4 TIMES DAILY PRN
Qty: 18 G | Refills: 0 | Status: SHIPPED | OUTPATIENT
Start: 2024-06-19

## 2024-06-19 RX ADMIN — IPRATROPIUM BROMIDE AND ALBUTEROL SULFATE 3 DOSE: .5; 2.5 SOLUTION RESPIRATORY (INHALATION) at 22:00

## 2024-06-19 RX ADMIN — BENZONATATE 100 MG: 100 CAPSULE ORAL at 22:02

## 2024-06-19 RX ADMIN — CETIRIZINE HYDROCHLORIDE 10 MG: 10 TABLET, FILM COATED ORAL at 22:02

## 2024-06-19 RX ADMIN — PREDNISONE 60 MG: 20 TABLET ORAL at 22:02

## 2024-06-19 ASSESSMENT — PAIN SCALES - GENERAL: PAINLEVEL_OUTOF10: 0

## 2024-06-19 ASSESSMENT — PAIN - FUNCTIONAL ASSESSMENT
PAIN_FUNCTIONAL_ASSESSMENT: NONE - DENIES PAIN
PAIN_FUNCTIONAL_ASSESSMENT: 0-10

## 2024-06-20 NOTE — ED PROVIDER NOTES
Triage Chief Complaint:    Asthma    HPI   Peter Blanco is a 43 y.o. male that presents for evaluation of shortness of breath is been ongoing for the last 3 days now.  He states he had a nonproductive cough as well.  He has a history of asthma but has been without his inhaler for the last couple weeks.  Patient has stated that this happens to him when the weather changes.  No known allergen exposure.  No chest pain.  No abdominal pain.  No nausea or vomiting.  No change in bowel movements or urination.  He does report that he is a diet-controlled diabetic not currently on medication.  No recent hospitalizations.  No known sick contacts.  No recent antibiotics.    History from : Patient    Limitations to history : None    ROS:  10 systems reviewed and negative except as above.     Past Medical History:   Diagnosis Date    Anxiety     Asthma     Chronic back pain     Diabetes mellitus (HCC)     H/O 24 hour EKG monitoring 09/19/2013    EVENT MONITOR-normal sinus rhythm    H/O echocardiogram 04/20/2017    EF 55% Normal LV with normal systolic function. No significant valvulopathy is seen    Hx of echocardiogram 08/16/13 08/13 Mitrall annular calcification with a trace to mild MR    Hyperlipidemia     Hypertension     Schizophrenia (HCC)      Past Surgical History:   Procedure Laterality Date    HEMORRHOID SURGERY       Family History   Problem Relation Age of Onset    Emphysema Mother     Heart Disease Father     High Blood Pressure Father      Social History     Socioeconomic History    Marital status: Single     Spouse name: Not on file    Number of children: Not on file    Years of education: Not on file    Highest education level: Not on file   Occupational History    Not on file   Tobacco Use    Smoking status: Every Day     Current packs/day: 0.50     Average packs/day: 0.5 packs/day for 20.0 years (10.0 ttl pk-yrs)     Types: Cigarettes    Smokeless tobacco: Current     Types: Snuff   Vaping Use    Vaping

## 2024-06-20 NOTE — ED TRIAGE NOTES
Pt is here by EMS for asthma symptoms that have been ongoing for about three days while he has not had his inhaler.